# Patient Record
Sex: MALE | Race: WHITE | NOT HISPANIC OR LATINO | Employment: OTHER | URBAN - METROPOLITAN AREA
[De-identification: names, ages, dates, MRNs, and addresses within clinical notes are randomized per-mention and may not be internally consistent; named-entity substitution may affect disease eponyms.]

---

## 2017-04-19 ENCOUNTER — HOSPITAL ENCOUNTER (EMERGENCY)
Facility: HOSPITAL | Age: 37
Discharge: HOME | End: 2017-04-19
Attending: EMERGENCY MEDICINE | Admitting: EMERGENCY MEDICINE

## 2017-04-19 VITALS
RESPIRATION RATE: 18 BRPM | TEMPERATURE: 98.3 F | WEIGHT: 219.36 LBS | DIASTOLIC BLOOD PRESSURE: 76 MMHG | HEART RATE: 76 BPM | SYSTOLIC BLOOD PRESSURE: 136 MMHG | OXYGEN SATURATION: 94 %

## 2017-04-19 DIAGNOSIS — R45.851 SUICIDAL IDEATION: Primary | ICD-10-CM

## 2017-04-19 DIAGNOSIS — R45.1 AGITATION: ICD-10-CM

## 2017-04-19 LAB — ETHANOL EXG-MCNC: 0 MG/DL

## 2017-04-19 PROCEDURE — 99284 EMERGENCY DEPT VISIT MOD MDM: CPT

## 2017-04-19 PROCEDURE — 82075 ASSAY OF BREATH ETHANOL: CPT | Performed by: EMERGENCY MEDICINE

## 2017-07-18 ENCOUNTER — ALLSCRIPTS OFFICE VISIT (OUTPATIENT)
Dept: OTHER | Facility: OTHER | Age: 37
End: 2017-07-18

## 2017-07-18 DIAGNOSIS — T50.901A POISONING BY UNSPECIFIED DRUGS, MEDICAMENTS AND BIOLOGICAL SUBSTANCES, ACCIDENTAL (UNINTENTIONAL), INITIAL ENCOUNTER: ICD-10-CM

## 2017-07-18 DIAGNOSIS — B15.9 HEPATITIS A WITHOUT HEPATIC COMA: ICD-10-CM

## 2017-07-18 DIAGNOSIS — B19.20 VIRAL HEPATITIS C WITHOUT HEPATIC COMA: ICD-10-CM

## 2017-07-18 DIAGNOSIS — B17.10 ACUTE HEPATITIS C WITHOUT HEPATIC COMA: ICD-10-CM

## 2017-07-21 ENCOUNTER — APPOINTMENT (OUTPATIENT)
Dept: LAB | Facility: CLINIC | Age: 37
End: 2017-07-21
Payer: MEDICARE

## 2017-07-21 ENCOUNTER — TRANSCRIBE ORDERS (OUTPATIENT)
Dept: LAB | Facility: CLINIC | Age: 37
End: 2017-07-21

## 2017-07-21 DIAGNOSIS — B15.9 HEPATITIS A WITHOUT HEPATIC COMA: ICD-10-CM

## 2017-07-21 LAB
ALBUMIN SERPL BCP-MCNC: 3.7 G/DL (ref 3.5–5)
ALP SERPL-CCNC: 77 U/L (ref 46–116)
ALT SERPL W P-5'-P-CCNC: 96 U/L (ref 12–78)
AST SERPL W P-5'-P-CCNC: 39 U/L (ref 5–45)
BILIRUB DIRECT SERPL-MCNC: 0.24 MG/DL (ref 0–0.2)
BILIRUB SERPL-MCNC: 0.8 MG/DL (ref 0.2–1)
INR PPP: 0.93 (ref 0.86–1.16)
IRON SATN MFR SERPL: 24 %
IRON SERPL-MCNC: 90 UG/DL (ref 65–175)
PROT SERPL-MCNC: 7.6 G/DL (ref 6.4–8.2)
PROTHROMBIN TIME: 12.7 SECONDS (ref 12.1–14.4)
TIBC SERPL-MCNC: 372 UG/DL (ref 250–450)

## 2017-07-21 PROCEDURE — 80076 HEPATIC FUNCTION PANEL: CPT

## 2017-07-21 PROCEDURE — 87902 NFCT AGT GNTYP ALYS HEP C: CPT

## 2017-07-21 PROCEDURE — 83540 ASSAY OF IRON: CPT

## 2017-07-21 PROCEDURE — 87340 HEPATITIS B SURFACE AG IA: CPT

## 2017-07-21 PROCEDURE — 87522 HEPATITIS C REVRS TRNSCRPJ: CPT

## 2017-07-21 PROCEDURE — 85610 PROTHROMBIN TIME: CPT

## 2017-07-21 PROCEDURE — 86704 HEP B CORE ANTIBODY TOTAL: CPT

## 2017-07-21 PROCEDURE — 36415 COLL VENOUS BLD VENIPUNCTURE: CPT

## 2017-07-21 PROCEDURE — 83550 IRON BINDING TEST: CPT

## 2017-07-21 PROCEDURE — 86803 HEPATITIS C AB TEST: CPT

## 2017-07-21 PROCEDURE — 86705 HEP B CORE ANTIBODY IGM: CPT

## 2017-07-22 LAB
HBV CORE AB SER QL: ABNORMAL
HBV CORE IGM SER QL: ABNORMAL
HBV SURFACE AG SER QL: ABNORMAL
HCV AB SER QL: ABNORMAL

## 2017-07-25 LAB
HCV GENTYP SERPL NAA+PROBE: NORMAL
HCV PLEASE NOTE: NORMAL
HCV RNA SERPL NAA+PROBE-ACNC: NORMAL IU/ML
HCV RNA SERPL NAA+PROBE-LOG IU: 5.75 LOG10 IU/ML
TEST INFORMATION: NORMAL

## 2017-07-26 ENCOUNTER — GENERIC CONVERSION - ENCOUNTER (OUTPATIENT)
Dept: OTHER | Facility: OTHER | Age: 37
End: 2017-07-26

## 2017-08-24 ENCOUNTER — APPOINTMENT (OUTPATIENT)
Dept: LAB | Age: 37
End: 2017-08-24
Payer: MEDICARE

## 2017-08-24 ENCOUNTER — TRANSCRIBE ORDERS (OUTPATIENT)
Dept: ADMINISTRATIVE | Age: 37
End: 2017-08-24

## 2017-08-24 DIAGNOSIS — B17.10 ACUTE HEPATITIS C WITHOUT HEPATIC COMA: ICD-10-CM

## 2017-08-24 DIAGNOSIS — B19.20 VIRAL HEPATITIS C WITHOUT HEPATIC COMA: ICD-10-CM

## 2017-08-24 LAB
BASOPHILS # BLD AUTO: 0.03 THOUSANDS/ΜL (ref 0–0.1)
BASOPHILS NFR BLD AUTO: 0 % (ref 0–1)
EOSINOPHIL # BLD AUTO: 0.39 THOUSAND/ΜL (ref 0–0.61)
EOSINOPHIL NFR BLD AUTO: 4 % (ref 0–6)
ERYTHROCYTE [DISTWIDTH] IN BLOOD BY AUTOMATED COUNT: 12.6 % (ref 11.6–15.1)
ETHANOL SERPL-MCNC: <3 MG/DL (ref 0–3)
HCT VFR BLD AUTO: 41.6 % (ref 36.5–49.3)
HGB BLD-MCNC: 14.3 G/DL (ref 12–17)
LYMPHOCYTES # BLD AUTO: 2.88 THOUSANDS/ΜL (ref 0.6–4.47)
LYMPHOCYTES NFR BLD AUTO: 33 % (ref 14–44)
MCH RBC QN AUTO: 31.3 PG (ref 26.8–34.3)
MCHC RBC AUTO-ENTMCNC: 34.4 G/DL (ref 31.4–37.4)
MCV RBC AUTO: 91 FL (ref 82–98)
MONOCYTES # BLD AUTO: 0.8 THOUSAND/ΜL (ref 0.17–1.22)
MONOCYTES NFR BLD AUTO: 9 % (ref 4–12)
NEUTROPHILS # BLD AUTO: 4.72 THOUSANDS/ΜL (ref 1.85–7.62)
NEUTS SEG NFR BLD AUTO: 54 % (ref 43–75)
NRBC BLD AUTO-RTO: 0 /100 WBCS
PLATELET # BLD AUTO: 346 THOUSANDS/UL (ref 149–390)
PMV BLD AUTO: 9.3 FL (ref 8.9–12.7)
RBC # BLD AUTO: 4.57 MILLION/UL (ref 3.88–5.62)
WBC # BLD AUTO: 8.84 THOUSAND/UL (ref 4.31–10.16)

## 2017-08-24 PROCEDURE — 85025 COMPLETE CBC W/AUTO DIFF WBC: CPT

## 2017-08-24 PROCEDURE — 80307 DRUG TEST PRSMV CHEM ANLYZR: CPT

## 2017-08-24 PROCEDURE — 83010 ASSAY OF HAPTOGLOBIN QUANT: CPT

## 2017-08-24 PROCEDURE — 83883 ASSAY NEPHELOMETRY NOT SPEC: CPT

## 2017-08-24 PROCEDURE — 80320 DRUG SCREEN QUANTALCOHOLS: CPT

## 2017-08-24 PROCEDURE — 82247 BILIRUBIN TOTAL: CPT

## 2017-08-24 PROCEDURE — 82977 ASSAY OF GGT: CPT

## 2017-08-24 PROCEDURE — 84460 ALANINE AMINO (ALT) (SGPT): CPT

## 2017-08-24 PROCEDURE — 87389 HIV-1 AG W/HIV-1&-2 AB AG IA: CPT

## 2017-08-24 PROCEDURE — 82172 ASSAY OF APOLIPOPROTEIN: CPT

## 2017-08-24 PROCEDURE — 36415 COLL VENOUS BLD VENIPUNCTURE: CPT

## 2017-08-25 LAB
AMPHETAMINES UR QL SCN: NEGATIVE NG/ML
BARBITURATES UR QL SCN: NEGATIVE NG/ML
BENZODIAZ UR QL SCN: NEGATIVE NG/ML
BZE UR QL SCN: NEGATIVE NG/ML
CANNABINOIDS UR QL SCN: NEGATIVE NG/ML
METHADONE UR QL SCN: NEGATIVE NG/ML
OPIATES UR QL: NEGATIVE NG/ML
PCP UR QL: NEGATIVE NG/ML
PROPOXYPH UR QL: NEGATIVE NG/ML

## 2017-08-26 LAB
A2 MACROGLOB SERPL-MCNC: 157 MG/DL (ref 110–276)
ALT SERPL W P-5'-P-CCNC: 96 IU/L (ref 0–55)
APO A-I SERPL-MCNC: 163 MG/DL (ref 101–178)
BILIRUB SERPL-MCNC: 0.2 MG/DL (ref 0–1.2)
COMMENT: ABNORMAL
FIBROSIS SCORING:: ABNORMAL
FIBROSIS STAGE SERPL QL: ABNORMAL
GGT SERPL-CCNC: 176 IU/L (ref 0–65)
HAPTOGLOB SERPL-MCNC: 96 MG/DL (ref 34–200)
INTERPRETATIONS: ABNORMAL
LIVER FIBR SCORE SERPL CALC.FIBROSURE: 0.12 (ref 0–0.21)
NECROINFLAMM ACTIVITY SCORING:: ABNORMAL
NECROINFLAMMATORY ACT GRADE SERPL QL: ABNORMAL
NECROINFLAMMATORY ACT SCORE SERPL: 0.48 (ref 0–0.17)
SERVICE CMNT-IMP: ABNORMAL

## 2017-08-27 LAB — HIV 1+2 AB+HIV1 P24 AG SERPL QL IA: NORMAL

## 2017-08-31 ENCOUNTER — GENERIC CONVERSION - ENCOUNTER (OUTPATIENT)
Dept: OTHER | Facility: OTHER | Age: 37
End: 2017-08-31

## 2017-08-31 DIAGNOSIS — R74.8 ABNORMAL LEVELS OF OTHER SERUM ENZYMES: ICD-10-CM

## 2017-08-31 DIAGNOSIS — B19.20 VIRAL HEPATITIS C WITHOUT HEPATIC COMA: ICD-10-CM

## 2017-09-21 ENCOUNTER — GENERIC CONVERSION - ENCOUNTER (OUTPATIENT)
Dept: OTHER | Facility: OTHER | Age: 37
End: 2017-09-21

## 2017-10-18 DIAGNOSIS — B19.20 VIRAL HEPATITIS C WITHOUT HEPATIC COMA: ICD-10-CM

## 2017-10-18 DIAGNOSIS — B17.10 ACUTE HEPATITIS C WITHOUT HEPATIC COMA: ICD-10-CM

## 2017-10-27 ENCOUNTER — APPOINTMENT (OUTPATIENT)
Dept: LAB | Facility: CLINIC | Age: 37
End: 2017-10-27
Payer: MEDICARE

## 2017-10-27 DIAGNOSIS — B17.10 ACUTE HEPATITIS C WITHOUT HEPATIC COMA: ICD-10-CM

## 2017-10-27 LAB
ALBUMIN SERPL BCP-MCNC: 3.7 G/DL (ref 3.5–5)
ALP SERPL-CCNC: 61 U/L (ref 46–116)
ALT SERPL W P-5'-P-CCNC: 28 U/L (ref 12–78)
ANION GAP SERPL CALCULATED.3IONS-SCNC: 5 MMOL/L (ref 4–13)
AST SERPL W P-5'-P-CCNC: 19 U/L (ref 5–45)
BILIRUB SERPL-MCNC: 0.28 MG/DL (ref 0.2–1)
BUN SERPL-MCNC: 8 MG/DL (ref 5–25)
CALCIUM SERPL-MCNC: 8.8 MG/DL (ref 8.3–10.1)
CHLORIDE SERPL-SCNC: 106 MMOL/L (ref 100–108)
CO2 SERPL-SCNC: 28 MMOL/L (ref 21–32)
CREAT SERPL-MCNC: 1 MG/DL (ref 0.6–1.3)
ERYTHROCYTE [DISTWIDTH] IN BLOOD BY AUTOMATED COUNT: 12.4 % (ref 11.6–15.1)
GFR SERPL CREATININE-BSD FRML MDRD: 95 ML/MIN/1.73SQ M
GLUCOSE SERPL-MCNC: 88 MG/DL (ref 65–140)
HCT VFR BLD AUTO: 43.2 % (ref 36.5–49.3)
HGB BLD-MCNC: 15.4 G/DL (ref 12–17)
MCH RBC QN AUTO: 31.8 PG (ref 26.8–34.3)
MCHC RBC AUTO-ENTMCNC: 35.6 G/DL (ref 31.4–37.4)
MCV RBC AUTO: 89 FL (ref 82–98)
PLATELET # BLD AUTO: 337 THOUSANDS/UL (ref 149–390)
PMV BLD AUTO: 8.9 FL (ref 8.9–12.7)
POTASSIUM SERPL-SCNC: 3.9 MMOL/L (ref 3.5–5.3)
PROT SERPL-MCNC: 7.1 G/DL (ref 6.4–8.2)
RBC # BLD AUTO: 4.85 MILLION/UL (ref 3.88–5.62)
SODIUM SERPL-SCNC: 139 MMOL/L (ref 136–145)
WBC # BLD AUTO: 7.71 THOUSAND/UL (ref 4.31–10.16)

## 2017-10-27 PROCEDURE — 80053 COMPREHEN METABOLIC PANEL: CPT

## 2017-10-27 PROCEDURE — 87522 HEPATITIS C REVRS TRNSCRPJ: CPT

## 2017-10-27 PROCEDURE — 36415 COLL VENOUS BLD VENIPUNCTURE: CPT

## 2017-10-27 PROCEDURE — 85027 COMPLETE CBC AUTOMATED: CPT

## 2017-10-29 ENCOUNTER — GENERIC CONVERSION - ENCOUNTER (OUTPATIENT)
Dept: OTHER | Facility: OTHER | Age: 37
End: 2017-10-29

## 2017-10-31 ENCOUNTER — GENERIC CONVERSION - ENCOUNTER (OUTPATIENT)
Dept: OTHER | Facility: OTHER | Age: 37
End: 2017-10-31

## 2017-10-31 LAB
HCV RNA SERPL NAA+PROBE-ACNC: 40 IU/ML
HCV RNA SERPL NAA+PROBE-LOG IU: 1.6 LOG10 IU/ML
TEST INFORMATION: NORMAL

## 2017-11-02 ENCOUNTER — GENERIC CONVERSION - ENCOUNTER (OUTPATIENT)
Dept: OTHER | Facility: OTHER | Age: 37
End: 2017-11-02

## 2017-11-27 DIAGNOSIS — B19.20 VIRAL HEPATITIS C WITHOUT HEPATIC COMA: ICD-10-CM

## 2017-12-13 ENCOUNTER — GENERIC CONVERSION - ENCOUNTER (OUTPATIENT)
Dept: OTHER | Facility: OTHER | Age: 37
End: 2017-12-13

## 2017-12-13 ENCOUNTER — TRANSCRIBE ORDERS (OUTPATIENT)
Dept: LAB | Facility: CLINIC | Age: 37
End: 2017-12-13

## 2017-12-13 ENCOUNTER — APPOINTMENT (OUTPATIENT)
Dept: LAB | Facility: CLINIC | Age: 37
End: 2017-12-13
Payer: MEDICARE

## 2017-12-13 DIAGNOSIS — B19.20 VIRAL HEPATITIS C WITHOUT HEPATIC COMA: ICD-10-CM

## 2017-12-13 LAB
ALBUMIN SERPL BCP-MCNC: 3.9 G/DL (ref 3.5–5)
ALP SERPL-CCNC: 66 U/L (ref 46–116)
ALT SERPL W P-5'-P-CCNC: 35 U/L (ref 12–78)
ANION GAP SERPL CALCULATED.3IONS-SCNC: 8 MMOL/L (ref 4–13)
AST SERPL W P-5'-P-CCNC: 18 U/L (ref 5–45)
BASOPHILS # BLD AUTO: 0.03 THOUSANDS/ΜL (ref 0–0.1)
BASOPHILS NFR BLD AUTO: 0 % (ref 0–1)
BILIRUB SERPL-MCNC: 0.44 MG/DL (ref 0.2–1)
BUN SERPL-MCNC: 10 MG/DL (ref 5–25)
CALCIUM SERPL-MCNC: 9.2 MG/DL (ref 8.3–10.1)
CHLORIDE SERPL-SCNC: 105 MMOL/L (ref 100–108)
CO2 SERPL-SCNC: 26 MMOL/L (ref 21–32)
CREAT SERPL-MCNC: 0.9 MG/DL (ref 0.6–1.3)
EOSINOPHIL # BLD AUTO: 0.3 THOUSAND/ΜL (ref 0–0.61)
EOSINOPHIL NFR BLD AUTO: 3 % (ref 0–6)
ERYTHROCYTE [DISTWIDTH] IN BLOOD BY AUTOMATED COUNT: 12.8 % (ref 11.6–15.1)
GFR SERPL CREATININE-BSD FRML MDRD: 108 ML/MIN/1.73SQ M
GLUCOSE SERPL-MCNC: 127 MG/DL (ref 65–140)
HCT VFR BLD AUTO: 44.3 % (ref 36.5–49.3)
HGB BLD-MCNC: 15.4 G/DL (ref 12–17)
LYMPHOCYTES # BLD AUTO: 3.89 THOUSANDS/ΜL (ref 0.6–4.47)
LYMPHOCYTES NFR BLD AUTO: 34 % (ref 14–44)
MCH RBC QN AUTO: 31 PG (ref 26.8–34.3)
MCHC RBC AUTO-ENTMCNC: 34.8 G/DL (ref 31.4–37.4)
MCV RBC AUTO: 89 FL (ref 82–98)
MONOCYTES # BLD AUTO: 0.95 THOUSAND/ΜL (ref 0.17–1.22)
MONOCYTES NFR BLD AUTO: 8 % (ref 4–12)
NEUTROPHILS # BLD AUTO: 6.35 THOUSANDS/ΜL (ref 1.85–7.62)
NEUTS SEG NFR BLD AUTO: 55 % (ref 43–75)
NRBC BLD AUTO-RTO: 0 /100 WBCS
PLATELET # BLD AUTO: 359 THOUSANDS/UL (ref 149–390)
PMV BLD AUTO: 9.1 FL (ref 8.9–12.7)
POTASSIUM SERPL-SCNC: 3.6 MMOL/L (ref 3.5–5.3)
PROT SERPL-MCNC: 7.7 G/DL (ref 6.4–8.2)
RBC # BLD AUTO: 4.96 MILLION/UL (ref 3.88–5.62)
SODIUM SERPL-SCNC: 139 MMOL/L (ref 136–145)
WBC # BLD AUTO: 11.55 THOUSAND/UL (ref 4.31–10.16)

## 2017-12-13 PROCEDURE — 80053 COMPREHEN METABOLIC PANEL: CPT

## 2017-12-13 PROCEDURE — 87522 HEPATITIS C REVRS TRNSCRPJ: CPT

## 2017-12-13 PROCEDURE — 85025 COMPLETE CBC W/AUTO DIFF WBC: CPT

## 2017-12-13 PROCEDURE — 36415 COLL VENOUS BLD VENIPUNCTURE: CPT

## 2017-12-16 LAB
HCV RNA SERPL NAA+PROBE-ACNC: NORMAL IU/ML
TEST INFORMATION: NORMAL

## 2017-12-19 ENCOUNTER — GENERIC CONVERSION - ENCOUNTER (OUTPATIENT)
Dept: OTHER | Facility: OTHER | Age: 37
End: 2017-12-19

## 2018-01-09 ENCOUNTER — ALLSCRIPTS OFFICE VISIT (OUTPATIENT)
Dept: OTHER | Facility: OTHER | Age: 38
End: 2018-01-09

## 2018-01-09 DIAGNOSIS — B18.2 CHRONIC VIRAL HEPATITIS C (HCC): ICD-10-CM

## 2018-01-09 NOTE — RESULT NOTES
Discussion/Summary    Leif Alvarado to review the labs  Patient aware of results, he states he has not missed a dose of harvoni but has been taking it a different times of the day  He said he will make an effort to take the same time every day  He is not on a PPI or acid reducer either  Verified Results  (1) HEP C RNA PCR, QUANTITATIVE 27Oct2017 10:59AM Rahul ALEXIS Order Number: [id number]     Test Name Result Flag Reference   HCV PCR QUANTITATIVE 40 IU/mL     HCV QUANTITATIVE LOG 1 602 log10 IU/mL     Performed at:  [facility] [address]  [id number]  : Phil Gao MD, Phone:  [phone]   TEST INFORMATION Comment     The quantitative range of this assay is 15 IU/mL to 100 million IU/mL         Signatures   Electronically signed by : Kenneth Dooley, ; Nov 1 2017  3:01PM EST                       (Author)

## 2018-01-10 NOTE — PROGRESS NOTES
Assessment   1  Hepatitis C, chronic (070 54) (B18 2)    Plan   Hepatitis C, chronic    · (1) HEPATIC FUNCTION PANEL; Status:Active; Requested TDT:[id number]; Perform:[facility]; JUSTIN:42ZHS8136;DVEEMRC;UQF:XCLGPCOOV C, chronic; Ordered By:Yaquelin Dodson;   · (Q) HEPATITIS C VIRAL RNA, QN REAL TIME PCR W/REFLS; Status:Active; Requested    [id number]; Perform:[facility]; SWETA:56QOQ0608;AIKAXMP;NXO:TURBZFRUZ C, chronic; Ordered By:Yaquelin Dodson; Discussion/Summary   Discussion Summary:       Chronic hepatitis C status post 12 weeks treatment with Harvoni  He has been doing well without any side effects  History of depression     Discussed with patient and his wife in detail about virologic response at the end of the treatment but we need to do the PCR in about 3 months to confirm sustained virologic response  Check hepatitis C PCR and liver enzymes in 3 months        Counseling Documentation With Imm: The patient was counseled regarding instructions for management,-- risk factor reductions,-- patient and family education,-- risks and benefits of treatment options,-- importance of compliance with treatment  Chief Complaint   Chief Complaint Free Text Note Form:  for hepatitis C      History of Present Illness   HPI:  Jin Llanos came in for follow-up along with his mother  He completed Helen Dec last month and his PCR came back as negative  Patient has regular bowel movements and denies any blood or mucus in the stool  Appetite is good and denies any recent weight loss  Denies any abdominal pain, nausea, or vomiting  Has no heartburn or acid reflux  Denies any difficulty swallowing  History Reviewed: The history was obtained today from the patient and I agree with the documented history  Review of Systems   Complete-Male GI Adult:      Constitutional: No fever or chills, feels well, no tiredness, no recent weight gain or weight loss        Eyes: No complaints of eye pain, no red eyes, no discharge from eyes, no itchy eyes  ENT: no complaints of earache, no hearing loss, no nosebleeds, no nasal discharge, no sore throat, no hoarseness  Cardiovascular: No complaints of slow heart rate, no fast heart rate, no chest pain, no palpitations, no leg claudication, no lower extremity  Respiratory: No complaints of shortness of breath, no wheezing, no cough, no SOB on exertion, no orthopnea or PND  Gastrointestinal: as noted in HPI  Genitourinary: No complaints of dysuria, no incontinence, no hesitancy, no nocturia, no genital lesion, no testicular pain  Musculoskeletal: No complaints of arthralgia, no myalgias, no joint swelling or stiffness, no limb pain or swelling  Integumentary: No complaints of skin rash or skin lesions, no itching, no skin wound, no dry skin  Neurological: No compliants of headache, no confusion, no convulsions, no numbness or tingling, no dizziness or fainting, no limb weakness, no difficulty walking  Psychiatric: Is not suicidal, no sleep disturbances, no anxiety or depression, no change in personality, no emotional problems  Endocrine: No complaints of proptosis, no hot flashes, no muscle weakness, no erectile dysfunction, no deepening of the voice, no feelings of weakness  Hematologic/Lymphatic: No complaints of swollen glands, no swollen glands in the neck, does not bleed easily, no easy bruising  Active Problems   1  Acid reflux disease (530 81) (K21 9)   2  Acute hepatitis C virus infection without hepatic coma (070 51) (B17 10)   3  Depression (311) (F32 9)   4  Drug-induced hepatitis (573 3,E947 9) (K71 6,T50 905A)   5  Elevated ALT measurement (790 4) (R74 0)   6  Elevated liver enzymes (790 5) (R74 8)   7  Hepatitis C virus infection without hepatic coma, unspecified chronicity (070 70) (B19 20)   8   Hepatitis, infectious (070 1) (B15 9)    Past Medical History   Active Problems And Past Medical History Reviewed: The active problems and past medical history were reviewed and updated today  Surgical History   1  Denied: History of Reported Prior Surgical / Procedural History  Surgical History Reviewed: The surgical history was reviewed and updated today  Family History   Mother    1  Denied: Family history of Colon cancer   2  Denied: Family history of Crohn disease   3  Denied: Family history of Liver disease  Father    4  Denied: Family history of Colon cancer   5  Denied: Family history of Crohn disease   6  Denied: Family history of Liver disease  Grandmother    7  Family history of Liver disease  Family History Reviewed: The family history was reviewed and updated today  Social History    · Current every day smoker (305 1) (F17 200)   · Intravenous drug user (305 90) (F19 90)   · Social alcohol use (Z78 9)  Social History Reviewed: The social history was reviewed and updated today  The social history was reviewed and is unchanged  Current Meds    1  Abilify 5 MG Oral Tablet; Therapy: 00Qqc4212 to Recorded   2  ChlorproMAZINE HCl - 200 MG Oral Tablet; Therapy: 76QAN2900 to Recorded   3  DiazePAM 10 MG Oral Tablet; Therapy: 79HXS7393 to Recorded   4  Harvoni  MG Oral Tablet; TAKE 1 TABLET ONCE; Therapy: 45VKR3303 to (Evaluate:27Jyp0432); Last Rx:21Sep2017 Ordered   5  LamoTRIgine 25 MG Oral Tablet; Therapy: 52Nea9898 to Recorded   6  Lithium Carbonate 300 MG Oral Capsule; Therapy: 87ZBX3574 to Recorded  Medication List Reviewed: The medication list was reviewed and updated today  Allergies   1  Sulfa Drugs    Vitals   Vital Signs    Recorded: 61KXA0859 08:20AM   Temperature 97 6 F   Heart Rate 76   Systolic 261   Diastolic 78   Weight 956 lb    BMI Calculated 30 71   BSA Calculated 2 32   O2 Saturation 98     Physical Exam        Constitutional      General appearance: No acute distress, well appearing and well nourished         Eyes Conjunctiva and lids: No swelling, erythema, or discharge  Pupils and irises: Equal, round and reactive to light  Ears, Nose, Mouth, and Throat      External inspection of ears and nose: Normal        Oropharynx: Normal with no erythema, edema, exudate or lesions  Pulmonary      Respiratory effort: No increased work of breathing or signs of respiratory distress  Auscultation of lungs: Clear to auscultation, equal breath sounds bilaterally, no wheezes, no rales, no rhonci  Cardiovascular      Palpation of heart: Normal PMI, no thrills  Auscultation of heart: Normal rate and rhythm, normal S1 and S2, without murmurs  Examination of extremities for edema and/or varicosities: Normal        Carotid pulses: Normal        Abdomen      Abdomen: Non-tender, no masses  Liver and spleen: No hepatomegaly or splenomegaly  Lymphatic      Palpation of lymph nodes in neck: No lymphadenopathy  Musculoskeletal      Gait and station: Normal        Digits and nails: Normal without clubbing or cyanosis  Inspection/palpation of joints, bones, and muscles: Normal        Skin      Skin and subcutaneous tissue: Normal without rashes or lesions         Psychiatric      Orientation to person, place and time: Normal        Mood and affect: Normal           Signatures    Electronically signed by : MECHELLE Stroud ; Jan 9 2018  8:50AM EST                       (Author)

## 2018-01-11 NOTE — RESULT NOTES
Discussion/Summary   Please inform patient his liver function looks good, he does appear to have chronic hepatitis C as suspected and his genotype is 1b (knowing this will help us decide what treatments will be good for him)  Please make sure patient had further tests performed (the ones we ordered included MELANIA, AMA, ASMA, Fibrosure testing, serum alcohol level, urine drug screen) and we can inform him of those results  Proceed with office visit in ~3 months  Verified Results  (1) HEPATIC FUNCTION PANEL 21Jul2017 01:02PM Stanford Guillaume     Test Name Result Flag Reference   ALBUMIN 3 7 g/dL  3 5-5 0   ALK PHOSPHATAS 77 U/L     ALT (SGPT) 96 U/L H 12-78   AST(SGOT) 39 U/L  5-45   BILI, DIRECT 0 24 mg/dL H 0 00-0 20   BILI, TOTAL 0 80 mg/dL  0 20-1 00   TOTAL PROTEIN 7 6 g/dL  6 4-8 2     (1) PT WITH INR 21Jul2017 01:02PM [facility] Order Number: [id number]     Test Name Result Flag Reference   INR 0 93  0 86-1 16   PT 12 7 seconds  12 1-14 4     (1) CHRONIC HEPATITIS PANEL 21Jul2017 01:02PM Stanford Guillaume     Test Name Result Flag Reference   HEPATITIS B SURFACE ANTIGEN Non-reactive  Non-reactive, NonReactive - Confirmed   HEPATITIS C ANTIBODY High Reactive A Non-reactive   HEPATITIS B CORE IGM ANTIBODY Non-reactive  Non-reactive   HEPATITIS B CORE TOTAL ANTIBODY Non-reactive  Non-reactive     (1) HEP C RNA PCR, QUANTITATIVE 21Jul2017 01:02PM [facility] Order Number: [id number]     Test Name Result Flag Reference   HCV PCR QUANTITATIVE 733399 IU/mL     HCV QUANTITATIVE LOG 5 753 log10 IU/mL     Performed at:  [address] [place] [address]  [id number]  : Yony Norris MD, Phone:  [phone]   TEST INFORMATION Comment     The quantitative range of this assay is 15 IU/mL to 100 million IU/mL       (1) HEPATITIS C GENOTYPING 21Jul2017 01:02PM [facility] Order Number: [id number]     Test Name Result Flag Reference   HEPATITIS C GENOTYPING 1b     HEP C GENOTYPE NOTE Comment     This test was developed and its performance characteristics determined  by [facility]   It has not been cleared or approved by the [place]  Food and  Drug Administration  The FDA has determined that such clearance or approval is not  necessary  This test is used for clinical purposes  It should not be  regarded as investigational or for research  Performed at:  [address]  [id number]  : Glory Campos MD, Phone:  [phone]     (1) IRON SATURATION %, TIBC 32ZDY6026 01:02PM Wendy Sender     Test Name Result Flag Reference   IRON SATURATION 24 %     TOTAL IRON BINDING CAPACITY 372 ug/dL  250-450   IRON 90 ug/dL     Patients treated with metal-binding drugs (ie  Deferoxamine) may have depressed iron values

## 2018-01-11 NOTE — RESULT NOTES
Discussion/Summary   Please inform patient his labs show no other abnormalities; there is some active inflammation related to the hepatitis C which is expected  Please send labs MELANIA, AMA and ASMA, which I've ordered to complete workup  He should follow up in the office in 2 months; please make sure he has appointment  Thank you     Verified Results  (1) DRUG ABUSE SCREEN, URINE ROUTINE 86Lto8333 10:31AM [facility] Order Number: [id number]     Test Name Result Flag Reference   AMPHETAMINE SCREEN URINE Negative ng/mL  Rhkllq=9045   Amphetamine test includes Amphetamine and Methamphetamine  BARBITURATE SCREEN URINE Negative ng/mL  Jxkasx=804   BENZODIAZEPINE SCREEN, URINE Negative ng/mL  Wmpfgz=600   CANNABINOID SCREEN URINE Negative ng/mL  Cutoff=50   COCAINE(METAB  )SCREEN, URINE Negative ng/mL  Ntoinx=330   METHADONE SCREEN, URINE Negative ng/mL  Xfzbdo=333   OPIATE SCREEN URINE Negative ng/mL  Fxywzp=407   Opiate test includes Codeine and Morphine only     PHENCYCLIDINE (PCP), QUAL, UR Negative ng/mL  Cutoff=25   PROPOXYPHENE, SCREEN Negative ng/mL  Izzrbx=911   Performed at:  [address] [place] [address]  [id number]  : Eli Willis MD, Phone:  [phone]     (1) ALCOHOL,MEDICAL (SERUM) 71OXC8399 10:31AM [facility] Order Number: [id number]     Test Name Result Flag Reference   ALCOHOL,MEDICAL (SERUM) <3 mg/dL  0-3     (1) HCV FIBROSURE 30QWL9505 10:31AM [facility] Order Number: [id number]     Test Name Result Flag Reference   Fibrosis Score 0 12  0 00 - 0 21   Fibrosis Stage Comment     F0 - No fibrosis   Necroinflammat Activity Score 0 48 H 0 00 - 0 17   Necroinflammat Activity Grade A1-A2     Alpha 2-Macroglobulins, Qn 157 mg/dL  110 - 276   ALT (SGPT) P5P 96 IU/L H 0 - 55   Interpretations: Comment     Quantitative results of 6 biochemical tests are analyzed using  a computational algorithm to provide a quantitative surrogate  marker (0 0-1 0) for liver fibrosis (METAVIR F0-F4) and for  necroinflammatory activity (METAVIR A0-A3)  Fibrosis Scoring: Comment     <0 21 = Stage F0 - No fibrosis  0 21 - 0 27 = Stage F0 - F1  0 27 - 0 31 = Stage F1 - Portal fibrosis  0 31 - 0 48 = Stage F1 - F2  0 48 - 0 58 = Stage F2 - Bridging fibrosis with few septa  0 58 - 0 72 = Stage F3 - Bridging fibrosis with many septa  0 72 - 0 74 = Stage F3 - F4        >0 74 = Stage F4 - Cirrhosis   Necroinflamm Activity Scoring: Comment     <0 17 = Grade A0 - No Activity  0 17 - 0 29 = Grade A0 - A1  0 29 - 0 36 = Grade A1 - Minimal activity  0 36 - 0 52 = Grade A1 - A2  0 52 - 0 60 = Grade A2 - Moderate activity  0 60 - 0 62 = Grade A2 - A3        >0 62 = Grade A3 - Severe activity   Limitations: Comment     The negative predictive value of a Fibrotest score <0 31 (absence of  clinically significant fibrosis) was 85% when compared to liver biopsy  in 1,270 HCV infected patients with a 38% prevalence of significant  liver fibrosis (F2, 3 or 4)  The positive predictive value of a Fibro-  test score >0 48 (F2, 3, 4) was 61% in that same patient cohort  HCV  FibroSURE is not recommended in patients with Andrena Dry Disease, acute  hemolysis (e g  HCV ribavirin therapy mediated hemolysis) acute hepa-  titis of the liver, extra-hepatic cholestasis, transplant patients,  and/or renal insufficiency patients  Any of these clinical situations  may lead to inaccurate quantitative predictions of fibrosis and  necroinflammatory activity in the liver  Comment: Comment     This test was developed and its performance characteristics determined  by [facility]   It has not been cleared or approved by the Food and Drug  Administration  The FDA has determined that such clearance or  approval is not necessary  For questions regarding this report please contact customer service  at [phone]     Bilirubin, Total 0 2 mg/dL  0 0 - 1 2    IU/L H 0 - 65 Performed at:  [address]  [id number]  : Jose David Awan MD, Phone:  [phone]   HAPTOGLOBIN 96 mg/dL  34 - 200   APOA 163 mg/dL  101 - 178     (1) HIV AG/AB Ximena GUIDRY 10ZHK4816 10:31AM Silvio Pitts Order Number: [id number]     Test Name Result Flag Reference   HIV 1/2 AND P24 Non-Reactive  Non-Reactive   This test detects HIV 1, HIV2 and p24 Antigen  (1) CBC/PLT/DIFF 24Aug2017 10:31AM Stanford Guillaume   TW Order Number: [id number]     Test Name Result Flag Reference   WBC COUNT 8 84 Thousand/uL  4 31-10 16   RBC COUNT 4 57 Million/uL  3 88-5 62   HEMOGLOBIN 14 3 g/dL  12 0-17 0   HEMATOCRIT 41 6 %  36 5-49 3   MCV 91 fL  82-98   MCH 31 3 pg  26 8-34 3   MCHC 34 4 g/dL  31 4-37 4   RDW 12 6 %  11 6-15 1   MPV 9 3 fL  8 9-12 7   PLATELET COUNT 583 Thousands/uL  149-390   nRBC AUTOMATED 0 /100 WBCs     NEUTROPHILS RELATIVE PERCENT 54 %  43-75   LYMPHOCYTES RELATIVE PERCENT 33 %  14-44   MONOCYTES RELATIVE PERCENT 9 %  4-12   EOSINOPHILS RELATIVE PERCENT 4 %  0-6   BASOPHILS RELATIVE PERCENT 0 %  0-1   NEUTROPHILS ABSOLUTE COUNT 4 72 Thousands/? ??L  1 85-7 62   LYMPHOCYTES ABSOLUTE COUNT 2 88 Thousands/? ??L  0 60-4 47   MONOCYTES ABSOLUTE COUNT 0 80 Thousand/? ??L  0 17-1 22   EOSINOPHILS ABSOLUTE COUNT 0 39 Thousand/? ??L  0 00-0 61   BASOPHILS ABSOLUTE COUNT 0 03 Thousands/? ??L  0 00-0 10       Plan  Elevated liver enzymes, Hepatitis C virus infection without hepatic coma, unspecified  chronicity    · (1) MELANIA SCREEN W/REFLEX TO TITER/PATTERN; Status:Active; Requested  for:83Gvs0598;    · (1) MITOCHONDRIAL ANTIBODY; Status:Active; Requested for:01Pon0171;    · (1) SMOOTH MUSCLE ANTIBODY; Status:Active;  Requested for:72Jpj5378;

## 2018-01-13 VITALS
HEART RATE: 83 BPM | OXYGEN SATURATION: 97 % | SYSTOLIC BLOOD PRESSURE: 122 MMHG | TEMPERATURE: 95 F | WEIGHT: 163 LBS | DIASTOLIC BLOOD PRESSURE: 74 MMHG

## 2018-01-13 NOTE — MISCELLANEOUS
Message  I submitted prior authorization for hepatitis c medication, harvoni x 12 weeks, today to [facility]    Patient approved 9/25/2017 approval faxed to [facility], awaiting start date  1  Called patient 10/3/2017 awaiting start 2  date  Called [facility], medication was sent to patient home 9/27/17, called patient today 10/16/2017 awaiting start 3  date  2  Spoke with patient, he started 10/1/2017  4wk 10/30/17, 8wk 11/25/2017, 12wk12/31/2017, 12 week svr due 3/25/18  he currently denies symptoms  4  Hep C RNA QN "40" on 10/27/2017  Scripts for 8 wk bw given to patient at his office visit  11/2/2017 5  I called patient to see how he was doing and to check on when he was getting labs drawn  I reached , left message  6  HCV not detected 12/13/17 7  Patient had office visit 1/9/18  Scripts given for 12 week svr due March 2018  8        1 Amended By: Toya Orlando; Sep 26 2017 11:08 AM EST   2 Amended By: Toya Orlando; Oct 03 2017 11:31 AM EST   3 Amended By: Toya Orlando; Oct 16 2017 4:01 PM EST   4 Amended By: Toya Orlando; Oct 18 2017 12:37 PM EST   5 Amended By: Car Blank; Nov 06 2017 3:18 PM EST   6 Amended By: Car Blank; Dec 06 2017 4:20 PM EST   7 Amended By: Car Blank; Dec 26 2017 3:58 PM EST   8 Amended By: Car Blank; Jan 09 2018 9:15 AM EST    Active Problems   1  Acid reflux disease (530 81) (K21 9)  2  Acute hepatitis C virus infection without hepatic coma (070 51) (B17 10)  3  Depression (311) (F32 9)  4  Drug-induced hepatitis (573 3,E947 9) (T50 901A,K71 6)  5  Elevated ALT measurement (790 4) (R74 0)  6  Elevated liver enzymes (790 5) (R74 8)  7  Hepatitis C virus infection without hepatic coma, unspecified chronicity (070 70) (B19 20)  8  Hepatitis, infectious (070 1) (B15 9)    Current Meds  1  Abilify 5 MG Oral Tablet (ARIPiprazole); Therapy: 01Lsf0411 to Recorded  2  ChlorproMAZINE HCl - 200 MG Oral Tablet;    Therapy: 91NAW4734 to Recorded  3  DiazePAM 10 MG Oral Tablet; Therapy: 59HWQ6468 to Recorded  4  LamoTRIgine 25 MG Oral Tablet; Therapy: 52Avy6684 to Recorded  5  Lithium Carbonate 300 MG Oral Capsule; Therapy: 82IQF9123 to Recorded    Allergies   1   Sulfa Drugs    Plan  Acute hepatitis C virus infection without hepatic coma    · Start: Harvoni  MG Oral Tablet; TAKE 1 TABLET ONCE    Signatures   Electronically signed by : Asmita Harrington RN; Oct 18 2017 12:37PM EST                       (Author)    Electronically signed by : Arzella Sicard, ; Jan 9 2018  9:15AM EST                       (Author)

## 2018-01-15 NOTE — RESULT NOTES
Discussion/Summary   Gia Noe to review labs      Hepatitis C patient and treatment  Liver enzymes are normal     Verified Results  (1) CBC/ PLT (NO DIFF) 27Oct2017 10:59AM Moses Wise Order Number: [id number]     Test Name Result Flag Reference   HEMATOCRIT 43 2 %  36 5-49 3   HEMOGLOBIN 15 4 g/dL  12 0-17 0   MCHC 35 6 g/dL  31 4-37 4   MCH 31 8 pg  26 8-34 3   MCV 89 fL  82-98   PLATELET COUNT 883 Thousands/uL  149-390   RBC COUNT 4 85 Million/uL  3 88-5 62   RDW 12 4 %  11 6-15 1   WBC COUNT 7 71 Thousand/uL  4 31-10 16   MPV 8 9 fL  8 9-12 7     (1) COMPREHENSIVE METABOLIC PANEL 10PPF0661 82:72OU Pam ALEXIS Order Number: [id number]     Test Name Result Flag Reference   GLUCOSE,RANDM 88 mg/dL     If the patient is fasting, the ADA then defines impaired fasting glucose as > 100 mg/dL and diabetes as > or equal to 123 mg/dL  Specimen collection should occur prior to Sulfasalazine administration due to the potential for falsely depressed results  Specimen collection should occur prior to Sulfapyridine administration due to the potential for falsely elevated results  SODIUM 139 mmol/L  136-145   POTASSIUM 3 9 mmol/L  3 5-5 3   CHLORIDE 106 mmol/L  100-108   CARBON DIOXIDE 28 mmol/L  21-32   ANION GAP (CALC) 5 mmol/L  4-13   BLOOD UREA NITROGEN 8 mg/dL  5-25   CREATININE 1 00 mg/dL  0 60-1 30   Standardized to IDMS reference method   CALCIUM 8 8 mg/dL  8 3-10 1   BILI, TOTAL 0 28 mg/dL  0 20-1 00   ALK PHOSPHATAS 61 U/L     ALT (SGPT) 28 U/L  12-78   Specimen collection should occur prior to Sulfasalazine and/or Sulfapyridine administration due to the potential for falsely depressed results  AST(SGOT) 19 U/L  5-45   Specimen collection should occur prior to Sulfasalazine administration due to the potential for falsely depressed results     ALBUMIN 3 7 g/dL  3 5-5 0   TOTAL PROTEIN 7 1 g/dL  6 4-8 2   eGFR 95 ml/min/1 73sq m     National Kidney Disease Education Program recommendations are as follows:  GFR calculation is accurate only with a steady state creatinine  Chronic Kidney disease less than 60 ml/min/1 73 sq  meters  Kidney failure less than 15 ml/min/1 73 sq  meters

## 2018-01-15 NOTE — MISCELLANEOUS
Dear Leo Traore is your 4 blood work for Hepatitis C  Please complete these at your earliest convenience  Please call our office once you  have had your blood test is completed so that I may look for your results  Thank you, and please do not hesitate to contact me with any questions at [phone] (Monday-Friday 8AM-4:30PM)    Be Well,        Electronically signed by:Clinton Alvarado RN  Oct 18 2017 12:42PM EST Author

## 2018-01-22 VITALS
HEART RATE: 83 BPM | OXYGEN SATURATION: 98 % | SYSTOLIC BLOOD PRESSURE: 144 MMHG | TEMPERATURE: 97.3 F | DIASTOLIC BLOOD PRESSURE: 82 MMHG | WEIGHT: 223 LBS | BODY MASS INDEX: 29.02 KG/M2

## 2018-01-23 VITALS
BODY MASS INDEX: 30.71 KG/M2 | HEART RATE: 76 BPM | DIASTOLIC BLOOD PRESSURE: 78 MMHG | WEIGHT: 236 LBS | OXYGEN SATURATION: 98 % | TEMPERATURE: 97.6 F | SYSTOLIC BLOOD PRESSURE: 124 MMHG

## 2018-01-23 NOTE — RESULT NOTES
Discussion/Summary   Please advise patient that his Hepatitis C PCR test did not detect any virus - this means his treatment appears to be working  Please follow up in the office with Dr Rodney Varela as planned (I see he has appointment in January) and continue the Michael Prince  Thank you     Verified Results  (1) HEP C RNA PCR, QUANTITATIVE 19Ahd6501 09:47AM Evaristo Baronin Order Number: [id number]     Test Name Result Flag Reference   HCV PCR QUANTITATIVE      HCV Not Detected IU/mL   TEST INFORMATION Comment     The quantitative range of this assay is 15 IU/mL to 100 million IU/mL      Performed at:  [facility] [address]  [id number]  : Marsha Martinez MD, Phone:  [phone]

## 2018-01-23 NOTE — RESULT NOTES
Discussion/Summary   Please advise patient, liver function tests and cell counts appear normal, he can continue taking the medication for hepatitis C and follow up with Dr Juan Diego Conklin as planned  Thank you     Verified Results  (1) COMPREHENSIVE METABOLIC PANEL [id number] 51:70HL [place] Median Order Number: [id number]     Test Name Result Flag Reference   GLUCOSE,RANDM 127 mg/dL     If the patient is fasting, the ADA then defines impaired fasting glucose as > 100 mg/dL and diabetes as > or equal to 123 mg/dL  Specimen collection should occur prior to Sulfasalazine administration due to the potential for falsely depressed results  Specimen collection should occur prior to Sulfapyridine administration due to the potential for falsely elevated results  SODIUM 139 mmol/L  136-145   POTASSIUM 3 6 mmol/L  3 5-5 3   CHLORIDE 105 mmol/L  100-108   CARBON DIOXIDE 26 mmol/L  21-32   ANION GAP (CALC) 8 mmol/L  4-13   BLOOD UREA NITROGEN 10 mg/dL  5-25   CREATININE 0 90 mg/dL  0 60-1 30   Standardized to IDMS reference method   CALCIUM 9 2 mg/dL  8 3-10 1   BILI, TOTAL 0 44 mg/dL  0 20-1 00   ALK PHOSPHATAS 66 U/L     ALT (SGPT) 35 U/L  12-78   Specimen collection should occur prior to Sulfasalazine and/or Sulfapyridine administration due to the potential for falsely depressed results  AST(SGOT) 18 U/L  5-45   Specimen collection should occur prior to Sulfasalazine administration due to the potential for falsely depressed results  ALBUMIN 3 9 g/dL  3 5-5 0   TOTAL PROTEIN 7 7 g/dL  6 4-8 2   eGFR 108 ml/min/1 73sq [facility] Disease Education Program recommendations are as follows:  GFR calculation is accurate only with a steady state creatinine  Chronic Kidney disease less than 60 ml/min/1 73 sq  meters  Kidney failure less than 15 ml/min/1 73 sq  meters       (1) CBC/PLT/DIFF [id number] 09:47AM [place] Median Order Number: [id number]     Test Name Result Flag Reference   WBC COUNT 11 55 Thousand/uL H 4 31-10 16   RBC COUNT 4 96 Million/uL  3 88-5 62   HEMOGLOBIN 15 4 g/dL  12 0-17 0   HEMATOCRIT 44 3 %  36 5-49 3   MCV 89 fL  82-98   MCH 31 0 pg  26 8-34 3   MCHC 34 8 g/dL  31 4-37 4   RDW 12 8 %  11 6-15 1   MPV 9 1 fL  8 9-12 7   PLATELET COUNT 636 Thousands/uL  149-390   nRBC AUTOMATED 0 /100 WBCs     NEUTROPHILS RELATIVE PERCENT 55 %  43-75   LYMPHOCYTES RELATIVE PERCENT 34 %  14-44   MONOCYTES RELATIVE PERCENT 8 %  4-12   EOSINOPHILS RELATIVE PERCENT 3 %  0-6   BASOPHILS RELATIVE PERCENT 0 %  0-1   NEUTROPHILS ABSOLUTE COUNT 6 35 Thousands/? ??L  1 85-7 62   LYMPHOCYTES ABSOLUTE COUNT 3 89 Thousands/? ??L  0 60-4 47   MONOCYTES ABSOLUTE COUNT 0 95 Thousand/? ??L  0 17-1 22   EOSINOPHILS ABSOLUTE COUNT 0 30 Thousand/? ??L  0 00-0 61   BASOPHILS ABSOLUTE COUNT 0 03 Thousands/? ??L  0 00-0 10

## 2018-06-13 ENCOUNTER — TELEPHONE (OUTPATIENT)
Dept: GASTROENTEROLOGY | Facility: AMBULARY SURGERY CENTER | Age: 38
End: 2018-06-13

## 2018-06-13 DIAGNOSIS — B18.2 CHRONIC HEPATITIS C WITHOUT HEPATIC COMA (HCC): Primary | ICD-10-CM

## 2018-06-13 RX ORDER — LEDIPASVIR AND SOFOSBUVIR 90; 400 MG/1; MG/1
1 TABLET, FILM COATED ORAL DAILY
Status: ON HOLD | COMMUNITY
End: 2018-09-25 | Stop reason: ALTCHOICE

## 2018-06-13 NOTE — TELEPHONE ENCOUNTER
I called patient, reached vm, left message that I am mailing script for 12 week blood work  SVR to confirm successful clearing of hepatitis C virus post treatment with harvoni

## 2018-06-19 ENCOUNTER — APPOINTMENT (OUTPATIENT)
Dept: LAB | Facility: CLINIC | Age: 38
End: 2018-06-19
Payer: MEDICARE

## 2018-06-19 PROCEDURE — 87522 HEPATITIS C REVRS TRNSCRPJ: CPT

## 2018-06-19 PROCEDURE — 36415 COLL VENOUS BLD VENIPUNCTURE: CPT

## 2018-06-21 LAB
HCV RNA SERPL NAA+PROBE-ACNC: NORMAL IU/ML
TEST INFORMATION: NORMAL

## 2018-06-29 ENCOUNTER — TELEPHONE (OUTPATIENT)
Dept: GASTROENTEROLOGY | Facility: AMBULARY SURGERY CENTER | Age: 38
End: 2018-06-29

## 2018-06-29 NOTE — TELEPHONE ENCOUNTER
cancelled patients apt of 07/05/20 as per [facility] instructions    Left message for patient to call in to reshedule

## 2018-08-02 ENCOUNTER — OFFICE VISIT (OUTPATIENT)
Dept: GASTROENTEROLOGY | Facility: AMBULARY SURGERY CENTER | Age: 38
End: 2018-08-02
Payer: MEDICARE

## 2018-08-02 VITALS
SYSTOLIC BLOOD PRESSURE: 140 MMHG | WEIGHT: 223.2 LBS | TEMPERATURE: 96.1 F | HEART RATE: 98 BPM | DIASTOLIC BLOOD PRESSURE: 94 MMHG | BODY MASS INDEX: 28.64 KG/M2 | HEIGHT: 74 IN | RESPIRATION RATE: 18 BRPM

## 2018-08-02 DIAGNOSIS — Z86.19 HISTORY OF HEPATITIS C: Primary | ICD-10-CM

## 2018-08-02 PROCEDURE — 99213 OFFICE O/P EST LOW 20 MIN: CPT | Performed by: PHYSICIAN ASSISTANT

## 2018-08-02 RX ORDER — LAMOTRIGINE 25 MG/1
TABLET ORAL
Status: ON HOLD | COMMUNITY
Start: 2014-09-08 | End: 2018-09-25 | Stop reason: ALTCHOICE

## 2018-08-02 RX ORDER — DIAZEPAM 10 MG/1
10 TABLET ORAL 2 TIMES DAILY
Refills: 0 | COMMUNITY
Start: 2018-07-18

## 2018-08-02 RX ORDER — CHLORPROMAZINE HYDROCHLORIDE 200 MG/1
TABLET, FILM COATED ORAL
Status: ON HOLD | COMMUNITY
Start: 2014-09-24 | End: 2018-09-25 | Stop reason: ALTCHOICE

## 2018-08-02 RX ORDER — DIAZEPAM 10 MG/1
TABLET ORAL
Status: ON HOLD | COMMUNITY
Start: 2015-01-12 | End: 2018-09-25 | Stop reason: ALTCHOICE

## 2018-08-02 RX ORDER — ARIPIPRAZOLE 5 MG/1
5 TABLET ORAL
Status: ON HOLD | COMMUNITY
Start: 2014-09-08 | End: 2019-04-09 | Stop reason: ALTCHOICE

## 2018-08-02 RX ORDER — DEXTROAMPHETAMINE SACCHARATE, AMPHETAMINE ASPARTATE, DEXTROAMPHETAMINE SULFATE AND AMPHETAMINE SULFATE 5; 5; 5; 5 MG/1; MG/1; MG/1; MG/1
TABLET ORAL
Refills: 0 | COMMUNITY
Start: 2018-07-19 | End: 2019-02-15 | Stop reason: HOSPADM

## 2018-08-02 RX ORDER — BUPROPION HYDROCHLORIDE 300 MG/1
300 TABLET ORAL EVERY MORNING
Refills: 0 | COMMUNITY
Start: 2018-07-18

## 2018-08-02 RX ORDER — PRAZOSIN HYDROCHLORIDE 1 MG/1
1 CAPSULE ORAL 2 TIMES DAILY PRN
Refills: 0 | COMMUNITY
Start: 2018-07-18

## 2018-08-02 RX ORDER — QUETIAPINE FUMARATE 200 MG/1
200 TABLET, FILM COATED ORAL
Refills: 0 | COMMUNITY
Start: 2018-07-18

## 2018-08-02 NOTE — LETTER
August 2, 2018     Hammad Buenrostro, [address] [facility]    Patient: Otf Ramirez   YOB: 1980   Date of Visit: 8/2/2018       Dear Dr Ermias Crocker: Thank you for referring Otf Ramirez to me for evaluation  Below are my notes for this consultation  If you have questions, please do not hesitate to call me  I look forward to following your patient along with you  Sincerely,        Rogers Houston PA-C        CC: No Recipients  Rogers Houston PA-C  8/2/2018  3:37 PM  Sign at close encounter  [facility] - Outpatient Follow-up Note  Otf Ramirez 43 y o  male MRN: [id number]  Encounter: [id number]    ASSESSMENT AND PLAN:      History of hepatitis C treated effectively with harvoni with sustained virologic response  -Advised that reinfection with the virus is possible and to continue to abstain from IV drug use  -follow-up PRN  ______________________________________________________________________    SUBJECTIVE:  Otf Ramirez is a 44 y/o male with a history of hepatitis C who presents for follow-up  He was treated with harvoni x 12 weeks and repeat SVR 12 weeks post treatment is undetectable  He has a history of IV drug use however has not used them in the last year  He denies complaints at this time    REVIEW OF SYSTEMS IS OTHERWISE NEGATIVE  Historical Information   Past Medical History:   Diagnosis Date    Bipolar 1 disorder ([facility] )     Delusion ([facility] )      No past surgical history on file  Social History   History   Alcohol Use    Yes     Comment: occ     History   Drug Use    Types: Marijuana     Comment: medical     History   Smoking Status    Current Every Day Smoker    Packs/day: 1 00   Smokeless Tobacco    Never Used     No family history on file      Meds/Allergies       Current Outpatient Prescriptions:     amphetamine-dextroamphetamine (ADDERALL) 20 mg tablet    ARIPiprazole (ABILIFY) 5 mg tablet    buPROPion (WELLBUTRIN XL) 300 mg 24 hr tablet    chlorproMAZINE (THORAZINE) 200 mg tablet    diazepam (VALIUM) 10 mg tablet    diazepam (VALIUM) 10 mg tablet    lamoTRIgine (LaMICtal) 25 mg tablet    Ledipasvir-Sofosbuvir (HARVONI) tablet    prazosin (MINIPRESS) 1 mg capsule    QUEtiapine (SEROquel) 200 mg tablet    Allergies   Allergen Reactions    Sulfa Antibiotics Hives     Objective     Blood pressure 140/94, pulse 98, temperature (!) 96 1 °F (35 6 °C), temperature source Tympanic, resp  rate 18, height 6' 2" (1 88 m), weight 101 kg (223 lb 3 2 oz)  Body mass index is 28 66 kg/m²  PHYSICAL EXAM:      General Appearance:   Alert, cooperative, no distress   HEENT:   Normocephalic, atraumatic, anicteric      Neck:  Supple, symmetrical, trachea midline   Lungs:   Clear to auscultation bilaterally   Heart[de-identified]   Regular rate and rhythm; no murmur, rub, or gallop  Abdomen:   Soft, non-tender, non-distended; normal bowel sounds   Genitalia:   Deferred    Rectal:   Deferred    Extremities:  No cyanosis, clubbing or edema    Pulses:  2+ and symmetric    Skin:  No jaundice, rashes, or lesions    Lymph nodes:  No palpable cervical lymphadenopathy        Lab Results:   No visits with results within 1 Day(s) from this visit  Latest known visit with results is:   Orders Only on 06/13/2018   Component Date Value    HCV PCR Quantitative 06/19/2018 HCV Not Detected     Test Information 06/19/2018 Comment          Radiology Results:   No results found

## 2018-08-02 NOTE — PROGRESS NOTES
[facility] - Outpatient Follow-up Note  Shiva Vickers 43 y o  male MRN: [id number]  Encounter: [id number]    ASSESSMENT AND PLAN:      History of hepatitis C treated effectively with harvoni with sustained virologic response  -Advised that reinfection with the virus is possible and to continue to abstain from IV drug use  -follow-up PRN  ______________________________________________________________________    SUBJECTIVE:  Shiva Vickesr is a 44 y/o male with a history of hepatitis C who presents for follow-up  He was treated with harvoni x 12 weeks and repeat SVR 12 weeks post treatment is undetectable  He has a history of IV drug use however has not used them in the last year  He denies complaints at this time    REVIEW OF SYSTEMS IS OTHERWISE NEGATIVE  Historical Information   Past Medical History:   Diagnosis Date    Bipolar 1 disorder ([facility] )     Delusion ([facility] )      No past surgical history on file  Social History   History   Alcohol Use    Yes     Comment: occ     History   Drug Use    Types: Marijuana     Comment: medical     History   Smoking Status    Current Every Day Smoker    Packs/day: 1 00   Smokeless Tobacco    Never Used     No family history on file  Meds/Allergies       Current Outpatient Prescriptions:     amphetamine-dextroamphetamine (ADDERALL) 20 mg tablet    ARIPiprazole (ABILIFY) 5 mg tablet    buPROPion (WELLBUTRIN XL) 300 mg 24 hr tablet    chlorproMAZINE (THORAZINE) 200 mg tablet    diazepam (VALIUM) 10 mg tablet    diazepam (VALIUM) 10 mg tablet    lamoTRIgine (LaMICtal) 25 mg tablet    Ledipasvir-Sofosbuvir (HARVONI) tablet    prazosin (MINIPRESS) 1 mg capsule    QUEtiapine (SEROquel) 200 mg tablet    Allergies   Allergen Reactions    Sulfa Antibiotics Hives     Objective     Blood pressure 140/94, pulse 98, temperature (!) 96 1 °F (35 6 °C), temperature source Tympanic, resp   rate 18, height 6' 2" (1 88 m), weight 101 kg (223 lb 3 2 oz)  Body mass index is 28 66 kg/m²  PHYSICAL EXAM:      General Appearance:   Alert, cooperative, no distress   HEENT:   Normocephalic, atraumatic, anicteric      Neck:  Supple, symmetrical, trachea midline   Lungs:   Clear to auscultation bilaterally   Heart[de-identified]   Regular rate and rhythm; no murmur, rub, or gallop  Abdomen:   Soft, non-tender, non-distended; normal bowel sounds   Genitalia:   Deferred    Rectal:   Deferred    Extremities:  No cyanosis, clubbing or edema    Pulses:  2+ and symmetric    Skin:  No jaundice, rashes, or lesions    Lymph nodes:  No palpable cervical lymphadenopathy        Lab Results:   No visits with results within 1 Day(s) from this visit  Latest known visit with results is:   Orders Only on 06/13/2018   Component Date Value    HCV PCR Quantitative 06/19/2018 HCV Not Detected     Test Information 06/19/2018 Comment          Radiology Results:   No results found

## 2018-08-03 PROBLEM — Z30.09 VASECTOMY EVALUATION: Status: ACTIVE | Noted: 2018-08-03

## 2018-08-03 NOTE — PATIENT INSTRUCTIONS
Vasectomy   WHAT YOU NEED TO KNOW:   A vasectomy is a procedure to make you sterile  It is a permanent form of birth control  The vas deferens (sperm tubes) are cut so that the semen does not contain sperm  HOW TO PREPARE:   Before your procedure:   · Write down the correct date, time, and location of your procedure  · Arrange a ride home  Ask a family member or friend to drive you home after your surgery or procedure  Do not drive yourself home  · Ask your caregiver if you need to stop using aspirin or any other prescribed or over-the-counter medicine before your procedure or surgery  · Bring your medicine bottles or a list of your medicines when you see your caregiver  Tell your caregiver if you are allergic to any medicine  Tell your caregiver if you use any herbs, food supplements, or over-the-counter medicine  The night before your procedure:  Ask caregivers about directions for eating and drinking  The day of your procedure:   · Ask your caregiver before taking any medicine on the day of your procedure  These medicines include insulin, diabetic pills, high blood pressure pills, or heart pills  Bring a list of all the medicines you take, or your pill bottles, with you to the hospital     · You or a close family member will be asked to sign a legal document called a consent form  It gives caregivers permission to do the procedure or surgery  It also explains the problems that may happen, and your choices  Make sure all your questions are answered before you sign this form  · Caregivers may insert an intravenous tube (IV) into your vein  A vein in the arm is usually chosen  Through the IV tube, you may be given liquids and medicine  · An anesthesiologist will talk to you before your surgery  You may need medicine to keep you asleep or numb an area of your body during surgery   Tell caregivers if you or anyone in your family has had a problem with anesthesia in the past     · Bring an athletic supporter to wear after your procedure  WHAT WILL HAPPEN:   What will happen: An incision will be made on one side of your scrotum or down the middle  One of your sperm tubes will be pulled through the incision  Your surgeon will cut the sperm tube and remove a small portion of it  He may then close one or both ends with stitches or a heat treatment  He also may sew a piece of body tissue between the cut ends of your tubes  Your surgeon will then do the same procedure to your other sperm tube  Your incisions may be closed with stitches, tissue glue, or left open to heal  Germ-fighting medicine may be put on your scrotum, and the area will be covered with a bandage  After your procedure: You will be taken to a room to rest until you are fully awake  Healthcare providers will monitor you closely for any problems  Healthcare providers may apply ice and an athletic supporter to decrease swelling and bruising  Do not get out of bed until your healthcare provider says it is okay  When your healthcare provider sees that you are okay, you will be able to go home or be taken to your hospital room  CONTACT YOUR HEALTHCARE PROVIDER IF:   · You cannot make it to your procedure  · You have a fever  · You get a cold or the flu  · You have questions or concerns about your procedure  RISKS:   You may bleed more than expected  Your scrotum may be bruised or inflamed  You may get a wound, urinary tract, or epididymal infection  The epididymis is a long, curled tube on the back of your scrotum  You may feel pain when you have an erection  Granulomas may form if sperm leaks out of your cut sperm tube  Granulomas are a lump that forms under your skin  You may not become sterile if one or both of your cut sperm tubes grow back together  CARE AGREEMENT:   You have the right to help plan your care  Learn about your health condition and how it may be treated   Discuss treatment options with your caregivers to decide what care you want to receive  You always have the right to refuse treatment  © 2017 [employer] Information is for End User's use only and may not be sold, redistributed or otherwise used for commercial purposes  All illustrations and images included in CareNotes® are the copyrighted property of A [employer] M [employer]  or [employer]  The above information is an  only  It is not intended as medical advice for individual conditions or treatments  Talk to your doctor, nurse or pharmacist before following any medical regimen to see if it is safe and effective for you

## 2018-08-03 NOTE — PROGRESS NOTES
Problem List Items Addressed This Visit     Vasectomy evaluation - Primary     The patient presents requesting elective sterilization vasectomy  We discussed that vasectomy is an operation performed in the office in order to provide elective sterilization  Risks include infection, bleeding, pain, damage to surrounding structures, risk of sperm granuloma, risk of recannulization and failure of the procedure, and risk that both Vasa deferentia cannot be accessed via a procedure under local anesthesia and a completion vasectomy may become necessary in the future in the operating room under general anesthesia  This procedure should be considered a permanent option  Although there are subspecialists who perform vasectomy reversals, these operations are not 100% successful and are often not covered by insurance meaning they can come with a large out-of-pocket cost  The patient understands this  We reviewed the procedure in depth  Risk and benefits of the procedure were discussed and reviewed  Informed consent was obtained in the office today  The patient was prescribed alprazolam to take one hour prior to the procedure to assist with his comfort  He understands that he will require transportation to and from the office that day if he is to use the alprazolam      He also understands he will require 2 semen analyses at 6 and 8 weeks post procedure to ensure full sterilization  In the interim, he will require contraception during intercourse to avoid an undesired pregnancy  Usually, patients are out of work for 2-3 days  We recommend tight fitting scrotal support following the procedure along with ice packs applied to the scrotum 15 minutes on and 15 minutes off for the first 24 hours  We discussed that we sometimes do send the patient home with short course of narcotic pain medication but that most men require only Tylenol and ibuprofen       After this discussion, the patient agrees to proceed and informed consent was signed and is on the chart  We will schedule him in the near future           Other Visit Diagnoses     Encounter for vasectomy        Relevant Orders    Case request operating room: VASECTOMY (Completed)                Assessment and plan:       Please see problem oriented charting for the assessment plan of today's urological complaints    Natty Narvaez MD      Chief Complaint     Chief Complaint   Patient presents with   Romero Domingo consult for vasectomy         History of Present Illness     Jaci Doctor is a 43 y o  gentleman with a history of hepatitis C as well as depression anxiety and PTSD who is desiring a vasectomy  He has a 3-1/2year-old boy desires no further children  He does have family members who have had vasectomy may have recommended this procedure to him  His only previous surgical history is wisdom teeth surgery and apart from getting dry socket he had no issues with bleeding or bruising after that surgery  On review of systems today he denies dysuria, incontinence, hesitancy of urination, gross hematuria, urgency, nocturia, he feels empty after voiding and stream quality is good  The following portions of the patient's history were reviewed and updated as appropriate: allergies, current medications, past family history, past medical history, past social history, past surgical history and problem list     Detailed Urologic History     - please refer to HPI    Review of Systems     Review of Systems   Constitutional: Negative  HENT: Negative  Eyes: Negative  Respiratory: Negative  Cardiovascular: Negative  Gastrointestinal: Negative  Endocrine: Negative  Genitourinary: Negative  Musculoskeletal: Negative  Skin: Negative  Allergic/Immunologic: Negative  Neurological: Negative  Hematological: Negative  Psychiatric/Behavioral: Negative                Allergies     Allergies   Allergen Reactions    Sulfa Antibiotics Hives       Physical Exam Physical Exam   Constitutional: He is oriented to person, place, and time  He appears well-developed and well-nourished  No distress  HENT:   Head: Normocephalic and atraumatic  Right Ear: External ear normal    Left Ear: External ear normal    Nose: Nose normal    Eyes: EOM are normal  Pupils are equal, round, and reactive to light  Right eye exhibits no discharge  Left eye exhibits no discharge  No scleral icterus  Neck: Normal range of motion  Neck supple  Cardiovascular: Regular rhythm and intact distal pulses  Pulmonary/Chest: Effort normal  No stridor  No respiratory distress  He has no wheezes  Abdominal: Soft  He exhibits no distension and no mass  There is no tenderness  There is no rebound and no guarding  No hernia  Hernia confirmed negative in the right inguinal area and confirmed negative in the left inguinal area  Genitourinary: Right testis shows no mass, no swelling and no tenderness  Right testis is descended  Cremasteric reflex is not absent on the right side  Left testis shows no mass, no swelling and no tenderness  Left testis is descended  Cremasteric reflex is not absent on the left side  Circumcised  No hypospadias, penile erythema or penile tenderness  No discharge found  Genitourinary Comments: The patient's Vasa deferentia are quite medial and quite deep, he would be a difficult procedure to do in the office and as such is better served in the ambulatory surgery center   Musculoskeletal: Normal range of motion  He exhibits no edema, tenderness or deformity  Lymphadenopathy: No inguinal adenopathy noted on the right or left side  Neurological: He is alert and oriented to person, place, and time  No cranial nerve deficit or sensory deficit  He exhibits normal muscle tone  Coordination normal    Skin: Skin is warm and dry  No rash noted  He is not diaphoretic  No erythema  No pallor  Psychiatric: He has a normal mood and affect   His behavior is normal  Judgment and thought content normal    Nursing note and vitals reviewed  Vital Signs  Vitals:    08/07/18 0845   BP: 118/78   BP Location: Left arm   Patient Position: Sitting   Cuff Size: Adult   Pulse: 70   Weight: 102 kg (225 lb 12 8 oz)   Height: 6' 3" (1 905 m)         Current Medications       Current Outpatient Prescriptions:     amphetamine-dextroamphetamine (ADDERALL) 20 mg tablet, TAKE 1 TABLET BY MOUTH AT 8AM, NOON, AND 4PM, Disp: , Rfl: 0    ARIPiprazole (ABILIFY) 5 mg tablet, Take by mouth, Disp: , Rfl:     buPROPion (WELLBUTRIN XL) 300 mg 24 hr tablet, Take 300 mg by mouth every morning, Disp: , Rfl: 0    chlorproMAZINE (THORAZINE) 200 mg tablet, Take by mouth, Disp: , Rfl:     diazepam (VALIUM) 10 mg tablet, Take 10 mg by mouth 2 (two) times a day, Disp: , Rfl: 0    diazepam (VALIUM) 10 mg tablet, Take by mouth, Disp: , Rfl:     lamoTRIgine (LaMICtal) 25 mg tablet, Take by mouth, Disp: , Rfl:     prazosin (MINIPRESS) 1 mg capsule, Take 1 mg by mouth 2 (two) times a day as needed, Disp: , Rfl: 0    QUEtiapine (SEROquel) 200 mg tablet, Take 200 mg by mouth daily at bedtime, Disp: , Rfl: 0    Ledipasvir-Sofosbuvir (HARVONI) tablet, Take 1 tablet by mouth daily 12 week duration, Disp: , Rfl:       Active Problems     Patient Active Problem List   Diagnosis    History of hepatitis C    Vasectomy evaluation         Past Medical History     Past Medical History:   Diagnosis Date    [facility] )     [facility]          Surgical History     Past Surgical History:   Procedure Laterality Date    WISDOM TOOTH EXTRACTION           Family History     History reviewed  No pertinent family history        Social History     Social History     History   Smoking Status    Current Every Day Smoker    Packs/day: 1 00   Smokeless Tobacco    Never Used         Pertinent Lab Values     Lab Results   Component Value Date    CREATININE 0 90 12/13/2017       No results found for: PSA          Pertinent Imaging      There is no pertinent urological imaging for my review

## 2018-08-07 ENCOUNTER — OFFICE VISIT (OUTPATIENT)
Dept: UROLOGY | Facility: CLINIC | Age: 38
End: 2018-08-07
Payer: MEDICARE

## 2018-08-07 ENCOUNTER — TELEPHONE (OUTPATIENT)
Dept: UROLOGY | Facility: CLINIC | Age: 38
End: 2018-08-07

## 2018-08-07 VITALS
BODY MASS INDEX: 28.07 KG/M2 | DIASTOLIC BLOOD PRESSURE: 78 MMHG | SYSTOLIC BLOOD PRESSURE: 118 MMHG | HEART RATE: 70 BPM | HEIGHT: 75 IN | WEIGHT: 225.8 LBS

## 2018-08-07 DIAGNOSIS — Z30.2 ENCOUNTER FOR VASECTOMY: ICD-10-CM

## 2018-08-07 DIAGNOSIS — Z30.09 VASECTOMY EVALUATION: Primary | ICD-10-CM

## 2018-08-07 PROCEDURE — 99203 OFFICE O/P NEW LOW 30 MIN: CPT | Performed by: UROLOGY

## 2018-08-07 NOTE — ASSESSMENT & PLAN NOTE
The patient presents requesting elective sterilization vasectomy  We discussed that vasectomy is an operation performed in the office in order to provide elective sterilization  Risks include infection, bleeding, pain, damage to surrounding structures, risk of sperm granuloma, risk of recannulization and failure of the procedure, and risk that both Vasa deferentia cannot be accessed via a procedure under local anesthesia and a completion vasectomy may become necessary in the future in the operating room under general anesthesia  This procedure should be considered a permanent option  Although there are subspecialists who perform vasectomy reversals, these operations are not 100% successful and are often not covered by insurance meaning they can come with a large out-of-pocket cost  The patient understands this  We reviewed the procedure in depth  Risk and benefits of the procedure were discussed and reviewed  Informed consent was obtained in the office today  The patient was prescribed alprazolam to take one hour prior to the procedure to assist with his comfort  He understands that he will require transportation to and from the office that day if he is to use the alprazolam      He also understands he will require 2 semen analyses at 6 and 8 weeks post procedure to ensure full sterilization  In the interim, he will require contraception during intercourse to avoid an undesired pregnancy  Usually, patients are out of work for 2-3 days  We recommend tight fitting scrotal support following the procedure along with ice packs applied to the scrotum 15 minutes on and 15 minutes off for the first 24 hours  We discussed that we sometimes do send the patient home with short course of narcotic pain medication but that most men require only Tylenol and ibuprofen  After this discussion, the patient agrees to proceed and informed consent was signed and is on the chart   We will schedule him in the near future

## 2018-08-07 NOTE — LETTER
August 7, 2018     Referral [address] [facility] [address]    Patient: Alan Matthews   YOB: 1980   Date of Visit: 8/7/2018       Dear Dr Prakash Innocent:    Thank you for referring Alan Matthews to me for evaluation  Below are my notes for this consultation  If you have questions, please do not hesitate to call me  I look forward to following your patient along with you  Sincerely,        Ab Clemens MD        CC: DO Ab Gonzales MD  8/7/2018  9:06 AM  Sign at close encounter       Problem List Items Addressed This Visit     Vasectomy evaluation - Primary     The patient presents requesting elective sterilization vasectomy  We discussed that vasectomy is an operation performed in the office in order to provide elective sterilization  Risks include infection, bleeding, pain, damage to surrounding structures, risk of sperm granuloma, risk of recannulization and failure of the procedure, and risk that both Vasa deferentia cannot be accessed via a procedure under local anesthesia and a completion vasectomy may become necessary in the future in the operating room under general anesthesia  This procedure should be considered a permanent option  Although there are subspecialists who perform vasectomy reversals, these operations are not 100% successful and are often not covered by insurance meaning they can come with a large out-of-pocket cost  The patient understands this  We reviewed the procedure in depth  Risk and benefits of the procedure were discussed and reviewed  Informed consent was obtained in the office today  The patient was prescribed alprazolam to take one hour prior to the procedure to assist with his comfort  He understands that he will require transportation to and from the office that day if he is to use the alprazolam      He also understands he will require 2 semen analyses at 6 and 8 weeks post procedure to ensure full sterilization   In the interim, he will require contraception during intercourse to avoid an undesired pregnancy  Usually, patients are out of work for 2-3 days  We recommend tight fitting scrotal support following the procedure along with ice packs applied to the scrotum 15 minutes on and 15 minutes off for the first 24 hours  We discussed that we sometimes do send the patient home with short course of narcotic pain medication but that most men require only Tylenol and ibuprofen  After this discussion, the patient agrees to proceed and informed consent was signed and is on the chart  We will schedule him in the near future           Other Visit Diagnoses     Encounter for vasectomy        Relevant Orders    Case request operating room: VASECTOMY (Completed)                Assessment and plan:       Please see problem oriented charting for the assessment plan of today's urological complaints    Lui Hinson MD      Chief Complaint     Chief Complaint   Patient presents with    consult for vasectomy         History of Present Illness     Samantha Beatty is a 43 y o  gentleman with a history of hepatitis [place] as well as depression anxiety and PTSD who is desiring a vasectomy  He has a 3-1/2year-old boy desires no further children  He does have family members who have had vasectomy may have recommended this procedure to him  His only previous surgical history is wisdom teeth surgery and apart from getting dry socket he had no issues with bleeding or bruising after that surgery  On review of systems today he denies dysuria, incontinence, hesitancy of urination, gross hematuria, urgency, nocturia, he feels empty after voiding and stream quality is good      The following portions of the patient's history were reviewed and updated as appropriate: allergies, current medications, past family history, past medical history, past social history, past surgical history and problem list     Detailed Urologic History     - please refer to HPI    Review of Systems     Review of Systems   Constitutional: Negative  HENT: Negative  Eyes: Negative  Respiratory: Negative  Cardiovascular: Negative  Gastrointestinal: Negative  Endocrine: Negative  Genitourinary: Negative  Musculoskeletal: Negative  Skin: Negative  Allergic/Immunologic: Negative  Neurological: Negative  Hematological: Negative  Psychiatric/Behavioral: Negative  Allergies     Allergies   Allergen Reactions    Sulfa Antibiotics Hives       Physical Exam     Physical Exam   Constitutional: He is oriented to person, place, and time  He appears well-developed and well-nourished  No distress  HENT:   Head: Normocephalic and atraumatic  Right Ear: External ear normal    Left Ear: External ear normal    Nose: Nose normal    Eyes: EOM are normal  Pupils are equal, round, and reactive to light  Right eye exhibits no discharge  Left eye exhibits no discharge  No scleral icterus  Neck: Normal range of motion  Neck supple  Cardiovascular: Regular rhythm and intact distal pulses  Pulmonary/Chest: Effort normal  No stridor  No respiratory distress  He has no wheezes  Abdominal: Soft  He exhibits no distension and no mass  There is no tenderness  There is no rebound and no guarding  No hernia  Hernia confirmed negative in the right inguinal area and confirmed negative in the left inguinal area  Genitourinary: Right testis shows no mass, no swelling and no tenderness  Right testis is descended  Cremasteric reflex is not absent on the right side  Left testis shows no mass, no swelling and no tenderness  Left testis is descended  Cremasteric reflex is not absent on the left side  Circumcised  No hypospadias, penile erythema or penile tenderness  No discharge found  Genitourinary Comments:  The patient's Vasa deferentia are quite medial and quite deep, he would be a difficult procedure to do in the office and as such is better served in the ambulatory surgery center   Musculoskeletal: Normal range of motion  He exhibits no edema, tenderness or deformity  Lymphadenopathy: No inguinal adenopathy noted on the right or left side  Neurological: He is alert and oriented to person, place, and time  No cranial nerve deficit or sensory deficit  He exhibits normal muscle tone  Coordination normal    Skin: Skin is warm and dry  No rash noted  He is not diaphoretic  No erythema  No pallor  Psychiatric: He has a normal mood and affect  His behavior is normal  Judgment and thought content normal    Nursing note and vitals reviewed            Vital Signs  Vitals:    08/07/18 0845   BP: 118/78   BP Location: Left arm   Patient Position: Sitting   Cuff Size: Adult   Pulse: 70   Weight: 102 kg (225 lb 12 8 oz)   Height: 6' 3" (1 905 m)         Current Medications       Current Outpatient Prescriptions:     amphetamine-dextroamphetamine (ADDERALL) 20 mg tablet, TAKE 1 TABLET BY MOUTH AT 8AM, NOON, AND 4PM, Disp: , Rfl: 0    ARIPiprazole (ABILIFY) 5 mg tablet, Take by mouth, Disp: , Rfl:     buPROPion (WELLBUTRIN XL) 300 mg 24 hr tablet, Take 300 mg by mouth every morning, Disp: , Rfl: 0    chlorproMAZINE (THORAZINE) 200 mg tablet, Take by mouth, Disp: , Rfl:     diazepam (VALIUM) 10 mg tablet, Take 10 mg by mouth 2 (two) times a day, Disp: , Rfl: 0    diazepam (VALIUM) 10 mg tablet, Take by mouth, Disp: , Rfl:     lamoTRIgine (LaMICtal) 25 mg tablet, Take by mouth, Disp: , Rfl:     prazosin (MINIPRESS) 1 mg capsule, Take 1 mg by mouth 2 (two) times a day as needed, Disp: , Rfl: 0    QUEtiapine (SEROquel) 200 mg tablet, Take 200 mg by mouth daily at bedtime, Disp: , Rfl: 0    Ledipasvir-Sofosbuvir (HARVONI) tablet, Take 1 tablet by mouth daily 12 week duration, Disp: , Rfl:       Active Problems     Patient Active Problem List   Diagnosis    History of hepatitis C    Vasectomy evaluation         Past Medical History     Past Medical History: Diagnosis Date    Bipolar 1 disorder ([facility] [address] )     Delusion [facility])          Surgical History     Past Surgical History:   Procedure Laterality Date    WISDOM TOOTH EXTRACTION           Family History     History reviewed  No pertinent family history        Social History     Social History     History   Smoking Status    Current Every Day Smoker    Packs/day: 1 00   Smokeless Tobacco    Never Used         Pertinent Lab Values     Lab Results   Component Value Date    CREATININE 0 90 12/13/2017       No results found for: PSA          Pertinent Imaging      There is no pertinent urological imaging for my review

## 2018-08-09 PROBLEM — Z30.2 ENCOUNTER FOR VASECTOMY: Status: ACTIVE | Noted: 2018-08-09

## 2018-09-04 NOTE — PRE-PROCEDURE INSTRUCTIONS
Pre-Surgery Instructions:   Medication Instructions    amphetamine-dextroamphetamine (ADDERALL) 20 mg tablet Instructed patient per Anesthesia Guidelines   ARIPiprazole (ABILIFY) 5 mg tablet Instructed patient per Anesthesia Guidelines   buPROPion (WELLBUTRIN XL) 300 mg 24 hr tablet Instructed patient per Anesthesia Guidelines   diazepam (VALIUM) 10 mg tablet Instructed patient per Anesthesia Guidelines   prazosin (MINIPRESS) 1 mg capsule Instructed patient per Anesthesia Guidelines   QUEtiapine (SEROquel) 200 mg tablet Instructed patient per Anesthesia Guidelines      Pre op and showering instructions reviewed- Patient has hibiclens

## 2018-09-11 ENCOUNTER — ANESTHESIA EVENT (OUTPATIENT)
Dept: PERIOP | Facility: AMBULARY SURGERY CENTER | Age: 38
End: 2018-09-11
Payer: MEDICARE

## 2018-09-25 ENCOUNTER — ANESTHESIA (OUTPATIENT)
Dept: PERIOP | Facility: AMBULARY SURGERY CENTER | Age: 38
End: 2018-09-25
Payer: MEDICARE

## 2018-09-25 ENCOUNTER — TELEPHONE (OUTPATIENT)
Dept: UROLOGY | Facility: CLINIC | Age: 38
End: 2018-09-25

## 2018-09-25 ENCOUNTER — HOSPITAL ENCOUNTER (OUTPATIENT)
Facility: AMBULARY SURGERY CENTER | Age: 38
Setting detail: OUTPATIENT SURGERY
Discharge: HOME | End: 2018-09-25
Attending: UROLOGY | Admitting: UROLOGY
Payer: MEDICARE

## 2018-09-25 VITALS
WEIGHT: 220 LBS | SYSTOLIC BLOOD PRESSURE: 124 MMHG | TEMPERATURE: 97 F | OXYGEN SATURATION: 95 % | DIASTOLIC BLOOD PRESSURE: 70 MMHG | HEIGHT: 75 IN | BODY MASS INDEX: 27.35 KG/M2 | HEART RATE: 80 BPM | RESPIRATION RATE: 16 BRPM

## 2018-09-25 DIAGNOSIS — Z30.2 ENCOUNTER FOR VASECTOMY: Primary | ICD-10-CM

## 2018-09-25 PROCEDURE — 88302 TISSUE EXAM BY PATHOLOGIST: CPT | Performed by: PATHOLOGY

## 2018-09-25 PROCEDURE — 55250 REMOVAL OF SPERM DUCT(S): CPT | Performed by: UROLOGY

## 2018-09-25 RX ORDER — OXYCODONE HYDROCHLORIDE AND ACETAMINOPHEN 5; 325 MG/1; MG/1
1 TABLET ORAL EVERY 6 HOURS PRN
Qty: 8 TABLET | Refills: 0 | Status: SHIPPED | OUTPATIENT
Start: 2018-09-25 | End: 2018-09-30

## 2018-09-25 RX ORDER — FENTANYL CITRATE 50 UG/ML
INJECTION, SOLUTION INTRAMUSCULAR; INTRAVENOUS AS NEEDED
Status: DISCONTINUED | OUTPATIENT
Start: 2018-09-25 | End: 2018-09-25 | Stop reason: SURG

## 2018-09-25 RX ORDER — FENTANYL CITRATE/PF 50 MCG/ML
25 SYRINGE (ML) INJECTION
Status: DISCONTINUED | OUTPATIENT
Start: 2018-09-25 | End: 2018-09-25 | Stop reason: HOSPADM

## 2018-09-25 RX ORDER — LIDOCAINE HYDROCHLORIDE 10 MG/ML
INJECTION, SOLUTION INFILTRATION; PERINEURAL AS NEEDED
Status: DISCONTINUED | OUTPATIENT
Start: 2018-09-25 | End: 2018-09-25 | Stop reason: SURG

## 2018-09-25 RX ORDER — SODIUM CHLORIDE 9 MG/ML
125 INJECTION, SOLUTION INTRAVENOUS CONTINUOUS
Status: DISCONTINUED | OUTPATIENT
Start: 2018-09-25 | End: 2018-09-25 | Stop reason: HOSPADM

## 2018-09-25 RX ORDER — OXYCODONE HYDROCHLORIDE AND ACETAMINOPHEN 5; 325 MG/1; MG/1
2 TABLET ORAL EVERY 6 HOURS PRN
Status: CANCELLED | OUTPATIENT
Start: 2018-09-25

## 2018-09-25 RX ORDER — SODIUM CHLORIDE, SODIUM LACTATE, POTASSIUM CHLORIDE, CALCIUM CHLORIDE 600; 310; 30; 20 MG/100ML; MG/100ML; MG/100ML; MG/100ML
INJECTION, SOLUTION INTRAVENOUS CONTINUOUS PRN
Status: DISCONTINUED | OUTPATIENT
Start: 2018-09-25 | End: 2018-09-25 | Stop reason: SURG

## 2018-09-25 RX ORDER — BUPIVACAINE HYDROCHLORIDE 5 MG/ML
INJECTION, SOLUTION PERINEURAL AS NEEDED
Status: DISCONTINUED | OUTPATIENT
Start: 2018-09-25 | End: 2018-09-25 | Stop reason: HOSPADM

## 2018-09-25 RX ORDER — MIDAZOLAM HYDROCHLORIDE 1 MG/ML
INJECTION INTRAMUSCULAR; INTRAVENOUS AS NEEDED
Status: DISCONTINUED | OUTPATIENT
Start: 2018-09-25 | End: 2018-09-25 | Stop reason: SURG

## 2018-09-25 RX ORDER — DIPHENHYDRAMINE HYDROCHLORIDE 50 MG/ML
25 INJECTION INTRAMUSCULAR; INTRAVENOUS EVERY 6 HOURS PRN
Status: CANCELLED | OUTPATIENT
Start: 2018-09-25

## 2018-09-25 RX ORDER — PROPOFOL 10 MG/ML
INJECTION, EMULSION INTRAVENOUS AS NEEDED
Status: DISCONTINUED | OUTPATIENT
Start: 2018-09-25 | End: 2018-09-25 | Stop reason: SURG

## 2018-09-25 RX ORDER — DOCUSATE SODIUM 100 MG/1
100 CAPSULE, LIQUID FILLED ORAL 3 TIMES DAILY
Qty: 90 CAPSULE | Refills: 0 | Status: SHIPPED | OUTPATIENT
Start: 2018-09-25 | End: 2019-02-15 | Stop reason: HOSPADM

## 2018-09-25 RX ORDER — ONDANSETRON 2 MG/ML
INJECTION INTRAMUSCULAR; INTRAVENOUS AS NEEDED
Status: DISCONTINUED | OUTPATIENT
Start: 2018-09-25 | End: 2018-09-25 | Stop reason: SURG

## 2018-09-25 RX ORDER — ACETAMINOPHEN 325 MG/1
650 TABLET ORAL EVERY 6 HOURS PRN
Status: CANCELLED | OUTPATIENT
Start: 2018-09-25

## 2018-09-25 RX ORDER — ONDANSETRON 2 MG/ML
4 INJECTION INTRAMUSCULAR; INTRAVENOUS ONCE AS NEEDED
Status: DISCONTINUED | OUTPATIENT
Start: 2018-09-25 | End: 2018-09-25 | Stop reason: HOSPADM

## 2018-09-25 RX ORDER — MAGNESIUM HYDROXIDE 1200 MG/15ML
LIQUID ORAL AS NEEDED
Status: DISCONTINUED | OUTPATIENT
Start: 2018-09-25 | End: 2018-09-25 | Stop reason: HOSPADM

## 2018-09-25 RX ORDER — ONDANSETRON 2 MG/ML
4 INJECTION INTRAMUSCULAR; INTRAVENOUS EVERY 6 HOURS PRN
Status: CANCELLED | OUTPATIENT
Start: 2018-09-25

## 2018-09-25 RX ADMIN — DEXAMETHASONE SODIUM PHOSPHATE 4 MG: 10 INJECTION INTRAMUSCULAR; INTRAVENOUS at 14:11

## 2018-09-25 RX ADMIN — SODIUM CHLORIDE, SODIUM LACTATE, POTASSIUM CHLORIDE, AND CALCIUM CHLORIDE: .6; .31; .03; .02 INJECTION, SOLUTION INTRAVENOUS at 13:46

## 2018-09-25 RX ADMIN — LIDOCAINE HYDROCHLORIDE 50 MG: 10 INJECTION, SOLUTION INFILTRATION; PERINEURAL at 13:50

## 2018-09-25 RX ADMIN — FENTANYL CITRATE 50 MCG: 50 INJECTION, SOLUTION INTRAMUSCULAR; INTRAVENOUS at 14:02

## 2018-09-25 RX ADMIN — CEFAZOLIN SODIUM 2000 MG: 2 SOLUTION INTRAVENOUS at 13:46

## 2018-09-25 RX ADMIN — MIDAZOLAM HYDROCHLORIDE 2 MG: 1 INJECTION, SOLUTION INTRAMUSCULAR; INTRAVENOUS at 13:46

## 2018-09-25 RX ADMIN — ONDANSETRON 4 MG: 2 INJECTION INTRAMUSCULAR; INTRAVENOUS at 14:11

## 2018-09-25 RX ADMIN — FENTANYL CITRATE 50 MCG: 50 INJECTION, SOLUTION INTRAMUSCULAR; INTRAVENOUS at 13:50

## 2018-09-25 RX ADMIN — PROPOFOL 200 MG: 10 INJECTION, EMULSION INTRAVENOUS at 13:50

## 2018-09-25 NOTE — ANESTHESIA PREPROCEDURE EVALUATION
Review of Systems/Medical History  Patient summary reviewed  Chart reviewed  No history of anesthetic complications     Cardiovascular  Negative cardio ROS Exercise tolerance (METS): >4,     Pulmonary  Smoker cigarette smoker and e-cigarette user  ,   Comment: Marijuana use     GI/Hepatic    Hepatitis (hx treated hep C) ,        Negative  ROS        Endo/Other  Negative endo/other ROS      GYN       Hematology  Negative hematology ROS      Musculoskeletal  Negative musculoskeletal ROS        Neurology  Negative neurology ROS      Psychology   Psychiatric history (PTSD), Anxiety, Depression , bipolar disorder and being treated for depression,     Comment: ADHD       Physical Exam    Airway  Comment: muhammad  Mallampati score: III  TM Distance: >3 FB  Neck ROM: full     Dental   No notable dental hx     Cardiovascular  Comment: Negative ROS,     Pulmonary      Other Findings      No recent labs    Anesthesia Plan  ASA Score- 2     Anesthesia Type- general with ASA Monitors  Additional Monitors:   Airway Plan: LMA  Plan Factors-  Patient did not smoke on day of surgery  Induction- intravenous  Postoperative Plan-     Informed Consent- Anesthetic plan and risks discussed with patient and spouse  I personally reviewed this patient with the CRNA  Discussed and agreed on the Anesthesia Plan with the CRNA  Soco Montenegro

## 2018-09-25 NOTE — TELEPHONE ENCOUNTER
If possible, could you please help to schedule appointment in 2 weeks at [facility] for post op? I do not see any availability in this time with either AP's/MD  Thank you!

## 2018-09-25 NOTE — H&P
H&P Exam - Urology   Lesly Colmenares 43 y o  male MRN: [id number]  Unit/Bed#: OR [place] Encounter: [id number]    Assessment/Plan     Assessment:  71-year-old gentleman desiring vasectomy, where the risks and benefits alternatives of this procedure, ready for surgery today  Plan:  Proceed to the operating room for bilateral vasectomy, patient ready for surgery    History of Present Illness   HPI:  Lesly Colmenares is a 43 y o  male who presents with undesired continued fertility, he desires bilateral vasectomy  He has been appropriately prepared for surgery today he does not require marking and his informed consent was verified  Review of Systems   Constitutional: Negative  HENT: Negative  Eyes: Negative  Respiratory: Negative  Cardiovascular: Negative  Gastrointestinal: Negative  Endocrine: Negative  Genitourinary: Negative  Musculoskeletal: Negative  Skin: Negative  Allergic/Immunologic: Negative  Neurological: Negative  Hematological: Negative  Psychiatric/Behavioral: Negative  Historical Information   Past Medical History:   Diagnosis Date    ADHD     Anxiety     [facility]     Delusion ([facility] )     Infectious viral hepatitis     Hep C- No Symptoms( Took a course of Harvoni)    Psoriasis     PTSD (post-traumatic stress disorder)     nursing home     Past Surgical History:   Procedure Laterality Date    WISDOM TOOTH EXTRACTION       Social History   History   Alcohol Use    Yes     Comment: socially     History   Drug Use    Types: Marijuana     Comment: medical- vaping 4x/day     History   Smoking Status    Current Every Day Smoker    Packs/day: 1 00   Smokeless Tobacco    Never Used     Family History: History reviewed  No pertinent family history  Meds/Allergies   PTA meds:   Prior to Admission Medications   Prescriptions Last Dose Informant Patient Reported? Taking?    ARIPiprazole (ABILIFY) 5 mg tablet 9/24/2018 at Unknown time Self Yes Yes Sig: Take 5 mg by mouth daily in the early morning     QUEtiapine (SEROquel) 200 mg tablet 9/24/2018 at Unknown time Self Yes Yes   Sig: Take 200 mg by mouth daily at bedtime   amphetamine-dextroamphetamine (ADDERALL) 20 mg tablet 9/24/2018 at Unknown time Self Yes Yes   Sig: TAKE 1 TABLET BY MOUTH AT 8AM, NOON, AND 4PM   buPROPion (WELLBUTRIN XL) 300 mg 24 hr tablet 9/24/2018 at Unknown time Self Yes Yes   Sig: Take 300 mg by mouth every morning   diazepam (VALIUM) 10 mg tablet 9/24/2018 at Unknown time Self Yes Yes   Sig: Take 10 mg by mouth 2 (two) times a day   prazosin (MINIPRESS) 1 mg capsule 9/24/2018 at Unknown time Self Yes Yes   Sig: Take 1 mg by mouth as needed Anxiety       Facility-Administered Medications: None     Allergies   Allergen Reactions    Sulfa Antibiotics Hives       Objective   Vitals: Blood pressure 136/72, pulse 90, temperature 98 2 °F (36 8 °C), resp  rate 18, height 6' 3" (1 905 m), weight 99 8 kg (220 lb), SpO2 96 %  No intake/output data recorded  Invasive Devices     Peripheral Intravenous Line            Peripheral IV 09/25/18 Right Hand less than 1 day                Physical Exam   Constitutional: He is oriented to person, place, and time  He appears well-developed and well-nourished  No distress  HENT:   Head: Normocephalic and atraumatic  Right Ear: External ear normal    Left Ear: External ear normal    Eyes: EOM are normal  Pupils are equal, round, and reactive to light  Left eye exhibits no discharge  No scleral icterus  Neck: Normal range of motion  Neck supple  No tracheal deviation present  No thyromegaly present  Cardiovascular: Regular rhythm and intact distal pulses  Pulmonary/Chest: Effort normal  No respiratory distress  He has no wheezes  Abdominal: Soft  He exhibits no distension and no mass  There is no tenderness  There is no rebound and no guarding     Genitourinary:   Genitourinary Comments: Deferred for OR   Musculoskeletal: Normal range of motion  He exhibits no edema, tenderness or deformity  Neurological: He is alert and oriented to person, place, and time  No cranial nerve deficit  He exhibits normal muscle tone  Coordination normal    Skin: Skin is warm and dry  No rash noted  He is not diaphoretic  No erythema  No pallor  Psychiatric: He has a normal mood and affect  His behavior is normal  Judgment and thought content normal    Nursing note and vitals reviewed  Lab Results: I have personally reviewed pertinent reports  Imaging: I have personally reviewed pertinent reports  EKG, Pathology, and Other Studies: I have personally reviewed pertinent reports  VTE Prophylaxis: Sequential compression device [place])     Code Status: No Order  Advance Directive and Living Will:      Power of :    POLST:      Counseling / Coordination of Care  Total floor / unit time spent today 15 minutes  Greater than 50% of total time was spent with the patient and / or family counseling and / or coordination of care  A description of the counseling / coordination of care:   Review of the pre, leslie, and postoperative care for this procedure

## 2018-09-25 NOTE — OP NOTE
OPERATIVE REPORT  PATIENT NAME: Kiana Turcios    :  1980  MRN: [id number]  Pt Location: [facility] OR ROOM 04    SURGERY DATE: 2018    Surgeon(s) and Role:     * Violetta Candelario MD - Primary    Preop Diagnosis:  Encounter for vasectomy [Z30 2]    Post-Op Diagnosis Codes:     * Encounter for vasectomy [Z30 2]    Procedure(s) (LRB):  VASECTOMY (Bilateral)    Specimen(s):  ID Type Source Tests Collected by Time Destination   1 :  Tissue Vas Deferens, Right TISSUE EXAM Violetta Candelario MD 2018 140    2 :  Tissue Vas Deferens, Left TISSUE EXAM Violetta Candelario MD 2018 1406        Estimated Blood Loss:   Minimal    Drains:  None       Anesthesia Type:   General    Operative Indications:  Encounter for vasectomy [Z30 2]  Undesired continued fertility    Operative Findings:  Bilateral vasectomy was performed, no issues, essentially no blood loss  Complications:   None    Procedure and Technique:  Procedures      Risks and benefits of vasectomy were previously discussed  Informed consent was obtained at his prior office visit  The patient had elected to undergo this procedure under anesthesia and he was brought to the operating room at the ambulatory surgery center  The patient was placed in the supine position  His genitalia were prepped and draped in the usual sterile fashion after being induced under general anesthesia via a laryngeal mask airway  A full time-out confirmed the proper patient position and procedure  The right vas deferens was then grasped and presented to the area of interest on the right hemiscrotum  A #15 blade was used to make a longitudinal incision in the sheath of the vas deferens  The vas itself was then dissected free from the surrounding tissue  Clamps were placed proximally and distally and a 1 cm segment of the vas deferens was excised  Silk ties were applied and the exposed ends were fulgurated as was the lumen of the vas  Hemostasis was excellent   The vas was returned to its natural anatomic position after ensuring meticulous hemostasis  A single stitch of 3-0 chromic was placed through the skin and dartos to reapproximate the defect in a horizontal mattress fashion  The left vas deferens was grasped and presented to the area of interest on the left hemiscrotum  Next, 2% lidocaine was used to anesthetize the skin, subcutaneous tissue, and left vas deferens  The left vas deferens was grasped and presented into the surgical field with a vas clamp  A #15 blade was used to make a longitudinal incision in the sheath of the vas deferens  The vas itself was then dissected free from the surrounding tissue  Clamps were placed proximally and distally and a 1 cm segment of the vas deferens was excised  Silk ties were applied and the exposed ends were fulgurated as was the lumen of the vas  Hemostasis was excellent  The vas was returned to its natural anatomic position after ensuring meticulous hemostasis  A single stitch of 3-0 chromic was placed through the skin and dartos to reapproximate the defect in a horizontal mattress fashion  I was present for the entire procedure and A qualified resident physician was not available    Patient Disposition:  PACU      Plan:  He did very well with the procedure today  Postprocedural instructions were provided  He will follow-up in 2-3 weeks for postoperative assessment  At that time we will coordinate his postprocedural semen analyses at 6 weeks, and again it 8 weeks after the procedure  He was instructed to call my office 2 weeks after his second specimen is submitted to review the results by phone  He was instructed to continue his current methods of contraception until that time      SIGNATURE: Leida Villegas MD  DATE: September 25, 2018  TIME: 2:19 PM

## 2018-09-25 NOTE — DISCHARGE INSTRUCTIONS
Vasectomy   WHAT YOU NEED TO KNOW:     Robert Good,    Today you had a bilateral vasectomy in which we removed a small segment of your right vas deferens as well as the left vas deferens  You lost essentially no blood in there were no complications during today's case  It will be very normal for you in the coming days to have discomfort in your scrotum which I expect to start in approximately 4 hours (when your local anesthesia wears off)  You may also see that your black and blue down below and this is no cause for concern  You will see someone in our office in 2 weeks to see how you are healing into order to confirmatory semen analyses  To review, we should see either no sperm or just a couple of sperm that are not moving on these confirmatory semen analyses  When we see that we give you the blessing to have unprotected intercourse with the knowledge that you will not have any further children  If you have questions or concerns in the postoperative time frame, please do not hesitate to contact my office  Take care and be well,  Dr Alisha Call vasectomy is a procedure to make you sterile  It is a permanent form of birth control  The vas deferens (sperm tubes) are cut so that the semen does not contain sperm  DISCHARGE INSTRUCTIONS:   Medicines: You may need any of the following:  · NSAIDs  help decrease swelling, pain, and fever  This medicine can be bought with or without a doctor's order  This medicine can cause stomach bleeding or kidney problems in certain people  If you take blood thinner medicine, always ask your healthcare provider if NSAIDs are safe for you  Always read the medicine label and follow the directions on it before using this medicine  · Take your medicine as directed  Contact your healthcare provider if you think your medicine is not helping or if you have side effects  Tell him if you are allergic to any medicine  Keep a list of the medicines, vitamins, and herbs you take  Include the amounts, and when and why you take them  Bring the list or the pill bottles to follow-up visits  Carry your medicine list with you in case of an emergency  Decrease pain and swelling:   · Lie on your back  as much as possible the day of your procedure  Place a cushion such as a washcloth or small towel under your scrotum to elevate it  · Apply ice  on your scrotum for 15 to 20 minutes every hour for 2 days  Use an ice pack, or put crushed ice in a plastic bag  Cover it with a towel  Ice helps prevent tissue damage and decreases swelling and pain  · Wear an athletic supporter for at least 2 days  This will decrease pain and swelling, and protect your wound  Wound care:  Care for your wound as directed  Carefully wash the wound with soap and water  Dry the area and put on new, clean bandages as directed  Change your bandages when they get wet or dirty  Activity:  You may walk and drive normally the day after your procedure  Do not play sports, do yard work, or lift anything heavy until your healthcare provider says it is okay  You may need to wait up to a week before you have sex  Follow up with your healthcare provider or surgeon in 12 weeks: You will need to return to have your semen tested for sperm  Write down your questions so you remember to ask them during your visits  Contact your healthcare provider or surgeon if:   · You have a fever  · Your wound is red, swollen, or draining pus  · You feel pain or burning when you urinate  · You have worsening pain in your scrotum, even after you take medicine  · You have questions or concerns about your condition or care  Seek care immediately or call 911 if:   · Blood soaks through your bandage  · Your stitches come apart  · You see blood in your urine or semen  © 2017 [employer] Information is for End User's use only and may not be sold, redistributed or otherwise used for commercial purposes   All illustrations and images included in CareNotes® are the copyrighted property of A [employer] Inc  or Willie Acosta  The above information is an  only  It is not intended as medical advice for individual conditions or treatments  Talk to your doctor, nurse or pharmacist before following any medical regimen to see if it is safe and effective for you

## 2018-10-19 ENCOUNTER — OFFICE VISIT (OUTPATIENT)
Dept: UROLOGY | Facility: CLINIC | Age: 38
End: 2018-10-19

## 2018-10-19 VITALS
DIASTOLIC BLOOD PRESSURE: 82 MMHG | BODY MASS INDEX: 31.7 KG/M2 | WEIGHT: 247 LBS | HEIGHT: 74 IN | HEART RATE: 80 BPM | SYSTOLIC BLOOD PRESSURE: 118 MMHG

## 2018-10-19 DIAGNOSIS — Z30.09 VASECTOMY EVALUATION: Primary | ICD-10-CM

## 2018-10-19 PROBLEM — Z30.2 ENCOUNTER FOR VASECTOMY: Status: RESOLVED | Noted: 2018-08-09 | Resolved: 2018-10-19

## 2018-10-19 PROCEDURE — 99024 POSTOP FOLLOW-UP VISIT: CPT | Performed by: PHYSICIAN ASSISTANT

## 2018-10-19 NOTE — PROGRESS NOTES
10/19/2018  Efraín Arizmendi  1980  [id number]      Assessment/Plan  S/p vasectomy 9/25/18    Discussion  Efraín Arizmendi is a 43 y o  male being managed by Dr Cris Steinberg  The patient is doing well with no complaints  He was provided verbal and written instructions regarding semen analysis testing  He was instructed to use contraception until sterility confirmed with 2 semen analysis  He will call to review results  He will follow up on an as needed basis  All questions were answered  Results reviewed, the partial section of vas deferens is likely due to the fixation of the specimen   Clinically, during the vasectomy cross-section was seen and the prostatic side and testicular side of the remaining vas deferens were cauterized shot and tied off with silk sutures  The patient will require postoperative semen analyses as per our protocol and only pending continued sperm on semen analyses will be concerned about this pathologic finding    History of Present Illness  43 y o  male s/p vasectomy 9/25/18, presents today for follow up  He denies any scrotal pain or swelling  He is doing well with no issues after surgery      Vitals  Vitals:    10/19/18 1103   BP: 118/82   BP Location: Left arm   Patient Position: Sitting   Cuff Size: Standard   Pulse: 80   Weight: 112 kg (247 lb)   Height: 6' 2" (1 88 m)       Current Medications  Current Outpatient Prescriptions   Medication Sig Dispense Refill    amphetamine-dextroamphetamine (ADDERALL) 20 mg tablet TAKE 1 TABLET BY MOUTH AT 8AM, NOON, AND 4PM  0    ARIPiprazole (ABILIFY) 5 mg tablet Take 5 mg by mouth daily in the early morning        buPROPion (WELLBUTRIN XL) 300 mg 24 hr tablet Take 300 mg by mouth every morning  0    diazepam (VALIUM) 10 mg tablet Take 10 mg by mouth 2 (two) times a day  0    docusate sodium (COLACE) 100 mg capsule Take 1 capsule (100 mg total) by mouth 3 (three) times a day for 30 days 90 capsule 0    prazosin (MINIPRESS) 1 mg capsule Take 1 mg by mouth as needed Anxiety   0    QUEtiapine (SEROquel) 200 mg tablet Take 200 mg by mouth daily at bedtime  0     No current facility-administered medications for this visit  Final Diagnosis   A  Right vas deferens (vasectomy):     - Complete cross-section of benign vas deferens       - No malignancy identified      B  Left vas deferens (vasectomy):     - Tangential section of benign vas deferens  - Tubular structure noted grossly  - No malignancy identified     Electronically signed by Batsheva Pop MD on 9/28/2018 at 10:27 AM   Preliminary result electronically signed by Batsheva Pop MD on 9/27/2018 at  9:29 AM         Physical Exam  Gu: scrotum midline/bilateral incisions c/d/i

## 2018-11-15 ENCOUNTER — TRANSCRIBE ORDERS (OUTPATIENT)
Dept: LAB | Facility: HOSPITAL | Age: 38
End: 2018-11-15

## 2018-11-20 ENCOUNTER — APPOINTMENT (OUTPATIENT)
Dept: LAB | Facility: HOSPITAL | Age: 38
End: 2018-11-20
Payer: MEDICARE

## 2018-11-20 DIAGNOSIS — Z30.09 VASECTOMY EVALUATION: ICD-10-CM

## 2019-02-01 ENCOUNTER — HOSPITAL ENCOUNTER (INPATIENT)
Facility: HOSPITAL | Age: 39
LOS: 14 days | Discharge: HOME HEALTH | DRG: 871 | End: 2019-02-15
Attending: EMERGENCY MEDICINE | Admitting: SURGERY
Payer: MEDICARE

## 2019-02-01 ENCOUNTER — APPOINTMENT (EMERGENCY)
Dept: RADIOLOGY | Facility: HOSPITAL | Age: 39
DRG: 871 | End: 2019-02-01
Payer: MEDICARE

## 2019-02-01 ENCOUNTER — APPOINTMENT (INPATIENT)
Dept: RADIOLOGY | Facility: HOSPITAL | Age: 39
DRG: 871 | End: 2019-02-01
Payer: MEDICARE

## 2019-02-01 ENCOUNTER — ANESTHESIA (INPATIENT)
Dept: RADIOLOGY | Facility: HOSPITAL | Age: 39
DRG: 871 | End: 2019-02-01
Payer: MEDICARE

## 2019-02-01 ENCOUNTER — ANESTHESIA EVENT (INPATIENT)
Dept: RADIOLOGY | Facility: HOSPITAL | Age: 39
DRG: 871 | End: 2019-02-01
Payer: MEDICARE

## 2019-02-01 DIAGNOSIS — R33.9 URINARY RETENTION: ICD-10-CM

## 2019-02-01 DIAGNOSIS — K75.0 PYOGENIC HEPATIC ABSCESS: Primary | ICD-10-CM

## 2019-02-01 DIAGNOSIS — F32.0 CURRENT MILD EPISODE OF MAJOR DEPRESSIVE DISORDER WITHOUT PRIOR EPISODE (HCC): ICD-10-CM

## 2019-02-01 DIAGNOSIS — K72.00 ACUTE HEPATIC FAILURE: ICD-10-CM

## 2019-02-01 DIAGNOSIS — A41.9 SEPSIS (HCC): ICD-10-CM

## 2019-02-01 DIAGNOSIS — R50.9 FEVER: ICD-10-CM

## 2019-02-01 LAB
ABO GROUP BLD: NORMAL
ALBUMIN SERPL BCP-MCNC: 2 G/DL (ref 3.5–5)
ALBUMIN SERPL BCP-MCNC: 2.2 G/DL (ref 3.5–5)
ALP SERPL-CCNC: 131 U/L (ref 46–116)
ALP SERPL-CCNC: 149 U/L (ref 46–116)
ALT SERPL W P-5'-P-CCNC: 279 U/L (ref 12–78)
ALT SERPL W P-5'-P-CCNC: 375 U/L (ref 12–78)
ANION GAP SERPL CALCULATED.3IONS-SCNC: 11 MMOL/L (ref 4–13)
ANION GAP SERPL CALCULATED.3IONS-SCNC: 11 MMOL/L (ref 4–13)
ANISOCYTOSIS BLD QL SMEAR: PRESENT
ANISOCYTOSIS BLD QL SMEAR: PRESENT
APTT PPP: 32 SECONDS (ref 26–38)
APTT PPP: 35 SECONDS (ref 26–38)
ARTERIAL PATENCY WRIST A: YES
AST SERPL W P-5'-P-CCNC: 513 U/L (ref 5–45)
AST SERPL W P-5'-P-CCNC: 706 U/L (ref 5–45)
ATRIAL RATE: 82 BPM
BASE EXCESS BLDA CALC-SCNC: -4 MMOL/L (ref -2–3)
BASE EXCESS BLDA CALC-SCNC: -7.6 MMOL/L
BASOPHILS # BLD MANUAL: 0 THOUSAND/UL (ref 0–0.1)
BASOPHILS # BLD MANUAL: 0.29 THOUSAND/UL (ref 0–0.1)
BASOPHILS NFR MAR MANUAL: 0 % (ref 0–1)
BASOPHILS NFR MAR MANUAL: 1 % (ref 0–1)
BILIRUB DIRECT SERPL-MCNC: 8.31 MG/DL (ref 0–0.2)
BILIRUB SERPL-MCNC: 8.92 MG/DL (ref 0.2–1)
BILIRUB SERPL-MCNC: 9.45 MG/DL (ref 0.2–1)
BILIRUB SERPL-MCNC: 9.53 MG/DL (ref 0.2–1)
BILIRUB UR QL STRIP: ABNORMAL
BLD GP AB SCN SERPL QL: NEGATIVE
BUN SERPL-MCNC: 36 MG/DL (ref 5–25)
BUN SERPL-MCNC: 48 MG/DL (ref 5–25)
BURR CELLS BLD QL SMEAR: PRESENT
CA-I BLD-SCNC: 0.96 MMOL/L (ref 1.12–1.32)
CALCIUM SERPL-MCNC: 7.1 MG/DL (ref 8.3–10.1)
CALCIUM SERPL-MCNC: 7.8 MG/DL (ref 8.3–10.1)
CHLORIDE SERPL-SCNC: 101 MMOL/L (ref 100–108)
CHLORIDE SERPL-SCNC: 92 MMOL/L (ref 100–108)
CLARITY UR: ABNORMAL
CO2 SERPL-SCNC: 20 MMOL/L (ref 21–32)
CO2 SERPL-SCNC: 21 MMOL/L (ref 21–32)
COLOR UR: ABNORMAL
CREAT SERPL-MCNC: 1.52 MG/DL (ref 0.6–1.3)
CREAT SERPL-MCNC: 1.74 MG/DL (ref 0.6–1.3)
EOSINOPHIL # BLD MANUAL: 0 THOUSAND/UL (ref 0–0.4)
EOSINOPHIL # BLD MANUAL: 0.23 THOUSAND/UL (ref 0–0.4)
EOSINOPHIL NFR BLD MANUAL: 0 % (ref 0–6)
EOSINOPHIL NFR BLD MANUAL: 1 % (ref 0–6)
ERYTHROCYTE [DISTWIDTH] IN BLOOD BY AUTOMATED COUNT: 14.5 % (ref 11.6–15.1)
ERYTHROCYTE [DISTWIDTH] IN BLOOD BY AUTOMATED COUNT: 15 % (ref 11.6–15.1)
GFR SERPL CREATININE-BSD FRML MDRD: 48 ML/MIN/1.73SQ M
GFR SERPL CREATININE-BSD FRML MDRD: 56 ML/MIN/1.73SQ M
GLUCOSE SERPL-MCNC: 85 MG/DL (ref 65–140)
GLUCOSE SERPL-MCNC: 94 MG/DL (ref 65–140)
GLUCOSE SERPL-MCNC: 99 MG/DL (ref 65–140)
GLUCOSE UR STRIP-MCNC: NEGATIVE MG/DL
HCO3 BLDA-SCNC: 17.8 MMOL/L (ref 22–28)
HCO3 BLDA-SCNC: 22.4 MMOL/L (ref 24–30)
HCT VFR BLD AUTO: 30.7 % (ref 36.5–49.3)
HCT VFR BLD AUTO: 31.6 % (ref 36.5–49.3)
HCT VFR BLD CALC: 36 % (ref 36.5–49.3)
HGB BLD-MCNC: 10.3 G/DL (ref 12–17)
HGB BLD-MCNC: 11.2 G/DL (ref 12–17)
HGB BLDA-MCNC: 12.2 G/DL (ref 12–17)
HGB UR QL STRIP.AUTO: NEGATIVE
HIV 1+2 AB+HIV1 P24 AG SERPL QL IA: NORMAL
HIV1 P24 AG SER QL: NORMAL
HOROWITZ INDEX BLDA+IHG-RTO: 100 MM[HG]
INR PPP: 1.4 (ref 0.86–1.17)
INR PPP: 1.4 (ref 0.86–1.17)
KETONES UR STRIP-MCNC: NEGATIVE MG/DL
LACTATE SERPL-SCNC: 1.9 MMOL/L (ref 0.5–2)
LACTATE SERPL-SCNC: 2.2 MMOL/L (ref 0.5–2)
LACTATE SERPL-SCNC: 2.6 MMOL/L (ref 0.5–2)
LEUKOCYTE ESTERASE UR QL STRIP: NEGATIVE
LIPASE SERPL-CCNC: 165 U/L (ref 73–393)
LYMPHOCYTES # BLD AUTO: 0.93 THOUSAND/UL (ref 0.6–4.47)
LYMPHOCYTES # BLD AUTO: 2.06 THOUSAND/UL (ref 0.6–4.47)
LYMPHOCYTES # BLD AUTO: 4 % (ref 14–44)
LYMPHOCYTES # BLD AUTO: 7 % (ref 14–44)
MACROCYTES BLD QL AUTO: PRESENT
MAGNESIUM SERPL-MCNC: 2.7 MG/DL (ref 1.6–2.6)
MCH RBC QN AUTO: 29.3 PG (ref 26.8–34.3)
MCH RBC QN AUTO: 29.7 PG (ref 26.8–34.3)
MCHC RBC AUTO-ENTMCNC: 33.6 G/DL (ref 31.4–37.4)
MCHC RBC AUTO-ENTMCNC: 35.4 G/DL (ref 31.4–37.4)
MCV RBC AUTO: 84 FL (ref 82–98)
MCV RBC AUTO: 88 FL (ref 82–98)
METAMYELOCYTES NFR BLD MANUAL: 1 % (ref 0–1)
MONOCYTES # BLD AUTO: 0.23 THOUSAND/UL (ref 0–1.22)
MONOCYTES # BLD AUTO: 0.88 THOUSAND/UL (ref 0–1.22)
MONOCYTES NFR BLD: 1 % (ref 4–12)
MONOCYTES NFR BLD: 3 % (ref 4–12)
NEUTROPHILS # BLD MANUAL: 21.79 THOUSAND/UL (ref 1.85–7.62)
NEUTROPHILS # BLD MANUAL: 25.85 THOUSAND/UL (ref 1.85–7.62)
NEUTS BAND NFR BLD MANUAL: 2 % (ref 0–8)
NEUTS BAND NFR BLD MANUAL: 4 % (ref 0–8)
NEUTS SEG NFR BLD AUTO: 86 % (ref 43–75)
NEUTS SEG NFR BLD AUTO: 90 % (ref 43–75)
NITRITE UR QL STRIP: NEGATIVE
NRBC BLD AUTO-RTO: 0 /100 WBCS
NRBC BLD AUTO-RTO: 0 /100 WBCS
O2 CT BLDA-SCNC: 15.3 ML/DL (ref 16–23)
OXYHGB MFR BLDA: 94.2 % (ref 94–97)
P AXIS: 31 DEGREES
PCO2 BLD: 24 MMOL/L (ref 21–32)
PCO2 BLD: 47.7 MM HG (ref 42–50)
PCO2 BLDA: 35.7 MM HG (ref 36–44)
PEEP RESPIRATORY: 5 CM[H2O]
PH BLD: 7.28 [PH] (ref 7.3–7.4)
PH BLDA: 7.32 [PH] (ref 7.35–7.45)
PH UR STRIP.AUTO: 5.5 [PH] (ref 4.5–8)
PHOSPHATE SERPL-MCNC: 3.6 MG/DL (ref 2.7–4.5)
PLATELET # BLD AUTO: 341 THOUSANDS/UL (ref 149–390)
PLATELET # BLD AUTO: 384 THOUSANDS/UL (ref 149–390)
PLATELET BLD QL SMEAR: ADEQUATE
PLATELET BLD QL SMEAR: ADEQUATE
PMV BLD AUTO: 10.6 FL (ref 8.9–12.7)
PMV BLD AUTO: 11.8 FL (ref 8.9–12.7)
PO2 BLD: 71 MM HG (ref 35–45)
PO2 BLDA: 83.8 MM HG (ref 75–129)
POIKILOCYTOSIS BLD QL SMEAR: PRESENT
POIKILOCYTOSIS BLD QL SMEAR: PRESENT
POLYCHROMASIA BLD QL SMEAR: PRESENT
POTASSIUM BLD-SCNC: 3.6 MMOL/L (ref 3.5–5.3)
POTASSIUM SERPL-SCNC: 3.2 MMOL/L (ref 3.5–5.3)
POTASSIUM SERPL-SCNC: 3.9 MMOL/L (ref 3.5–5.3)
PR INTERVAL: 164 MS
PROCALCITONIN SERPL-MCNC: 38.27 NG/ML
PROT SERPL-MCNC: 6.3 G/DL (ref 6.4–8.2)
PROT SERPL-MCNC: 7.4 G/DL (ref 6.4–8.2)
PROT UR STRIP-MCNC: NEGATIVE MG/DL
PROTHROMBIN TIME: 17.3 SECONDS (ref 11.8–14.2)
PROTHROMBIN TIME: 17.3 SECONDS (ref 11.8–14.2)
QRS AXIS: 36 DEGREES
QRSD INTERVAL: 80 MS
QT INTERVAL: 378 MS
QTC INTERVAL: 422 MS
RBC # BLD AUTO: 3.51 MILLION/UL (ref 3.88–5.62)
RBC # BLD AUTO: 3.77 MILLION/UL (ref 3.88–5.62)
RBC MORPH BLD: PRESENT
RBC MORPH BLD: PRESENT
RH BLD: POSITIVE
SAO2 % BLD FROM PO2: 92 % (ref 95–98)
SODIUM BLD-SCNC: 130 MMOL/L (ref 136–145)
SODIUM SERPL-SCNC: 124 MMOL/L (ref 136–145)
SODIUM SERPL-SCNC: 132 MMOL/L (ref 136–145)
SP GR UR STRIP.AUTO: 1.02 (ref 1–1.03)
SPECIMEN EXPIRATION DATE: NORMAL
SPECIMEN SOURCE: ABNORMAL
SPECIMEN SOURCE: ABNORMAL
T WAVE AXIS: 38 DEGREES
TOXIC GRANULES BLD QL SMEAR: PRESENT
UROBILINOGEN UR QL STRIP.AUTO: 1 E.U./DL
VENT AC: 16
VENT- AC: AC
VENTRICULAR RATE: 75 BPM
VT SETTING VENT: 500 ML
WBC # BLD AUTO: 23.18 THOUSAND/UL (ref 4.31–10.16)
WBC # BLD AUTO: 29.37 THOUSAND/UL (ref 4.31–10.16)
WBC TOXIC VACUOLES BLD QL SMEAR: PRESENT

## 2019-02-01 PROCEDURE — 86900 BLOOD TYPING SEROLOGIC ABO: CPT | Performed by: RADIOLOGY

## 2019-02-01 PROCEDURE — 83605 ASSAY OF LACTIC ACID: CPT | Performed by: STUDENT IN AN ORGANIZED HEALTH CARE EDUCATION/TRAINING PROGRAM

## 2019-02-01 PROCEDURE — 82330 ASSAY OF CALCIUM: CPT

## 2019-02-01 PROCEDURE — 84132 ASSAY OF SERUM POTASSIUM: CPT

## 2019-02-01 PROCEDURE — 49405 IMAGE CATH FLUID COLXN VISC: CPT

## 2019-02-01 PROCEDURE — 96361 HYDRATE IV INFUSION ADD-ON: CPT

## 2019-02-01 PROCEDURE — 80053 COMPREHEN METABOLIC PANEL: CPT | Performed by: STUDENT IN AN ORGANIZED HEALTH CARE EDUCATION/TRAINING PROGRAM

## 2019-02-01 PROCEDURE — C1729 CATH, DRAINAGE: HCPCS

## 2019-02-01 PROCEDURE — 87040 BLOOD CULTURE FOR BACTERIA: CPT | Performed by: EMERGENCY MEDICINE

## 2019-02-01 PROCEDURE — 86901 BLOOD TYPING SEROLOGIC RH(D): CPT | Performed by: RADIOLOGY

## 2019-02-01 PROCEDURE — 49406 IMAGE CATH FLUID PERI/RETRO: CPT | Performed by: RADIOLOGY

## 2019-02-01 PROCEDURE — 81003 URINALYSIS AUTO W/O SCOPE: CPT | Performed by: EMERGENCY MEDICINE

## 2019-02-01 PROCEDURE — 94760 N-INVAS EAR/PLS OXIMETRY 1: CPT

## 2019-02-01 PROCEDURE — 36600 WITHDRAWAL OF ARTERIAL BLOOD: CPT

## 2019-02-01 PROCEDURE — 0F913ZX DRAINAGE OF RIGHT LOBE LIVER, PERCUTANEOUS APPROACH, DIAGNOSTIC: ICD-10-PCS | Performed by: RADIOLOGY

## 2019-02-01 PROCEDURE — 85610 PROTHROMBIN TIME: CPT | Performed by: STUDENT IN AN ORGANIZED HEALTH CARE EDUCATION/TRAINING PROGRAM

## 2019-02-01 PROCEDURE — 85610 PROTHROMBIN TIME: CPT | Performed by: EMERGENCY MEDICINE

## 2019-02-01 PROCEDURE — 82805 BLOOD GASES W/O2 SATURATION: CPT | Performed by: STUDENT IN AN ORGANIZED HEALTH CARE EDUCATION/TRAINING PROGRAM

## 2019-02-01 PROCEDURE — 87205 SMEAR GRAM STAIN: CPT | Performed by: SURGERY

## 2019-02-01 PROCEDURE — 80053 COMPREHEN METABOLIC PANEL: CPT | Performed by: EMERGENCY MEDICINE

## 2019-02-01 PROCEDURE — 85730 THROMBOPLASTIN TIME PARTIAL: CPT | Performed by: EMERGENCY MEDICINE

## 2019-02-01 PROCEDURE — 82803 BLOOD GASES ANY COMBINATION: CPT

## 2019-02-01 PROCEDURE — 85014 HEMATOCRIT: CPT

## 2019-02-01 PROCEDURE — 87806 HIV AG W/HIV1&2 ANTB W/OPTIC: CPT | Performed by: NURSE ANESTHETIST, CERTIFIED REGISTERED

## 2019-02-01 PROCEDURE — 74177 CT ABD & PELVIS W/CONTRAST: CPT

## 2019-02-01 PROCEDURE — 85027 COMPLETE CBC AUTOMATED: CPT | Performed by: EMERGENCY MEDICINE

## 2019-02-01 PROCEDURE — 84100 ASSAY OF PHOSPHORUS: CPT | Performed by: STUDENT IN AN ORGANIZED HEALTH CARE EDUCATION/TRAINING PROGRAM

## 2019-02-01 PROCEDURE — 85730 THROMBOPLASTIN TIME PARTIAL: CPT | Performed by: STUDENT IN AN ORGANIZED HEALTH CARE EDUCATION/TRAINING PROGRAM

## 2019-02-01 PROCEDURE — 94002 VENT MGMT INPAT INIT DAY: CPT

## 2019-02-01 PROCEDURE — 96365 THER/PROPH/DIAG IV INF INIT: CPT

## 2019-02-01 PROCEDURE — 0F9130Z DRAINAGE OF RIGHT LOBE LIVER WITH DRAINAGE DEVICE, PERCUTANEOUS APPROACH: ICD-10-PCS | Performed by: RADIOLOGY

## 2019-02-01 PROCEDURE — 93010 ELECTROCARDIOGRAM REPORT: CPT | Performed by: INTERNAL MEDICINE

## 2019-02-01 PROCEDURE — 87077 CULTURE AEROBIC IDENTIFY: CPT | Performed by: SURGERY

## 2019-02-01 PROCEDURE — 99285 EMERGENCY DEPT VISIT HI MDM: CPT

## 2019-02-01 PROCEDURE — 83735 ASSAY OF MAGNESIUM: CPT | Performed by: STUDENT IN AN ORGANIZED HEALTH CARE EDUCATION/TRAINING PROGRAM

## 2019-02-01 PROCEDURE — 87070 CULTURE OTHR SPECIMN AEROBIC: CPT | Performed by: SURGERY

## 2019-02-01 PROCEDURE — 84145 PROCALCITONIN (PCT): CPT | Performed by: EMERGENCY MEDICINE

## 2019-02-01 PROCEDURE — 36415 COLL VENOUS BLD VENIPUNCTURE: CPT

## 2019-02-01 PROCEDURE — 86803 HEPATITIS C AB TEST: CPT | Performed by: NURSE ANESTHETIST, CERTIFIED REGISTERED

## 2019-02-01 PROCEDURE — 93005 ELECTROCARDIOGRAM TRACING: CPT

## 2019-02-01 PROCEDURE — 85027 COMPLETE CBC AUTOMATED: CPT | Performed by: STUDENT IN AN ORGANIZED HEALTH CARE EDUCATION/TRAINING PROGRAM

## 2019-02-01 PROCEDURE — 99233 SBSQ HOSP IP/OBS HIGH 50: CPT | Performed by: SURGERY

## 2019-02-01 PROCEDURE — 82247 BILIRUBIN TOTAL: CPT | Performed by: EMERGENCY MEDICINE

## 2019-02-01 PROCEDURE — 87077 CULTURE AEROBIC IDENTIFY: CPT | Performed by: EMERGENCY MEDICINE

## 2019-02-01 PROCEDURE — 87340 HEPATITIS B SURFACE AG IA: CPT | Performed by: NURSE ANESTHETIST, CERTIFIED REGISTERED

## 2019-02-01 PROCEDURE — 85007 BL SMEAR W/DIFF WBC COUNT: CPT | Performed by: EMERGENCY MEDICINE

## 2019-02-01 PROCEDURE — 71046 X-RAY EXAM CHEST 2 VIEWS: CPT

## 2019-02-01 PROCEDURE — 84295 ASSAY OF SERUM SODIUM: CPT

## 2019-02-01 PROCEDURE — 83605 ASSAY OF LACTIC ACID: CPT | Performed by: EMERGENCY MEDICINE

## 2019-02-01 PROCEDURE — 85007 BL SMEAR W/DIFF WBC COUNT: CPT | Performed by: STUDENT IN AN ORGANIZED HEALTH CARE EDUCATION/TRAINING PROGRAM

## 2019-02-01 PROCEDURE — 86850 RBC ANTIBODY SCREEN: CPT | Performed by: RADIOLOGY

## 2019-02-01 PROCEDURE — 82947 ASSAY GLUCOSE BLOOD QUANT: CPT

## 2019-02-01 PROCEDURE — 83690 ASSAY OF LIPASE: CPT | Performed by: EMERGENCY MEDICINE

## 2019-02-01 PROCEDURE — C1769 GUIDE WIRE: HCPCS

## 2019-02-01 PROCEDURE — 82248 BILIRUBIN DIRECT: CPT | Performed by: EMERGENCY MEDICINE

## 2019-02-01 RX ORDER — SODIUM CHLORIDE 9 MG/ML
125 INJECTION, SOLUTION INTRAVENOUS CONTINUOUS
Status: DISCONTINUED | OUTPATIENT
Start: 2019-02-01 | End: 2019-02-02

## 2019-02-01 RX ORDER — ROCURONIUM BROMIDE 10 MG/ML
INJECTION, SOLUTION INTRAVENOUS AS NEEDED
Status: DISCONTINUED | OUTPATIENT
Start: 2019-02-01 | End: 2019-02-01 | Stop reason: SURG

## 2019-02-01 RX ORDER — PROPOFOL 10 MG/ML
5-50 INJECTION, EMULSION INTRAVENOUS
Status: DISCONTINUED | OUTPATIENT
Start: 2019-02-01 | End: 2019-02-02

## 2019-02-01 RX ORDER — METOCLOPRAMIDE HYDROCHLORIDE 5 MG/ML
10 INJECTION INTRAMUSCULAR; INTRAVENOUS ONCE AS NEEDED
Status: DISCONTINUED | OUTPATIENT
Start: 2019-02-01 | End: 2019-02-02 | Stop reason: HOSPADM

## 2019-02-01 RX ORDER — DIAZEPAM 5 MG/1
10 TABLET ORAL ONCE
Status: COMPLETED | OUTPATIENT
Start: 2019-02-01 | End: 2019-02-01

## 2019-02-01 RX ORDER — HEPARIN SODIUM 5000 [USP'U]/ML
5000 INJECTION, SOLUTION INTRAVENOUS; SUBCUTANEOUS EVERY 8 HOURS SCHEDULED
Status: DISCONTINUED | OUTPATIENT
Start: 2019-02-02 | End: 2019-02-15 | Stop reason: HOSPADM

## 2019-02-01 RX ORDER — CHLORHEXIDINE GLUCONATE 0.12 MG/ML
15 RINSE ORAL EVERY 12 HOURS SCHEDULED
Status: DISCONTINUED | OUTPATIENT
Start: 2019-02-01 | End: 2019-02-15 | Stop reason: HOSPADM

## 2019-02-01 RX ORDER — METOCLOPRAMIDE HYDROCHLORIDE 5 MG/ML
INJECTION INTRAMUSCULAR; INTRAVENOUS AS NEEDED
Status: DISCONTINUED | OUTPATIENT
Start: 2019-02-01 | End: 2019-02-01 | Stop reason: SURG

## 2019-02-01 RX ORDER — SODIUM CHLORIDE 9 MG/ML
INJECTION, SOLUTION INTRAVENOUS CONTINUOUS PRN
Status: DISCONTINUED | OUTPATIENT
Start: 2019-02-01 | End: 2019-02-01 | Stop reason: SURG

## 2019-02-01 RX ORDER — ACETAMINOPHEN 325 MG/1
650 TABLET ORAL ONCE
Status: COMPLETED | OUTPATIENT
Start: 2019-02-01 | End: 2019-02-01

## 2019-02-01 RX ORDER — ALBUMIN, HUMAN INJ 5% 5 %
SOLUTION INTRAVENOUS CONTINUOUS PRN
Status: DISCONTINUED | OUTPATIENT
Start: 2019-02-01 | End: 2019-02-01 | Stop reason: SURG

## 2019-02-01 RX ORDER — SUCCINYLCHOLINE CHLORIDE 20 MG/ML
INJECTION INTRAMUSCULAR; INTRAVENOUS AS NEEDED
Status: DISCONTINUED | OUTPATIENT
Start: 2019-02-01 | End: 2019-02-01 | Stop reason: SURG

## 2019-02-01 RX ORDER — FENTANYL CITRATE 50 UG/ML
INJECTION, SOLUTION INTRAMUSCULAR; INTRAVENOUS AS NEEDED
Status: DISCONTINUED | OUTPATIENT
Start: 2019-02-01 | End: 2019-02-01 | Stop reason: SURG

## 2019-02-01 RX ORDER — ACETAMINOPHEN 325 MG/1
650 TABLET ORAL EVERY 6 HOURS PRN
Status: DISCONTINUED | OUTPATIENT
Start: 2019-02-01 | End: 2019-02-01

## 2019-02-01 RX ORDER — NEOSTIGMINE METHYLSULFATE 1 MG/ML
INJECTION INTRAVENOUS AS NEEDED
Status: DISCONTINUED | OUTPATIENT
Start: 2019-02-01 | End: 2019-02-01 | Stop reason: SURG

## 2019-02-01 RX ORDER — ACETAMINOPHEN 160 MG/5ML
650 SUSPENSION, ORAL (FINAL DOSE FORM) ORAL EVERY 4 HOURS PRN
Status: DISCONTINUED | OUTPATIENT
Start: 2019-02-01 | End: 2019-02-02

## 2019-02-01 RX ORDER — SODIUM CHLORIDE, SODIUM LACTATE, POTASSIUM CHLORIDE, CALCIUM CHLORIDE 600; 310; 30; 20 MG/100ML; MG/100ML; MG/100ML; MG/100ML
INJECTION, SOLUTION INTRAVENOUS CONTINUOUS PRN
Status: DISCONTINUED | OUTPATIENT
Start: 2019-02-01 | End: 2019-02-01 | Stop reason: SURG

## 2019-02-01 RX ORDER — FENTANYL CITRATE 50 UG/ML
25 INJECTION, SOLUTION INTRAMUSCULAR; INTRAVENOUS
Status: DISCONTINUED | OUTPATIENT
Start: 2019-02-01 | End: 2019-02-02

## 2019-02-01 RX ORDER — SODIUM CHLORIDE, SODIUM GLUCONATE, SODIUM ACETATE, POTASSIUM CHLORIDE, MAGNESIUM CHLORIDE, SODIUM PHOSPHATE, DIBASIC, AND POTASSIUM PHOSPHATE .53; .5; .37; .037; .03; .012; .00082 G/100ML; G/100ML; G/100ML; G/100ML; G/100ML; G/100ML; G/100ML
1000 INJECTION, SOLUTION INTRAVENOUS ONCE
Status: COMPLETED | OUTPATIENT
Start: 2019-02-01 | End: 2019-02-02

## 2019-02-01 RX ORDER — HYDROMORPHONE HCL/PF 1 MG/ML
0.5 SYRINGE (ML) INJECTION
Status: DISCONTINUED | OUTPATIENT
Start: 2019-02-01 | End: 2019-02-02

## 2019-02-01 RX ORDER — LIDOCAINE HYDROCHLORIDE 10 MG/ML
INJECTION, SOLUTION INFILTRATION; PERINEURAL AS NEEDED
Status: DISCONTINUED | OUTPATIENT
Start: 2019-02-01 | End: 2019-02-01 | Stop reason: SURG

## 2019-02-01 RX ORDER — PROPOFOL 10 MG/ML
INJECTION, EMULSION INTRAVENOUS AS NEEDED
Status: DISCONTINUED | OUTPATIENT
Start: 2019-02-01 | End: 2019-02-01 | Stop reason: SURG

## 2019-02-01 RX ORDER — PROPOFOL 10 MG/ML
INJECTION, EMULSION INTRAVENOUS CONTINUOUS PRN
Status: DISCONTINUED | OUTPATIENT
Start: 2019-02-01 | End: 2019-02-01 | Stop reason: SURG

## 2019-02-01 RX ORDER — GLYCOPYRROLATE 0.2 MG/ML
INJECTION INTRAMUSCULAR; INTRAVENOUS AS NEEDED
Status: DISCONTINUED | OUTPATIENT
Start: 2019-02-01 | End: 2019-02-01 | Stop reason: SURG

## 2019-02-01 RX ORDER — ONDANSETRON 2 MG/ML
4 INJECTION INTRAMUSCULAR; INTRAVENOUS ONCE AS NEEDED
Status: DISCONTINUED | OUTPATIENT
Start: 2019-02-01 | End: 2019-02-02 | Stop reason: HOSPADM

## 2019-02-01 RX ORDER — ONDANSETRON 2 MG/ML
INJECTION INTRAMUSCULAR; INTRAVENOUS AS NEEDED
Status: DISCONTINUED | OUTPATIENT
Start: 2019-02-01 | End: 2019-02-01 | Stop reason: SURG

## 2019-02-01 RX ADMIN — ROCURONIUM BROMIDE 48 MG: 10 INJECTION INTRAVENOUS at 18:19

## 2019-02-01 RX ADMIN — IODIXANOL 100 ML: 320 INJECTION, SOLUTION INTRAVASCULAR at 14:40

## 2019-02-01 RX ADMIN — CHLORHEXIDINE GLUCONATE 0.12% ORAL RINSE 15 ML: 1.2 LIQUID ORAL at 23:50

## 2019-02-01 RX ADMIN — SODIUM CHLORIDE: 0.9 INJECTION, SOLUTION INTRAVENOUS at 20:38

## 2019-02-01 RX ADMIN — FENTANYL CITRATE 50 MCG/HR: 50 INJECTION, SOLUTION INTRAMUSCULAR; INTRAVENOUS at 22:26

## 2019-02-01 RX ADMIN — FENTANYL CITRATE 100 MCG: 50 INJECTION, SOLUTION INTRAMUSCULAR; INTRAVENOUS at 21:10

## 2019-02-01 RX ADMIN — FENTANYL CITRATE 50 MCG: 50 INJECTION, SOLUTION INTRAMUSCULAR; INTRAVENOUS at 18:30

## 2019-02-01 RX ADMIN — SODIUM CHLORIDE: 0.9 INJECTION, SOLUTION INTRAVENOUS at 17:50

## 2019-02-01 RX ADMIN — PROPOFOL 50 MCG/KG/MIN: 10 INJECTION, EMULSION INTRAVENOUS at 21:24

## 2019-02-01 RX ADMIN — SODIUM CHLORIDE, SODIUM LACTATE, POTASSIUM CHLORIDE, AND CALCIUM CHLORIDE: .6; .31; .03; .02 INJECTION, SOLUTION INTRAVENOUS at 18:15

## 2019-02-01 RX ADMIN — METOCLOPRAMIDE 10 MG: 5 INJECTION, SOLUTION INTRAMUSCULAR; INTRAVENOUS at 18:50

## 2019-02-01 RX ADMIN — PIPERACILLIN SODIUM,TAZOBACTAM SODIUM 4.5 G: 4; .5 INJECTION, POWDER, FOR SOLUTION INTRAVENOUS at 15:11

## 2019-02-01 RX ADMIN — SODIUM CHLORIDE: 0.9 INJECTION, SOLUTION INTRAVENOUS at 20:24

## 2019-02-01 RX ADMIN — DIAZEPAM 10 MG: 5 TABLET ORAL at 15:04

## 2019-02-01 RX ADMIN — ACETAMINOPHEN 650 MG: 160 SUSPENSION ORAL at 23:50

## 2019-02-01 RX ADMIN — LIDOCAINE HYDROCHLORIDE 50 MG: 10 INJECTION, SOLUTION INFILTRATION; PERINEURAL at 18:10

## 2019-02-01 RX ADMIN — PROPOFOL 50 MCG/KG/MIN: 10 INJECTION, EMULSION INTRAVENOUS at 23:15

## 2019-02-01 RX ADMIN — FENTANYL CITRATE 100 MCG: 50 INJECTION, SOLUTION INTRAMUSCULAR; INTRAVENOUS at 21:21

## 2019-02-01 RX ADMIN — PROPOFOL 200 MG: 10 INJECTION, EMULSION INTRAVENOUS at 18:10

## 2019-02-01 RX ADMIN — GLYCOPYRROLATE 0.4 MG: 0.2 INJECTION, SOLUTION INTRAMUSCULAR; INTRAVENOUS at 20:36

## 2019-02-01 RX ADMIN — ALBUMIN (HUMAN): 12.5 SOLUTION INTRAVENOUS at 18:13

## 2019-02-01 RX ADMIN — SODIUM CHLORIDE 125 ML/HR: 0.9 INJECTION, SOLUTION INTRAVENOUS at 22:48

## 2019-02-01 RX ADMIN — FENTANYL CITRATE 50 MCG: 50 INJECTION, SOLUTION INTRAMUSCULAR; INTRAVENOUS at 18:10

## 2019-02-01 RX ADMIN — ROCURONIUM BROMIDE 10 MG: 10 INJECTION INTRAVENOUS at 19:35

## 2019-02-01 RX ADMIN — FENTANYL CITRATE 50 MCG: 50 INJECTION, SOLUTION INTRAMUSCULAR; INTRAVENOUS at 20:37

## 2019-02-01 RX ADMIN — ONDANSETRON 4 MG: 2 INJECTION INTRAMUSCULAR; INTRAVENOUS at 18:50

## 2019-02-01 RX ADMIN — NEOSTIGMINE METHYLSULFATE 2 MG: 1 INJECTION INTRAVENOUS at 20:36

## 2019-02-01 RX ADMIN — METRONIDAZOLE 500 MG: 500 INJECTION, SOLUTION INTRAVENOUS at 23:21

## 2019-02-01 RX ADMIN — SUCCINYLCHOLINE CHLORIDE 100 MG: 20 INJECTION, SOLUTION INTRAMUSCULAR; INTRAVENOUS at 18:10

## 2019-02-01 RX ADMIN — SODIUM CHLORIDE, SODIUM GLUCONATE, SODIUM ACETATE, POTASSIUM CHLORIDE, MAGNESIUM CHLORIDE, SODIUM PHOSPHATE, DIBASIC, AND POTASSIUM PHOSPHATE 1000 ML: .53; .5; .37; .037; .03; .012; .00082 INJECTION, SOLUTION INTRAVENOUS at 23:51

## 2019-02-01 RX ADMIN — GLYCOPYRROLATE 0.2 MG: 0.2 INJECTION, SOLUTION INTRAMUSCULAR; INTRAVENOUS at 21:05

## 2019-02-01 RX ADMIN — NEOSTIGMINE METHYLSULFATE 1 MG: 1 INJECTION INTRAVENOUS at 21:05

## 2019-02-01 RX ADMIN — ROCURONIUM BROMIDE 2 MG: 10 INJECTION INTRAVENOUS at 18:10

## 2019-02-01 RX ADMIN — ALBUMIN (HUMAN): 12.5 SOLUTION INTRAVENOUS at 18:38

## 2019-02-01 RX ADMIN — SODIUM CHLORIDE 1000 ML: 0.9 INJECTION, SOLUTION INTRAVENOUS at 13:49

## 2019-02-01 RX ADMIN — ACETAMINOPHEN 650 MG: 325 TABLET, FILM COATED ORAL at 13:48

## 2019-02-01 RX ADMIN — CEFEPIME HYDROCHLORIDE 2000 MG: 1 INJECTION, POWDER, FOR SOLUTION INTRAMUSCULAR; INTRAVENOUS at 22:50

## 2019-02-01 RX ADMIN — FENTANYL CITRATE 50 MCG: 50 INJECTION, SOLUTION INTRAMUSCULAR; INTRAVENOUS at 20:38

## 2019-02-01 NOTE — H&P
H&P Exam - General Surgery   Analy Jeronimo 35 y o  male MRN: [id number]  Unit/Bed#: ED 10 Encounter: [id number]    Assessment/Plan     Assessment:  36 yo M with sepsis and multiple liver abscesses/collections  Also with elevated LFTs and bilirubin, unknown etiology or source, risk factor of previous IVDU but patient affirms he hasn't done drugs in 2 years  Plan:  1  Sepsis   - Admit to ICU   - 2 L bolus in ED   - Blood cultures sent   - Empiric broad spectrum antibiotics- Cefepime/Vanco/Flagyl   - May need echo if blood cx positive- unknown source of multi-abscess liver   - ID consult  2  Liver abscesses   - IR consult for drainage  Will be difficult given the multiple locations of these abscesses    - Broad spectrum antibiotics  3  Hyponatremia   - Likely 2/2 sepsis, will check serial BMP  4  Hyperbilirubinemia   - Likely 2/2 multiple liver abscess, no significant biliary findings on Ct   - Trend T bili, check D bili   - Will check AFP given hx of Hep C, liver masses        History of Present Illness     HPI:  Analy Jeronimo is a 35 y o  male with history of treated Hep C (Harvoni, 2017- secondary to IVDU)  The patient denies any IV drug use since his treatment  He presents with abdominal pain, mostly RUQ, that has been present and worsening over the past week  He denies history of any similar pain  This pain is intermittent but quite severe at the moment, does not radiate  Associated with nausea, vomiting, diarrhea  He does not think he has had fevers at home, however temp in the ED is 103  1  A few days ago someone pointed out to him that he appeared more yellow than usual  T bili today is 9 5  WBC 29 37  CTAP was performed and shows multiple (~9) multiloculated liver masses suggestive of abscesses  There is no obvious source, as he has some mild GB thickening likely reactive to the surrounding inflammation      Review of Systems   14 point ROS was conducted and is negative unless noted in HPI    Historical Information   Past Medical History:   Diagnosis Date    ADHD     Anxiety     Bipolar 1 disorder ([facility] [address] )     Delusion (HCC)     Hepatitis C     Infectious viral hepatitis     Hep C- No Symptoms( Took a course of Harvoni)    Psoriasis     PTSD (post-traumatic stress disorder)     FDC     Past Surgical History:   Procedure Laterality Date    OK REMOVAL OF SPERM DUCT(S) Bilateral 9/25/2018    Procedure: VASECTOMY;  Surgeon: Joe Tompkins MD;  Location: [facility];  Service: Urology    WISDOM TOOTH EXTRACTION       Social History   History   Alcohol Use    Yes     Comment: socially     History   Drug Use    Types: Marijuana     Comment: medical- vaping 4x/day     History   Smoking Status    Current Every Day Smoker    Packs/day: 1 00   Smokeless Tobacco    Never Used     Family History: History reviewed  No pertinent family history  Meds/Allergies   all medications and allergies reviewed  Allergies   Allergen Reactions    Sulfa Antibiotics Hives       Objective   First Vitals:   Blood Pressure: 111/72 (02/01/19 1224)  Pulse: 76 (02/01/19 1224)  Temperature: (!) 103 1 °F (39 5 °C) (02/01/19 1224)  Temp Source: Rectal (02/01/19 1224)  Respirations: (!) 24 (02/01/19 1224)  SpO2: 96 % (02/01/19 1224)    Current Vitals:   Blood Pressure: 94/52 (02/01/19 1617)  Pulse: 77 (02/01/19 1617)  Temperature: 99 4 °F (37 4 °C) (02/01/19 1509)  Temp Source: Tympanic (02/01/19 1509)  Respirations: 22 (02/01/19 1617)  SpO2: 94 % (02/01/19 1617)      Intake/Output Summary (Last 24 hours) at 02/01/19 1618  Last data filed at 02/01/19 1511   Gross per 24 hour   Intake                0 ml   Output              600 ml   Net             -600 ml       Invasive Devices     Peripheral Intravenous Line            Peripheral IV 02/01/19 Right Antecubital less than 1 day                Physical Exam   Constitutional: He is oriented to person, place, and time  He has a sickly appearance  No distress     HENT:   Head: Normocephalic and atraumatic  Eyes:   Scratches and erythema to right orbit   Neck: Neck supple  Pulmonary/Chest: Breath sounds normal  No respiratory distress  He has no wheezes  Abdominal: Soft  He exhibits no distension  There is tenderness  There is guarding  There is no rebound  Tender in RUQ   Musculoskeletal: He exhibits no edema, tenderness or deformity  Neurological: He is alert and oriented to person, place, and time  Skin: Skin is warm and dry  He is not diaphoretic  Jaundiced   Psychiatric: He has a normal mood and affect  Lab Results:   CBC:   Lab Results   Component Value Date    WBC 29 37 (H) 02/01/2019    HGB 11 2 (L) 02/01/2019    HCT 31 6 (L) 02/01/2019    MCV 84 02/01/2019     02/01/2019    MCH 29 7 02/01/2019    MCHC 35 4 02/01/2019    RDW 14 5 02/01/2019    MPV 11 8 02/01/2019    NRBC 0 02/01/2019   , CMP:   Lab Results   Component Value Date    SODIUM 124 (L) 02/01/2019    K 3 2 (L) 02/01/2019    CL 92 (L) 02/01/2019    CO2 21 02/01/2019    BUN 48 (H) 02/01/2019    CREATININE 1 74 (H) 02/01/2019    CALCIUM 7 8 (L) 02/01/2019     (H) 02/01/2019     (H) 02/01/2019    ALKPHOS 149 (H) 02/01/2019    EGFR 48 02/01/2019   , Coagulation:   Lab Results   Component Value Date    INR 1 40 (H) 02/01/2019     Imaging: I have personally reviewed pertinent reports  CT abdomen pelvis with contrast   Final Result by Antonio Kaba MD (02/01 9394)         1  In the provided clinical setting of fever and severe leukocytosis, the innumerable hepatic multilocular/multiseptated liver masses are most likely pyogenic abscesses versus less likely anemic abscesses  Other causes of cystic liver masses are less    likely in this clinical setting  The patient's reported history of hepatitis C is noted  After patient's acute issues have resolved and the above liver lesions have been treated, surveillance liver imaging could be performed        2   Nondistended gallbladder with mild wall thickening and mucosal hyperemia is likely reactive  I personally discussed this study with Taylor Jewell on 2/1/2019 at 3:21PM                Workstation performed: [id number]         XR chest 2 views   Final Result by Sanjay Alvarez DO (02/01 1450)      Elevation of the right hemidiaphragm with bibasilar subsegmental atelectasis and suspected trace right effusion  Workstation performed: [id number]             EKG, Pathology, and Other Studies: I have personally reviewed pertinent reports  Code Status: No Order  Advance Directive and Living Will:      Power of :    POLST:      Counseling / Coordination of Care  Total floor / unit time spent today 45 minutes  Greater than 50% of total time was spent with the patient and / or family counseling and / or coordination of care  A description of the counseling / coordination of care: Telly Riggs

## 2019-02-01 NOTE — ED PROVIDER NOTES
History  Chief Complaint   Patient presents with    Abdominal Pain     Pt presents with RUQ pain and generalized red rash on abdomen  Pt states since January 24th he has felt sick c/o chills, diarrhea, new onset jaundice, and tachypnea  Hx: Heptitis C      HPI  50-year-old male with past medical history of IV drug use (has not used in last two years) and hepatitis-C virus s/p treatment with harvoni in 2017 presents the ED with flu-like symptoms x1 week and progressively worsening right upper quadrant pain x3 days  Patient reports extreme fatigue, headache, body aches, multiple episodes of diarrhea, fever for about 1 week  He denies cough or vomiting  Denies any sick contacts  States he has had these symptoms in the past, but never lasting more than 1 or 2 days  A few days ago patient's family members also brought to his attention that he was jaundiced  Patient also reports that he has been feeling off balance for about a week and has fallen several times when walking  Right upper quadrant pain is constant and nonradiating  No history of abdominal surgeries  Patient notes feeling short of breath, dizzy, and like his abdomen is distended  Prior to Admission Medications   Prescriptions Last Dose Informant Patient Reported? Taking?    ARIPiprazole (ABILIFY) 5 mg tablet 1/31/2019 at Unknown time Self Yes Yes   Sig: Take 5 mg by mouth daily in the early morning     QUEtiapine (SEROquel) 200 mg tablet 1/31/2019 at Unknown time Self Yes Yes   Sig: Take 200 mg by mouth daily at bedtime   amphetamine-dextroamphetamine (ADDERALL) 20 mg tablet 1/31/2019 at Unknown time Self Yes Yes   Sig: TAKE 1 TABLET BY MOUTH AT 8AM, NOON, AND 4PM   buPROPion (WELLBUTRIN XL) 300 mg 24 hr tablet 1/31/2019 at Unknown time Self Yes Yes   Sig: Take 300 mg by mouth every morning   diazepam (VALIUM) 10 mg tablet 1/31/2019 at Unknown time Self Yes Yes   Sig: Take 10 mg by mouth 2 (two) times a day   docusate sodium (COLACE) 100 mg capsule Self No No   Sig: Take 1 capsule (100 mg total) by mouth 3 (three) times a day for 30 days   prazosin (MINIPRESS) 1 mg capsule 1/31/2019 at Unknown time Self Yes Yes   Sig: Take 1 mg by mouth as needed Anxiety       Facility-Administered Medications: None       Past Medical History:   Diagnosis Date    ADHD     Anxiety     Bipolar 1 disorder ([facility] )     Delusion ([facility] )     Hepatitis C     Infectious viral hepatitis     Hep C- No Symptoms( Took a course of Harvoni)    Psoriasis     PTSD (post-traumatic stress disorder)     [employer]       Past Surgical History:   Procedure Laterality Date    [place] REMOVAL OF SPERM DUCT(S) Bilateral 9/25/2018    Procedure: VASECTOMY;  Surgeon: Kadeem Min MD;  Location: [facility];  Service: Urology    WISDOM TOOTH EXTRACTION         History reviewed  No pertinent family history  I have reviewed and agree with the history as documented  Social History   Substance Use Topics    Smoking status: Current Every Day Smoker     Packs/day: 1 00    Smokeless tobacco: Never Used    Alcohol use Yes      Comment: socially        Review of Systems   Constitutional: Positive for appetite change, chills, fatigue and fever  HENT: Negative for congestion, rhinorrhea and sore throat  Respiratory: Positive for shortness of breath  Negative for cough  Cardiovascular: Negative for chest pain and palpitations  Gastrointestinal: Positive for diarrhea  Negative for abdominal pain, nausea and vomiting  Genitourinary: Negative for dysuria and hematuria  Musculoskeletal: Positive for arthralgias and myalgias  Negative for neck pain and neck stiffness  Skin: Negative for rash  Neurological: Positive for dizziness, light-headedness and headaches  Negative for syncope, weakness and numbness  Psychiatric/Behavioral: The patient is nervous/anxious  All other systems reviewed and are negative        Physical Exam  ED Triage Vitals   Temperature Pulse Respirations Blood Pressure SpO2   02/01/19 1224 02/01/19 1224 02/01/19 1224 02/01/19 1224 02/01/19 1224   (!) 103 1 °F (39 5 °C) 76 (!) 24 111/72 96 %      Temp Source Heart Rate Source Patient Position - Orthostatic VS BP Location FiO2 (%)   02/01/19 1224 02/01/19 1303 02/01/19 1345 02/01/19 1303 --   Rectal Monitor Lying Right arm       Pain Score       02/01/19 1224       9           Orthostatic Vital Signs  Vitals:    02/01/19 1400 02/01/19 1509 02/01/19 1617 02/01/19 1714   BP: 120/65 101/54 94/52 126/65   Pulse: 78 76 77 82   Patient Position - Orthostatic VS:  Lying Lying Lying       Physical Exam   Constitutional: He is oriented to person, place, and time  He appears well-developed and well-nourished  No distress  HENT:   Head: Normocephalic and atraumatic  Right Ear: External ear normal    Left Ear: External ear normal    Mouth/Throat: Oropharynx is clear and moist  Mucous membranes are dry  Eyes: Pupils are equal, round, and reactive to light  Conjunctivae are normal  Scleral icterus is present  Neck: Normal range of motion  Neck supple  No neck rigidity  Normal range of motion present  Cardiovascular: Normal rate, regular rhythm, normal heart sounds and intact distal pulses  Exam reveals no gallop and no friction rub  No murmur heard  Pulmonary/Chest: Effort normal and breath sounds normal  No respiratory distress  He has no wheezes  He has no rhonchi  He has no rales  Abdominal: Soft  Bowel sounds are normal  He exhibits distension  He exhibits no fluid wave and no mass  There is generalized tenderness and tenderness in the right upper quadrant  Musculoskeletal: Normal range of motion  Lymphadenopathy:     He has no cervical adenopathy  Neurological: He is alert and oriented to person, place, and time  He has normal strength  No cranial nerve deficit or sensory deficit  Coordination (mild dysmetria on finger to nose bilaterally) abnormal    Skin: Skin is warm and dry  He is not diaphoretic   There is erythema (erythema and excoriations, likely from scratching on right side of abdomen  Blanching, not petechial)  jaundice   Nursing note and vitals reviewed  ED Medications  Medications   sodium chloride 0 9 % bolus 1,000 mL (not administered)   acetaminophen (TYLENOL) tablet 650 mg (650 mg Oral Given 2/1/19 1348)   sodium chloride 0 9 % bolus 1,000 mL (1,000 mL Intravenous New Bag 2/1/19 1349)   piperacillin-tazobactam (ZOSYN) 4 5 g in sodium chloride 0 9 % 100 mL IVPB (4 5 g Intravenous New Bag 2/1/19 1511)   diazepam (VALIUM) tablet 10 mg (10 mg Oral Given 2/1/19 1504)   iodixanol (VISIPAQUE) 320 MG/ML injection 100 mL (100 mL Intravenous Given 2/1/19 1440)       Diagnostic Studies  Results Reviewed     Procedure Component Value Units Date/Time    UA w Reflex to Microscopic w Reflex to Culture [[id number]]  (Abnormal) Collected:  02/01/19 1508    Lab Status:  Final result Specimen:  Urine from Urine, Clean Catch Updated:  02/01/19 1613     Color, UA Orange     Clarity, UA Cloudy     Specific Gravity, UA 1 016     pH, UA 5 5     Leukocytes, UA Negative     Nitrite, UA Negative     Protein, UA Negative mg/dl      Glucose, UA Negative mg/dl      Ketones, UA Negative mg/dl      Urobilinogen, UA 1 0 E U /dl      Bilirubin, UA Interference- unable to analyze (A)     Blood, UA Negative    Bilirubin, direct [[id number]]     Lab Status:  No result Specimen:  Blood     Lactic Acid x2 [[id number]]  (Normal) Collected:  02/01/19 1503    Lab Status:  Final result Specimen:  Blood from Arm, Right Updated:  02/01/19 1545     LACTIC ACID 1 9 mmol/L     Narrative:         Result may be elevated if tourniquet was used during collection      Ova and parasite examination [[id number]]     Lab Status:  No result Specimen:  Stool from Rectum     Procalcitonin [[id number]]  (Abnormal) Collected:  02/01/19 1345    Lab Status:  Final result Specimen:  Blood Updated:  02/01/19 1443     Procalcitonin 38 27 (H) ng/ml     Bilirubin, direct [[id number]] Collected:  02/01/19 1234    Lab Status: In process Specimen:  Blood from Arm, Right Updated:  02/01/19 1420    Lipase [[id number]] Collected:  02/01/19 1234    Lab Status: In process Specimen:  Blood from Arm, Right Updated:  02/01/19 1420    Bilirubin, total [[id number]] Collected:  02/01/19 1234    Lab Status: In process Specimen:  Blood from Arm, Right Updated:  02/01/19 1420    Lactic Acid x2 [[id number]]  (Abnormal) Collected:  02/01/19 1235    Lab Status:  Final result Specimen:  Blood from Arm, Right Updated:  02/01/19 1353     LACTIC ACID 2 2 (HH) mmol/L     Narrative:         Result may be elevated if tourniquet was used during collection  POCT urinalysis dipstick [[id number]]     Lab Status:  No result     Comprehensive metabolic panel [[id number]]  (Abnormal) Collected:  02/01/19 1234    Lab Status:  Final result Specimen:  Blood from Arm, Right Updated:  02/01/19 1333     Sodium 124 (L) mmol/L      Potassium 3 2 (L) mmol/L      Chloride 92 (L) mmol/L      CO2 21 mmol/L      ANION GAP 11 mmol/L      BUN 48 (H) mg/dL      Creatinine 1 74 (H) mg/dL      Glucose 99 mg/dL      Calcium 7 8 (L) mg/dL       (H) U/L       (H) U/L      Alkaline Phosphatase 149 (H) U/L      Total Protein 7 4 g/dL      Albumin 2 2 (L) g/dL      Total Bilirubin 9 53 (H) mg/dL      eGFR 48 ml/min/1 73sq m     Narrative:         National Kidney Disease Education Program recommendations are as follows:  GFR calculation is accurate only with a steady state creatinine  Chronic Kidney disease less than 60 ml/min/1 73 sq  meters  Kidney failure less than 15 ml/min/1 73 sq  meters      CBC and differential [[id number]]  (Abnormal) Collected:  02/01/19 1235    Lab Status:  Final result Specimen:  Blood from Arm, Right Updated:  02/01/19 1327     WBC 29 37 (H) Thousand/uL      RBC 3 77 (L) Million/uL      Hemoglobin 11 2 (L) g/dL      Hematocrit 31 6 (L) %      MCV 84 fL      MCH 29 7 pg      MCHC 35 4 g/dL      RDW 14 5 %      MPV 11 8 fL      Platelets 673 Thousands/uL      nRBC 0 /100 WBCs     Narrative: This is an appended report  These results have been appended to a previously verified report  APTT [[id number]]  (Normal) Collected:  02/01/19 1234    Lab Status:  Final result Specimen:  Blood from Arm, Right Updated:  02/01/19 1312     PTT 35 seconds     Protime-INR [[id number]]  (Abnormal) Collected:  02/01/19 1234    Lab Status:  Final result Specimen:  Blood from Arm, Right Updated:  02/01/19 1312     Protime 17 3 (H) seconds      INR 1 40 (H)    Blood culture #1 [[id number]] Collected:  02/01/19 1301    Lab Status: In process Specimen:  Blood from Hand, Left Updated:  02/01/19 1308    Blood culture #2 [[id number]] Collected:  02/01/19 1235    Lab Status: In process Specimen:  Blood from Arm, Right Updated:  02/01/19 1241                 CT abdomen pelvis with contrast   Final Result by Julianne Springer MD (02/01 4628)         1  In the provided clinical setting of fever and severe leukocytosis, the innumerable hepatic multilocular/multiseptated liver masses are most likely pyogenic abscesses versus less likely anemic abscesses  Other causes of cystic liver masses are less    likely in this clinical setting  The patient's reported history of hepatitis C is noted  After patient's acute issues have resolved and the above liver lesions have been treated, surveillance liver imaging could be performed  2   Nondistended gallbladder with mild wall thickening and mucosal hyperemia is likely reactive  I personally discussed this study with Hyun Gale on 2/1/2019 at 3:21PM                Workstation performed: [id number]         XR chest 2 views   Final Result by Odalys Negrete DO (02/01 6950)      Elevation of the right hemidiaphragm with bibasilar subsegmental atelectasis and suspected trace right effusion              Workstation performed: [id number]         IR image guided drainage w tube placement    (Results Pending)         Procedures  ECG 12 Lead Documentation  Date/Time: 2/1/2019 3:33 PM  Performed by: Claudia Turk  Authorized by: Claudia Turk     ECG reviewed by me, the ED Provider: yes    Patient location:  ED  Previous ECG:     Previous ECG:  Compared to current    Similarity:  No change  Interpretation:     Interpretation: normal    Rate:     ECG rate:  75    ECG rate assessment: normal    Rhythm:     Rhythm: sinus rhythm    Ectopy:     Ectopy: none    QRS:     QRS axis:  Normal    QRS intervals:  Normal  Conduction:     Conduction: normal    ST segments:     ST segments:  Normal  T waves:     T waves: normal            Phone Consults  ED Phone Contact    ED Course  ED Course as of Feb 01 1744 Fri Feb 01, 2019   1354 WBC: (!) 29 37   1354 Creatinine: (!) 1 74   1354 AST: (!) 706   1354 ALT: (!) 375   1416 LACTIC ACID: (!!) 2 2   1524 Discussed with radiology  Multiple hepatic masses/abscesses  CT abdomen pelvis with contrast   1554 Discussed with Dr Sp Bey, will admit to red surgery  1724 Surgery will admit to SICU                          Initial Sepsis Screening     Row Name 02/01/19 1303                Is the patient's history suggestive of a new or worsening infection? (!)  Yes (Proceed)  -HK        Suspected source of infection    gallbladder vs  hepatitis  -HK        Are two or more of the following signs & symptoms of infection both present and new to the patient? (!)  Yes (Proceed)  -HK        Indicate SIRS criteria Hyperthemia > 38 3C (100 9F); Tachypnea > 20 resp per min  -HK        If the answer is yes to both questions, suspicion of sepsis is present          If severe sepsis is present AND tissue hypoperfusion perists in the hour after fluid resuscitation or lactate > 4, the patient meets criteria for SEPTIC SHOCK          Are any of the following organ dysfunction criteria present within 6 hours of suspected infection and SIRS criteria that are NOT considered to be chronic conditions?         Organ dysfunction          Date of presentation of severe sepsis          Time of presentation of severe sepsis          Tissue hypoperfusion persists in the hour after crystalloid fluid administration, evidenced, by either:          Was hypotension present within one hour of the conclusion of crystalloid fluid administration?         Date of presentation of septic shock          Time of presentation of septic shock            User Key  (r) = Recorded By, (t) = Taken By, (c) = Cosigned By    Initials Name Provider Type    Diya Serna MD Resident                  MDM  Number of Diagnoses or Management Options  Diagnosis management comments: 58-year-old male with past medical history of IV drug use (has not used in last two years) and hepatitis-C virus s/p treatment with harvoni in 2017 presents the ED with flu-like symptoms and fever x1 week and progressively worsening right upper quadrant pain x3 days  This with pt's jaundice is also concerning for acute hepatic failure  Bedside ultrasound does not show gallstones, but gallbladder neck is not well visualized  Will get CT abdomen with contrast to evaluate for cholangitis or other source of infection  Will get CBC, CMP, fractionated bilirubin, EKG, CXR, UA, Lactate, Procalcitonin to evaluate for sepsis  Will give fluids as pt looks dry  Pt will likely need IV antibiotics and to be admitted to surgery         Disposition  Final diagnoses:   Pyogenic hepatic abscess   Fever   Sepsis ([facility] )   Acute hepatic failure     Time reflects when diagnosis was documented in both MDM as applicable and the Disposition within this note     Time User Action Codes Description Comment    2/1/2019  4:00 PM Cleophus Fanti Add [K75 0] Pyogenic hepatic abscess     2/1/2019  4:00 PM Cleophus Fanti Add [R50 9] Fever     2/1/2019  4:00 PM Parris Fuelling [A41 9] Sepsis ([facility] )     2/1/2019  4:01 PM Cleophus Fanti Add [K72 00] Acute hepatic failure ED Disposition     ED Disposition Condition Date/Time Comment    Admit  Fri Feb 1, 2019  4:02 PM Case was discussed with Dr Dave Ordaz and the patient's admission status was agreed to be Admission Status: inpatient status to the service of Dr Dave Ordaz   Follow-up Information    None         Patient's Medications   Discharge Prescriptions    No medications on file     No discharge procedures on file  ED Provider  Attending physically available and evaluated Cathy Bro I managed the patient along with the ED Attending      Electronically Signed by         Dustin Baez MD  02/01/19 8089

## 2019-02-01 NOTE — PROGRESS NOTES
Critical Care Accept Note   Mandy Alonzo 35 y o  male MRN: [id number]  Unit/Bed#: [facility] Encounter: [id number]    Physician Requesting Consult: Lamont Jackman MD    Reason for Consultation / Chief Complaint: hepatic abscesses    History of Present Illness:  Mandy Alonzo is a 35 y o  male who presents with approximately 1 week history of RUQ pain, nausea/vomiting, and malaise  Past medical history is significant for IVDU and Hep C, treated with Balbir Calle in 2017  He follows with  [facility]  He denies any drug use since treatment  Upon presentation to the ED he was febrile as high as 103  CT of the abdomen and pelvis demonstrated multiple liver abscesses  He went directly to IR for drainage; 5 drains were placed and cultures were sent  He was unable to be weaned from the vent following IR therefore was brought to the ICU intubated and sedated  History obtained from the patient  Past Medical History:  Past Medical History:   Diagnosis Date    ADHD     Anxiety     Bipolar 1 disorder ([facility] [address] )     Delusion ([facility] )     Hepatitis C     Infectious viral hepatitis     Hep C- No Symptoms( Took a course of Harvoni)    Psoriasis     PTSD (post-traumatic stress disorder)     assisted       Past Surgical History:  Past Surgical History:   Procedure Laterality Date    VT REMOVAL OF SPERM DUCT(S) Bilateral 9/25/2018    Procedure: VASECTOMY;  Surgeon: Ta Jackman MD;  Location: [facility]  MAIN [facility];  Service: Urology    WISDOM TOOTH EXTRACTION         Past Family History:  History reviewed  No pertinent family history  Social History:  History   Smoking Status    Current Every Day Smoker    Packs/day: 1 00   Smokeless Tobacco    Never Used     History   Alcohol Use    Yes     Comment: socially     History   Drug Use    Types: Marijuana     Comment: medical- vaping 4x/day     Marital Status: Single    Home Medications:   Prior to Admission medications    Medication Sig Start Date End Date Taking?  Authorizing Provider amphetamine-dextroamphetamine (ADDERALL) 20 mg tablet TAKE 1 TABLET BY MOUTH AT 8AM, NOON, AND 4PM 7/19/18  Yes Historical Provider, MD   ARIPiprazole (ABILIFY) 5 mg tablet Take 5 mg by mouth daily in the early morning   9/8/14  Yes Historical Provider, MD   buPROPion (WELLBUTRIN XL) 300 mg 24 hr tablet Take 300 mg by mouth every morning 7/18/18  Yes Historical Provider, MD   diazepam (VALIUM) 10 mg tablet Take 10 mg by mouth 2 (two) times a day 7/18/18  Yes Historical Provider, MD   prazosin (MINIPRESS) 1 mg capsule Take 1 mg by mouth as needed Anxiety  7/18/18  Yes Historical Provider, MD   QUEtiapine (SEROquel) 200 mg tablet Take 200 mg by mouth daily at bedtime 7/18/18  Yes Historical Provider, MD   docusate sodium (COLACE) 100 mg capsule Take 1 capsule (100 mg total) by mouth 3 (three) times a day for 30 days 9/25/18 10/25/18  Kristin Rivera MD       Inpatient Medications:  Scheduled Meds:  Current Facility-Administered Medications:  sodium chloride 1,000 mL Intravenous Once Sofy Frias MD     Facility-Administered Medications Ordered in Other Encounters:  albumin human   Continuous PRN Niharika Mcgarry CRNA Last Rate: 0 mL/hr at 02/01/19 1825   fentanyl citrate (PF)  Intravenous PRN Niharika Malgorzata, CRNA    lactated ringers   Continuous PRN Niharika Malgorzata, CRNA    lidocaine   PRN Niharika Malgorzata, CRNA    propofol  Intravenous PRN Niharika Malgorzata, CRNA    rocuronium   PRN Niharika Malgorzata, CRNA    sodium chloride   Continuous PRN Niharika Malgorzata, CRNA    succinylcholine  Intravenous PRN Niharika Malgorzata, CRNA      Continuous Infusions:   PRN Meds:       Allergies: Allergies   Allergen Reactions    Sulfa Antibiotics Hives       ROS:   Review of Systems   Constitutional: Positive for fatigue  Negative for chills and fever  HENT: Negative  Eyes: Negative  Respiratory: Negative  Negative for cough and shortness of breath  Cardiovascular: Negative    Negative for chest pain and palpitations  Gastrointestinal: Positive for abdominal pain, diarrhea, nausea and vomiting  Negative for blood in stool and constipation  Endocrine: Negative  Genitourinary: Negative  Negative for dysuria  Musculoskeletal: Negative  Skin: Negative  Allergic/Immunologic: Negative  Neurological: Negative  Hematological: Negative  Psychiatric/Behavioral: Negative  Vitals:  Vitals:    19 1400 19 1509 19 1617 19 1714   BP: 120/65 101/54 94/52 126/65   BP Location:  Left arm Left arm Left arm   Pulse: 78 76 77 82   Resp: (!) 24 22 22 22   Temp:  99 4 °F (37 4 °C)     TempSrc:  Tympanic     SpO2: 94% 94% 94% 93%     Temperature:   Temp (24hrs), Av 3 °F (38 5 °C), Min:99 4 °F (37 4 °C), Max:103 1 °F (39 5 °C)    Current Temperature: 99 4 °F (37 4 °C)    Weights: There is no height or weight on file to calculate BMI  Hemodynamic Monitoring:  N/A     Non-Invasive/Invasive Ventilation Settings:  Respiratory    Lab Data (Last 4 hours)    None         O2/Vent Data (Last 4 hours)    None              No results found for: PHART, NTJ9JNI, PO2ART, DXR6INY, A9IWHPVQ, BEART, SOURCE  SpO2: SpO2: 93 %     Physical Exam:  Physical Exam   Constitutional: He is oriented to person, place, and time  He appears well-developed and well-nourished  No distress  Visibly jaundiced   HENT:   Head: Normocephalic and atraumatic  Eyes: Pupils are equal, round, and reactive to light  EOM are normal  Scleral icterus is present  Neck: No JVD present  Cardiovascular: Normal rate, regular rhythm and normal heart sounds  Pulmonary/Chest: Effort normal  No stridor  No respiratory distress  On mechanical ventilation AC 14/500/50/5  Decreased breath sounds bilateral bases   Abdominal: Soft  He exhibits no distension  There is tenderness (tender in RUQ)  There is no rebound and no guarding  IR drains in place   Musculoskeletal: He exhibits no edema     Neurological: He is alert and oriented to person, place, and time  No cranial nerve deficit  Skin: Skin is warm and dry  He is not diaphoretic  Psychiatric: He has a normal mood and affect  Labs:    Results from last 7 days  Lab Units 02/01/19  1235   WBC Thousand/uL 29 37*   HEMOGLOBIN g/dL 11 2*   HEMATOCRIT % 31 6*   PLATELETS Thousands/uL 341   BANDS PCT % 2   MONO PCT % 3*       Results from last 7 days  Lab Units 02/01/19  1234   SODIUM mmol/L 124*   POTASSIUM mmol/L 3 2*   CHLORIDE mmol/L 92*   CO2 mmol/L 21   ANION GAP mmol/L 11   BUN mg/dL 48*   CREATININE mg/dL 1 74*   CALCIUM mg/dL 7 8*   ALT U/L 375*   AST U/L 706*   ALK PHOS U/L 149*   ALBUMIN g/dL 2 2*   TOTAL BILIRUBIN mg/dL 9 45*  9 53*            Results from last 7 days  Lab Units 02/01/19  1234   INR  1 40*   PTT seconds 35           Results from last 7 days  Lab Units 02/01/19  1503 02/01/19  1235   LACTIC ACID mmol/L 1 9 2 2*     ABG:No results found for: PHART, KYV7DCJ, PO2ART, YHF7DGJ, V2UNNEFK, BEART, SOURCE  VBG:      Results from last 7 days  Lab Units 02/01/19  1345   PROCALCITONIN ng/ml 38 27*       Imaging:   Xr Chest 2 Views    Result Date: 2/1/2019  Impression: Elevation of the right hemidiaphragm with bibasilar subsegmental atelectasis and suspected trace right effusion  Workstation performed: [id number]     Ct Abdomen Pelvis With Contrast    Result Date: 2/1/2019  Impression: 1  In the provided clinical setting of fever and severe leukocytosis, the innumerable hepatic multilocular/multiseptated liver masses are most likely pyogenic abscesses versus less likely anemic abscesses  Other causes of cystic liver masses are less likely in this clinical setting  The patient's reported history of hepatitis C is noted  After patient's acute issues have resolved and the above liver lesions have been treated, surveillance liver imaging could be performed   2   Nondistended gallbladder with mild wall thickening and mucosal hyperemia is likely reactive  I personally discussed this study with Tori Earl on 2/1/2019 at 3:21PM  Workstation performed: [id number]    I have personally reviewed pertinent reports  Micro: blood cultures pending        ______________________________________________________________________    Assessment and Plan:     Neuro:   - monitor for any signs of withdrawal  - prn analgesia    CV:   - currently hemodynamically stable, will trend cuff pressures     Lung:   - unable to wean from vent post procedure, patient does have a history of smoking  - respiratory protocol, vent wean as tolerated    GI:   - history of Hepatitis C s/p Harvoni treatment, will plan on outpatient follow up with GI  - trend LFT's  - will insert OGT while intubated    F/E/N:   - keep NPO/sips with meds for now  - s/p 2L IVF bolus in ED, will continue resuscitative IVF  - trend hyponatremia with q6 BMP, likely secondary to sepsis    :   - HENRIQUE of 1 7 in setting of sepsis, will trend Cr  - Lopez for accurate I/O in setting of critical illness, need to assess resuscitation    ID:   - hepatic abscesses s/p IR drain placement x5  - infectious disease consult for their recommendations, but in meantime will continue cefepime/vanc/flagyl and trend WBC/procalcitonin  - f/u blood cultures and IR cultures  - plan to obtain echo tomorrow to evaluate for source    Heme:   - f/u AFP  - trend H/H    Endo:   - blood sugar checks q6 while NPO    Msk/Skin:   - frequent turns/OOB as able    Disposition:   - ICU level of care    Counseling / Coordination of Care  Total Critical Care time spent 30 minutes excluding procedures, teaching and family updates  ______________________________________________________________________    VTE Pharmacologic Prophylaxis: Heparin  VTE Mechanical Prophylaxis: sequential compression device    Invasive lines and devices:   Invasive Devices     Peripheral Intravenous Line            Peripheral IV 02/01/19 Right Antecubital less than 1 day                Code Status: No Order  POA:    POLST:      Given critical illness, patient length of stay will require greater than two midnights  Portions of the record may have been created with voice recognition software  Occasional wrong word or "sound a like" substitutions may have occurred due to the inherent limitations of voice recognition software  Read the chart carefully and recognize, using context, where substitutions have occurred        Torey Roach MD    Consults

## 2019-02-01 NOTE — ED ATTENDING ATTESTATION
Villa Fuentes MD, saw and evaluated the patient  I have discussed the patient with the resident/non-physician practitioner and agree with the resident's/non-physician practitioner's findings, Plan of Care, and MDM as documented in the resident's/non-physician practitioner's note, except where noted  All available labs and Radiology studies were reviewed  At this point I agree with the current assessment done in the Emergency Department  I have conducted an independent evaluation of this patient including a focused history and a physical exam     58-year-old male, history of hepatitis-C, reports that he was treated for and cured of hepatitis-C, is presenting to the emergency department for evaluation of right upper quadrant abdominal pain and fever  The patient states that he has been sick this past week with diarrhea and intermittent vomiting  The patient reports that he is having right upper quadrant abdominal pain, has developed fever, and when specifically questioned about the yellow pigmentation of his skin, says that he thinks that he might of been jaundiced over the past couple of days  No history of foreign travel  No chest pain, cough, shortness of breath  No urinary symptoms  Ten systems reviewed and negative except as noted in the history of present illness  On examination patient is moderately ill in appearance  Patient is tachypneic  Head is normocephalic and atraumatic  Eyelids and lashes are normal   Conjuctiva are icteric  Mucous membranes are dry  Lungs are clear to auscultation bilaterally  Heart is regular rate and rhythm with no murmurs rubs or gallops  Abdomen is distended, soft, tender to palpation in the right upper quadrant  Positive Campo sign  Extremities are unremarkable  GCS is 15, motor is 5/5 bilateral upper lower extremities      Assessment and plan:  58-year-old male, history of hepatitis-C, presenting with right upper quadrant abdominal pain and vomiting, diarrhea, and jaundice  Differential diagnosis includes acute viral hepatitis including hepatitis-A, EBV, CMV  Other differential considerations include acute cholecystitis versus cholangitis  Bedside ultrasound was utilized and demonstrates a gallbladder which is of normal size without any notable gallstones  There is some gallbladder wall thickening noted  Labs Reviewed   PROTIME-INR - Abnormal        Result Value Ref Range Status    Protime 17 3 (*) 11 8 - 14 2 seconds Final    INR 1 40 (*) 0 86 - 1 17 Final   CBC AND DIFFERENTIAL - Abnormal     WBC 29 37 (*) 4 31 - 10 16 Thousand/uL Final    RBC 3 77 (*) 3 88 - 5 62 Million/uL Final    Hemoglobin 11 2 (*) 12 0 - 17 0 g/dL Final    Hematocrit 31 6 (*) 36 5 - 49 3 % Final    MCV 84  82 - 98 fL Final    MCH 29 7  26 8 - 34 3 pg Final    MCHC 35 4  31 4 - 37 4 g/dL Final    RDW 14 5  11 6 - 15 1 % Final    MPV 11 8  8 9 - 12 7 fL Final    Platelets 386  247 - 390 Thousands/uL Final    nRBC 0  /100 WBCs Final    Comment: This is an appended report  These results have been appended to a previously preliminary verified report  Narrative: This is an appended report  These results have been appended to a previously verified report  COMPREHENSIVE METABOLIC PANEL - Abnormal     Sodium 124 (*) 136 - 145 mmol/L Final    Potassium 3 2 (*) 3 5 - 5 3 mmol/L Final    Chloride 92 (*) 100 - 108 mmol/L Final    CO2 21  21 - 32 mmol/L Final    ANION GAP 11  4 - 13 mmol/L Final    BUN 48 (*) 5 - 25 mg/dL Final    Creatinine 1 74 (*) 0 60 - 1 30 mg/dL Final    Comment: Standardized to IDMS reference method    Glucose 99  65 - 140 mg/dL Final    Comment:   If the patient is fasting, the ADA then defines impaired fasting glucose as > 100 mg/dL and diabetes as > or equal to 123 mg/dL  Specimen collection should occur prior to Sulfasalazine administration due to the potential for falsely depressed results   Specimen collection should occur prior to Sulfapyridine administration due to the potential for falsely elevated results  Calcium 7 8 (*) 8 3 - 10 1 mg/dL Final     (*) 5 - 45 U/L Final    Comment:   Specimen collection should occur prior to Sulfasalazine administration due to the potential for falsely depressed results   (*) 12 - 78 U/L Final    Comment:   Specimen collection should occur prior to Sulfasalazine and/or Sulfapyridine administration due to the potential for falsely depressed results  Alkaline Phosphatase 149 (*) 46 - 116 U/L Final    Total Protein 7 4  6 4 - 8 2 g/dL Final    Albumin 2 2 (*) 3 5 - 5 0 g/dL Final    Total Bilirubin 9 53 (*) 0 20 - 1 00 mg/dL Final    eGFR 48  ml/min/1 73sq m Final    Narrative:     National Kidney Disease Education Program recommendations are as follows:  GFR calculation is accurate only with a steady state creatinine  Chronic Kidney disease less than 60 ml/min/1 73 sq  meters  Kidney failure less than 15 ml/min/1 73 sq  meters  LACTIC ACID, PLASMA - Abnormal     LACTIC ACID 2 2 (*) 0 5 - 2 0 mmol/L Final    Narrative:     Result may be elevated if tourniquet was used during collection  PROCALCITONIN TEST - Abnormal     Procalcitonin 38 27 (*) <=0 25 ng/ml Final    Comment: Suspected Lower Respiratory Tract Infection (LRTI):  - LESS than or EQUAL to 0 25 ng/mL:   low likelihood for bacterial LRTI; antibiotics DISCOURAGED   - GREATER than 0 25 ng/mL:   increased likelihood for bacterial LRTI; antibiotics ENCOURAGED  Suspected Sepsis:  - Strongly consider initiating antibiotics in ALL UNSTABLE patients  - LESS than or EQUAL to 0 5 ng/mL:   low likelihood for bacterial sepsis; antibiotics DISCOURAGED   - GREATER than 0 5 ng/mL:   increased likelihood for bacterial sepsis; antibiotics ENCOURAGED   - GREATER than 2 ng/mL:   high risk for severe sepsis / septic shock; antibiotics strongly ENCOURAGED      Decisions on antibiotic use should not be based solely on Procalcitonin (PCT) levels  If PCT is low but uncertainty exists with stopping antibiotics, repeat PCT in 6-24 hours to confirm the low level  If antibiotics are administered (regardless if initial PCT was high or low), repeat PCT every 1-2 days to consider early antibiotic cessation (when GREATER than 80% decrease from the peak OR when PCT drops below designated cutoffs, whichever comes first), so long as the infection is NOT one that typically requires prolonged treatment durations (e g , bone/joint infections, endocarditis, Staph  aureus bacteremia)      Situations of FALSE-POSITIVE Procalcitonin values:  1) Newborns < 67 hours old  2) Massive stress from severe trauma / burns, major surgery, acute pancreatitis, cardiogenic / hemorrhagic shock, sickle cell crisis, or other organ perfusion abnormalities  3) Malaria and some Candidal infections  4) Treatment with agents that stimulate cytokines (e g , OKT3, anti-lymphocyte globulins, alemtuzumab, IL-2, granulocyte transfusion [NOT GCSFs])  5) Chronic renal disease causes elevated baseline levels (consider GREATER than 0 75 ng/mL as an abnormal cut-off); initiating HD/CRRT may cause transient decreases  6) Paraneoplastic syndromes from medullary thyroid or SCLC, some forms of vasculitis, and acute [id number]-gguh disease    Situations of FALSE-NEGATIVE Procalcitonin values:  1) Too early in clinical course for PCT to have reached its peak (may repeat in 6-24 hours to confirm low level)  2) Localized infection WITHOUT systemic (SIRS / sepsis) response (e g , an abscess, osteomyelitis, cystitis)  3) Mycobacteria (e g , Tuberculosis, MAC)  4) Cystic fibrosis exacerbations     MANUAL DIFFERENTIAL(PHLEBS DO NOT ORDER) - Abnormal     Segmented % 86 (*) 43 - 75 % Final    Bands % 2  0 - 8 % Final    Lymphocytes % 7 (*) 14 - 44 % Final    Monocytes % 3 (*) 4 - 12 % Final    Eosinophils, % 0  0 - 6 % Final    Basophils % 1  0 - 1 % Final    Metamyelocytes% 1  0 - 1 % Final    Absolute Neutrophils 25 85 (*) 1 85 - 7 62 Thousand/uL Final    Lymphocytes Absolute 2 06  0 60 - 4 47 Thousand/uL Final    Monocytes Absolute 0 88  0 00 - 1 22 Thousand/uL Final    Eosinophils Absolute 0 00  0 00 - 0 40 Thousand/uL Final    Basophils Absolute 0 29 (*) 0 00 - 0 10 Thousand/uL Final    Total Counted     Final    Toxic Granulation Present   Final    Toxic Vacuolated Neutrophils Present   Final    RBC Morphology Present   Final    Anisocytosis Present   Final    Kathy Cells Present   Final    Poikilocytes Present   Final    Platelet Estimate Adequate  Adequate Final   APTT - Normal    PTT 35  26 - 38 seconds Final    Comment: Therapeutic Heparin Range =  60-90 seconds   LACTIC ACID, PLASMA - Normal    LACTIC ACID 1 9  0 5 - 2 0 mmol/L Final    Narrative:     Result may be elevated if tourniquet was used during collection  BLOOD CULTURE   BLOOD CULTURE   OVA AND PARASITE EXAMINATION   UA W REFLEX TO MICROSCOPIC WITH REFLEX TO CULTURE   LIPASE   BILIRUBIN, DIRECT   BILIRUBIN, TOTAL   POCT URINALYSIS DIPSTICK       CT abdomen pelvis with contrast   Final Result         1  In the provided clinical setting of fever and severe leukocytosis, the innumerable hepatic multilocular/multiseptated liver masses are most likely pyogenic abscesses versus less likely anemic abscesses  Other causes of cystic liver masses are less    likely in this clinical setting  The patient's reported history of hepatitis C is noted  After patient's acute issues have resolved and the above liver lesions have been treated, surveillance liver imaging could be performed  2   Nondistended gallbladder with mild wall thickening and mucosal hyperemia is likely reactive               I personally discussed this study with Jenn Luciano on 2/1/2019 at 3:21PM                Workstation performed: [id number]         XR chest 2 views   Final Result      Elevation of the right hemidiaphragm with bibasilar subsegmental atelectasis and suspected trace right effusion  Workstation performed: [id number]           Patient was empirically treated for cholangitis pending CT  CT consistent with possible abscess  Plan surgical consult  Total critical care time in initial assessment of sepsis, ordering labs, reassessing patient, bedside ultrasound, following up CT, consultation to surgery equals 34 minutes

## 2019-02-01 NOTE — ED NOTES
Pt reports no food intake today, had ~1/2cup of ice water here today with medication admin       Jamila López, JAIME  02/01/19 7280

## 2019-02-01 NOTE — ANESTHESIA PREPROCEDURE EVALUATION
Review of Systems/Medical History  Patient summary reviewed  Chart reviewed  No history of anesthetic complications     Cardiovascular  Negative cardio ROS Exercise tolerance (METS): >4,     Pulmonary  Smoker cigarette smoker and e-cigarette user  ,   Comment: Marijuana use     GI/Hepatic    Hepatitis (hx treated hep C) C,   Comment: Septic from multiple liver abscesses     Negative  ROS Kidney disease ARF,        Endo/Other  Negative endo/other ROS      GYN       Hematology  Negative hematology ROS      Musculoskeletal  Negative musculoskeletal ROS        Neurology  Negative neurology ROS      Psychology   Psychiatric history (PTSD), Anxiety, Depression , bipolar disorder and being treated for depression,     Comment: ADHD         Physical Exam    Airway    Mallampati score: II  TM Distance: >3 FB  Neck ROM: full     Dental       Cardiovascular  Comment: Negative ROS,     Pulmonary      Other Findings        Anesthesia Plan  ASA Score- 3 Emergent    Anesthesia Type- general with ASA Monitors  Additional Monitors:   Airway Plan: ETT  Comment: Prone  Plan Factors-    Induction- intravenous  Postoperative Plan-     Informed Consent- Anesthetic plan and risks discussed with patient  I personally reviewed this patient with the CRNA  Discussed and agreed on the Anesthesia Plan with the CRNA  Mely Navarrete

## 2019-02-02 ENCOUNTER — APPOINTMENT (INPATIENT)
Dept: NON INVASIVE DIAGNOSTICS | Facility: HOSPITAL | Age: 39
DRG: 871 | End: 2019-02-02
Payer: MEDICARE

## 2019-02-02 PROBLEM — F19.10 IV DRUG ABUSE (HCC): Status: ACTIVE | Noted: 2019-02-02

## 2019-02-02 PROBLEM — F32.A DEPRESSION: Status: ACTIVE | Noted: 2019-02-02

## 2019-02-02 PROBLEM — F41.9 ANXIETY: Status: ACTIVE | Noted: 2019-02-02

## 2019-02-02 PROBLEM — E87.1 HYPONATREMIA: Status: ACTIVE | Noted: 2019-02-02

## 2019-02-02 LAB
AFP-TM SERPL-MCNC: 0.9 NG/ML (ref 0.5–8)
ALBUMIN SERPL BCP-MCNC: 1.8 G/DL (ref 3.5–5)
ALP SERPL-CCNC: 129 U/L (ref 46–116)
ALT SERPL W P-5'-P-CCNC: 316 U/L (ref 12–78)
ANION GAP SERPL CALCULATED.3IONS-SCNC: 8 MMOL/L (ref 4–13)
ARTERIAL PATENCY WRIST A: YES
AST SERPL W P-5'-P-CCNC: 596 U/L (ref 5–45)
BASE EXCESS BLDA CALC-SCNC: -3.9 MMOL/L
BASOPHILS # BLD MANUAL: 0.27 THOUSAND/UL (ref 0–0.1)
BASOPHILS NFR MAR MANUAL: 1 % (ref 0–1)
BILIRUB SERPL-MCNC: 8.24 MG/DL (ref 0.2–1)
BUN SERPL-MCNC: 30 MG/DL (ref 5–25)
CALCIUM SERPL-MCNC: 6.7 MG/DL (ref 8.3–10.1)
CHLORIDE SERPL-SCNC: 104 MMOL/L (ref 100–108)
CO2 SERPL-SCNC: 22 MMOL/L (ref 21–32)
CREAT SERPL-MCNC: 1.49 MG/DL (ref 0.6–1.3)
EOSINOPHIL # BLD MANUAL: 0 THOUSAND/UL (ref 0–0.4)
EOSINOPHIL NFR BLD MANUAL: 0 % (ref 0–6)
ERYTHROCYTE [DISTWIDTH] IN BLOOD BY AUTOMATED COUNT: 15.2 % (ref 11.6–15.1)
GFR SERPL CREATININE-BSD FRML MDRD: 58 ML/MIN/1.73SQ M
GIANT PLATELETS BLD QL SMEAR: PRESENT
GLUCOSE SERPL-MCNC: 108 MG/DL (ref 65–140)
GLUCOSE SERPL-MCNC: 112 MG/DL (ref 65–140)
GLUCOSE SERPL-MCNC: 143 MG/DL (ref 65–140)
HBV SURFACE AG SER QL: NORMAL
HCO3 BLDA-SCNC: 19.4 MMOL/L (ref 22–28)
HCT VFR BLD AUTO: 28.9 % (ref 36.5–49.3)
HCV AB SER QL: ABNORMAL
HGB BLD-MCNC: 10.1 G/DL (ref 12–17)
HOROWITZ INDEX BLDA+IHG-RTO: 60 MM[HG]
INR PPP: 1.53 (ref 0.86–1.17)
LACTATE SERPL-SCNC: 1.7 MMOL/L (ref 0.5–2)
LYMPHOCYTES # BLD AUTO: 1.35 THOUSAND/UL (ref 0.6–4.47)
LYMPHOCYTES # BLD AUTO: 5 % (ref 14–44)
MAGNESIUM SERPL-MCNC: 2.7 MG/DL (ref 1.6–2.6)
MCH RBC QN AUTO: 30.6 PG (ref 26.8–34.3)
MCHC RBC AUTO-ENTMCNC: 34.9 G/DL (ref 31.4–37.4)
MCV RBC AUTO: 88 FL (ref 82–98)
MONOCYTES # BLD AUTO: 1.08 THOUSAND/UL (ref 0–1.22)
MONOCYTES NFR BLD: 4 % (ref 4–12)
MYELOCYTES NFR BLD MANUAL: 1 % (ref 0–1)
NEUTROPHILS # BLD MANUAL: 23.94 THOUSAND/UL (ref 1.85–7.62)
NEUTS BAND NFR BLD MANUAL: 2 % (ref 0–8)
NEUTS SEG NFR BLD AUTO: 87 % (ref 43–75)
NRBC BLD AUTO-RTO: 0 /100 WBCS
O2 CT BLDA-SCNC: 14.6 ML/DL (ref 16–23)
OXYHGB MFR BLDA: 99.1 % (ref 94–97)
PCO2 BLDA: 29.2 MM HG (ref 36–44)
PEEP RESPIRATORY: 5 CM[H2O]
PH BLDA: 7.44 [PH] (ref 7.35–7.45)
PHOSPHATE SERPL-MCNC: 2.9 MG/DL (ref 2.7–4.5)
PLATELET # BLD AUTO: 350 THOUSANDS/UL (ref 149–390)
PLATELET BLD QL SMEAR: ADEQUATE
PMV BLD AUTO: 10.6 FL (ref 8.9–12.7)
PO2 BLDA: 187.5 MM HG (ref 75–129)
POIKILOCYTOSIS BLD QL SMEAR: PRESENT
POLYCHROMASIA BLD QL SMEAR: PRESENT
POTASSIUM SERPL-SCNC: 3.9 MMOL/L (ref 3.5–5.3)
PROCALCITONIN SERPL-MCNC: 33.89 NG/ML
PROT SERPL-MCNC: 5.9 G/DL (ref 6.4–8.2)
PROTHROMBIN TIME: 18.5 SECONDS (ref 11.8–14.2)
RBC # BLD AUTO: 3.3 MILLION/UL (ref 3.88–5.62)
RBC MORPH BLD: PRESENT
SODIUM SERPL-SCNC: 134 MMOL/L (ref 136–145)
SPECIMEN SOURCE: ABNORMAL
TOTAL CELLS COUNTED SPEC: 100
VENT AC: 14
VENT- AC: AC
VT SETTING VENT: 500 ML
WBC # BLD AUTO: 26.9 THOUSAND/UL (ref 4.31–10.16)

## 2019-02-02 PROCEDURE — 94760 N-INVAS EAR/PLS OXIMETRY 1: CPT

## 2019-02-02 PROCEDURE — 82105 ALPHA-FETOPROTEIN SERUM: CPT | Performed by: SURGERY

## 2019-02-02 PROCEDURE — 84100 ASSAY OF PHOSPHORUS: CPT | Performed by: SURGERY

## 2019-02-02 PROCEDURE — 92610 EVALUATE SWALLOWING FUNCTION: CPT

## 2019-02-02 PROCEDURE — 99291 CRITICAL CARE FIRST HOUR: CPT | Performed by: SURGERY

## 2019-02-02 PROCEDURE — 85610 PROTHROMBIN TIME: CPT | Performed by: SURGERY

## 2019-02-02 PROCEDURE — 94003 VENT MGMT INPAT SUBQ DAY: CPT

## 2019-02-02 PROCEDURE — 83605 ASSAY OF LACTIC ACID: CPT | Performed by: STUDENT IN AN ORGANIZED HEALTH CARE EDUCATION/TRAINING PROGRAM

## 2019-02-02 PROCEDURE — 85027 COMPLETE CBC AUTOMATED: CPT | Performed by: SURGERY

## 2019-02-02 PROCEDURE — 93306 TTE W/DOPPLER COMPLETE: CPT

## 2019-02-02 PROCEDURE — 99223 1ST HOSP IP/OBS HIGH 75: CPT | Performed by: INTERNAL MEDICINE

## 2019-02-02 PROCEDURE — 84145 PROCALCITONIN (PCT): CPT | Performed by: SURGERY

## 2019-02-02 PROCEDURE — 93306 TTE W/DOPPLER COMPLETE: CPT | Performed by: INTERNAL MEDICINE

## 2019-02-02 PROCEDURE — 36600 WITHDRAWAL OF ARTERIAL BLOOD: CPT

## 2019-02-02 PROCEDURE — 83735 ASSAY OF MAGNESIUM: CPT | Performed by: SURGERY

## 2019-02-02 PROCEDURE — 85007 BL SMEAR W/DIFF WBC COUNT: CPT | Performed by: SURGERY

## 2019-02-02 PROCEDURE — 82805 BLOOD GASES W/O2 SATURATION: CPT | Performed by: STUDENT IN AN ORGANIZED HEALTH CARE EDUCATION/TRAINING PROGRAM

## 2019-02-02 PROCEDURE — 82948 REAGENT STRIP/BLOOD GLUCOSE: CPT

## 2019-02-02 PROCEDURE — C9113 INJ PANTOPRAZOLE SODIUM, VIA: HCPCS | Performed by: STUDENT IN AN ORGANIZED HEALTH CARE EDUCATION/TRAINING PROGRAM

## 2019-02-02 PROCEDURE — 94660 CPAP INITIATION&MGMT: CPT

## 2019-02-02 PROCEDURE — 80053 COMPREHEN METABOLIC PANEL: CPT | Performed by: SURGERY

## 2019-02-02 RX ORDER — OXYCODONE HYDROCHLORIDE 10 MG/1
10 TABLET ORAL EVERY 4 HOURS PRN
Status: DISCONTINUED | OUTPATIENT
Start: 2019-02-02 | End: 2019-02-15 | Stop reason: HOSPADM

## 2019-02-02 RX ORDER — PANTOPRAZOLE SODIUM 40 MG/1
40 INJECTION, POWDER, FOR SOLUTION INTRAVENOUS
Status: DISCONTINUED | OUTPATIENT
Start: 2019-02-02 | End: 2019-02-03

## 2019-02-02 RX ORDER — DIAZEPAM 5 MG/1
10 TABLET ORAL 2 TIMES DAILY
Status: DISCONTINUED | OUTPATIENT
Start: 2019-02-02 | End: 2019-02-04

## 2019-02-02 RX ORDER — BUPROPION HYDROCHLORIDE 150 MG/1
300 TABLET ORAL EVERY MORNING
Status: DISCONTINUED | OUTPATIENT
Start: 2019-02-02 | End: 2019-02-15 | Stop reason: HOSPADM

## 2019-02-02 RX ORDER — ACETAMINOPHEN 160 MG/5ML
650 SUSPENSION, ORAL (FINAL DOSE FORM) ORAL EVERY 4 HOURS PRN
Status: DISCONTINUED | OUTPATIENT
Start: 2019-02-02 | End: 2019-02-02

## 2019-02-02 RX ORDER — QUETIAPINE FUMARATE 100 MG/1
200 TABLET, FILM COATED ORAL
Status: DISCONTINUED | OUTPATIENT
Start: 2019-02-02 | End: 2019-02-15 | Stop reason: HOSPADM

## 2019-02-02 RX ORDER — NICOTINE 21 MG/24HR
21 PATCH, TRANSDERMAL 24 HOURS TRANSDERMAL DAILY
Status: COMPLETED | OUTPATIENT
Start: 2019-02-02 | End: 2019-02-09

## 2019-02-02 RX ORDER — SENNOSIDES 8.8 MG/5ML
17.6 LIQUID ORAL
Status: DISCONTINUED | OUTPATIENT
Start: 2019-02-02 | End: 2019-02-03

## 2019-02-02 RX ORDER — LORAZEPAM 2 MG/ML
1 INJECTION INTRAMUSCULAR EVERY 4 HOURS PRN
Status: DISCONTINUED | OUTPATIENT
Start: 2019-02-02 | End: 2019-02-15 | Stop reason: HOSPADM

## 2019-02-02 RX ORDER — ACETAMINOPHEN 160 MG/5ML
650 SUSPENSION, ORAL (FINAL DOSE FORM) ORAL EVERY 8 HOURS PRN
Status: DISCONTINUED | OUTPATIENT
Start: 2019-02-02 | End: 2019-02-03

## 2019-02-02 RX ORDER — ARIPIPRAZOLE 5 MG/1
5 TABLET ORAL
Status: DISCONTINUED | OUTPATIENT
Start: 2019-02-02 | End: 2019-02-15 | Stop reason: HOSPADM

## 2019-02-02 RX ORDER — OXYCODONE HYDROCHLORIDE 5 MG/1
5 TABLET ORAL EVERY 4 HOURS PRN
Status: DISCONTINUED | OUTPATIENT
Start: 2019-02-02 | End: 2019-02-15 | Stop reason: HOSPADM

## 2019-02-02 RX ORDER — HYDROMORPHONE HCL/PF 1 MG/ML
1 SYRINGE (ML) INJECTION
Status: DISCONTINUED | OUTPATIENT
Start: 2019-02-02 | End: 2019-02-15 | Stop reason: HOSPADM

## 2019-02-02 RX ORDER — METHOCARBAMOL 500 MG/1
500 TABLET, FILM COATED ORAL EVERY 6 HOURS SCHEDULED
Status: DISCONTINUED | OUTPATIENT
Start: 2019-02-02 | End: 2019-02-02

## 2019-02-02 RX ORDER — OXYCODONE HYDROCHLORIDE 5 MG/1
5 TABLET ORAL EVERY 4 HOURS PRN
Status: DISCONTINUED | OUTPATIENT
Start: 2019-02-02 | End: 2019-02-02

## 2019-02-02 RX ORDER — METHOCARBAMOL 750 MG/1
750 TABLET, FILM COATED ORAL EVERY 6 HOURS SCHEDULED
Status: DISCONTINUED | OUTPATIENT
Start: 2019-02-02 | End: 2019-02-04

## 2019-02-02 RX ORDER — FENTANYL CITRATE 50 UG/ML
25 INJECTION, SOLUTION INTRAMUSCULAR; INTRAVENOUS EVERY 2 HOUR PRN
Status: DISCONTINUED | OUTPATIENT
Start: 2019-02-02 | End: 2019-02-02

## 2019-02-02 RX ADMIN — AMPICILLIN SODIUM AND SULBACTAM SODIUM 3 G: 10; 5 INJECTION, POWDER, FOR SOLUTION INTRAVENOUS at 20:49

## 2019-02-02 RX ADMIN — LORAZEPAM 1 MG: 2 INJECTION INTRAMUSCULAR; INTRAVENOUS at 09:33

## 2019-02-02 RX ADMIN — PROPOFOL 50 MCG/KG/MIN: 10 INJECTION, EMULSION INTRAVENOUS at 01:47

## 2019-02-02 RX ADMIN — METHOCARBAMOL 500 MG: 500 TABLET ORAL at 12:02

## 2019-02-02 RX ADMIN — ACETAMINOPHEN 650 MG: 160 SUSPENSION ORAL at 20:31

## 2019-02-02 RX ADMIN — PROPOFOL 50 MCG/KG/MIN: 10 INJECTION, EMULSION INTRAVENOUS at 07:51

## 2019-02-02 RX ADMIN — PROPOFOL 45 MCG/KG/MIN: 10 INJECTION, EMULSION INTRAVENOUS at 04:53

## 2019-02-02 RX ADMIN — PANTOPRAZOLE SODIUM 40 MG: 40 INJECTION, POWDER, FOR SOLUTION INTRAVENOUS at 08:49

## 2019-02-02 RX ADMIN — PNEUMOCOCCAL 13-VALENT CONJUGATE VACCINE 0.5 ML: 2.2; 2.2; 2.2; 2.2; 2.2; 4.4; 2.2; 2.2; 2.2; 2.2; 2.2; 2.2; 2.2 INJECTION, SUSPENSION INTRAMUSCULAR at 15:25

## 2019-02-02 RX ADMIN — HYDROMORPHONE HYDROCHLORIDE 1 MG: 1 INJECTION, SOLUTION INTRAMUSCULAR; INTRAVENOUS; SUBCUTANEOUS at 17:10

## 2019-02-02 RX ADMIN — FENTANYL CITRATE 25 MCG: 50 INJECTION, SOLUTION INTRAMUSCULAR; INTRAVENOUS at 09:34

## 2019-02-02 RX ADMIN — VANCOMYCIN HYDROCHLORIDE 1500 MG: 10 INJECTION, POWDER, LYOPHILIZED, FOR SOLUTION INTRAVENOUS at 00:26

## 2019-02-02 RX ADMIN — DIAZEPAM 10 MG: 5 TABLET ORAL at 17:09

## 2019-02-02 RX ADMIN — HEPARIN SODIUM 5000 UNITS: 5000 INJECTION INTRAVENOUS; SUBCUTANEOUS at 05:25

## 2019-02-02 RX ADMIN — CHLORHEXIDINE GLUCONATE 0.12% ORAL RINSE 15 ML: 1.2 LIQUID ORAL at 20:30

## 2019-02-02 RX ADMIN — METRONIDAZOLE 500 MG: 500 INJECTION, SOLUTION INTRAVENOUS at 06:08

## 2019-02-02 RX ADMIN — HEPARIN SODIUM 5000 UNITS: 5000 INJECTION INTRAVENOUS; SUBCUTANEOUS at 21:46

## 2019-02-02 RX ADMIN — SODIUM CHLORIDE 125 ML/HR: 0.9 INJECTION, SOLUTION INTRAVENOUS at 12:18

## 2019-02-02 RX ADMIN — QUETIAPINE FUMARATE 200 MG: 100 TABLET ORAL at 21:46

## 2019-02-02 RX ADMIN — OXYCODONE HYDROCHLORIDE 5 MG: 5 TABLET ORAL at 11:01

## 2019-02-02 RX ADMIN — ACETAMINOPHEN 650 MG: 160 SUSPENSION ORAL at 12:02

## 2019-02-02 RX ADMIN — HEPARIN SODIUM 5000 UNITS: 5000 INJECTION INTRAVENOUS; SUBCUTANEOUS at 13:52

## 2019-02-02 RX ADMIN — BUPROPION HYDROCHLORIDE 300 MG: 150 TABLET, FILM COATED, EXTENDED RELEASE ORAL at 09:11

## 2019-02-02 RX ADMIN — VANCOMYCIN HYDROCHLORIDE 1750 MG: 10 INJECTION, POWDER, LYOPHILIZED, FOR SOLUTION INTRAVENOUS at 10:08

## 2019-02-02 RX ADMIN — NICOTINE 21 MG: 21 PATCH, EXTENDED RELEASE TRANSDERMAL at 18:02

## 2019-02-02 RX ADMIN — OXYCODONE HYDROCHLORIDE 10 MG: 10 TABLET ORAL at 13:40

## 2019-02-02 RX ADMIN — METHOCARBAMOL 750 MG: 750 TABLET, FILM COATED ORAL at 17:10

## 2019-02-02 RX ADMIN — SENNOSIDES A AND B 17.6 MG: 415.36 LIQUID ORAL at 21:46

## 2019-02-02 RX ADMIN — AMPICILLIN SODIUM AND SULBACTAM SODIUM 3 G: 10; 5 INJECTION, POWDER, FOR SOLUTION INTRAVENOUS at 15:06

## 2019-02-02 RX ADMIN — ARIPIPRAZOLE 5 MG: 5 TABLET ORAL at 09:10

## 2019-02-02 RX ADMIN — POTASSIUM PHOSPHATE, MONOBASIC AND POTASSIUM PHOSPHATE, DIBASIC 12 MMOL: 224; 236 INJECTION, SOLUTION INTRAVENOUS at 07:26

## 2019-02-02 RX ADMIN — INFLUENZA VIRUS VACCINE 0.5 ML: 15; 15; 15; 15 SUSPENSION INTRAMUSCULAR at 07:26

## 2019-02-02 RX ADMIN — FENTANYL CITRATE 25 MCG: 50 INJECTION, SOLUTION INTRAMUSCULAR; INTRAVENOUS at 12:09

## 2019-02-02 RX ADMIN — HYDROMORPHONE HYDROCHLORIDE 1 MG: 1 INJECTION, SOLUTION INTRAMUSCULAR; INTRAVENOUS; SUBCUTANEOUS at 21:39

## 2019-02-02 RX ADMIN — OXYCODONE HYDROCHLORIDE 10 MG: 10 TABLET ORAL at 20:30

## 2019-02-02 RX ADMIN — CEFEPIME HYDROCHLORIDE 2000 MG: 1 INJECTION, POWDER, FOR SOLUTION INTRAMUSCULAR; INTRAVENOUS at 10:00

## 2019-02-02 RX ADMIN — LORAZEPAM 1 MG: 2 INJECTION INTRAMUSCULAR; INTRAVENOUS at 13:40

## 2019-02-02 RX ADMIN — VANCOMYCIN HYDROCHLORIDE 1750 MG: 10 INJECTION, POWDER, LYOPHILIZED, FOR SOLUTION INTRAVENOUS at 21:40

## 2019-02-02 RX ADMIN — DIAZEPAM 10 MG: 5 TABLET ORAL at 08:49

## 2019-02-02 RX ADMIN — CHLORHEXIDINE GLUCONATE 0.12% ORAL RINSE 15 ML: 1.2 LIQUID ORAL at 08:00

## 2019-02-02 NOTE — UTILIZATION REVIEW
Initial Clinical Review    Admission: Date/Time/Statement: 2/1/19 @ 1723   Orders Placed This Encounter   Procedures    Inpatient Admission     Standing Status:   Standing     Number of Occurrences:   1     Order Specific Question:   Admitting Physician     Answer:   Gwenette Ormond     Order Specific Question:   Level of Care     Answer:   Critical Care [15]     Order Specific Question:   Estimated length of stay     Answer:   More than 2 Midnights     Order Specific Question:   Certification     Answer:   I certify that inpatient services are medically necessary for this patient for a duration of greater than two midnights  See H&P and MD Progress Notes for additional information about the patient's course of treatment  ED: Date/Time/Mode of Arrival:   ED Arrival Information     Expected Arrival Acuity Means of Arrival Escorted By Service Admission Type    - 2/1/2019 12:16 Emergent Ambulance [facility] Surgery-General Emergency    Arrival Complaint    weakness        Chief Complaint:   Chief Complaint   Patient presents with    Abdominal Pain     Pt presents with RUQ pain and generalized red rash on abdomen  Pt states since January 24th he has felt sick c/o chills, diarrhea, new onset jaundice, and tachypnea  Hx: Heptitis C  History of Illness:  35 y o  male with history of treated Hep C ([facility], 2017- secondary to IVDU)  The patient denies any IV drug use since his treatment  He presents with abdominal pain, mostly RUQ, that has been present and worsening over the past week  He denies history of any similar pain  This pain is intermittent but quite severe at the moment, does not radiate  Associated with nausea, vomiting, diarrhea  He does not think he has had fevers at home, however temp in the ED is 103  1  A few days ago someone pointed out to him that he appeared more yellow than usual  T bili today is 9 5  WBC 29 37   CTAP was performed and shows multiple (~9) multiloculated liver masses suggestive of abscesses   There is no obvious source, as he has some mild GB thickening likely reactive to the surrounding inflammation      ED Vital Signs:   ED Triage Vitals   Temperature Pulse Respirations Blood Pressure SpO2   02/01/19 1224 02/01/19 1224 02/01/19 1224 02/01/19 1224 02/01/19 1224   (!) 103 1 °F (39 5 °C) 76 (!) 24 111/72 96 %      Temp Source Heart Rate Source Patient Position - Orthostatic VS BP Location FiO2 (%)   02/01/19 1224 02/01/19 1303 02/01/19 1345 02/01/19 1303 02/02/19 0952   Rectal Monitor Lying Right arm 70      Pain Score       02/01/19 1224       9        Wt Readings from Last 1 Encounters:   02/01/19 112 kg (246 lb 14 6 oz)     Vital Signs   02/01 0701  02/02 0700    02/02 0701  02/02 1851    Most Recent    Temperature (°F) 99103 6 98 6101 8 100 (37 8)    Pulse 6496 64104 86    Respirations 039 1845 36    Blood Pressure 89/47131/71 97/53151/96 133/79    Shock Index 0 550 93 0 650 85 0 65    SpO2 (%) 93100 8898 96        Pertinent Labs/Diagnostic Test Results:    Ref Range & Units 02/01/19 2212   pH, Arterial 7 350 - 7 450 7 316     pCO2, Arterial 36 0 - 44 0 mm Hg 35 7     pO2, Arterial 75 0 - 129 0 mm Hg 83 8    HCO3, Arterial 22 0 - 28 0 mmol/L 17 8     Base Excess, Arterial mmol/L -7 6    O2 Content, Arterial 16 0 - 23 0 mL/dL 15 3     O2 HGB,Arterial  94 0 - 97 0 % 94 2    SOURCE  Radial, Left       Ref Range & Units 02/01/19 1234   Sodium 136 - 145 mmol/L 124     Potassium 3 5 - 5 3 mmol/L 3 2     Chloride 100 - 108 mmol/L 92     BUN 5 - 25 mg/dL 48     Creatinine 0 60 - 1 30 mg/dL 1 74     Calcium 8 3 - 10 1 mg/dL 7 8     AST 5 - 45 U/L 706     ALT 12 - 78 U/L 375     Alkaline Phosphatase 46 - 116 U/L 149     Total Protein 6 4 - 8 2 g/dL 7 4    Albumin 3 5 - 5 0 g/dL 2 2     Total Bilirubin 0 20 - 1 00 mg/dL 9 53        Ref Range & Units 02/01/19 1235   WBC 4 31 - 10 16 Thousand/uL 29 37     RBC 3 88 - 5 62 Million/uL 3 77     Hemoglobin 12 0 - 17 0 g/dL 11 2     Hematocrit 36 5 - 49 3 % 31 6       CXR -- Elevation of the right hemidiaphragm with bibasilar subsegmental atelectasis and suspected trace right effusion  CT A/P -- 1   In the provided clinical setting of fever and severe leukocytosis, the innumerable hepatic multilocular/multiseptated liver masses are most likely pyogenic abscesses versus less likely anemic abscesses   Other causes of cystic liver masses are less likely in this clinical setting  The patient's reported history of hepatitis C is noted  N[place] Bill patient's acute issues have resolved and the above liver lesions have been treated, surveillance liver imaging could be performed  2   Nondistended gallbladder with mild wall thickening and mucosal hyperemia is likely reactive  CT GUIDED PERC DRAINAGE CATH PLACEMENT -- Successful placement of a total of 5  all-purpose drainage catheters into the 5 largest liver abscess collections  A total of 300 cc of purulent material was aspirated and a specimen sent to the laboratory as described  The 5 drains were labeled as anterior superior, middle superior, posterior superior, central, and posterior inferior   The two 10-Palestinian drains were the central drain and the posterior-inferior drain   The 3 superior drains were 8 5-Palestinian  Plan: Tubes to DANNY bulb suction   Flush each tube with 20 mL normal saline solution   The patient will likely need more drains placed since there are several other abscesses   We will follow patient and see on follow-up CAT scan if 2 further drains are needed or if the existing drains require repositioning and or wire disruption of the loculations to improvedrainage      ED Treatment:   Medication Administration from 02/01/2019 1216 to 02/01/2019 2133       Date/Time Order Dose Route Action     02/01/2019 1348 acetaminophen (TYLENOL) tablet 650 mg 650 mg Oral Given     02/01/2019 1349 sodium chloride 0 9 % bolus 1,000 mL 1,000 mL Intravenous New Bag     02/01/2019 1511 piperacillin-tazobactam (ZOSYN) 4 5 g in sodium chloride 0 9 % 100 mL IVPB 4 5 g Intravenous New Bag     02/01/2019 1504 diazepam (VALIUM) tablet 10 mg 10 mg Oral Given     02/01/2019 1440 iodixanol (VISIPAQUE) 320 MG/ML injection 100 mL 100 mL Intravenous Given     Past Medical/Surgical History: Active Ambulatory Problems     Diagnosis Date Noted    History of hepatitis C 08/02/2018    Vasectomy evaluation 08/03/2018     Past Medical History:   Diagnosis Date    ADHD     Anxiety     Bipolar 1 disorder (HCC)     Delusion (HCC)     Hepatitis C     Infectious viral hepatitis     Psoriasis     PTSD (post-traumatic stress disorder)      Admitting Diagnosis: Pyogenic hepatic abscess [K75 0]  Weakness [R53 1]  Acute hepatic failure [K72 00]  Fever [R50 9]  Sepsis ([address] ) [A41 9]  Age/Sex: 35 y o  male     Assessment/Plan:   Assessment:  36 yo M with sepsis and multiple liver abscesses/collections  Also with elevated LFTs and bilirubin, unknown etiology or source, risk factor of previous IVDU but patient affirms he hasn't done drugs in 2 years      Plan:  1  Sepsis              - Admit to ICU              - 2 L bolus in ED              - Blood cultures sent              - Empiric broad spectrum antibiotics- Cefepime/Vanco/Flagyl              - May need echo if blood cx positive- unknown source of multi-abscess liver              - ID consult  2  Liver abscesses              - IR consult for drainage  Will be difficult given the multiple locations of these abscesses               - Broad spectrum antibiotics  3  Hyponatremia              - Likely 2/2 sepsis, will check serial BMP  4   Hyperbilirubinemia             - Likely 2/2 multiple liver abscess, no significant biliary findings on Ct             - Trend T bili, check D bili             - Will check AFP given hx of Hep C, liver masses       Admission Orders:  Scheduled Meds:   Current Facility-Administered Medications:  acetaminophen 650 mg Oral Q8H PRN ampicillin-sulbactam 3 g Intravenous Q6H   ARIPiprazole 5 mg Oral Early Morning   buPROPion 300 mg Oral QAM   chlorhexidine 15 mL Swish & Spit Q12H [facility]   diazepam 10 mg Oral BID   heparin (porcine) 5,000 Units Subcutaneous Q8H [facility]   HYDROmorphone 1 mg Intravenous Q3H PRN   LORazepam 1 mg Intravenous Q4H PRN   methocarbamol 750 mg Oral Q6H GAIL   nicotine 21 mg Transdermal Daily   oxyCODONE 10 mg Oral Q4H PRN   oxyCODONE 5 mg Oral Q4H PRN   pantoprazole 40 mg Intravenous Q24H GAIL   QUEtiapine 200 mg Oral HS   senna 17 6 mg Oral HS   sodium chloride 1,000 mL Intravenous Once   vancomycin 15 mg/kg Intravenous Q12H     ADMIT TO ICU  NEURO CHECKS Q4H  VENT WEANED --> O2 HFNC 55L/MIN, FIO2 60  RESPIRATORY PROTOCOL  PIV X3  ABD BULB DRAIN X5  ACEVES CATH  FINGERSTICK GLUCOSE Q6H  DAILY WTS  I/O  SCD'S  UP WITH ASSIST  SPEECH EVAL  CLEAR LIQUID DIET  CONSULT ID, ACUTE PAIN SERVICES

## 2019-02-02 NOTE — PROGRESS NOTES
Progress Note - Critical Care   Corazon Grady 35 y o  male MRN: [id number]  Unit/Bed#: ICU 06 Encounter: [id number]    Attending Physician: Dionna Swift MD      ______________________________________________________________________  Assessment and Plan:   Principal Problem:    Hepatic abscess  Resolved Problems:    * No resolved hospital problems  *        Neuro:   - GCS 11T, intermittently obeying commands off sedation  - propofol/fentanyl drips while intubated, titrating for RASS 0 to -1, prn fentanyl for pain   - holding oral psych meds while intubated, will restart when able    CV:   - maintaining MAPs >60-65 without hemodynamic support, will continue to trend cuff pressures  - plan to check echo today to r/o endocarditis    Pulm:   - FiO2 weaned to 40% from 100% overnight, plan for SBT today to assess for possible extubation  - albuterol nebs/respiratory protocol, patient with a history of heavy smoking    GI:   - history of Hep C s/p Harvoni in 2017, plan for outpt f/u with GI  - trend LFT's, are trending down  - OGT to suction, protonix for stress ulcer ppx    :   - HENRIQUE improving, continue to trend Cr  - continue IVF at 125  - Lopez for accurate I/O's    F/E/N:   - NPO while intubated, will plan to start TF through OGT if unable to extubate today  - NS @ 125  - replete lytes for Mg>2, phos>3, K>4  - trend hyponatremia    ID:   - hepatic abscesses s/p IR drain placement x5  - DANNY bulbs to suction  - f/u IR cultures and blood cultures  - continue cefepime/vanc/flagyl while awaiting cultures  - awaiting ID recommendations  - echo today for evaluation of valves    Heme:   - trend H/H, currently stable  - SQH for DVT ppx    Endo:   - q6 blood sugar checks     Msk/Skin:   - frequent turns/repositioning    Disposition:   - continue ICU level of care    Code Status: Level 1 - Full Code    Counseling / Coordination of Care  Total Critical Care time spent 30 minutes excluding procedures, teaching and family updates  ______________________________________________________________________    Chief Complaint: unable to assess, intubated/sedated    24 Hour Events: Patient went to IR yesterday for drain placement into largest of hepatic abscesses  Was unable to be extubated post-procedure, so was brought to ICU intubated/sedated  Review of Systems   Unable to perform ROS: Intubated     ______________________________________________________________________    Physical Exam:   NAD, comfortable  Intubated and sedated, GCS 10-11T, intermittently obeying commands  Normocephalic, atraumatic  Jaundiced  MMM  Mechanical ventilation AC 14/500/40/5, satting well  RRR  OGT in place to low continuous suction, minimal contents in canister  Abd soft, NT/ND, DANNY x5 in place to suction, serosanguineous/serous, minimal purulence  Lopez in place with yellow urine draining  No calf tenderness or peripheral edema  -rash/lesions      ______________________________________________________________________  Vitals:    19 0359 19 0400 19 0500 19 0600   BP:  93/51 125/69 (!) 94/48   Pulse:  70 90 70   Resp:  (!) 24 (!) 39 (!) 23   Temp:  100 °F (37 8 °C) 99 7 °F (37 6 °C) 99 3 °F (37 4 °C)   TempSrc:       SpO2: 98% 98% 93% 94%   Weight:       Height:           Temperature:   Temp (24hrs), Av 1 °F (38 9 °C), Min:99 3 °F (37 4 °C), Max:103 6 °F (39 8 °C)    Current Temperature: 99 3 °F (37 4 °C)  Weights:   IBW: 82 2 kg    Body mass index is 31 7 kg/m²    Weight (last 2 days)     Date/Time   Weight    19 2138  112 (246 92)            Hemodynamic Monitoring:  N/A     Non-Invasive/Invasive Ventilation Settings:  Respiratory    Lab Data (Last 4 hours)    None         O2/Vent Data (Last 4 hours)       0359           Vent Mode AC/VC       Resp Rate (BPM) (BPM) 14       Vt (mL) (mL) 500       FIO2 (%) (%) 40       PEEP (cmH2O) (cmH2O) 5       MV 13 8                 Lab Results   Component Value Date    PHA 7 839 02/02/2019    QHY4EFY 29 2 (LL) 02/02/2019    PO2ART 187 5 (H) 02/02/2019    ISF7WCD 19 4 (L) 02/02/2019    BEART -3 9 02/02/2019    SOURCE Radial, Right 02/02/2019     SpO2: SpO2: 94 %  Intake and Outputs:  I/O       01/31 0701 - 02/01 0700 02/01 0701 - 02/02 0700    I V  (mL/kg)  3653 4 (32 6)    NG/GT  100    IV Piggyback  1124 5    Total Intake(mL/kg)  4877 9 (43 6)    Urine (mL/kg/hr)  2825    Drains  282    Blood  200    Total Output   3307    Net   +1570 9              UOP: 1 8cc/kg/hr  Nutrition:        Diet Orders            Start     Ordered    02/01/19 2146  Diet NPO  Diet effective now     Question Answer Comment   Diet Type NPO    RD to adjust diet per protocol?  Yes        02/01/19 2145          Labs:     Results from last 7 days  Lab Units 02/02/19 0459 02/01/19 2234 02/01/19 2011 02/01/19  1235   WBC Thousand/uL 26 90* 23 18*  --  29 37*   HEMOGLOBIN g/dL 10 1* 10 3*  --  11 2*   I STAT HEMOGLOBIN g/dl  --   --  12 2  --    HEMATOCRIT % 28 9* 30 7*  --  31 6*   HEMATOCRIT, ISTAT %  --   --  36*  --    PLATELETS Thousands/uL 350 384  --  341   BANDS PCT %  --  4  --  2   MONO PCT %  --  1*  --  3*       Results from last 7 days  Lab Units 02/02/19 0459 02/01/19 2234 02/01/19 2011 02/01/19  1234   SODIUM mmol/L 134* 132*  --  124*   POTASSIUM mmol/L 3 9 3 9  --  3 2*   CHLORIDE mmol/L 104 101  --  92*   CO2 mmol/L 22 20*  --  21   CO2, I-STAT mmol/L  --   --  24  --    ANION GAP mmol/L 8 11  --  11   BUN mg/dL 30* 36*  --  48*   CREATININE mg/dL 1 49* 1 52*  --  1 74*   CALCIUM mg/dL 6 7* 7 1*  --  7 8*   ALT U/L 316* 279*  --  375*   AST U/L 596* 513*  --  706*   ALK PHOS U/L 129* 131*  --  149*   ALBUMIN g/dL 1 8* 2 0*  --  2 2*   TOTAL BILIRUBIN mg/dL 8 24* 8 92*  --  9 45*  9 53*       Results from last 7 days  Lab Units 02/02/19  0459 02/01/19  2234   MAGNESIUM mg/dL 2 7* 2 7*   PHOSPHORUS mg/dL 2 9 3 6        Results from last 7 days  Lab Units 02/02/19  0459 02/01/19  2238 02/01/19  1234 INR  1 53* 1 40* 1 40*   PTT seconds  --  32 35           Results from last 7 days  Lab Units 02/02/19  0133 02/01/19  2234 02/01/19  1503 02/01/19  1235   LACTIC ACID mmol/L 1 7 2 6* 1 9 2 2*     ABG:  Lab Results   Component Value Date    PHART 7 440 02/02/2019    ROL2DGD 29 2 (LL) 02/02/2019    PO2ART 187 5 (H) 02/02/2019    XUA8ZCT 19 4 (L) 02/02/2019    BEART -3 9 02/02/2019    SOURCE Radial, Right 02/02/2019     VBG:  Results from last 7 days  Lab Units 02/02/19  0129   ABG SOURCE  Radial, Right       Results from last 7 days  Lab Units 02/02/19  0459 02/01/19  1345   PROCALCITONIN ng/ml 33 89* 38 27*     No results found for: [facility]   Imaging:   Xr Chest 2 Views    Result Date: 2/1/2019  Impression: Elevation of the right hemidiaphragm with bibasilar subsegmental atelectasis and suspected trace right effusion  Workstation performed: [id number]     Ct Guided Perc Drainage Catheter Placeme    Result Date: 2/1/2019  Impression: Impression: Successful placement of a total of 5  all-purpose drainage catheters into the 5 largest liver abscess collections  A total of 300 cc of purulent material was aspirated and a specimen sent to the laboratory as described  The 5 drains were labeled as anterior superior, middle superior, posterior superior, central, and posterior inferior  The two 10-Irish drains were the central drain and the posterior-inferior drain  The 3 superior drains were 8 5-Irish  Plan: Tubes to DANNY bulb suction  Flush each tube with 20 mL normal saline solution  The patient will likely need more drains placed since there are several other abscesses  We will follow patient and see on follow-up CAT scan if 2 further drains are needed or if the existing drains require repositioning and or wire disruption of the loculations to [facility]  Workstation performed: [id number]     Ct Abdomen Pelvis With Contrast    Result Date: 2/1/2019  Impression: 1    In the provided clinical setting of fever and severe leukocytosis, the innumerable hepatic multilocular/multiseptated liver masses are most likely pyogenic abscesses versus less likely anemic abscesses  Other causes of cystic liver masses are less likely in this clinical setting  The patient's reported history of hepatitis C is noted  After patient's acute issues have resolved and the above liver lesions have been treated, surveillance liver imaging could be performed  2   Nondistended gallbladder with mild wall thickening and mucosal hyperemia is likely reactive  I personally discussed this study with Sheryle Ammarisa on 2/1/2019 at 3:21PM  Workstation performed: [id number]    I have personally reviewed pertinent reports  Micro: blood cx and IR cx pending      Allergies:    Allergies   Allergen Reactions    Sulfa Antibiotics Hives     Medications:   Scheduled Meds:  Current Facility-Administered Medications:  acetaminophen 650 mg Oral Q4H PRN Kajal Galicia MD    cefepime 2,000 mg Intravenous Q12H Vasquez Hernandez MD Last Rate: Stopped (02/01/19 2320)   chlorhexidine 15 mL Swish & Spit Q12H [facility] Vasquez Hernandez MD    fentaNYL 50 mcg/hr Intravenous Continuous Kajal Galicia MD Last Rate: 50 mcg/hr (02/01/19 2226)   fentanyl citrate (PF) 25 mcg Intravenous Q1H PRN Kajal Galicia MD    heparin (porcine) 5,000 Units Subcutaneous [facility] Vasquez Hernandez MD    HYDROmorphone 0 5 mg Intravenous Q5 Min PRN Onofre Cabrera MD    influenza vaccine 0 5 mL Intramuscular Prior to discharge Dionna Swift MD    metoclopramide 10 mg Intravenous Once PRN Onofre Cabrera MD    metroNIDAZOLE 500 mg Intravenous Q8H Vasquez Hernandez MD Last Rate: Stopped (02/02/19 0708)   ondansetron 4 mg Intravenous Once PRN Onofre Cabrera MD    pneumococcal 13-valent conjugate vaccine 0 5 mL Intramuscular Prior to discharge Dionna Swift MD    potassium phosphate 12 mmol Intravenous Once Kajal Galicia MD    propofol 5-50 mcg/kg/min Intravenous Titrated Kajal Galicia MD Last Rate: 50 mcg/kg/min (02/02/19 0501)   sodium chloride 1,000 mL Intravenous Once Vasquez Hernandez MD    sodium chloride 125 mL/hr Intravenous Continuous Vasquez Hernandez MD Last Rate: 125 mL/hr (02/01/19 2248)   vancomycin 12 5 mg/kg Intravenous Q12H Vasquez Hernandez MD Last Rate: Stopped (02/02/19 0200)     Continuous Infusions:  fentaNYL 50 mcg/hr Last Rate: 50 mcg/hr (02/01/19 2226)   propofol 5-50 mcg/kg/min Last Rate: 50 mcg/kg/min (02/02/19 0501)   sodium chloride 125 mL/hr Last Rate: 125 mL/hr (02/01/19 2248)     PRN Meds:    acetaminophen 650 mg Q4H PRN   fentanyl citrate (PF) 25 mcg Q1H PRN   HYDROmorphone 0 5 mg Q5 Min PRN   influenza vaccine 0 5 mL Prior to discharge   metoclopramide 10 mg Once PRN   ondansetron 4 mg Once PRN   pneumococcal 13-valent conjugate vaccine 0 5 mL Prior to discharge     VTE Pharmacologic Prophylaxis: Heparin  VTE Mechanical Prophylaxis: sequential compression device  Invasive lines and devices: Invasive Devices     Peripheral Intravenous Line            Peripheral IV 02/01/19 Right Antecubital 1 day    Peripheral IV 02/01/19 Left Wrist less than 1 day    Peripheral IV 02/01/19 Right Wrist less than 1 day          Drain            Closed/Suction Drain Right Abdomen Bulb 10 2 Fr  less than 1 day    Closed/Suction Drain Right; Inferior;Superior Abdomen Bulb 8 5 Fr  less than 1 day    Closed/Suction Drain Right;Posterior; Inferior Abdomen Bulb 10 2 Fr  less than 1 day    Closed/Suction Drain Right;Superior; Anterior Abdomen Bulb 8 5 Fr  less than 1 day    Closed/Suction Drain Right;Superior;Medial Abdomen Bulb 8 5 Fr  less than 1 day    Urethral Catheter Temperature probe 16 Fr  less than 1 day                     Portions of the record may have been created with voice recognition software  Occasional wrong word or "sound a like" substitutions may have occurred due to the inherent limitations of voice recognition software    Read the chart carefully and recognize, using context, where substitutions have occurred      Lakisha Mandujano MD

## 2019-02-02 NOTE — ADDENDUM NOTE
Addendum  created 02/02/19 7250 by Lorie Ramos MD    Anesthesia Attestations filed, Anesthesia Event edited

## 2019-02-02 NOTE — RESPIRATORY THERAPY NOTE
RT Protocol Note  Bishop Santoyo 35 y o  male MRN: [id number]  Unit/Bed#: ICU 06 Encounter: [id number]    Assessment    Active Problems:    * No active hospital problems  *      Home Pulmonary Medications:  none       Past Medical History:   Diagnosis Date    ADHD     Anxiety     Bipolar 1 disorder ([address] )     Delusion (HCC)     Hepatitis C     Infectious viral hepatitis     Hep C- No Symptoms( Took a course of Harvoni)    Psoriasis     PTSD (post-traumatic stress disorder)     snf     Social History     Social History    Marital status: Single     Spouse name: N/A    Number of children: N/A    Years of education: N/A     Social History Main Topics    Smoking status: Current Every Day Smoker     Packs/day: 1 00    Smokeless tobacco: Never Used    Alcohol use Yes      Comment: socially    Drug use: Yes     Types: Marijuana      Comment: medical- vaping 4x/day    Sexual activity: Not Asked     Other Topics Concern    None     Social History Narrative    None       Subjective    Subjective Data: Pt currently intubated, on vent  BS clear, SpO2 100%  No resp neb treatments needed at this time  Will continue vent management while pt intubated    Objective    Physical Exam:   Assessment Type: Assess only  General Appearance: Sedated  Respiratory Pattern: Assisted  Chest Assessment: Chest expansion symmetrical  Bilateral Breath Sounds: Clear  O2 Device: vent    Vitals:  Blood pressure 114/61, pulse 86, temperature (!) 101 5 °F (38 6 °C), resp  rate (!) 30, SpO2 100 %  Imaging and other studies: I have personally reviewed pertinent reports  O2 Device: vent     Plan: vent management while pt intubated             Resp Comments: pt arrived from IR/PACU, intubated  Placed on 840 vent, AC/VC mode/settings, currently tolerating  Pt assesed per resp protocol, presents with hepatic abscesses  No resp PMH, no home resp meds, + smoking hx

## 2019-02-02 NOTE — PROGRESS NOTES
Progress Note - General Surgery   Nohemy Marroquin 35 y o  male MRN: [id number]  Unit/Bed#: ICU 06 Encounter: [id number]    Assessment:  34 yo M w/ sepsis from multiple hepatic abscesses s/p IR drainage    Drains 1-5: 45, 43, 72, 20, 110  WBC 26 9  Tb 8 24    Plan:  Vent wean to extubation this AM  F/U ID consult, continue broad spectrum antibx for now  F/U drainage cultures  Flush drains w/ 20 cc NS every shift per IR request  PRN pain control  Follow CMP, bilirubin, WBC      Subjective/Objective   Chief Complaint:     Subjective: Remained intubated s/p IR drainage, failed vent wean  Tmax 103 6 overnight s/p drainage  Was purposeful but difficult to direct on sedation holiday    Objective:     Blood pressure (!) 94/48, pulse 70, temperature 99 3 °F (37 4 °C), resp  rate (!) 23, height 6' 2" (1 88 m), weight 112 kg (246 lb 14 6 oz), SpO2 94 %  ,Body mass index is 31 7 kg/m²  Intake/Output Summary (Last 24 hours) at 02/02/19 0659  Last data filed at 02/02/19 0600   Gross per 24 hour   Intake          5201 14 ml   Output             3507 ml   Net          1694 14 ml       Invasive Devices     Peripheral Intravenous Line            Peripheral IV 02/01/19 Right Antecubital 1 day    Peripheral IV 02/01/19 Left Wrist less than 1 day    Peripheral IV 02/01/19 Right Wrist less than 1 day          Drain            Closed/Suction Drain Right Abdomen Bulb 10 2 Fr  less than 1 day    Closed/Suction Drain Right; Inferior;Superior Abdomen Bulb 8 5 Fr  less than 1 day    Closed/Suction Drain Right;Posterior; Inferior Abdomen Bulb 10 2 Fr  less than 1 day    Closed/Suction Drain Right;Superior; Anterior Abdomen Bulb 8 5 Fr  less than 1 day    Closed/Suction Drain Right;Superior;Medial Abdomen Bulb 8 5 Fr  less than 1 day    Urethral Catheter Temperature probe 16 Fr  less than 1 day                Physical Exam:   Gen: Sedated, intubated, NAD  Jaundiced  Cardio: RRR  Lungs: CTAB  Abd: Soft, non distended, non tender   DANNY x 5, serous/serosanginous      Lab, Imaging and other studies:  CBC:   Lab Results   Component Value Date    WBC 26 90 (H) 02/02/2019    HGB 10 1 (L) 02/02/2019    HCT 28 9 (L) 02/02/2019    MCV 88 02/02/2019     02/02/2019    MCH 30 6 02/02/2019    MCHC 34 9 02/02/2019    RDW 15 2 (H) 02/02/2019    MPV 10 6 02/02/2019    NRBC 0 02/01/2019   , CMP:   Lab Results   Component Value Date    SODIUM 134 (L) 02/02/2019    K 3 9 02/02/2019     02/02/2019    CO2 22 02/02/2019    CO2 24 02/01/2019    BUN 30 (H) 02/02/2019    CREATININE 1 49 (H) 02/02/2019    GLUCOSE 94 02/01/2019    CALCIUM 6 7 (L) 02/02/2019     (H) 02/02/2019     (H) 02/02/2019    ALKPHOS 129 (H) 02/02/2019    EGFR 58 02/02/2019   , Coagulation:   Lab Results   Component Value Date    INR 1 53 (H) 02/02/2019     VTE Pharmacologic Prophylaxis: Heparin  VTE Mechanical Prophylaxis: sequential compression device

## 2019-02-02 NOTE — BRIEF OP NOTE (RAD/CATH)
CT liver abscess drainage tube placement - Procedure Note    PATIENT NAME: Helena Burch  : 1980  MRN: [id number]     Pre-op Diagnosis:   1  Pyogenic hepatic abscess    2  Fever    3  Sepsis ([facility] [address] )    4  Acute hepatic failure      Post-op Diagnosis:   1  Pyogenic hepatic abscess    2  Fever    3  Sepsis ([facility] [address] )    4  Acute hepatic failure        Surgeon:   Donavon Calle MD  Assistants:     No qualified resident was available, Resident is only observing    Estimated Blood Loss: minimal  Findings:  Successful placement of 5 liver abscess drains using CT guidance  Tubes to DANNY bulb suction  Flush each tube with 20cc q shift      Specimens: purulent material    Complications:  None immediate    Anesthesia: General    Donavon Calle MD     Date: 2019  Time: 10:29 PM

## 2019-02-02 NOTE — PROGRESS NOTES
Vancomycin Assessment    Corazon Grady is a 35 y o  male who is currently receiving vancomycin 1500mg IV q12H for & Hepatic Abscess   Relevant clinical data and objective history reviewed:  Creatinine   Date Value Ref Range Status   02/02/2019 1 49 (H) 0 60 - 1 30 mg/dL Final     Comment:     Standardized to IDMS reference method   02/01/2019 1 52 (H) 0 60 - 1 30 mg/dL Final     Comment:     Standardized to IDMS reference method   02/01/2019 1 74 (H) 0 60 - 1 30 mg/dL Final     Comment:     Standardized to IDMS reference method   03/21/2015 0 76 0 60 - 1 30 mg/dL Final     Comment:     Standardized to IDMS reference method     BP 97/53 (BP Location: Left arm)   Pulse 64   Temp 98 6 °F (37 °C) (Probe)   Resp 22   Ht 6' 2" (1 88 m)   Wt 112 kg (246 lb 14 6 oz)   SpO2 97%   BMI 31 70 kg/m²   I/O last 3 completed shifts: In: 5201 1 [I V :3976 7; NG/GT:100; IV Piggyback:1124 5]  Out: 4699 [Urine:3025; Drains:282; Blood:200]  Lab Results   Component Value Date/Time    BUN 30 (H) 02/02/2019 04:59 AM    BUN 14 03/21/2015 11:33 PM    WBC 26 90 (H) 02/02/2019 04:59 AM    WBC 9 33 03/31/2015 10:12 AM    HGB 10 1 (L) 02/02/2019 04:59 AM    HGB 14 4 03/31/2015 10:12 AM    HCT 28 9 (L) 02/02/2019 04:59 AM    HCT 43 4 03/31/2015 10:12 AM    MCV 88 02/02/2019 04:59 AM    MCV 87 03/31/2015 10:12 AM     02/02/2019 04:59 AM     03/31/2015 10:12 AM     Temp Readings from Last 3 Encounters:   02/02/19 98 6 °F (37 °C) (Probe)   09/25/18 (!) 97 °F (36 1 °C)   08/02/18 (!) 96 1 °F (35 6 °C) (Tympanic)     Vancomycin Days of Therapy: 1    Assessment/Plan  The patient is currently on vancomycin utilizing scheduled dosing based on adjusted body weight (due to obesity)  Baseline risks associated with therapy include: pre-existing renal impairment  Patient's baseline Scr is approx 0 9 and Scr has been improving 1 74 - 1 52 - 1 49 today  Will continue to monitor   Urine output is also decent at 1 3mL/kg/hour The patient is currently receiving 1500mg IV q12H and after clinical evaluation will be changed to 1750mg IV q12H  (corrected CrCL is 79mL/min) Pharmacy will also follow closely for s/sx of nephrotoxicity, infusion reactions and appropriateness of therapy  BMP and CBC will be ordered per protocol  Plan for trough as patient approaches steady state, prior to the 4th dose at approximately 21:30 on 2/3/18  Due to infection severity, will target a trough of 15-20 (appropriate for most indications)   Pharmacy will continue to follow the patients culture results and clinical progress daily      Darcy Lou, Pharmacist

## 2019-02-02 NOTE — PROGRESS NOTES
Extubated to nasal cannula  with dr Mao Kumar and team at bedside  Pt  With copious secretions with strong cough noted  Pt  tacypniac on nasal cannula and short guppy like breaths noted    md haider, placed on high flow nasal cannula to master sats > 92 % at this time

## 2019-02-02 NOTE — RESPIRATORY THERAPY NOTE
RT Ventilator Management Note  Horace Delgadillo 35 y o  male MRN: [id number]  Unit/Bed#: ICU 06 Encounter: [id number]      Daily Screen     No data found  Physical Exam:   Assessment Type: Assess only  General Appearance: Sedated  Respiratory Pattern: Assisted  Chest Assessment: Chest expansion symmetrical  Bilateral Breath Sounds: Clear  O2 Device: vent  Subjective Data: Pt currently intubated, on vent  BS clear, SpO2 100%  No resp neb treatments needed at this time  Will continue vent management while pt intubated      Resp Comments: pt remained on AC/VC mode/settings all evening/night  Vent changes made during the night based on ABG results

## 2019-02-02 NOTE — PROGRESS NOTES
Vancomycin Assessment    Jb Garcia is a 35 y o  male who is currently being restarted on vancomycin 1750 q12 for bacteremia    Relevant clinical data and objective history reviewed:  Creatinine   Date Value Ref Range Status   02/02/2019 1 49 (H) 0 60 - 1 30 mg/dL Final     Comment:     Standardized to IDMS reference method   02/01/2019 1 52 (H) 0 60 - 1 30 mg/dL Final     Comment:     Standardized to IDMS reference method   02/01/2019 1 74 (H) 0 60 - 1 30 mg/dL Final     Comment:     Standardized to IDMS reference method   03/21/2015 0 76 0 60 - 1 30 mg/dL Final     Comment:     Standardized to IDMS reference method     /51 (BP Location: Left arm)   Pulse 96   Temp 100 4 °F (38 °C) (Probe)   Resp (!) 36   Ht 6' 2" (1 88 m)   Wt 112 kg (246 lb 14 6 oz)   SpO2 96%   BMI 31 70 kg/m²   I/O last 3 completed shifts: In: 5201 1 [I V :3976 7; NG/GT:100; IV Piggyback:1124 5]  Out: 3380 [Urine:3025; Drains:282; Blood:200]  Lab Results   Component Value Date/Time    BUN 30 (H) 02/02/2019 04:59 AM    BUN 14 03/21/2015 11:33 PM    WBC 26 90 (H) 02/02/2019 04:59 AM    WBC 9 33 03/31/2015 10:12 AM    HGB 10 1 (L) 02/02/2019 04:59 AM    HGB 14 4 03/31/2015 10:12 AM    HCT 28 9 (L) 02/02/2019 04:59 AM    HCT 43 4 03/31/2015 10:12 AM    MCV 88 02/02/2019 04:59 AM    MCV 87 03/31/2015 10:12 AM     02/02/2019 04:59 AM     03/31/2015 10:12 AM     Temp Readings from Last 3 Encounters:   02/02/19 100 4 °F (38 °C) (Probe)   09/25/18 (!) 97 °F (36 1 °C)   08/02/18 (!) 96 1 °F (35 6 °C) (Tympanic)     Vancomycin Days of Therapy: 1    Assessment/Plan  The patient is currently on vancomycin utilizing scheduled dosing  The patient was receiving 1750 q12 for a hepatic abscess and will be restarted on the same dose for bacteremia with a goal of 15-20 (appropriate for most indications)  Pharmacy will continue to follow closely for s/sx of nephrotoxicity, infusion reactions and appropriateness of therapy    BMP and CBC will be ordered per protocol  Plan for trough as patient approaches steady state, prior to the 4th  dose at approximately 2/3 2130  Pharmacy will continue to follow the patients culture results and clinical progress daily      Dian Oviedo Pharmacist

## 2019-02-02 NOTE — ANESTHESIA POSTPROCEDURE EVALUATION
Post-Op Assessment Note      CV Status:  Stable    Mental Status:  Unresponsive (sedated on ppf)    Hydration Status:  Euvolemic and stable    PONV Controlled:  Controlled    Airway Patency:  Patent  Airway: intubated    Post Op Vitals Reviewed: Yes          Staff: CRNA       Comments: report to icu staff          /61 (02/01/19 2138)    Temp (!) 101 5 °F (38 6 °C) (02/01/19 2138)    Pulse 86 (02/01/19 2138)   Resp (!) 30 (02/01/19 2138)    SpO2 99 % (02/01/19 2138)

## 2019-02-02 NOTE — CONSULTS
Addendum:  Both bc's are now positive for GPC in chains  Will restart vancomycin in the event patient has ampicillin-resistant Enterococcus  D/W surgery service earlier  Consultation - Infectious Disease   Jayne López 35 y o  male MRN: [id number]  Unit/Bed#: ICU 06 Encounter: [id number]      IMPRESSION & RECOMMENDATIONS:   Impression/Recommendations: This is a 35 y o  male, with history of chronic HCV infection IVDU, came to the ER with diarrhea and RUQ abdominal pain  He is found to have sepsis and multiple liver abscesses  He is status post drainage of liver abscesses  1  Sepsis, POA, presented with fever and leukocytosis  Source of sepsis is most likely liver abscesses  Overall, patient is stable  Temperature still up and down  WBC stable  Admission blood cultures are negative so far  Antibiotic plan as in below  Monitor temperature/WB C  Monitor hemodynamics  Follow-up on pending blood cultures  2  Multiple liver abscesses  Patient has history of diarrhea  Liver abscess is may be all secondary to translocation from gut  Abdomen/pelvis CT without other obvious intra-abdominal pathologies  Consider hematogenous source from active IVDU with bacteremia  Thus far, blood cultures are negative  It may be worthwhile to locate colonoscopy to look for colonic lesions  Patient is at low risk for resistant pathogens, due to lack of antibiotic use and hospitalization  Likely pathogens are enteric GNR and anaerobes  As long as blood cultures are negative, I would not pursue endocarditis workup  Change antibiotic regimen to IV Unasyn  Follow-up on abscess cultures  Monitor temperature/WB C  Monitor abdominal pain  3  Intermittent hypotension  This may be all secondary to sepsis above  SBP is much better now  Patient is currently not on any pressors  Antibiotic plan as in above  Monitor SBP  4  HENRIQUE, POA  This may be secondary sepsis  Dehydration may contribute    Creatinine slowly improving with IV fluid hydration  Antibiotic at full dose for now  Monitor creatinine  5  Elevated LFTs  This is most likely secondary to hepatic abscesses  Will monitor LFTs with treatment and drainage of abscesses  Monitor LFTs  6  History of IVDU  I have some concern regarding possible active IVDU  For these reasons, patient is not a candidate for home IV antibiotic  Prior HIV screen negative  Hospitalization records reviewed in detail  Discussed with patient in detail regarding the above plan  Thank you for this consultation  We will follow along with you  HISTORY OF PRESENT ILLNESS:  Reason for Consult:  Liver abscesses  HPI: Faisal Willard is a 35 y o  male, with history of chronic HCV infection and IVDU, came to the ER yesterday with 1 week history of RUQ abdominal pain with diarrhea  On presentation, he was noted to have high fever with leukocytosis  Abdomen/pelvis CT show multiple liver collections  Patient was admitted to ICU for sepsis  He was started on vancomycin/cefepime/Flagyl  Yesterday, he had drainage of l 5 iver abscess in IR, with finding of purulence  Fall these reasons, we are asked to evaluate the patient  At present, patient states that his RUQ abdominal pain is about the same  He denies any chills  Diarrhea is better  No ill contact  Patient has history of IVDU  He states that he has not used drugs for more than 5 years  He is status post Harvoni treatment for his chronic HCV infection in 2017  REVIEW OF SYSTEMS:  A complete 12 point system-based review was done  Except for what is noted in HPI above, ROS of systems is otherwise negative      PAST MEDICAL HISTORY:  Past Medical History:   Diagnosis Date    ADHD     Anxiety     Bipolar 1 disorder ([facility] [address] )     Delusion ([facility] )     Hepatitis C     Infectious viral hepatitis     Hep C- No Symptoms( Took a course of Harvoni)    Psoriasis     PTSD (post-traumatic stress disorder)     [facility] Past Surgical History:   Procedure Laterality Date    CT GUIDED PERC DRAINAGE CATHETER PLACEMENT  2019    MD REMOVAL OF SPERM DUCT(S) Bilateral 2018    Procedure: Celsa Diaz;  Surgeon: Jazmin Fagan MD;  Location: [facility];  Service: Urology    WISDOM TOOTH EXTRACTION       Problem list reviewed  FAMILY HISTORY:  Non-contributory    SOCIAL HISTORY:  History   Alcohol Use    Yes     Comment: socially     History   Drug Use    Types: Marijuana, Methaqualone     Comment: medical- vaping 4x/day     History   Smoking Status    Current Every Day Smoker    Packs/day: 1 00   Smokeless Tobacco    Never Used       ALLERGIES:  Allergies   Allergen Reactions    Sulfa Antibiotics Hives       MEDICATIONS:  All current active medications have been reviewed  Patient is currently on vancomycin/cefepime/Flagyl  PHYSICAL EXAM:  Vitals:  Temp:  [98 6 °F (37 °C)-103 6 °F (39 8 °C)] 101 8 °F (38 8 °C)  HR:  [] 104  Resp:  [0-45] 40  BP: ()/(45-96) 151/96  SpO2:  [88 %-100 %] 97 %  Temp (24hrs), Av 5 °F (38 6 °C), Min:98 6 °F (37 °C), Max:103 6 °F (39 8 °C)  Current: Temperature: (!) 101 8 °F (38 8 °C)     Physical Exam:  General:  Well-nourished, well-developed, in no acute distress  Awake, alert and oriented x 3  Eyes:  Conjunctive clear with no hemorrhages or effusions  Sclera icteric  Oropharynx:  No ulcers, no lesions, pharynx benign, no tonsillitis  Neck:  Supple, no lymphadenopathy, no mass, nontender  Lungs:  Expansion symmetric, no rales, no wheezing, no accessory muscle use  Cardiac:  Tachycardic with regular rhythm, normal S1, normal S2, no murmurs  Abdomen:  Soft, nondistended, moderate RUQ tenderness, no HSM  Drain serial purulent  Extremities:  No edema, no erythema, nontender   No ulcers  Skin:  No rashes, no ulcers  Neurological:  Moves all four extremities spontaneously, sensation grossly intact    LABS, IMAGING, & OTHER STUDIES:  Lab Results:  I have personally reviewed pertinent labs  Results from last 7 days  Lab Units 02/02/19  0459 02/01/19 2234 02/01/19 2011 02/01/19  1234   POTASSIUM mmol/L 3 9 3 9  --  3 2*   CHLORIDE mmol/L 104 101  --  92*   CO2 mmol/L 22 20*  --  21   CO2, I-STAT mmol/L  --   --  24  --    BUN mg/dL 30* 36*  --  48*   CREATININE mg/dL 1 49* 1 52*  --  1 74*   EGFR ml/min/1 73sq m 58 56  --  48   GLUCOSE, ISTAT mg/dl  --   --  94  --    CALCIUM mg/dL 6 7* 7 1*  --  7 8*   AST U/L 596* 513*  --  706*   ALT U/L 316* 279*  --  375*   ALK PHOS U/L 129* 131*  --  149*       Results from last 7 days  Lab Units 02/02/19  0459 02/01/19 2234 02/01/19 2011 02/01/19  1235   WBC Thousand/uL 26 90* 23 18*  --  29 37*   HEMOGLOBIN g/dL 10 1* 10 3*  --  11 2*   I STAT HEMOGLOBIN g/dl  --   --  12 2  --    PLATELETS Thousands/uL 350 384  --  341       Results from last 7 days  Lab Units 02/01/19 1952   GRAM STAIN RESULT  3+ Polys  4+ Gram positive cocci in pairs and chains   BODY FLUID CULTURE, STERILE  Culture results to follow  Imaging Studies:   I have personally reviewed pertinent imaging study reports and images in PACS  CXR reviewed personally  Elevated right hemidiaphragm with atelectasis and small effusion  Abdomen/pelvis CT reviewed personally  Multiple hepatic collections  EKG, Pathology, and Other Studies:   I have personally reviewed pertinent reports

## 2019-02-02 NOTE — SPEECH THERAPY NOTE
Bedside Swallow Evaluation:    Summary:  Dysphagia consult received as patient was extubated this am  Patient passed RN dysphagia screen  Patient has adequate oral stage of swallowing with a possible mild pharyngeal dysphagia characterized by coughing with all trials  However, patient is also coughing outside of evaluation  RN reports patient has been coughing and moaning all morning since being extubated  Recommendations:  Diet: Clear liquids   Meds: As tolerated best   Supervision: Close supervision  Remind patient to take small sips  Positioning:Upright  Strategies: Pt to take PO/Meds only when fully alert and upright  Oral care: As needed     Aspiration precautions    Therapy Prognosis: Good  Prognosis considerations: Respiratory compromise  Frequency: 3-5x week    Patient's goal: "Can I have Gatorade?"     Consider consult w/:  None at this time      Reason for consult:  R/o aspiration  Determine safest and least restrictive diet  Change in mental status  respiratory compromise    Precautions:  None    Current diet:  NPO  Premorbid diet[de-identified]  Regular with thin liquids   Previous VBS:  None on record   O2 requirement:  HFNC  Voice/Speech:  WFL, but some SOB  Social:  Lives with spouse  Follows commands:  WFL                    Cognitive Status:  WFL  Oral [facility] exam:  Full dentition  Full symmetry    Items administered:  Puree solids and thin liquids  Liquids were taken by straw  Oral stage: WFL  Lip closure: WFL  Mastication: N/A  Bolus formation: WFL  Bolus control: WFL  Transfer: WFL  Oral residue: No  Pocketing: No    Pharyngeal stage: Mild  Swallow promptness: WFL  Laryngeal rise: WFL  Wet voice: No  Throat clear: No  Cough: Yes, but also observed not with PO trials  Secondary swallows: No  Audible swallows: No    Esophageal stage:  No s/s reported    Results d/w:  Pt, nursing, physician    Goal(s):  Pt will tolerate least restrictive diet w/out s/s aspiration or oral/pharyngeal difficulties

## 2019-02-02 NOTE — INTERVAL H&P NOTE
Update: (This section must be completed if the H&P was completed greater than 24 hrs to procedure or admission)    H&P reviewed  After examining the patient, I find no changed to the H&P since it had been written  Patient re-evaluated   Accept as history and physical     Lakisha Metcalf MD/February 1, 2019/10:29 PM

## 2019-02-03 ENCOUNTER — APPOINTMENT (INPATIENT)
Dept: RADIOLOGY | Facility: HOSPITAL | Age: 39
DRG: 871 | End: 2019-02-03
Payer: MEDICARE

## 2019-02-03 PROBLEM — D64.9 ANEMIA: Status: ACTIVE | Noted: 2019-02-03

## 2019-02-03 PROBLEM — D72.829 LEUKOCYTOSIS: Status: ACTIVE | Noted: 2019-02-03

## 2019-02-03 PROBLEM — R74.01 TRANSAMINITIS: Status: ACTIVE | Noted: 2019-02-03

## 2019-02-03 PROBLEM — Z72.0 TOBACCO ABUSE: Status: ACTIVE | Noted: 2019-02-03

## 2019-02-03 PROBLEM — R78.81 GRAM-POSITIVE BACTEREMIA: Status: ACTIVE | Noted: 2019-02-03

## 2019-02-03 LAB
ALBUMIN SERPL BCP-MCNC: 1.7 G/DL (ref 3.5–5)
ALP SERPL-CCNC: 137 U/L (ref 46–116)
ALT SERPL W P-5'-P-CCNC: 254 U/L (ref 12–78)
ANION GAP SERPL CALCULATED.3IONS-SCNC: 7 MMOL/L (ref 4–13)
ANISOCYTOSIS BLD QL SMEAR: PRESENT
ARTERIAL PATENCY WRIST A: YES
AST SERPL W P-5'-P-CCNC: 272 U/L (ref 5–45)
BASE EXCESS BLDA CALC-SCNC: -3.3 MMOL/L
BASOPHILS # BLD MANUAL: 0.21 THOUSAND/UL (ref 0–0.1)
BASOPHILS NFR MAR MANUAL: 1 % (ref 0–1)
BILIRUB SERPL-MCNC: 7.2 MG/DL (ref 0.2–1)
BUN SERPL-MCNC: 13 MG/DL (ref 5–25)
CALCIUM SERPL-MCNC: 7.2 MG/DL (ref 8.3–10.1)
CHLORIDE SERPL-SCNC: 104 MMOL/L (ref 100–108)
CO2 SERPL-SCNC: 23 MMOL/L (ref 21–32)
CREAT SERPL-MCNC: 0.92 MG/DL (ref 0.6–1.3)
EOSINOPHIL # BLD MANUAL: 0.21 THOUSAND/UL (ref 0–0.4)
EOSINOPHIL NFR BLD MANUAL: 1 % (ref 0–6)
ERYTHROCYTE [DISTWIDTH] IN BLOOD BY AUTOMATED COUNT: 15.6 % (ref 11.6–15.1)
GFR SERPL CREATININE-BSD FRML MDRD: 104 ML/MIN/1.73SQ M
GIANT PLATELETS BLD QL SMEAR: PRESENT
GLUCOSE SERPL-MCNC: 110 MG/DL (ref 65–140)
GLUCOSE SERPL-MCNC: 113 MG/DL (ref 65–140)
HCO3 BLDA-SCNC: 20.8 MMOL/L (ref 22–28)
HCT VFR BLD AUTO: 28 % (ref 36.5–49.3)
HFNC FLOW LPM: 55
HGB BLD-MCNC: 9.6 G/DL (ref 12–17)
LYMPHOCYTES # BLD AUTO: 1.04 THOUSAND/UL (ref 0.6–4.47)
LYMPHOCYTES # BLD AUTO: 5 % (ref 14–44)
MAGNESIUM SERPL-MCNC: 2.3 MG/DL (ref 1.6–2.6)
MCH RBC QN AUTO: 30.1 PG (ref 26.8–34.3)
MCHC RBC AUTO-ENTMCNC: 34.3 G/DL (ref 31.4–37.4)
MCV RBC AUTO: 88 FL (ref 82–98)
MONOCYTES # BLD AUTO: 0.83 THOUSAND/UL (ref 0–1.22)
MONOCYTES NFR BLD: 4 % (ref 4–12)
NEUTROPHILS # BLD MANUAL: 18.57 THOUSAND/UL (ref 1.85–7.62)
NEUTS SEG NFR BLD AUTO: 89 % (ref 43–75)
NON VENT HFNC FIO2: 65
NON VENT TYPE HFNC: ABNORMAL
NRBC BLD AUTO-RTO: 0 /100 WBCS
O2 CT BLDA-SCNC: 15.1 ML/DL (ref 16–23)
OXYHGB MFR BLDA: 95.9 % (ref 94–97)
PCO2 BLDA: 34.2 MM HG (ref 36–44)
PH BLDA: 7.4 [PH] (ref 7.35–7.45)
PHOSPHATE SERPL-MCNC: 1.3 MG/DL (ref 2.7–4.5)
PLATELET # BLD AUTO: 407 THOUSANDS/UL (ref 149–390)
PLATELET BLD QL SMEAR: ABNORMAL
PMV BLD AUTO: 10.2 FL (ref 8.9–12.7)
PO2 BLDA: 83 MM HG (ref 75–129)
POIKILOCYTOSIS BLD QL SMEAR: PRESENT
POTASSIUM SERPL-SCNC: 3.6 MMOL/L (ref 3.5–5.3)
PROT SERPL-MCNC: 5.9 G/DL (ref 6.4–8.2)
RBC # BLD AUTO: 3.19 MILLION/UL (ref 3.88–5.62)
RBC MORPH BLD: PRESENT
SODIUM SERPL-SCNC: 134 MMOL/L (ref 136–145)
SPECIMEN SOURCE: ABNORMAL
TARGETS BLD QL SMEAR: PRESENT
TOXIC GRANULES BLD QL SMEAR: PRESENT
WBC # BLD AUTO: 20.86 THOUSAND/UL (ref 4.31–10.16)

## 2019-02-03 PROCEDURE — 80053 COMPREHEN METABOLIC PANEL: CPT | Performed by: STUDENT IN AN ORGANIZED HEALTH CARE EDUCATION/TRAINING PROGRAM

## 2019-02-03 PROCEDURE — 36600 WITHDRAWAL OF ARTERIAL BLOOD: CPT

## 2019-02-03 PROCEDURE — 82805 BLOOD GASES W/O2 SATURATION: CPT | Performed by: EMERGENCY MEDICINE

## 2019-02-03 PROCEDURE — 94660 CPAP INITIATION&MGMT: CPT

## 2019-02-03 PROCEDURE — 83735 ASSAY OF MAGNESIUM: CPT | Performed by: STUDENT IN AN ORGANIZED HEALTH CARE EDUCATION/TRAINING PROGRAM

## 2019-02-03 PROCEDURE — 71045 X-RAY EXAM CHEST 1 VIEW: CPT

## 2019-02-03 PROCEDURE — 82948 REAGENT STRIP/BLOOD GLUCOSE: CPT

## 2019-02-03 PROCEDURE — 99232 SBSQ HOSP IP/OBS MODERATE 35: CPT | Performed by: SURGERY

## 2019-02-03 PROCEDURE — 94760 N-INVAS EAR/PLS OXIMETRY 1: CPT

## 2019-02-03 PROCEDURE — 94669 MECHANICAL CHEST WALL OSCILL: CPT

## 2019-02-03 PROCEDURE — 84100 ASSAY OF PHOSPHORUS: CPT | Performed by: STUDENT IN AN ORGANIZED HEALTH CARE EDUCATION/TRAINING PROGRAM

## 2019-02-03 PROCEDURE — 99233 SBSQ HOSP IP/OBS HIGH 50: CPT | Performed by: INTERNAL MEDICINE

## 2019-02-03 PROCEDURE — 94640 AIRWAY INHALATION TREATMENT: CPT

## 2019-02-03 PROCEDURE — 85027 COMPLETE CBC AUTOMATED: CPT | Performed by: STUDENT IN AN ORGANIZED HEALTH CARE EDUCATION/TRAINING PROGRAM

## 2019-02-03 PROCEDURE — 99233 SBSQ HOSP IP/OBS HIGH 50: CPT | Performed by: SURGERY

## 2019-02-03 PROCEDURE — 85007 BL SMEAR W/DIFF WBC COUNT: CPT | Performed by: STUDENT IN AN ORGANIZED HEALTH CARE EDUCATION/TRAINING PROGRAM

## 2019-02-03 RX ORDER — SODIUM CHLORIDE, SODIUM GLUCONATE, SODIUM ACETATE, POTASSIUM CHLORIDE, MAGNESIUM CHLORIDE, SODIUM PHOSPHATE, DIBASIC, AND POTASSIUM PHOSPHATE .53; .5; .37; .037; .03; .012; .00082 G/100ML; G/100ML; G/100ML; G/100ML; G/100ML; G/100ML; G/100ML
75 INJECTION, SOLUTION INTRAVENOUS CONTINUOUS
Status: DISCONTINUED | OUTPATIENT
Start: 2019-02-03 | End: 2019-02-03

## 2019-02-03 RX ORDER — SODIUM CHLORIDE FOR INHALATION 0.9 %
3 VIAL, NEBULIZER (ML) INHALATION
Status: DISCONTINUED | OUTPATIENT
Start: 2019-02-03 | End: 2019-02-06

## 2019-02-03 RX ORDER — FAMOTIDINE 20 MG/1
20 TABLET, FILM COATED ORAL EVERY 12 HOURS SCHEDULED
Status: DISCONTINUED | OUTPATIENT
Start: 2019-02-03 | End: 2019-02-15 | Stop reason: HOSPADM

## 2019-02-03 RX ORDER — PRAZOSIN HYDROCHLORIDE 1 MG/1
1 CAPSULE ORAL DAILY PRN
Status: DISCONTINUED | OUTPATIENT
Start: 2019-02-03 | End: 2019-02-15 | Stop reason: HOSPADM

## 2019-02-03 RX ORDER — METRONIDAZOLE 500 MG/1
500 TABLET ORAL EVERY 8 HOURS SCHEDULED
Status: DISCONTINUED | OUTPATIENT
Start: 2019-02-03 | End: 2019-02-15 | Stop reason: HOSPADM

## 2019-02-03 RX ORDER — FAMOTIDINE 40 MG/5ML
20 POWDER, FOR SUSPENSION ORAL 2 TIMES DAILY
Status: DISCONTINUED | OUTPATIENT
Start: 2019-02-03 | End: 2019-02-03

## 2019-02-03 RX ORDER — ACETAMINOPHEN 325 MG/1
650 TABLET ORAL EVERY 8 HOURS SCHEDULED
Status: DISCONTINUED | OUTPATIENT
Start: 2019-02-03 | End: 2019-02-04

## 2019-02-03 RX ORDER — AMOXICILLIN 250 MG
1 CAPSULE ORAL
Status: DISCONTINUED | OUTPATIENT
Start: 2019-02-03 | End: 2019-02-04

## 2019-02-03 RX ORDER — DEXTROSE, SODIUM CHLORIDE, AND POTASSIUM CHLORIDE 5; .45; .15 G/100ML; G/100ML; G/100ML
75 INJECTION INTRAVENOUS CONTINUOUS
Status: DISCONTINUED | OUTPATIENT
Start: 2019-02-03 | End: 2019-02-05

## 2019-02-03 RX ORDER — LEVALBUTEROL 1.25 MG/.5ML
1.25 SOLUTION, CONCENTRATE RESPIRATORY (INHALATION) ONCE
Status: COMPLETED | OUTPATIENT
Start: 2019-02-03 | End: 2019-02-03

## 2019-02-03 RX ORDER — ACETAMINOPHEN 160 MG/5ML
650 SUSPENSION, ORAL (FINAL DOSE FORM) ORAL EVERY 8 HOURS SCHEDULED
Status: DISCONTINUED | OUTPATIENT
Start: 2019-02-03 | End: 2019-02-03

## 2019-02-03 RX ORDER — LEVALBUTEROL 1.25 MG/.5ML
1.25 SOLUTION, CONCENTRATE RESPIRATORY (INHALATION)
Status: DISCONTINUED | OUTPATIENT
Start: 2019-02-03 | End: 2019-02-06

## 2019-02-03 RX ADMIN — ACETAMINOPHEN 650 MG: 325 TABLET, FILM COATED ORAL at 21:33

## 2019-02-03 RX ADMIN — ISODIUM CHLORIDE 3 ML: 0.03 SOLUTION RESPIRATORY (INHALATION) at 19:16

## 2019-02-03 RX ADMIN — OXYCODONE HYDROCHLORIDE 10 MG: 10 TABLET ORAL at 00:49

## 2019-02-03 RX ADMIN — HEPARIN SODIUM 5000 UNITS: 5000 INJECTION INTRAVENOUS; SUBCUTANEOUS at 05:31

## 2019-02-03 RX ADMIN — CEFTRIAXONE SODIUM 2000 MG: 10 INJECTION, POWDER, FOR SOLUTION INTRAVENOUS at 21:34

## 2019-02-03 RX ADMIN — HEPARIN SODIUM 5000 UNITS: 5000 INJECTION INTRAVENOUS; SUBCUTANEOUS at 17:55

## 2019-02-03 RX ADMIN — METHOCARBAMOL 750 MG: 750 TABLET, FILM COATED ORAL at 18:05

## 2019-02-03 RX ADMIN — QUETIAPINE FUMARATE 200 MG: 100 TABLET ORAL at 21:35

## 2019-02-03 RX ADMIN — SENNOSIDES AND DOCUSATE SODIUM 1 TABLET: 8.6; 5 TABLET ORAL at 21:35

## 2019-02-03 RX ADMIN — DIAZEPAM 10 MG: 5 TABLET ORAL at 18:05

## 2019-02-03 RX ADMIN — METHOCARBAMOL 750 MG: 750 TABLET, FILM COATED ORAL at 00:48

## 2019-02-03 RX ADMIN — BUPROPION HYDROCHLORIDE 300 MG: 150 TABLET, FILM COATED, EXTENDED RELEASE ORAL at 09:31

## 2019-02-03 RX ADMIN — METHOCARBAMOL 750 MG: 750 TABLET, FILM COATED ORAL at 05:31

## 2019-02-03 RX ADMIN — LEVALBUTEROL 1.25 MG: 1.25 SOLUTION, CONCENTRATE RESPIRATORY (INHALATION) at 13:50

## 2019-02-03 RX ADMIN — NICOTINE 21 MG: 21 PATCH, EXTENDED RELEASE TRANSDERMAL at 18:08

## 2019-02-03 RX ADMIN — DEXTROSE, SODIUM CHLORIDE, AND POTASSIUM CHLORIDE 75 ML/HR: 5; .45; .15 INJECTION INTRAVENOUS at 09:46

## 2019-02-03 RX ADMIN — OXYCODONE HYDROCHLORIDE 10 MG: 10 TABLET ORAL at 05:32

## 2019-02-03 RX ADMIN — VANCOMYCIN HYDROCHLORIDE 1500 MG: 10 INJECTION, POWDER, LYOPHILIZED, FOR SOLUTION INTRAVENOUS at 09:38

## 2019-02-03 RX ADMIN — LEVALBUTEROL 1.25 MG: 1.25 SOLUTION, CONCENTRATE RESPIRATORY (INHALATION) at 19:16

## 2019-02-03 RX ADMIN — FAMOTIDINE 20 MG: 20 TABLET, FILM COATED ORAL at 20:05

## 2019-02-03 RX ADMIN — CHLORHEXIDINE GLUCONATE 0.12% ORAL RINSE 15 ML: 1.2 LIQUID ORAL at 08:47

## 2019-02-03 RX ADMIN — METRONIDAZOLE 500 MG: 500 TABLET ORAL at 21:35

## 2019-02-03 RX ADMIN — FAMOTIDINE 20 MG: 40 POWDER, FOR SUSPENSION ORAL at 09:33

## 2019-02-03 RX ADMIN — LEVALBUTEROL 1.25 MG: 1.25 SOLUTION, CONCENTRATE RESPIRATORY (INHALATION) at 08:19

## 2019-02-03 RX ADMIN — ACETAMINOPHEN 650 MG: 325 TABLET, FILM COATED ORAL at 13:25

## 2019-02-03 RX ADMIN — DEXTROSE, SODIUM CHLORIDE, AND POTASSIUM CHLORIDE 75 ML/HR: 5; .45; .15 INJECTION INTRAVENOUS at 22:01

## 2019-02-03 RX ADMIN — POTASSIUM PHOSPHATE, MONOBASIC AND POTASSIUM PHOSPHATE, DIBASIC 30 MMOL: 224; 236 INJECTION, SOLUTION INTRAVENOUS at 08:18

## 2019-02-03 RX ADMIN — HEPARIN SODIUM 5000 UNITS: 5000 INJECTION INTRAVENOUS; SUBCUTANEOUS at 21:34

## 2019-02-03 RX ADMIN — METHOCARBAMOL 750 MG: 750 TABLET, FILM COATED ORAL at 13:25

## 2019-02-03 RX ADMIN — AMPICILLIN SODIUM AND SULBACTAM SODIUM 3 G: 10; 5 INJECTION, POWDER, FOR SOLUTION INTRAVENOUS at 02:30

## 2019-02-03 RX ADMIN — ARIPIPRAZOLE 5 MG: 5 TABLET ORAL at 05:31

## 2019-02-03 RX ADMIN — ACETAMINOPHEN 650 MG: 160 SUSPENSION ORAL at 06:32

## 2019-02-03 RX ADMIN — CHLORHEXIDINE GLUCONATE 0.12% ORAL RINSE 15 ML: 1.2 LIQUID ORAL at 20:05

## 2019-02-03 RX ADMIN — AMPICILLIN SODIUM AND SULBACTAM SODIUM 3 G: 10; 5 INJECTION, POWDER, FOR SOLUTION INTRAVENOUS at 08:25

## 2019-02-03 RX ADMIN — ISODIUM CHLORIDE 3 ML: 0.03 SOLUTION RESPIRATORY (INHALATION) at 13:50

## 2019-02-03 RX ADMIN — METRONIDAZOLE 500 MG: 500 TABLET ORAL at 13:25

## 2019-02-03 RX ADMIN — ISODIUM CHLORIDE 3 ML: 0.03 SOLUTION RESPIRATORY (INHALATION) at 08:19

## 2019-02-03 NOTE — PROGRESS NOTES
Progress Note - Infectious Disease   Corazon Grady 35 y o  male MRN: [id number]  Unit/Bed#: ICU 06 Encounter: [id number]      Impression/Recommendations:  1  Sepsis, POA, presented with fever and leukocytosis  Source of sepsis is most likely liver abscesses  Overall, patient is stable  Temperature still up and down  WBC stable  Admission blood cultures are now positive  Antibiotic plan as in below  Monitor temperature/WB C  Monitor hemodynamics      2  Alpha Strep bacteremia  Source is most likely liver abscesses  However, we need to consider the possibility of endocarditis with septic emboli to liver  Change Unasyn to high-dose IV ceftriaxone  No further need for vancomycin  I will discontinue  Repeat blood cultures in a paz Westrie Nissen Check 2D echo  3  Multiple liver abscesses  Patient has history of diarrhea  Liver abscess is may be all secondary to translocation from gut  Abdomen/pelvis CT without other obvious intra-abdominal pathologies  It may be worthwhile to look at colonoscopy to look for colonic lesions  Patient remains with fever  He may need repeat CT to look for undrained abscess if fever persists  Antibiotic plan as in above  Add Flagyl for anaerobic coverage  Monitor temperature/WBC  Monitor abdominal pain      3  Intermittent hypotension  This may be all secondary to sepsis above  SBP is much better now  Patient is currently not on any pressors  Antibiotic plan as in above  Monitor SBP      4  HENRIQUE, POA  This may be secondary sepsis  Dehydration may contribute  Creatinine improving, near normalized  Antibiotic at full dose for now  Monitor creatinine      5  Elevated LFTs  This is most likely secondary to hepatic abscesses  Will monitor LFTs with treatment and drainage of abscesses  LFTs are improving  Monitor LFTs      6  History of IVDU  I have some concern regarding possible active IVDU  For these reasons, patient is not a candidate for home IV antibiotic    Prior HIV screen negative      Discussed with patient in detail regarding the above plan  Discussed with surgery service earlier  Antibiotics:  Unasyn/vancomycin  Post drainage # 2     Subjective:  Patient remains in ICU  He is on high-flow O2  He is more sleepy today, arousable  Stable RUQ abdominal pain  Temperature stays up  No diarrhea  Objective:  Vitals:  Temp:  [99 °F (37 2 °C)-102 6 °F (39 2 °C)] 99 °F (37 2 °C)  HR:  [] 110  Resp:  [21-59] 24  BP: ()/(51-96) 114/62  SpO2:  [94 %-98 %] 96 %  Temp (24hrs), Av 9 °F (38 3 °C), Min:99 °F (37 2 °C), Max:102 6 °F (39 2 °C)  Current: Temperature: 99 °F (37 2 °C)    Physical Exam:     General: Awake, alert, cooperative, no distress  Neck:  Supple  No mass  No lymphadenopathy  Lungs: Expansion symmetric, I basilar rales, no wheezing, respirations unlabored  Heart:  Tachycardic with regular rhythm, S1 and S2 normal, no murmur  Abdomen: Soft, distended, stable RUQ tenderness  Drains serial purulent  Extremities: Trace edema  No erythema/warmth  No ulcer  Nontender to palpation  Skin:  No rash  Neuro: Moves all extremities  Invasive Devices     Peripheral Intravenous Line            Peripheral IV 19 Left Wrist 1 day    Peripheral IV 19 Right Wrist 1 day    Peripheral IV 19 Left Antecubital less than 1 day          Drain            Closed/Suction Drain Right Abdomen Bulb 10 2 Fr  1 day    Closed/Suction Drain Right; Inferior;Superior Abdomen Bulb 8 5 Fr  1 day    Closed/Suction Drain Right;Posterior; Inferior Abdomen Bulb 10 2 Fr  1 day    Closed/Suction Drain Right;Superior; Anterior Abdomen Bulb 8 5 Fr  1 day    Closed/Suction Drain Right;Superior;Medial Abdomen Bulb 8 5 Fr  1 day                Labs studies:   I have personally reviewed pertinent labs      Results from last 7 days  Lab Units 19  0524 19  0459 19  2234 19   POTASSIUM mmol/L 3 6 3 9 3 9  --    CHLORIDE mmol/L 104 104 101  --    CO2 mmol/L 23 22 20*  --    CO2, I-STAT mmol/L  --   --   --  24   BUN mg/dL 13 30* 36*  --    CREATININE mg/dL 0 92 1 49* 1 52*  --    EGFR ml/min/1 73sq m 104 58 56  --    GLUCOSE, ISTAT mg/dl  --   --   --  94   CALCIUM mg/dL 7 2* 6 7* 7 1*  --    AST U/L 272* 596* 513*  --    ALT U/L 254* 316* 279*  --    ALK PHOS U/L 137* 129* 131*  --        Results from last 7 days  Lab Units 02/03/19  0524 02/02/19  0459 02/01/19  2234   WBC Thousand/uL 20 86* 26 90* 23 18*   HEMOGLOBIN g/dL 9 6* 10 1* 10 3*   PLATELETS Thousands/uL 407* 350 384       Results from last 7 days  Lab Units 02/01/19  1952 02/01/19  1301 02/01/19  1235   BLOOD CULTURE   --   --  Alpha Hemolytic Strep (Organism type)*   GRAM STAIN RESULT  3+ Polys  4+ Gram positive cocci in pairs and chains Gram positive cocci in chains Gram positive cocci in chains   BODY FLUID CULTURE, STERILE  3+ Growth of Alpha Hemolytic Streptococcus NOT Enterococcus*  --   --        Imaging Studies:   I have personally reviewed pertinent imaging study reports and images in PACS  EKG, Pathology, and Other Studies:   I have personally reviewed pertinent reports

## 2019-02-03 NOTE — CONSULTS
The patient's vancomycin therapy was discontinued  Pharmacy will sign off now  Thank you for this consult

## 2019-02-03 NOTE — PROGRESS NOTES
Progress Note - Critical Care   Guru Lewis 35 y o  male MRN: [id number]  Unit/Bed#: ICU 06 Encounter: [id number]    Attending Physician: Salome Jacobson MD      ______________________________________________________________________  Assessment and Plan:   Principal Problem:    Hepatic abscess  Active Problems:    History of hepatitis C    Hyponatremia    Depression    Anxiety    IV drug abuse ([facility] [address] )  Resolved Problems:    * No resolved hospital problems  *        Neuro:   - Anxiety: continue home meds of Abilify, Wellbutrin, Valium, Seroquel  PRN minipress restarted today also  - Analgesia: continue 5/10 oxycodone prn and scheduled robaxin  Will consult acute pain service for their recommendations  CV:   - currently hemodynamically stable, continue to trend cuff pressures    Pulm:   - Acute hypoxic respiratory failure: extubated yesterday; however, unable to wean from HFNC  Will continue HFNC titrating for sats>88% given history of smoking  Respiratory protocol and airway protocol ordered  ABG relatively unremarkable  CXR demonstrates increased R pleural effusion as well as opacity on RLL/RML  Possibility of aspiration pneumonitis given location  Aggressive pulmonary toilet/IS, encourage coughing  GI:   - Hepatitis C: s/p Harvoni treatment x12 months [id number]  Hep C Ab highly reactive  Could consider obtaining viral PCR to evaluate for virologic response   - Transaminitis: continue to trend LFT's, all improving    :   - HENRIQUE: resolved, creatinine this morning 0 94 which appears to be his approximate baseline  Will discontinue mckeon catheter  F/E/N:   - Fluids: currently off all IVF, will restart at low rate given patient's distension/likely inability to tolerate much PO   - Electrolytes: administer 30 KPhos, repleting lytes for Mg>2, phos>3, K>4  Continue to trend hyponatremia   - Nutrition: continue clears as tolerated, counseled patient on going slow given considerable distension      ID:   - Hepatic abscesses: s/p IR drain placement x5  Will continue DANNY bulbs to suction  Cultures primarily growing GPC in pairs and chains  Patient switched to Unasyn and Vanc yesterday by infectious disease  TTE negative for any valvular lesions but given cultures would consider obtaining TAMICA to evaluate for endocarditis; translocation of intra-abdominal pathogens seems unlikely given lack of enteric bacteria  Will discuss with infectious disease  Will continue to trend WBC  Heme:   - Thrombocytosis: likely reactive in nature, will trend  - Anemia - trend H/H, currently stable  - DVT ppx: SQH, SCDs    Endo:   - QID blood sugar checks while in ICU     Msk/Skin:   - frequent turns/repositioning    Disposition:   - continue ICU level of care    Code Status: Level 1 - Full Code    Counseling / Coordination of Care  Total Critical Care time spent 30 minutes excluding procedures, teaching and family updates  ______________________________________________________________________    Chief Complaint: "I'm very sleepy"    24 Hour Events: Extubated yesterday, but unable to wean from HFNC  Review of Systems   Constitutional: Positive for fever  Negative for chills  HENT: Negative  Eyes: Negative  Respiratory: Positive for shortness of breath  Negative for cough and wheezing  Cardiovascular: Negative  Negative for chest pain and palpitations  Gastrointestinal: Positive for abdominal distention and abdominal pain  Negative for blood in stool, constipation, diarrhea, nausea and vomiting  Endocrine: Negative  Genitourinary: Negative  Musculoskeletal: Negative  Skin: Negative  Allergic/Immunologic: Negative  Neurological: Negative  Hematological: Negative      Psychiatric/Behavioral: Negative       ______________________________________________________________________    Physical Exam:  NAD, alert and oriented to person, place, time, and situation  Normocephalic, atraumatic  MMM, EOMI, PERRLA  Tachypnea, shallow breathing, coarse BS bilaterally, decreased at R lung base  Mildly tachycardic in 100's, sinus rhythm on monitors  Abd soft, tender in RUQ, distended, DANNY drains in place x5 with serosang/serous output  No calf tenderness or peripheral edema  Lopez in place draining clear urine  Motor/sensation intact in distal extremities  CN grossly intact  -rash/lesions        ______________________________________________________________________  Vitals:    19 0337 19 0400 19 0500 19 0600   BP:  131/60 120/64 127/79   Pulse:  102 104 (!) 110   Resp:  (!) 36 (!) 32 (!) 38   Temp:  (!) 102 2 °F (39 °C) (!) 102 6 °F (39 2 °C) (!) 102 6 °F (39 2 °C)   TempSrc:       SpO2: 96% 97% 96% 94%   Weight:       Height:           Temperature:   Temp (24hrs), Av 6 °F (38 1 °C), Min:98 6 °F (37 °C), Max:102 6 °F (39 2 °C)    Current Temperature: (!) 102 6 °F (39 2 °C)  Weights:   IBW: 82 2 kg    Body mass index is 31 7 kg/m²    Weight (last 2 days)     Date/Time   Weight    19 2138  112 (246 92)            Hemodynamic Monitoring:  N/A     Non-Invasive/Invasive Ventilation Settings:  Respiratory    Lab Data (Last 4 hours)       0634            pH, Arterial       7 402           pCO2, Arterial       (!)34 2           pO2, Arterial       83 0           HCO3, Arterial       (!)20 8           Base Excess, Arterial       -3 3                O2/Vent Data           Most Recent        Non-Invasive Ventilation Mode   HFNC                Lab Results   Component Value Date    PHART 7 402 2019    BMI3ZLO 34 2 (L) 2019    PO2ART 83 0 2019    AUY9VZZ 20 8 (L) 2019    BEART -3 3 2019    SOURCE Radial, Right 2019     SpO2: SpO2: 94 %  Intake and Outputs:  I/O        07 -  0700  0700    I V  (mL/kg)  3653 4 (32 6)    NG/GT  100    IV Piggyback  1124 5    Total Intake(mL/kg)  4877 9 (43 6)    Urine (mL/kg/hr)  2825    Drains  282    Blood  200    Total Output   3307    Net   +1570 9              UOP: 1 8cc/kg/hr  Nutrition:        Diet Orders            Start     Ordered    02/02/19 1149  Diet Clear Liquid  Diet effective now     Question Answer Comment   Diet Type Clear Liquid    RD to adjust diet per protocol?  Yes        02/02/19 1148          Labs:     Results from last 7 days  Lab Units 02/03/19 0524 02/02/19 0459 02/01/19 2234 02/01/19 2011 02/01/19  1235   WBC Thousand/uL 20 86* 26 90* 23 18*  --  29 37*   HEMOGLOBIN g/dL 9 6* 10 1* 10 3*  --  11 2*   I STAT HEMOGLOBIN g/dl  --   --   --  12 2  --    HEMATOCRIT % 28 0* 28 9* 30 7*  --  31 6*   HEMATOCRIT, ISTAT %  --   --   --  36*  --    PLATELETS Thousands/uL 407* 350 384  --  341   BANDS PCT %  --  2 4  --  2   MONO PCT %  --  4 1*  --  3*       Results from last 7 days  Lab Units 02/03/19 0524 02/02/19 0459 02/01/19 2234 02/01/19 2011 02/01/19  1234   SODIUM mmol/L 134* 134* 132*  --  124*   POTASSIUM mmol/L 3 6 3 9 3 9  --  3 2*   CHLORIDE mmol/L 104 104 101  --  92*   CO2 mmol/L 23 22 20*  --  21   CO2, I-STAT mmol/L  --   --   --  24  --    ANION GAP mmol/L 7 8 11  --  11   BUN mg/dL 13 30* 36*  --  48*   CREATININE mg/dL 0 92 1 49* 1 52*  --  1 74*   CALCIUM mg/dL 7 2* 6 7* 7 1*  --  7 8*   ALT U/L 254* 316* 279*  --  375*   AST U/L 272* 596* 513*  --  706*   ALK PHOS U/L 137* 129* 131*  --  149*   ALBUMIN g/dL 1 7* 1 8* 2 0*  --  2 2*   TOTAL BILIRUBIN mg/dL 7 20* 8 24* 8 92*  --  9 45*  9 53*       Results from last 7 days  Lab Units 02/03/19 0524 02/02/19 0459 02/01/19 2234   MAGNESIUM mg/dL 2 3 2 7* 2 7*   PHOSPHORUS mg/dL 1 3* 2 9 3 6        Results from last 7 days  Lab Units 02/02/19  0459 02/01/19  2238 02/01/19  1234   INR  1 53* 1 40* 1 40*   PTT seconds  --  32 35           Results from last 7 days  Lab Units 02/02/19  0133 02/01/19  2234 02/01/19  1503 02/01/19  1235   LACTIC ACID mmol/L 1 7 2 6* 1 9 2 2*     ABG:  Lab Results   Component Value Date    PHART 7 402 02/03/2019 [id number] 34 2 (L) 02/03/2019    PO2ART 83 0 02/03/2019    [id number] 20 8 (L) 02/03/2019    BEART -3 3 02/03/2019    SOURCE Radial, Right 02/03/2019     VBG:    Results from last 7 days  Lab Units 02/03/19  0634   ABG SOURCE  Radial, Right       Results from last 7 days  Lab Units 02/02/19  0459 02/01/19  1345   PROCALCITONIN ng/ml 33 89* 38 27*     No results found for: [facility]   Imaging:   Xr Chest 2 Views    Result Date: 2/1/2019  Impression: Elevation of the right hemidiaphragm with bibasilar subsegmental atelectasis and suspected trace right effusion  Workstation performed: [id number]     Ct Guided Perc Drainage Catheter Placeme    Result Date: 2/1/2019  Impression: Impression: Successful placement of a total of 5  all-purpose drainage catheters into the 5 largest liver abscess collections  A total of 300 cc of purulent material was aspirated and a specimen sent to the laboratory as described  The 5 drains were labeled as anterior superior, middle superior, posterior superior, central, and posterior inferior  The two 10-Slovak drains were the central drain and the posterior-inferior drain  The 3 superior drains were 8 5-Slovak  Plan: Tubes to DANNY bulb suction  Flush each tube with 20 mL normal saline solution  The patient will likely need more drains placed since there are several other abscesses  We will follow patient and see on follow-up CAT scan if 2 further drains are needed or if the existing drains require repositioning and or wire disruption of the loculations to [facility]  Workstation performed: [id number]     Ct Abdomen Pelvis With Contrast    Result Date: 2/1/2019  Impression: 1  In the provided clinical setting of fever and severe leukocytosis, the innumerable hepatic multilocular/multiseptated liver masses are most likely pyogenic abscesses versus less likely anemic abscesses  Other causes of cystic liver masses are less likely in this clinical setting   The patient's reported history of hepatitis C is noted  After patient's acute issues have resolved and the above liver lesions have been treated, surveillance liver imaging could be performed  2   Nondistended gallbladder with mild wall thickening and mucosal hyperemia is likely reactive  I personally discussed this study with Dinesh Banks on 2/1/2019 at 3:21PM  Workstation performed: [id number]    I have personally reviewed pertinent reports  Micro: blood cx and IR cx pending    Results from last 7 days  Lab Units 02/01/19  1952 02/01/19  1301 02/01/19  1235   GRAM STAIN RESULT  3+ Polys  4+ Gram positive cocci in pairs and chains Gram positive cocci in chains Gram positive cocci in chains   BODY FLUID CULTURE, STERILE  Culture results to follow  --   --      Allergies:    Allergies   Allergen Reactions    Sulfa Antibiotics Hives     Medications:   Scheduled Meds:    Current Facility-Administered Medications:  acetaminophen 650 mg Oral Q8H PRN Malgorzata Frees, CRNP    ampicillin-sulbactam 3 g Intravenous Q6H Johan Fraire MD Last Rate: Stopped (02/03/19 0300)   ARIPiprazole 5 mg Oral Early Morning Beatrice Salinas MD    buPROPion 300 mg Oral QAM Beatrice Salinas MD    chlorhexidine 15 mL Swish & Spit Q12H [facility] Beatrice Salinas MD    diazepam 10 mg Oral BID Beatrice Salinas MD    heparin (porcine) 5,000 Units Subcutaneous [facility] Beatrice Salinas MD    HYDROmorphone 1 mg Intravenous Q3H PRN Malgorzata Frees, CRNP    LORazepam 1 mg Intravenous Q4H PRN Malgorzata Frees, CRNP    methocarbamol 750 mg Oral Q6H [facility] ESTRELLITA Scott    nicotine 21 mg Transdermal Daily ESTRELLITA Scott    oxyCODONE 10 mg Oral Q4H PRN Malgorzata Frees, CRNP    oxyCODONE 5 mg Oral Q4H PRN ESTRELLITA Scott    pantoprazole 40 mg Intravenous Q24H Sabrina Freedman MD    potassium phosphate 30 mmol Intravenous Once Margi Rey MD    prazosin 1 mg Oral Daily PRN Margi Rey MD    QUEtiapine 200 mg Oral HS Beatrice Salinas MD    senna-docusate sodium 1 tablet Oral HS Kajal Galicia MD    sodium chloride 1,000 mL Intravenous Once Vasquez Hernandez MD    vancomycin 15 mg/kg Intravenous Q12H Kassandra Vera MD Last Rate: Stopped (02/02/19 2340)     Continuous Infusions:   PRN Meds:    acetaminophen 650 mg Q8H PRN   HYDROmorphone 1 mg Q3H PRN   LORazepam 1 mg Q4H PRN   oxyCODONE 10 mg Q4H PRN   oxyCODONE 5 mg Q4H PRN   prazosin 1 mg Daily PRN     VTE Pharmacologic Prophylaxis: Heparin  VTE Mechanical Prophylaxis: sequential compression device  Invasive lines and devices: Invasive Devices     Peripheral Intravenous Line            Peripheral IV 02/01/19 Right Antecubital 2 days    Peripheral IV 02/01/19 Left Wrist 1 day    Peripheral IV 02/01/19 Right Wrist 1 day          Drain            Closed/Suction Drain Right Abdomen Bulb 10 2 Fr  1 day    Closed/Suction Drain Right; Inferior;Superior Abdomen Bulb 8 5 Fr  1 day    Closed/Suction Drain Right;Posterior; Inferior Abdomen Bulb 10 2 Fr  1 day    Closed/Suction Drain Right;Superior; Anterior Abdomen Bulb 8 5 Fr  1 day    Closed/Suction Drain Right;Superior;Medial Abdomen Bulb 8 5 Fr  1 day    Urethral Catheter Temperature probe 16 Fr  1 day                     Portions of the record may have been created with voice recognition software  Occasional wrong word or "sound a like" substitutions may have occurred due to the inherent limitations of voice recognition software  Read the chart carefully and recognize, using context, where substitutions have occurred      Kajal Galicia MD

## 2019-02-03 NOTE — PROGRESS NOTES
Progress Note - General Surgery   Omi Varma 35 y o  male MRN: [id number]  Unit/Bed#: ICU 06 Encounter: [id number]    Assessment:  36 yo M w/ sepsis from multiple hepatic abscesses s/p IR drainage  Extubated to HFNC, stable    ABG 7 4/34/83/20/-3 3/96  JPs: 75, 90, 95, 75, 145  Echo- EF 60%, no lesions seen  T max 102 6    Plan:  Would NOT recommend re-intubating at this time- not in distress on HFNC and ABG unalarming  Needs aggressive pulm toilet  Recommend backing down to sips of clears- patient took in 2 L (does not seem to self-restrain) and is distended  Continue flushing drains per IR recs  Antibiotics per ID: Unasyn  F/U culture speciation - currently GPCs pairs/chains  OOB / pulm toilet      Subjective/Objective   Chief Complaint:     Subjective: Patient states he still has RUQ pain but it's improved  On HFNC  Some concern from nursing that he should be re-intubated as he de-sats off of HFNC    Took in 2 L PO, denies N/V however is distended    Objective:     Blood pressure 127/79, pulse (!) 110, temperature (!) 102 6 °F (39 2 °C), resp  rate (!) 38, height 6' 2" (1 88 m), weight 112 kg (246 lb 14 6 oz), SpO2 94 %  ,Body mass index is 31 7 kg/m²  Intake/Output Summary (Last 24 hours) at 02/03/19 0704  Last data filed at 02/03/19 0601   Gross per 24 hour   Intake          5752 42 ml   Output             4155 ml   Net          1597 42 ml       Invasive Devices     Peripheral Intravenous Line            Peripheral IV 02/01/19 Right Antecubital 2 days    Peripheral IV 02/01/19 Left Wrist 1 day    Peripheral IV 02/01/19 Right Wrist 1 day          Drain            Closed/Suction Drain Right Abdomen Bulb 10 2 Fr  1 day    Closed/Suction Drain Right; Inferior;Superior Abdomen Bulb 8 5 Fr  1 day    Closed/Suction Drain Right;Posterior; Inferior Abdomen Bulb 10 2 Fr  1 day    Closed/Suction Drain Right;Superior; Anterior Abdomen Bulb 8 5 Fr  1 day    Closed/Suction Drain Right;Superior;Medial Abdomen Bulb 8 5 Fr  1 day    Urethral Catheter Temperature probe 16 Fr  1 day                Physical Exam:   Gen: A&O, NAD  Cardio: RRR  Lungs: Decreased b/l  Abd: Soft, distended & tympanitic   DANNY x 5, ranging from serous to serosang    Lab, Imaging and other studies:  CBC:   Lab Results   Component Value Date    WBC 20 86 (H) 02/03/2019    HGB 9 6 (L) 02/03/2019    HCT 28 0 (L) 02/03/2019    MCV 88 02/03/2019     (H) 02/03/2019    MCH 30 1 02/03/2019    MCHC 34 3 02/03/2019    RDW 15 6 (H) 02/03/2019    MPV 10 2 02/03/2019    NRBC 0 02/03/2019   , CMP:   Lab Results   Component Value Date    SODIUM 134 (L) 02/03/2019    K 3 6 02/03/2019     02/03/2019    CO2 23 02/03/2019    BUN 13 02/03/2019    CREATININE 0 92 02/03/2019    CALCIUM 7 2 (L) 02/03/2019     (H) 02/03/2019     (H) 02/03/2019    ALKPHOS 137 (H) 02/03/2019    EGFR 104 02/03/2019   , Coagulation: No results found for: PT, INR, APTT  VTE Pharmacologic Prophylaxis: Heparin  VTE Mechanical Prophylaxis: sequential compression device

## 2019-02-03 NOTE — PROGRESS NOTES
Vancomycin Assessment    Jonelle Dowling is a 35 y o  male who is currently receiving vancomycin 1750mg IV q12H for bacteremia, MRSA suspected; Hepatic Abscess   Relevant clinical data and objective history reviewed:  Creatinine   Date Value Ref Range Status   02/03/2019 0 92 0 60 - 1 30 mg/dL Final     Comment:     Standardized to IDMS reference method   02/02/2019 1 49 (H) 0 60 - 1 30 mg/dL Final     Comment:     Standardized to IDMS reference method   02/01/2019 1 52 (H) 0 60 - 1 30 mg/dL Final     Comment:     Standardized to IDMS reference method   03/21/2015 0 76 0 60 - 1 30 mg/dL Final     Comment:     Standardized to IDMS reference method     BP 97/62 (BP Location: Left arm)   Pulse (!) 106   Temp (!) 101 5 °F (38 6 °C)   Resp (!) 29   Ht 6' 2" (1 88 m)   Wt 112 kg (246 lb 14 6 oz)   SpO2 98%   BMI 31 70 kg/m²   I/O last 3 completed shifts: In: 07509 6 [P O :2020; I V :5809 1; NG/GT:400; IV Piggyback:2374 5]  Out: 7062 [Urine:6100; Drains:762; Blood:200]  Lab Results   Component Value Date/Time    BUN 13 02/03/2019 05:24 AM    BUN 14 03/21/2015 11:33 PM    WBC 20 86 (H) 02/03/2019 05:24 AM    WBC 9 33 03/31/2015 10:12 AM    HGB 9 6 (L) 02/03/2019 05:24 AM    HGB 14 4 03/31/2015 10:12 AM    HCT 28 0 (L) 02/03/2019 05:24 AM    HCT 43 4 03/31/2015 10:12 AM    MCV 88 02/03/2019 05:24 AM    MCV 87 03/31/2015 10:12 AM     (H) 02/03/2019 05:24 AM     03/31/2015 10:12 AM     Temp Readings from Last 3 Encounters:   02/03/19 (!) 101 5 °F (38 6 °C)   09/25/18 (!) 97 °F (36 1 °C)   08/02/18 (!) 96 1 °F (35 6 °C) (Tympanic)     Vancomycin Days of Therapy: 2    Assessment/Plan  The patient is currently on vancomycin utilizing scheduled dosing  Baseline risks associated with therapy include: none - patient's renal function is back at baseline   The patient is receiving 1750mg IV q12H, and has received one dose  Patient's renal function has returned to baseline   Due to severity of infection, increased dose to 1500mg IV [id number] ((15mg/kg (adjusted body weight) dose for current renal function based on goal trough of 15 - 20))  Pharmacy will continue to follow closely for s/sx of nephrotoxicity, infusion reactions and appropriateness of therapy  BMP and CBC will be ordered per protocol  Plan for trough as patient approaches steady state, prior to the 4th  dose at approximately 9:00am on 2/4/19 before 9:30am dose  Pharmacy will continue to follow the patients culture results and clinical progress daily      Hilary Van, Pharmacist

## 2019-02-03 NOTE — SOCIAL WORK
CM attempted to assess pt, pt sleepy and not arousable  CM called pts Mother Moisés Toledo and left message for callback

## 2019-02-04 ENCOUNTER — APPOINTMENT (INPATIENT)
Dept: RADIOLOGY | Facility: HOSPITAL | Age: 39
DRG: 871 | End: 2019-02-04
Payer: MEDICARE

## 2019-02-04 PROBLEM — E83.39 HYPOPHOSPHATEMIA: Status: ACTIVE | Noted: 2019-02-04

## 2019-02-04 PROBLEM — R33.9 URINARY RETENTION: Status: ACTIVE | Noted: 2019-02-04

## 2019-02-04 LAB
ALBUMIN SERPL BCP-MCNC: 1.5 G/DL (ref 3.5–5)
ALP SERPL-CCNC: 122 U/L (ref 46–116)
ALT SERPL W P-5'-P-CCNC: 190 U/L (ref 12–78)
ANION GAP SERPL CALCULATED.3IONS-SCNC: 6 MMOL/L (ref 4–13)
ANISOCYTOSIS BLD QL SMEAR: PRESENT
ARTERIAL PATENCY WRIST A: YES
AST SERPL W P-5'-P-CCNC: 137 U/L (ref 5–45)
BACTERIA SPEC BFLD CULT: ABNORMAL
BASE EXCESS BLDA CALC-SCNC: 1.5 MMOL/L
BASOPHILS # BLD MANUAL: 0 THOUSAND/UL (ref 0–0.1)
BASOPHILS NFR MAR MANUAL: 0 % (ref 0–1)
BILIRUB SERPL-MCNC: 7.76 MG/DL (ref 0.2–1)
BUN SERPL-MCNC: 9 MG/DL (ref 5–25)
CALCIUM SERPL-MCNC: 7.3 MG/DL (ref 8.3–10.1)
CHLORIDE SERPL-SCNC: 105 MMOL/L (ref 100–108)
CO2 SERPL-SCNC: 24 MMOL/L (ref 21–32)
CREAT SERPL-MCNC: 0.83 MG/DL (ref 0.6–1.3)
EOSINOPHIL # BLD MANUAL: 0.21 THOUSAND/UL (ref 0–0.4)
EOSINOPHIL NFR BLD MANUAL: 1 % (ref 0–6)
ERYTHROCYTE [DISTWIDTH] IN BLOOD BY AUTOMATED COUNT: 15.8 % (ref 11.6–15.1)
GFR SERPL CREATININE-BSD FRML MDRD: 110 ML/MIN/1.73SQ M
GLUCOSE SERPL-MCNC: 101 MG/DL (ref 65–140)
GLUCOSE SERPL-MCNC: 102 MG/DL (ref 65–140)
GLUCOSE SERPL-MCNC: 104 MG/DL (ref 65–140)
GLUCOSE SERPL-MCNC: 106 MG/DL (ref 65–140)
GLUCOSE SERPL-MCNC: 109 MG/DL (ref 65–140)
GLUCOSE SERPL-MCNC: 113 MG/DL (ref 65–140)
GLUCOSE SERPL-MCNC: 122 MG/DL (ref 65–140)
GLUCOSE SERPL-MCNC: 129 MG/DL (ref 65–140)
GLUCOSE SERPL-MCNC: 131 MG/DL (ref 65–140)
GRAM STN SPEC: ABNORMAL
GRAM STN SPEC: ABNORMAL
HCO3 BLDA-SCNC: 24.8 MMOL/L (ref 22–28)
HCT VFR BLD AUTO: 27.3 % (ref 36.5–49.3)
HFNC FLOW LPM: 50
HGB BLD-MCNC: 9.1 G/DL (ref 12–17)
HYPERCHROMIA BLD QL SMEAR: PRESENT
LYMPHOCYTES # BLD AUTO: 0.43 THOUSAND/UL (ref 0.6–4.47)
LYMPHOCYTES # BLD AUTO: 2 % (ref 14–44)
MAGNESIUM SERPL-MCNC: 2 MG/DL (ref 1.6–2.6)
MCH RBC QN AUTO: 29.5 PG (ref 26.8–34.3)
MCHC RBC AUTO-ENTMCNC: 33.3 G/DL (ref 31.4–37.4)
MCV RBC AUTO: 89 FL (ref 82–98)
MONOCYTES # BLD AUTO: 0 THOUSAND/UL (ref 0–1.22)
MONOCYTES NFR BLD: 0 % (ref 4–12)
MYELOCYTES NFR BLD MANUAL: 2 % (ref 0–1)
NEUTROPHILS # BLD MANUAL: 20.19 THOUSAND/UL (ref 1.85–7.62)
NEUTS SEG NFR BLD AUTO: 95 % (ref 43–75)
NON VENT HFNC FIO2: 50
NON VENT TYPE HFNC: ABNORMAL
NRBC BLD AUTO-RTO: 0 /100 WBCS
O2 CT BLDA-SCNC: 13 ML/DL (ref 16–23)
OXYHGB MFR BLDA: 94.9 % (ref 94–97)
PCO2 BLDA: 33.7 MM HG (ref 36–44)
PH BLDA: 7.48 [PH] (ref 7.35–7.45)
PHOSPHATE SERPL-MCNC: 1.4 MG/DL (ref 2.7–4.5)
PLATELET # BLD AUTO: 428 THOUSANDS/UL (ref 149–390)
PLATELET BLD QL SMEAR: ABNORMAL
PMV BLD AUTO: 9.9 FL (ref 8.9–12.7)
PO2 BLDA: 73.9 MM HG (ref 75–129)
POTASSIUM SERPL-SCNC: 4 MMOL/L (ref 3.5–5.3)
PROT SERPL-MCNC: 5.9 G/DL (ref 6.4–8.2)
RBC # BLD AUTO: 3.08 MILLION/UL (ref 3.88–5.62)
RBC MORPH BLD: PRESENT
SODIUM SERPL-SCNC: 135 MMOL/L (ref 136–145)
SPECIMEN SOURCE: ABNORMAL
TARGETS BLD QL SMEAR: PRESENT
TOXIC GRANULES BLD QL SMEAR: PRESENT
WBC # BLD AUTO: 21.25 THOUSAND/UL (ref 4.31–10.16)

## 2019-02-04 PROCEDURE — 83735 ASSAY OF MAGNESIUM: CPT | Performed by: STUDENT IN AN ORGANIZED HEALTH CARE EDUCATION/TRAINING PROGRAM

## 2019-02-04 PROCEDURE — 99232 SBSQ HOSP IP/OBS MODERATE 35: CPT | Performed by: SURGERY

## 2019-02-04 PROCEDURE — 94760 N-INVAS EAR/PLS OXIMETRY 1: CPT

## 2019-02-04 PROCEDURE — 85027 COMPLETE CBC AUTOMATED: CPT | Performed by: STUDENT IN AN ORGANIZED HEALTH CARE EDUCATION/TRAINING PROGRAM

## 2019-02-04 PROCEDURE — 94640 AIRWAY INHALATION TREATMENT: CPT

## 2019-02-04 PROCEDURE — 94760 N-INVAS EAR/PLS OXIMETRY 1: CPT | Performed by: SOCIAL WORKER

## 2019-02-04 PROCEDURE — 94669 MECHANICAL CHEST WALL OSCILL: CPT | Performed by: SOCIAL WORKER

## 2019-02-04 PROCEDURE — 87040 BLOOD CULTURE FOR BACTERIA: CPT | Performed by: INTERNAL MEDICINE

## 2019-02-04 PROCEDURE — 80053 COMPREHEN METABOLIC PANEL: CPT | Performed by: STUDENT IN AN ORGANIZED HEALTH CARE EDUCATION/TRAINING PROGRAM

## 2019-02-04 PROCEDURE — 99222 1ST HOSP IP/OBS MODERATE 55: CPT | Performed by: NURSE PRACTITIONER

## 2019-02-04 PROCEDURE — 94660 CPAP INITIATION&MGMT: CPT | Performed by: SOCIAL WORKER

## 2019-02-04 PROCEDURE — G8988 SELF CARE GOAL STATUS: HCPCS

## 2019-02-04 PROCEDURE — G8979 MOBILITY GOAL STATUS: HCPCS

## 2019-02-04 PROCEDURE — 82805 BLOOD GASES W/O2 SATURATION: CPT | Performed by: EMERGENCY MEDICINE

## 2019-02-04 PROCEDURE — 99233 SBSQ HOSP IP/OBS HIGH 50: CPT | Performed by: INTERNAL MEDICINE

## 2019-02-04 PROCEDURE — 85007 BL SMEAR W/DIFF WBC COUNT: CPT | Performed by: STUDENT IN AN ORGANIZED HEALTH CARE EDUCATION/TRAINING PROGRAM

## 2019-02-04 PROCEDURE — 94640 AIRWAY INHALATION TREATMENT: CPT | Performed by: SOCIAL WORKER

## 2019-02-04 PROCEDURE — 36600 WITHDRAWAL OF ARTERIAL BLOOD: CPT | Performed by: SOCIAL WORKER

## 2019-02-04 PROCEDURE — G8987 SELF CARE CURRENT STATUS: HCPCS

## 2019-02-04 PROCEDURE — G8978 MOBILITY CURRENT STATUS: HCPCS

## 2019-02-04 PROCEDURE — 84100 ASSAY OF PHOSPHORUS: CPT | Performed by: STUDENT IN AN ORGANIZED HEALTH CARE EDUCATION/TRAINING PROGRAM

## 2019-02-04 PROCEDURE — 97167 OT EVAL HIGH COMPLEX 60 MIN: CPT

## 2019-02-04 PROCEDURE — 97163 PT EVAL HIGH COMPLEX 45 MIN: CPT

## 2019-02-04 PROCEDURE — 82948 REAGENT STRIP/BLOOD GLUCOSE: CPT

## 2019-02-04 PROCEDURE — 99233 SBSQ HOSP IP/OBS HIGH 50: CPT | Performed by: EMERGENCY MEDICINE

## 2019-02-04 PROCEDURE — 94669 MECHANICAL CHEST WALL OSCILL: CPT

## 2019-02-04 PROCEDURE — 71045 X-RAY EXAM CHEST 1 VIEW: CPT

## 2019-02-04 RX ORDER — CEFAZOLIN SODIUM 2 G/50ML
2000 SOLUTION INTRAVENOUS EVERY 8 HOURS
Status: DISCONTINUED | OUTPATIENT
Start: 2019-02-04 | End: 2019-02-05

## 2019-02-04 RX ORDER — FUROSEMIDE 10 MG/ML
40 INJECTION INTRAMUSCULAR; INTRAVENOUS ONCE
Status: COMPLETED | OUTPATIENT
Start: 2019-02-04 | End: 2019-02-04

## 2019-02-04 RX ORDER — DIAZEPAM 5 MG/1
10 TABLET ORAL 2 TIMES DAILY
Status: DISCONTINUED | OUTPATIENT
Start: 2019-02-04 | End: 2019-02-15 | Stop reason: HOSPADM

## 2019-02-04 RX ORDER — POLYETHYLENE GLYCOL 3350 17 G/17G
17 POWDER, FOR SOLUTION ORAL DAILY
Status: DISCONTINUED | OUTPATIENT
Start: 2019-02-05 | End: 2019-02-08

## 2019-02-04 RX ORDER — ACETAMINOPHEN 325 MG/1
650 TABLET ORAL ONCE
Status: COMPLETED | OUTPATIENT
Start: 2019-02-04 | End: 2019-02-04

## 2019-02-04 RX ORDER — DOCUSATE SODIUM 100 MG/1
100 CAPSULE, LIQUID FILLED ORAL 2 TIMES DAILY
Status: DISCONTINUED | OUTPATIENT
Start: 2019-02-04 | End: 2019-02-15 | Stop reason: HOSPADM

## 2019-02-04 RX ORDER — POLYETHYLENE GLYCOL 3350 17 G/17G
17 POWDER, FOR SOLUTION ORAL DAILY PRN
Status: DISCONTINUED | OUTPATIENT
Start: 2019-02-04 | End: 2019-02-15 | Stop reason: HOSPADM

## 2019-02-04 RX ORDER — BISACODYL 10 MG
10 SUPPOSITORY, RECTAL RECTAL DAILY
Status: DISCONTINUED | OUTPATIENT
Start: 2019-02-04 | End: 2019-02-15 | Stop reason: HOSPADM

## 2019-02-04 RX ORDER — SENNOSIDES 8.6 MG
1 TABLET ORAL
Status: DISCONTINUED | OUTPATIENT
Start: 2019-02-04 | End: 2019-02-15 | Stop reason: HOSPADM

## 2019-02-04 RX ADMIN — DIBASIC SODIUM PHOSPHATE, MONOBASIC POTASSIUM PHOSPHATE AND MONOBASIC SODIUM PHOSPHATE 2 TABLET: 852; 155; 130 TABLET ORAL at 18:44

## 2019-02-04 RX ADMIN — METHOCARBAMOL 750 MG: 750 TABLET, FILM COATED ORAL at 05:32

## 2019-02-04 RX ADMIN — HEPARIN SODIUM 5000 UNITS: 5000 INJECTION INTRAVENOUS; SUBCUTANEOUS at 21:30

## 2019-02-04 RX ADMIN — HEPARIN SODIUM 5000 UNITS: 5000 INJECTION INTRAVENOUS; SUBCUTANEOUS at 13:43

## 2019-02-04 RX ADMIN — ISODIUM CHLORIDE 3 ML: 0.03 SOLUTION RESPIRATORY (INHALATION) at 14:35

## 2019-02-04 RX ADMIN — LEVALBUTEROL 1.25 MG: 1.25 SOLUTION, CONCENTRATE RESPIRATORY (INHALATION) at 19:51

## 2019-02-04 RX ADMIN — STANDARDIZED SENNA CONCENTRATE 8.6 MG: 8.6 TABLET ORAL at 21:33

## 2019-02-04 RX ADMIN — FAMOTIDINE 20 MG: 20 TABLET, FILM COATED ORAL at 09:03

## 2019-02-04 RX ADMIN — VANCOMYCIN HYDROCHLORIDE 1500 MG: 10 INJECTION, POWDER, LYOPHILIZED, FOR SOLUTION INTRAVENOUS at 20:03

## 2019-02-04 RX ADMIN — NICOTINE 21 MG: 21 PATCH, EXTENDED RELEASE TRANSDERMAL at 18:38

## 2019-02-04 RX ADMIN — CEFAZOLIN SODIUM 2000 MG: 2 SOLUTION INTRAVENOUS at 21:30

## 2019-02-04 RX ADMIN — METRONIDAZOLE 500 MG: 500 TABLET ORAL at 05:28

## 2019-02-04 RX ADMIN — HEPARIN SODIUM 5000 UNITS: 5000 INJECTION INTRAVENOUS; SUBCUTANEOUS at 05:28

## 2019-02-04 RX ADMIN — ISODIUM CHLORIDE 3 ML: 0.03 SOLUTION RESPIRATORY (INHALATION) at 19:51

## 2019-02-04 RX ADMIN — DEXTROSE, SODIUM CHLORIDE, AND POTASSIUM CHLORIDE 75 ML/HR: 5; .45; .15 INJECTION INTRAVENOUS at 11:04

## 2019-02-04 RX ADMIN — QUETIAPINE FUMARATE 200 MG: 100 TABLET ORAL at 21:31

## 2019-02-04 RX ADMIN — FAMOTIDINE 20 MG: 20 TABLET, FILM COATED ORAL at 20:03

## 2019-02-04 RX ADMIN — ACETAMINOPHEN 650 MG: 325 TABLET, FILM COATED ORAL at 05:27

## 2019-02-04 RX ADMIN — DOCUSATE SODIUM 100 MG: 100 CAPSULE, LIQUID FILLED ORAL at 12:56

## 2019-02-04 RX ADMIN — CHLORHEXIDINE GLUCONATE 0.12% ORAL RINSE 15 ML: 1.2 LIQUID ORAL at 20:03

## 2019-02-04 RX ADMIN — METHOCARBAMOL 750 MG: 750 TABLET, FILM COATED ORAL at 00:59

## 2019-02-04 RX ADMIN — OXYCODONE HYDROCHLORIDE 10 MG: 10 TABLET ORAL at 05:29

## 2019-02-04 RX ADMIN — METRONIDAZOLE 500 MG: 500 TABLET ORAL at 21:31

## 2019-02-04 RX ADMIN — DIBASIC SODIUM PHOSPHATE, MONOBASIC POTASSIUM PHOSPHATE AND MONOBASIC SODIUM PHOSPHATE 2 TABLET: 852; 155; 130 TABLET ORAL at 09:03

## 2019-02-04 RX ADMIN — ACETAMINOPHEN 650 MG: 325 TABLET, FILM COATED ORAL at 12:56

## 2019-02-04 RX ADMIN — ISODIUM CHLORIDE 3 ML: 0.03 SOLUTION RESPIRATORY (INHALATION) at 07:56

## 2019-02-04 RX ADMIN — LEVALBUTEROL 1.25 MG: 1.25 SOLUTION, CONCENTRATE RESPIRATORY (INHALATION) at 07:56

## 2019-02-04 RX ADMIN — LEVALBUTEROL 1.25 MG: 1.25 SOLUTION, CONCENTRATE RESPIRATORY (INHALATION) at 00:28

## 2019-02-04 RX ADMIN — VANCOMYCIN HYDROCHLORIDE 1500 MG: 10 INJECTION, POWDER, LYOPHILIZED, FOR SOLUTION INTRAVENOUS at 12:45

## 2019-02-04 RX ADMIN — CHLORHEXIDINE GLUCONATE 0.12% ORAL RINSE 15 ML: 1.2 LIQUID ORAL at 09:08

## 2019-02-04 RX ADMIN — ARIPIPRAZOLE 5 MG: 5 TABLET ORAL at 06:10

## 2019-02-04 RX ADMIN — OXYCODONE HYDROCHLORIDE 10 MG: 10 TABLET ORAL at 21:28

## 2019-02-04 RX ADMIN — METRONIDAZOLE 500 MG: 500 TABLET ORAL at 13:53

## 2019-02-04 RX ADMIN — DOCUSATE SODIUM 100 MG: 100 CAPSULE, LIQUID FILLED ORAL at 18:38

## 2019-02-04 RX ADMIN — BUPROPION HYDROCHLORIDE 300 MG: 150 TABLET, FILM COATED, EXTENDED RELEASE ORAL at 11:05

## 2019-02-04 RX ADMIN — LEVALBUTEROL 1.25 MG: 1.25 SOLUTION, CONCENTRATE RESPIRATORY (INHALATION) at 14:35

## 2019-02-04 RX ADMIN — Medication 10 MG: at 11:55

## 2019-02-04 RX ADMIN — FUROSEMIDE 40 MG: 10 INJECTION, SOLUTION INTRAMUSCULAR; INTRAVENOUS at 11:39

## 2019-02-04 RX ADMIN — CEFAZOLIN SODIUM 2000 MG: 2 SOLUTION INTRAVENOUS at 13:53

## 2019-02-04 RX ADMIN — ISODIUM CHLORIDE 3 ML: 0.03 SOLUTION RESPIRATORY (INHALATION) at 00:28

## 2019-02-04 RX ADMIN — POLYETHYLENE GLYCOL 3350 17 G: 17 POWDER, FOR SOLUTION ORAL at 09:02

## 2019-02-04 NOTE — RESPIRATORY THERAPY NOTE
resp care      02/04/19 0913   Respiratory Assessment   Resp Comments Pt changed to nc      Additional Assessments   SpO2 95 %

## 2019-02-04 NOTE — PROGRESS NOTES
Progress Note - Critical Care   Christie Mendez 35 y o  male MRN: [id number]  Unit/Bed#: ICU 06 Encounter: [id number]    Assessment: 34 yo M w/ sepsis from multiple hepatic abscesses s/p IR drainage on 2/1    Principal Problem:    Hepatic abscess  Active Problems:    History of hepatitis C    Hyponatremia    Depression    Anxiety    IV drug abuse ([facility] [address] )  Resolved Problems:    * No resolved hospital problems  *    Neuro:   Anxiety: continue home meds of Abilify, Wellbutrin, Valium, Seroquel  PRN minipress restarted today also  Analgesia: continue 5/10 oxycodone prn and scheduled robaxin  Pain mgmt consult pending     CV: No acute issues  Currently hemodynamically stable, continue to trend cuff pressures     Pulm:   1  Acute hypoxic respiratory failure:   -HFNC, 55% LPM, 52% FiO2  Will continue HFNC titrating for sats>88% given history of smoking   -Trial wean  -Respiratory protocol and airway protocol ordered   -ABG relatively unremarkable    -2/3 CXR demonstrates increased R pleural effusion as well as opacity on RLL/RML, Possibility of aspiration pneumonitis given location  -2/4 CXR appears mildly improved  -Aggressive pulmonary toilet/IS, encourage coughing      GI:   1  Hepatitis C: s/p Harvoni treatment x12 months [id number]  Hep C Ab highly reactive  Could consider obtaining viral PCR to evaluate for virologic response  2  Transaminitis: continue to trend LFT's, all improving  Bowel regimen: senokot, no BM, will add miralax     :   1  HENRIQUE:   -resolved, creatinine this morning 0 83  2  Urinary retention   - No UO x2, bladder scanner 600-750cc     F/E/N:   Fluids: D5 1/2 NS with 20 KCl 75cc/hr, clear liquid   Electrolytes: administer 30 KPhos, repleting lytes for Mg>2, phos>3, K>4  Continue to trend hyponatremia  Nutrition: continue clears as tolerated, counseled patient on going slow given considerable distension      ID:   1   Hepatic abscesses: s/p IR drain placement x5   -Continue DANNY bulbs to suction  -Culture grew alpha hemolytic strep  -elevated LFTs, improving   -if fever persists, consider repeat CTAP or colonoscopy  2  Alpha strep bacteremia   --WBC 20 86-> 21 25  -2/1 blood cultures grew GPC in pairs and chains, one set grew Strep intermedius  -high dose ceftriaxone and flagyl as per ID and vanco DC  -Blood culture set x2 redrawn today, follow up   -TTE negative for any valvular lesions but given cultures would consider obtaining TAMICA to evaluate for endocarditis  -ID following      Heme:   1  Thrombocytosis: likely reactive in nature, will trend  2  Anemia - trend H/H, currently stable 9 6->9 1  DVT ppx: SQH, SCDs     Endo: No acute issues  QID blood sugar checks while in ICU                Msk/Skin:   Frequent turns/repositioning  OOB  PT/OT     Disposition:   Continue ICU level of care    Chief Complaint: "Tired"    HPI/24hr events:   Remains tachpynic on HFNC, drain 4# appeared more purulent today  Not urinating     Jonelle Dowling is a 35 y o  male who presents with approximately 1 week history of RUQ pain, nausea/vomiting, and malaise  Past medical history is significant for IVDU and Hep C, treated with Babak Grammes in 2017  He follows with Dr Jay Sebastian of GI  He denies any drug use since treatment  Upon presentation to the ED he was febrile as high as 103  CT of the abdomen and pelvis demonstrated multiple liver abscesses  He went directly to IR for drainage; 5 drains were placed and cultures were sent  He was unable to be weaned from the vent following IR therefore was brought to the ICU intubated and sedated    Physical Exam:   Physical Exam   Constitutional: He is oriented to person, place, and time  He appears distressed  HENT:   Head: Normocephalic and atraumatic  Eyes: Pupils are equal, round, and reactive to light  Scleral icterus is present  Neck: Normal range of motion  Neck supple  Cardiovascular: Regular rhythm, normal heart sounds and intact distal pulses  No murmur heard    Mildly tachy   Pulmonary/Chest:   HFNC  Decreased breath sounds and crackles at bases   Abdominal: Soft  Bowel sounds are normal  He exhibits distension  There is no tenderness  There is no guarding  RUQ DANNY draines c/d/i   Musculoskeletal: He exhibits no edema or tenderness  Neurological: He is alert and oriented to person, place, and time  Skin: Skin is warm and dry  He is not diaphoretic  Mildly jaundice   Psychiatric: He has a normal mood and affect  His behavior is normal  Judgment and thought content normal    Nursing note and vitals reviewed  Vitals:    19 0845 19 0900 19 0913 19 1000   BP: 126/72 134/69  115/66   BP Location: Right arm Right arm  Right arm   Pulse: 104 104  98   Resp: (!) 30 (!) 28  (!) 28   Temp: 100 °F (37 8 °C)      TempSrc: Oral      SpO2: 97% 99% 95% 96%   Weight:       Height:                 Temperature:   Temp (24hrs), Av 2 °F (37 3 °C), Min:97 5 °F (36 4 °C), Max:100 2 °F (37 9 °C)    Current: Temperature: 100 °F (37 8 °C)    Weights:   IBW: 82 2 kg    Body mass index is 31 7 kg/m²  Weight (last 2 days)     None          Hemodynamic Monitoring:  BP cuff     Non-Invasive/Invasive Ventilation Settings:  Respiratory    Lab Data (Last 4 hours)    None         O2/Vent Data (Last 4 hours)       0757          Non-Invasive Ventilation Mode HFNC                 No results found for: PHART, VTD2FJI, PO2ART, VHL3MQA, Y7WBGMOY, BEART, SOURCE      Intake and Outputs:  I/O       701 -  0700  07 -  07 07 -  0700    P  O  2020 1920     I V  (mL/kg) 1832 4 (16 4) 1517 5 (13 5)     NG/ 300     IV Piggyback 1600 680     Total Intake(mL/kg) 5752 4 (51 4) 4417 5 (39 4)     Urine (mL/kg/hr) 3675 (1 4) 1945 (0 7)     Drains 480 310     Total Output 4155 2255      Net +1597 4 +2162 5                 UOP: 81cc/hour     Nutrition:        Diet Orders            Start     Ordered    19 1149  Diet Clear Liquid  Diet effective now     Question Answer Comment   Diet Type Clear Liquid    RD to adjust diet per protocol? Yes        02/02/19 1148            Labs:     Results from last 7 days  Lab Units 02/04/19  0500 02/03/19  0524 02/02/19  0459   WBC Thousand/uL 21 25* 20 86* 26 90*   HEMOGLOBIN g/dL 9 1* 9 6* 10 1*   HEMATOCRIT % 27 3* 28 0* 28 9*   PLATELETS Thousands/uL 428* 407* 350   MONO PCT % 0* 4 4      Results from last 7 days  Lab Units 02/04/19  0500 02/03/19  0524 02/02/19  0459  02/01/19 2011   SODIUM mmol/L 135* 134* 134*  < >  --    POTASSIUM mmol/L 4 0 3 6 3 9  < >  --    CHLORIDE mmol/L 105 104 104  < >  --    CO2 mmol/L 24 23 22  < >  --    CO2, I-STAT mmol/L  --   --   --   --  24   BUN mg/dL 9 13 30*  < >  --    CREATININE mg/dL 0 83 0 92 1 49*  < >  --    CALCIUM mg/dL 7 3* 7 2* 6 7*  < >  --    ALK PHOS U/L 122* 137* 129*  < >  --    ALT U/L 190* 254* 316*  < >  --    AST U/L 137* 272* 596*  < >  --    GLUCOSE, ISTAT mg/dl  --   --   --   --  94   < > = values in this interval not displayed  Results from last 7 days  Lab Units 02/04/19  0500 02/03/19  0524 02/02/19  0459   MAGNESIUM mg/dL 2 0 2 3 2 7*       Results from last 7 days  Lab Units 02/04/19  0500 02/03/19  0524 02/02/19  0459   PHOSPHORUS mg/dL 1 4* 1 3* 2 9        Results from last 7 days  Lab Units 02/02/19  0459 02/01/19 2238 02/01/19  1234   INR  1 53* 1 40* 1 40*   PTT seconds  --  32 35       Results from last 7 days  Lab Units 02/02/19  0133   LACTIC ACID mmol/L 1 7     No results found for: TROPONINI    Imaging:  I have personally reviewed pertinent films in PACS    Micro:  Lab Results   Component Value Date    BLOODCX Streptococcus intermedius (A) 02/01/2019       Allergies:    Allergies   Allergen Reactions    Sulfa Antibiotics Hives       Medications:   Scheduled Meds:    Current Facility-Administered Medications:  acetaminophen 650 mg Oral Q8H Antonia Gonzales MD    ARIPiprazole 5 mg Oral Early Morning Quentin Yeager MD buPROPion 300 mg Oral QAM Kaila Short MD    cefTRIAXone 2,000 mg Intravenous Q24H Lisa Angel MD Last Rate: Stopped (02/03/19 2210)   chlorhexidine 15 mL Swish & Spit Q12H Brianna Hermosillo MD    dextrose 5 % and sodium chloride 0 45 % with KCl 20 mEq/L 75 mL/hr Intravenous Continuous Cony Machuca MD Last Rate: 75 mL/hr (02/03/19 2201)   famotidine 20 mg Oral Q12H Navdeep Tijerina MD    heparin (porcine) 5,000 Units Subcutaneous [facility] Kaila Short MD    HYDROmorphone 1 mg Intravenous Q3H PRN Malgorzata Hutchinson, ESTRELLITA    levalbuterol 1 25 mg Nebulization Q6H Susan Thomas MD    LORazepam 1 mg Intravenous Q4H PRN Malgorzata Jasper, AMANDANP    methocarbamol 750 mg Oral Q6H [facility] ESTRELLITA Scott    metroNIDAZOLE 500 mg Oral [facility] Lisa Angel MD    nicotine 21 mg Transdermal Daily Malgorzata Jasper, CRNP    oxyCODONE 10 mg Oral Q4H PRN Malgorzata Jasper, CRNP    oxyCODONE 5 mg Oral Q4H PRN ESTRELLITA Scott    polyethylene glycol 17 g Oral Daily PRN Zara Diop MD    prazosin 1 mg Oral Daily PRN Cony Machuca MD    QUEtiapine 200 mg Oral HS Kaila Short MD    senna-docusate sodium 1 tablet Oral HS Cony Machuca MD    sodium chloride 3 mL Nebulization Q6H Susan Thomas MD      Continuous Infusions:    dextrose 5 % and sodium chloride 0 45 % with KCl 20 mEq/L 75 mL/hr Last Rate: 75 mL/hr (02/03/19 2201)     PRN Meds:    HYDROmorphone 1 mg Q3H PRN   LORazepam 1 mg Q4H PRN   oxyCODONE 10 mg Q4H PRN   oxyCODONE 5 mg Q4H PRN   polyethylene glycol 17 g Daily PRN   prazosin 1 mg Daily PRN       VTE Pharmacologic Prophylaxis: Heparin  VTE Mechanical Prophylaxis: sequential compression device    Invasive lines and devices: Invasive Devices     Peripheral Intravenous Line            Peripheral IV 02/01/19 Left Wrist 2 days    Peripheral IV 02/03/19 Left Antecubital 1 day          Drain            Closed/Suction Drain Right Abdomen Bulb 10 2 Fr  2 days    Closed/Suction Drain Right; Inferior;Superior Abdomen Bulb 8 5 Fr  2 days    Closed/Suction Drain Right;Posterior; Inferior Abdomen Bulb 10 2 Fr  2 days    Closed/Suction Drain Right;Superior; Anterior Abdomen Bulb 8 5 Fr  2 days    Closed/Suction Drain Right;Superior;Medial Abdomen Bulb 8 5 Fr  2 days                   Counseling / Coordination of Care  Total Critical Care time spent 35 minutes excluding procedures, teaching and family updates  Code Status: Level 1 - Full Code     Portions of the record may have been created with voice recognition software  Occasional wrong word or "sound a like" substitutions may have occurred due to the inherent limitations of voice recognition software  Read the chart carefully and recognize, using context, where substitutions have occurred       Boris Hargrove MD

## 2019-02-04 NOTE — MEDICAL STUDENT
Progress Note - Critical Care   Waqas Stratton 35 y o  male MRN: [id number]  Unit/Bed#: ICU 06 Encounter: [id number]    Assessment / Plan:     Neuro:  - Pain: currently denies pain    - Continue Robaxin 750 mg PO q6h   - PRNs: oxycodone 5/10 mg q4h (mod/severe pain)    - Oxycodone 10 mg x 2 in last 24 hours (consistent use x 2 days)   - Follow-up APS recs  - Anxiety   - Continue home meds: Abilify 5 mg, Wellbutrin 300 mg, Diazepam 10 mg BID, Seroquel 200 mg QHS, Ativan 2 mg q4h PRN, Minipress 1 mg PRN (0 PRNs in last 24 hours)    CV:   - No acute issues, continue to monitor hemodynamic status     Lung:   - Acute hypoxic respiratory failure   - Extubation on 2/2 -> unable to wean from HFNC   - CXR (2/1): elevation of R hemidiaphragm with bibasilar subsegmental atelectasis, suspected trace R effusion   - CXR (2/3): diffuse opacities? - CXR (2/4): unchanged?   - Continue HFNC 60/55, maintain O2 sat 88+%   - Continue pulmonary toilet / IS     GI:  - Elevated LFTs: trending down since initial presentation, continue to monitor   - AST: 706 -> 137   - ALT:  375 -> 190   - Alk Phos: 149 -> 122   - T   Bili: 9 53 -> 7 76 (7 20 on 2/3)     FEN:   - Fluids: D5 1/2 NS (with KCl 20 mEQ/L) @ 75 mL/hr  - Electrolytes: currently stable (besides phos 1 4), continue to monitor and replete with goal K > 4, Mg > 2, Phos > 3   - Administer potassium phosphate 30 mmol today (1 dose yesterday)  - Nutrition: clear liquids     :   - HENRIQUE   - Resolved (Cr 1 74 -> 0 83)   - Continue to monitor renal function  - Lopez d/c'd on 2/3   - Required straight cath (x2) yesterday -> plan to replace Lopez if straight cath x3     ID:   - Hepatic abscesses: s/p abscess drain placement (x5) on 2/1   - WBC: 29 37 -> 23 18 -> 26 90 -> 20 86 -> 21 25   - Blood cx (2/1): streptococcus intermedius, GPC in chains / clusters   - Body fluid cx / stain (2/1): alpha-hemolytic streptococcus     - Source most likely liver abscesses, still need to consider possibility of endocarditis with septic emboli to liver    - Repeat blood cx today per ID   - ECHO - TTE (2/2): no valvular abnormalities seen    - Consider TAMICA due to clinical suspicion   - ID recommendations:    - D/C vancomycin    - Switch Unasyn to high-dose IV ceftriaxone 2 g (alpha-strep bacteremia)    - Add metronidazole 500 mg q8h (anaerobic coverage)     Heme:   - Anemia (normocytic)   - Hgb with slight downtrend (at 9 1 today)   - Possibly hemodilution (+2 2 L), continue to monitor  - Thrombocytosis   - Platelets trending up (today at 428)   - Possibly reactive, continue to monitor  - DVT ppx: heparin SubQ 5000U q8h, SCDs    Endo:   - No acute issues, continue to monitor blood glucose (last 104, goal 140-180)                Msk/Skin:  - Repositioning q2h, OOB with assistance as tolerated     Disposition: Continue ICU care    Chief Complaint: :"I'm very tired and I don't feel good"    HPI/24hr events: 23-year-old male with PMH of treated hep C (secondary to IVDU , s/p Gracie in 2017) who presented on 2/1 with RUQ abdominal pain, fever, diarrhea, and intermittent vomiting  Patient's family reports jaundiced appearance for several days prior to admission  On admission, temperature was 103 1, WBC 29 37, and T  Bili 9 5  CT A/P showed innumerable multilocular / multiseptated liver masses determined to be most likely pyogenic abscesses  The patient was taken to IR for placement of abscess drains (x5) using CT guidance (tubes to DANNY bulb suction)  The patient returned to the ICU following the post-procedure due to inability to extubate  Patient was successfully extubated on 2/2 and started on HFNC (70/55)  This morning, the patient states he is very tired  He currently denies pain, shortness of breath, or difficulty breathing  He states he does not feel well, but could not elaborate on the details       Physical Exam:     Temp: febrile yesterday morning with Tmax 101 5 (2/3 @ 09:15), afebrile throughout yesterday late AM / PM   HR: tachycardic to 114 ( @ 06:00)  BP: stable, 100-120s / 50-70s  Resp: persistently tachypneic in mid 30s, max: 40 (2/3 @ 20:00)  SpO2: 92+% (HFNC 60/55)    Intake: 4417 5 (1920 PO, 300 drain flushes, 2197 5 IV)  Output: 2255 (1945 urine, 310 drains - 55/60/40/75/80, serosanguinous 1-3, purulent 4 and 5)    Vitals:    19 0306 19 0400 19 0500 19 0600   BP:  118/60 141/70 125/71   Pulse:  94 (!) 106 (!) 114   Resp:  (!) 36 (!) 37 (!) 38   Temp:  97 5 °F (36 4 °C)     TempSrc:  Oral     SpO2: 95% 97% 94% 95%   Weight:       Height:               General: somnolent, NAD, well-nourished  Head: atraumautic, normocephalic  Neck: normal ROM, atraumatic  Cardiovascular: tachycardic, regular rhythm, no murmur on auscultation  Pulmonary: slightly coarse sounds bilaterally, decreased breath sounds at right base, tachypnea  GI: abdominal distention, soft, no TTP  :  MSK:   Extremities:  Skin: no diaphoresis, rashes / lesions seen, slight jaundice? Neuro:   Psych:    Temperature:   Temp (24hrs), Av 2 °F (37 9 °C), Min:97 5 °F (36 4 °C), Max:101 7 °F (38 7 °C)    Current: Temperature: 97 5 °F (36 4 °C)    Weights:   IBW: 82 2 kg    Body mass index is 31 7 kg/m²  Weight (last 2 days)     None          Hemodynamic Monitoring:  N/A     Non-Invasive/Invasive Ventilation Settings:  Respiratory    Lab Data (Last 4 hours)    None         O2/Vent Data (Last 4 hours)    None              No results found for: PHART, PLF5JDH, PO2ART, BUV8DQO, K7QFDIHX, BEART, SOURCE  SpO2: SpO2: 93 %    Intake and Outputs:  I/O        07 -  0700  07 -  0700    P  O  2020 1320    I V  (mL/kg) 1832 4 (16 4) 617 5 (5 5)    NG/ 200    IV Piggyback 1600 620    Total Intake(mL/kg) 5752 4 (51 4) 2757 5 (24 6)    Urine (mL/kg/hr) 3675 (1 4) 1095 (0 7)    Drains 480 160    Total Output 4155 1255    Net +1597 4 +1502 5              UOP: /hour   Nutrition:        Diet Orders            Start Ordered    02/02/19 1149  Diet Clear Liquid  Diet effective now     Question Answer Comment   Diet Type Clear Liquid    RD to adjust diet per protocol? Yes        02/02/19 1148        TF currently running at /hour with a goal of   Formula:    Labs:     Results from last 7 days  Lab Units 02/04/19  0500 02/03/19  0524 02/02/19  0459   WBC Thousand/uL 21 25* 20 86* 26 90*   HEMOGLOBIN g/dL 9 1* 9 6* 10 1*   HEMATOCRIT % 27 3* 28 0* 28 9*   PLATELETS Thousands/uL 428* 407* 350   MONO PCT % 0* 4 4      Results from last 7 days  Lab Units 02/04/19  0500 02/03/19  0524 02/02/19  0459 02/01/19 2011   SODIUM mmol/L 135* 134* 134*  < >  --    POTASSIUM mmol/L 4 0 3 6 3 9  < >  --    CHLORIDE mmol/L 105 104 104  < >  --    CO2 mmol/L 24 23 22  < >  --    CO2, I-STAT mmol/L  --   --   --   --  24   BUN mg/dL 9 13 30*  < >  --    CREATININE mg/dL 0 83 0 92 1 49*  < >  --    CALCIUM mg/dL 7 3* 7 2* 6 7*  < >  --    ALK PHOS U/L 122* 137* 129*  < >  --    ALT U/L 190* 254* 316*  < >  --    AST U/L 137* 272* 596*  < >  --    GLUCOSE, ISTAT mg/dl  --   --   --   --  94   < > = values in this interval not displayed  Results from last 7 days  Lab Units 02/04/19  0500 02/03/19  0524 02/02/19  0459   MAGNESIUM mg/dL 2 0 2 3 2 7*       Results from last 7 days  Lab Units 02/04/19  0500 02/03/19  0524 02/02/19  0459   PHOSPHORUS mg/dL 1 4* 1 3* 2 9        Results from last 7 days  Lab Units 02/02/19  0459 02/01/19  2238 02/01/19  1234   INR  1 53* 1 40* 1 40*   PTT seconds  --  32 35       Results from last 7 days  Lab Units 02/02/19  0133   LACTIC ACID mmol/L 1 7     No results found for: TROPONINI    Imaging:  I have personally reviewed pertinent reports  EKG:     Micro:  Lab Results   Component Value Date    BLOODCX Streptococcus intermedius (A) 02/01/2019       Allergies:    Allergies   Allergen Reactions    Sulfa Antibiotics Hives       Medications:   Scheduled Meds:    Current Facility-Administered Medications:  acetaminophen 650 mg Oral Q8H Lisa Evans MD    ARIPiprazole 5 mg Oral Early Morning Ben Holt MD    buPROPion 300 mg Oral QAM Ben Holt MD    cefTRIAXone 2,000 mg Intravenous Q24H Renee Coffey MD Last Rate: Stopped (02/03/19 2210)   chlorhexidine 15 mL Swish & Spit Q12H Toya Mckenzie MD    dextrose 5 % and sodium chloride 0 45 % with KCl 20 mEq/L 75 mL/hr Intravenous Continuous Estuardo Obsorne MD Last Rate: 75 mL/hr (02/03/19 2201)   diazepam 10 mg Oral BID Ben Holt MD    famotidine 20 mg Oral Q12H Warren Card MD    heparin (porcine) 5,000 Units Subcutaneous [facility] Ben Holt MD    HYDROmorphone 1 mg Intravenous Q3H PRN Bernadette Hum, CRNP    levalbuterol 1 25 mg Nebulization Q6H Chuck Toussaint MD    LORazepam 1 mg Intravenous Q4H PRN Bernadette Hum, CRNP    methocarbamol 750 mg Oral Q6H [facility] Britney Almanza, AMANDANP    metroNIDAZOLE 500 mg Oral [facility] Renee Coffey MD    nicotine 21 mg Transdermal Daily AMANDA ScottNP    oxyCODONE 10 mg Oral Q4H PRN Bernadette Hum, CRNP    oxyCODONE 5 mg Oral Q4H PRN Britney Almanza, CRNP    prazosin 1 mg Oral Daily PRN Estuardo Osborne MD    QUEtiapine 200 mg Oral HS Ben Holt MD    senna-docusate sodium 1 tablet Oral HS Estuardo Osborne MD    sodium chloride 3 mL Nebulization Q6H Chuck Toussaint MD      Continuous Infusions:    dextrose 5 % and sodium chloride 0 45 % with KCl 20 mEq/L 75 mL/hr Last Rate: 75 mL/hr (02/03/19 2201)     PRN Meds:    HYDROmorphone 1 mg Q3H PRN   LORazepam 1 mg Q4H PRN   oxyCODONE 10 mg Q4H PRN   oxyCODONE 5 mg Q4H PRN   prazosin 1 mg Daily PRN       VTE Pharmacologic Prophylaxis: Heparin  VTE Mechanical Prophylaxis: sequential compression device    Invasive lines and devices:   Invasive Devices     Peripheral Intravenous Line            Peripheral IV 02/01/19 Left Wrist 2 days    Peripheral IV 02/03/19 Left Antecubital less than 1 day          Drain            Closed/Suction Drain Right Abdomen Bulb 10 2 Fr  2 days    Closed/Suction Drain Right; Inferior;Superior Abdomen Bulb 8 5 Fr  2 days    Closed/Suction Drain Right;Posterior; Inferior Abdomen Bulb 10 2 Fr  2 days    Closed/Suction Drain Right;Superior; Anterior Abdomen Bulb 8 5 Fr  2 days    Closed/Suction Drain Right;Superior;Medial Abdomen Bulb 8 5 Fr  2 days                   Counseling / Coordination of Care  Total Critical Care time spent 15 minutes excluding procedures, teaching and family updates  Code Status: Level 1 - Full Code     Portions of the record may have been created with voice recognition software  Occasional wrong word or "sound a like" substitutions may have occurred due to the inherent limitations of voice recognition software  Read the chart carefully and recognize, using context, where substitutions have occurred       Brianda Corona

## 2019-02-04 NOTE — PHYSICAL THERAPY NOTE
Physical Therapy Evaluation     Patient's Name: Mandy Alonzo    Admitting Diagnosis  Pyogenic hepatic abscess [K75 0]  Weakness [R53 1]  Acute hepatic failure [K72 00]  Fever [R50 9]  Sepsis ([place] [address] ) [A41 9]    Problem List  Patient Active Problem List   Diagnosis    History of hepatitis C    Vasectomy evaluation    Hepatic abscess    Hyponatremia    Depression    Anxiety    IV drug abuse ([place] [address] )    Transaminitis    Anemia    Leukocytosis    Gram-positive bacteremia    Tobacco abuse    Hypophosphatemia    Urinary retention       Past Medical History  Past Medical History:   Diagnosis Date    ADHD     Anxiety     Bipolar 1 disorder ([place] [address] )     Delusion ([place] [address] )     Hepatitis C     Infectious viral hepatitis     Hep C- No Symptoms( Took a course of Harvoni)    Psoriasis     PTSD (post-traumatic stress disorder)     long term       Past Surgical History  Past Surgical History:   Procedure Laterality Date    CT GUIDED PERC DRAINAGE CATHETER PLACEMENT  2/1/2019    KS REMOVAL OF SPERM DUCT(S) Bilateral 9/25/2018    Procedure: VASECTOMY;  Surgeon: Ta Jackman MD;  [facility]: [facility];  Service: Urology    WISDOM TOOTH EXTRACTION        02/04/19 1155   Note Type   Note type Eval only   Pain Assessment   Pain Assessment 0-10   Pain Score 4   Pain Type Acute pain   Pain Location Abdomen   Pain Orientation Right   Home Living   Type of [address] One level;Stairs to enter with rails  (12 SHEA)   Bathroom Shower/Tub Walk-in shower   Bathroom Toilet Standard   Bathroom Equipment (DENIES)   [address] (DENIES)   Prior Function   Level of [place] Independent with ADLs and functional mobility   Lives With Significant other   ADL Assistance Independent   IADLs Independent   Falls in the last 6 months 0   Vocational (NOT WORKING)   Restrictions/Precautions   Weight Bearing Precautions Per Order No   Braces or Orthoses (NONE)   Other Precautions Pain;Fall Risk;O2;Telemetry;Multiple lines; Bed Alarm; Chair Alarm;Cognitive  (5 DANNY DRAINS )   General   Family/Caregiver Present No   Cognition   Arousal/Participation Lethargic   Orientation Level Oriented to person;Oriented to situation;Disoriented to time;Oriented to place   Memory Decreased recall of precautions;Decreased recall of recent events   Following Commands Follows one step commands with increased time or repetition   RUE Assessment   RUE Assessment WFL   LUE Assessment   LUE Assessment WFL   RLE Assessment   RLE Assessment WFL  (4-/5 (GROSSLY))   LLE Assessment   LLE Assessment WFL  (4-/5 (GROSSLY))   Bed Mobility   Supine to Sit Unable to assess   Sit to Supine 3  Moderate assistance   Additional items Assist x 2; Increased time required;LE management;Verbal cues   Transfers   Sit to Stand 3  Moderate assistance   Additional items Assist x 2; Increased time required;Verbal cues   Stand to Sit 3  Moderate assistance   Additional items Assist x 2;Verbal cues; Increased time required   Stand pivot 3  Moderate assistance   Additional items Assist x 2; Increased time required;Verbal cues   Ambulation/Elevation   Gait pattern Excessively slow; Short stride;Decreased foot clearance; Foward flexed   Gait Assistance 3  Moderate assist   Additional items Assist x 2   Assistive Device Other (Comment)  (B/L HHA)   Distance 2 FEET (CHAIR TO BED)    Balance   Static Sitting Fair -   Static Standing Poor   Ambulatory Poor -   Endurance Deficit   Endurance Deficit Yes   Endurance Deficit Description LETHARGY; SUPPLEMENTAL O2   Activity Tolerance   Activity Tolerance Patient limited by fatigue   Medical Staff Made Aware OT Des[id number]   Nurse Made Aware JAIME CHO    Assessment   Prognosis Fair   Problem List Decreased strength;Decreased endurance; Impaired balance;Decreased mobility;Pain;Decreased safety awareness; Impaired judgement;Decreased cognition   Assessment PT COMPLETED EVALUATION OF 35YEAR OLD MALE ADMITTED TO hospitals ON 2/1/19 WITH SYMPTOMS OF ABDOMINAL PAIN  CURRENT DIAGNOSIS INCLUDES MULTIPLE HEPATIC ABSCESSES S/P IR DRAINAGE 2/1/19  CURRENT MEDICAL AND PHYSICAL INSTABILITIES INCLUDE ICU ADMISSION, CONTINUOUS O2/HR/BP MONITORING, 5 DANNY DRAINS, FALLS RISK, BED/CHAIR ALARMS, AND A REGRESSION IN FUNCTIONAL STATUS FROM BASELINE  PMH IS SIGNIFICANT FOR HEP C, IV DRUG USE, PTSD ([facility]), BIPOLAR DISORDER, ADHD, AND ANXIETY  PRIOR TO THIS ADMISSION PATIENT RESIDED WITH SIG  OTHER (HOME, DOES NOT WORK) IN A 2ND FLOOR APARTMENT (NO ELEVATOR ACCESS, 12 SHEA) WHERE HE REPORTS PRIOR I WITH MOBILITY (NO AD), ADLS, AND IADLS  -  OR EMPLOYMENT  CURRENT IMPAIRMENTS INCLUDE PAIN, DECREASED GROSS STRENGTH AND ACTIVITY TOLERANCE AND GAIT DEVIATIONS  DURING PT EVALUATION PATIENT WAS SIGNIFICANTLY LETHARGIC LIMITING MOBILITY ASSESSMENT  HE REQUIRED MAX-AX2 FOR SIT-->STAND TRANSFER AND SHORT DISTANCE AMBULATION CHAIR-->BED  PATIENT AMBULATED 2 FEET WITH B/L HHA PRESENTING WITH SHUFFLED GAIT  AT THIS TIME PATIENT WOULD BENEFIT FROM STR- AS HE IS FUNCTIONING BELOW BASELINE  PT WILL CONTINUE TO FOLLOW ON [place] AND IF PROGRESS IS MADE/PT GOALS ACHIEVED PATIENT MAY D/C HOME W/ FAMILY ASSIST  WILL CONTINUE TO MONITOR PROGRESS  PATIENT WILL BENEFIT FROM CONTINUED SKILLED PT THIS ADMISSION TO ACHIEVE MAXIMAL FUNCTION AND SAFETY  Goals   Patient Goals TO GET INTO BED    LTG Expiration Date 02/18/19   Long Term Goal #1 1) PERFORM BED MOBILITY MOD-I TO PARTICIPATE IN FREQUENT REPOSITIONING AND IMPROVE SKIN INTEGRITY; 2) PERFORM SIT<-->STAND TRANSFER MOD-I TO PROMOTE I WITH TOILETING AND OOB MOBILITY; 3) AMBULATE 200 FEET MOD-I W/ LEAST RESTRICTIVE DEVICE TO PARTICIPATE IN HOUSEHOLD AND COMMUNITY LEVEL AMBULATION; 4) IMPROVE B/L LE STRENGTH BY 1/2 GRADE TO IMPROVE EFFICIENCY OF TRANSFERS; 5) IMPROVE BALANCE BY 1 GRADE TO REDUCE RISK FOR FALLS; 6) NAVIGATE 12 STEPS S LEVEL IN ORDER TO SAFELY NAVIGATE MULTIPLE FLOORS AT HOME      Treatment Day 0   Plan Treatment/Interventions Functional transfer training;LE strengthening/ROM; Elevations; Therapeutic exercise; Endurance training;Patient/family training;Equipment eval/education; Bed mobility;Gait training;OT;Spoke to nursing   PT Frequency Other (Comment)  (3-5X/WK)   Recommendation   Recommendation Short-term skilled PT   Equipment Recommended Walker  (RW)   PT - OK to Discharge Yes  (TO STR WHEN MED CLAUDINE )   Barthel Index   Feeding 5  (MOD  DIET)   Bathing 0   Grooming Score 0   Dressing Score 0   Bladder Score 0   Bowels Score 5   Toilet Use Score 0   Transfers (Bed/Chair) Score 5   Mobility (Level Surface) Score 0   Stairs Score 0   Barthel Index Score 15       Ron Ennis, PT

## 2019-02-04 NOTE — SOCIAL WORK
CM was informed by JAIME Oropeza that pt is lethargic and on HFNC d/t tachypnea  CM called pt's mother Noman Delgado, left VM  CM will follow

## 2019-02-04 NOTE — SOCIAL WORK
CM received a call back from pt's mother Marybeth Lu  CM introduced self and made aware of CM role at [facility]  Marybeth Lu reported that pt has no LW or POA  Pt's primary contact is mother Marybeth Lu- [phone]  Pt lives with her Macie Padilla in a 2nd flr apt with 1 flight of stairs to enter  Pt was IPTA with all ADL's, does not drive and on disability  Pt has no DME  Pt's grandfather transport pt to his appts  Pt does not have hx with HHC, STR and alc tx  Pt has hx of drug abuse and had drug rehab hx at [facility]  Marybeth Lu reported that pt was dx with PTSD, depression, anxiety and Bipolar disorder  Pt's last [address] IP admission was [facility] [address] [facility] [address] last fall of 2018  Pt follows with his psychiatrist with [facility] in [place]  Pharmacy is [facility] in [place]  Pt has no PCP  CM will follow  CM reviewed d/c planning process including the following: identifying help at home, patient preference for d/c planning needs, Discharge Lounge, [facility] Meds to Bed program, availability of treatment team to discuss questions or concerns patient and/or family may have regarding understanding medications and recognizing signs and symptoms once discharged  CM also encouraged patient to follow up with all recommended appointments after discharge  Patient advised of importance for patient and family to participate in managing patients medical well being

## 2019-02-04 NOTE — OCCUPATIONAL THERAPY NOTE
[address] Evaluation     Patient Name: Melissa Ibrahim  VECBD'Y Date: 2/4/2019  Problem List  Patient Active Problem List   Diagnosis    History of hepatitis C    Vasectomy evaluation    Hepatic abscess    Hyponatremia    Depression    Anxiety    [facility] ([email or web address] )    Transaminitis    Anemia    Leukocytosis    Gram-positive bacteremia    Tobacco abuse    Hypophosphatemia    Urinary retention     Past Medical History  Past Medical History:   Diagnosis Date    ADHD     Anxiety     Bipolar 1 disorder ([email or web address] )     Delusion ([email or web address] )     Hepatitis C     Infectious viral hepatitis     Hep C- No Symptoms( Took a course of Harvoni)    Psoriasis     PTSD (post-traumatic stress disorder)     skilled nursing     Past Surgical History  Past Surgical History:   Procedure Laterality Date    CT GUIDED PERC DRAINAGE CATHETER PLACEMENT  2/1/2019    [place] REMOVAL OF SPERM DUCT(S) Bilateral 9/25/2018    Procedure: VASECTOMY;  Surgeon: Tereza Soriano MD;  [facility];  Service: Urology    WISDOM TOOTH EXTRACTION           02/04/19 1153   Note Type   Note type Eval/Treat   Restrictions/Precautions   Weight Bearing Precautions Per Order No   Other Precautions Pain; Fall Risk;O2;Telemetry;Multiple lines;Cognitive  (5 DANNY drains)   Pain Assessment   Pain Assessment 0-10   Pain Score 4   Pain Type Acute pain   Pain Location Abdomen   Pain Orientation Right   Home Living   Type of [address] One level;Stairs to enter with rails  (12 SHEA)   Bathroom Shower/Tub Walk-in shower   Bathroom Toilet Standard   Bathroom Equipment (none)   [address] (none)   Prior Function   Level of [place] Independent with ADLs and functional mobility   Lives With Significant other   ADL Assistance Independent   IADLs Independent   Falls in the last 6 months 0   Vocational (not working)   Lifestyle   Autonomy Pt I w/ ADLs, IADLs, and mobility at baseline  (-)    No DME     Reciprocal Relationships Lives w/ s/o who is home   Service to Others Not working   Intrinsic Gratification Enjoys reading   Psychosocial   Psychosocial (WDL) WDL   ADL   Where Assessed Chair   Eating Assistance 6  Modified independent   Eating Deficit Setup   Grooming Assistance 4  Minimal Assistance   UB Bathing Assistance 4  Minimal Assistance   LB Bathing Assistance 3  Moderate Assistance   [address] 4  [address] 3  Moderate Assistance   Bed Mobility   Sit to Supine 3  Moderate assistance   Additional items Assist x 2; Increased time required   Transfers   Sit to Stand 3  Moderate assistance   Additional items Assist x 2   Stand to Sit 3  Moderate assistance   Additional items Assist x 2   Stand pivot 3  Moderate assistance   Additional items Assist x 2   Additional Comments Pt performed SPT from chair to bed w/ HHAx2 and cues for safety w/ transfer   Functional Mobility   Additional Comments HHAx2 for SPT from chair to bed   Balance   Static Sitting Fair -   Dynamic Sitting Poor +   Static Standing Poor   Dynamic Standing Poor -   Activity Tolerance   Activity Tolerance Patient limited by fatigue   Medical Staff Made Aware PT, Mariana   Nurse Made Aware RN, Poncho Frazier Assessment   RUE Assessment WFL   LUE Assessment   LUE Assessment WFL   Hand Function   Gross Motor Coordination Functional   Fine Motor Coordination Functional   Cognition   Overall Cognitive Status Impaired   Arousal/Participation Responsive; Cooperative   Attention Attends with cues to redirect   Orientation Level Oriented to person;Oriented to place   Memory Decreased recall of recent events   Following Commands Follows one step commands with increased time or repetition   Comments Pt agreeable to session but very limited by fatigue/decreased engagement at this time  Pt required cues to remain alert and keep eyes open   He answered questions appropriately, but required increased time and repetition   Assessment   Limitation Decreased ADL status; Decreased Safe judgement during ADL;Decreased cognition;Decreased endurance;Decreased self-care trans;Decreased high-level ADLs   Prognosis Good   Assessment Pt is a 35 y o  male who was [facility] to [facility] on 2/1/2019 with multiple Hepatic abscesses  Pt is s/p IR drainage on 2/1/19 and now has 5 DANNY drains  Pt's comorbidities include Hep C, h/o of IV drug use, depression, anxiety, anemia, tobacco abuse, ADHD, Bipolar I, delusions, and PTSD from prison  At baseline pt was I w/ ADLs, IADLs, and mobility  Was not using DME and (-)   Pt lives in a on level apt w/ 12 SHEA w/ his s/o  Currently pt requires min-mod A for overall ADLS and mod Ax2 for functional mobility/transfers w/ HHA  Pt performed SPT from chair to bed w/ Ax2 and vc for safety  Pt very fatigued w/ decreased alertness during session, requiring vc to keep his his open and remain engaged  Pt currently presents with impairments in the following categories -steps to enter environment, difficulty performing ADLS, difficulty performing IADLS, limited insight into deficits, decreased initiation and engagement,  activity tolerance, endurance, standing balance/tolerance, sitting balance/tolerance, insight, safety  and judgement   These impairments, as well as pt's fatigue, pain, decreased caregiver support and risk for falls  limit pt's ability to safely engage in all baseline areas of occupation, including grooming, bathing, dressing, toileting, functional mobility/transfers and leisure activities  From OT standpoint, recommend short term rehab at this time, however will cont to follow/monitor progress and update rec as needed  OT will continue to follow to address the below stated goals  Goals   Patient Goals to go to bed   LTG Time Frame 10-14   Long Term Goal see below goals    Plan   Treatment Interventions ADL retraining;Functional transfer training; Endurance training;Patient/family training;Equipment evaluation/education; Compensatory technique education; Energy conservation; Activityengagement   Goal Expiration Date 02/18/19   OT Frequency 3-5x/wk   Recommendation   OT Discharge Recommendation Short Term Rehab  (pending progress)   Barthel Index   Feeding 5   Bathing 0   Grooming Score 0   Dressing Score 0   Bladder Score 0   Bowels Score 5   Toilet Use Score 0   Transfers (Bed/Chair) Score 5   Mobility (Level Surface) Score 0   Stairs Score 0   Barthel Index Score 15   Modified [place] Scale   Modified [place] Scale 4     LTG (10-14 DAYS)  Pt will improve activity tolerance to G for min 30-45 min txment sessions to increase participation in ADLs    Pt will complete ADLs/self care w/ mod I using adaptive device and DME as needed    Pt will complete toileting w/ mod I w/ G hygiene/thoroughness using DME as needed    Pt will improve functional transfers on/off all surfaces to mod I using DME as needed w/ G balance/safety  Pt will improve functional mobility during ADL/IADL/leisure tasks to mod I using DME as needed w/ G balance/safety  Pt will improve standing balance to G for 8-10 minutes of purposeful activity w/ G endurance      Pt will engage in ongoing cognitive assessment (as needed) w/ G participation w/ mod I to assist w/ safe d/c planning/recommendations    Pt will demonstrate G carryover of pt/caregiver education and training as appropriate w/ mod I w/o cues w/ good tolerance    Pt will demonstrate 100% carryover of energy conservation techniques w/ mod I t/o functional I/ADL/leisure tasks w/o cues s/p skilled education           Ryann John, OTR/L

## 2019-02-04 NOTE — PLAN OF CARE
Problem: OCCUPATIONAL THERAPY ADULT  Goal: Performs self-care activities at highest level of function for planned discharge setting  See evaluation for individualized goals  Treatment Interventions: ADL retraining, Functional transfer training, Endurance training, Patient/family training, Equipment evaluation/education, Compensatory technique education, Energy conservation, Activityengagement          See flowsheet documentation for full assessment, interventions and recommendations  Limitation: Decreased ADL status, Decreased Safe judgement during ADL, Decreased cognition, Decreased endurance, Decreased self-care trans, Decreased high-level ADLs  Prognosis: Good  Assessment: Pt is a 35 y o  male who was admitted to [facility] on 2/1/2019 with multiple Hepatic abscesses  Pt is s/p IR drainage on 2/1/19 and now has 5 DANNY drains  Pt's comorbidities include Hep C, h/o of IV drug use, depression, anxiety, anemia, tobacco abuse, ADHD, Bipolar I, delusions, and [place] from [place]  At baseline pt was I w/ ADLs, IADLs, and mobility  Was not using DME and (-)   Pt lives in a on level apt w/ 12 SHEA w/ his s/o  Currently pt requires min-mod A for overall ADLS and mod Ax2 for functional mobility/transfers w/ HHA  Pt performed SPT from chair to bed w/ Ax2 and vc for safety  Pt very fatigued w/ decreased alertness during session, requiring vc to keep his his open and remain engaged  Pt currently presents with impairments in the following categories -steps to enter environment, difficulty performing ADLS, difficulty performing IADLS, limited insight into deficits, decreased initiation and engagement,  activity tolerance, endurance, standing balance/tolerance, sitting balance/tolerance, insight, safety  and judgement    These impairments, as well as pt's fatigue, pain, decreased caregiver support and risk for falls  limit pt's ability to safely engage in all baseline areas of occupation, including grooming, bathing, dressing, toileting, functional mobility/transfers and leisure activities  From OT standpoint, recommend short term rehab at this time, however will cont to follow/monitor progress and update rec as needed  OT will continue to follow to address the below stated goals       OT Discharge Recommendation: Short Term Rehab (pending progress)

## 2019-02-04 NOTE — CONSULTS
CONSULT    Patient Name: Yumiko Mcintosh  Patient MRN: [id number]  Date of Service: 2/4/2019   Date / Time Note Created: 2/4/2019 2:14 PM   Referring Provider:adolfo Edwards  Provider Creating Note: ESTRELLITA Goode    PCP: No primary care provider on file  Attending Provider:  Jordi Garrett MD    Reason for Consult: Urinary Retention    HPI --Yumiko Mcintosh is a 65-year-old male known to Dr Wendy Johnson status post vasectomy last fall admitted with multiple hepatic abscesses  Patient is lethargic and minimally cooperative but denies any recent  irritative or obstructive urinary symptoms  Patient is status post multiple interventional Radiology placed drains  Nursing staff inserted Mckeon catheter per protocol for bladder volumes exceeding 700 mL  CT scan demonstrated normal kidneys, bladder and reproductive organs  Urologic consultation was requested for Mckeon catheter management recommendations  Source:chart review and the patient         Patient Active Problem List   Diagnosis    History of hepatitis C    Vasectomy evaluation    Hepatic abscess    Hyponatremia    Depression    Anxiety    IV drug abuse ([facility] [address] )    Transaminitis    Anemia    Leukocytosis    Gram-positive bacteremia    Tobacco abuse    Hypophosphatemia    Urinary retention         Impressions  Acute Urinary Retention/Post-procedural Urinary Retention (Multi-factorial) secondary to recent administration of anesthesia/sedatives, acute illness episode (hepatic abscess and infectious illness), narcotics, debilitation, and recumbency  Recommendations  1  Maintain mckeon catheter  2  Do not remove  Not Nsg managed   3  Will add Flomax   4  In interim, increase ambulation, wean narcotics, hydrate and treat constipation  5  Proceed with void trial if patient is ambulatory morning after transfer out of ICU and to medical surgical unit    6  Patient may require mckeon catheter at home or short term rehab if patient fails repeat void trial Will follow peripherally  Past Medical History:   Diagnosis Date    ADHD     Anxiety     Bipolar 1 disorder ([facility] [address] )     Delusion (HCC)     Hepatitis C     Infectious viral hepatitis     Hep C- No Symptoms( Took a course of Harvoni)    Psoriasis     PTSD (post-traumatic stress disorder)     senior care       Past Surgical History:   Procedure Laterality Date    CT GUIDED PERC DRAINAGE CATHETER PLACEMENT  2/1/2019    IN REMOVAL OF SPERM DUCT(S) Bilateral 9/25/2018    Procedure: VASECTOMY;  Surgeon: Leif Alonso MD;  Location: [facility];  Service: Urology    WISDOM TOOTH EXTRACTION         History reviewed  No pertinent family history  Social History     Social History    Marital status: Single     Spouse name: N/A    Number of children: N/A    Years of education: N/A     Occupational History    Not on file       Social History Main Topics    Smoking status: Current Every Day Smoker     Packs/day: 1 00    Smokeless tobacco: Never Used    Alcohol use Yes      Comment: socially    Drug use: Yes     Types: Marijuana, Methaqualone      Comment: medical- vaping 4x/day    Sexual activity: Not on file     Other Topics Concern    Not on file     Social History Narrative    No narrative on file       Allergies   Allergen Reactions    Sulfa Antibiotics Hives       Review of Systems  Review of Systems - History obtained from chart review and the patient  Respiratory ROS: no cough, shortness of breath, or wheezing  Cardiovascular ROS: no chest pain or dyspnea on exertion  Gastrointestinal ROS: positive for - abdominal pain  Genito-Urinary ROS: no dysuria, trouble voiding, or hematuria  Neurological ROS: no TIA or stroke symptoms    Chart Review   Allergies, current medications, history, problem list    Vital Signs & Physical Exam  General appearance: alert and oriented, in no acute distress  Head: Normocephalic, without obvious abnormality, atraumatic  Neck: no adenopathy, no carotid bruit, no JVD, supple, symmetrical, trachea midline and thyroid not enlarged, symmetric, no tenderness/mass/nodules  Lungs: diminished breath sounds  Heart: regular rate and rhythm, S1, S2 normal, no murmur, click, rub or gallop  Abdomen: soft, non-tender; bowel sounds normal; no masses,  no organomegaly and Multiple drains in Place  Extremities: extremities normal, warm and well-perfused; no cyanosis, clubbing, or edema  Pulses: 2+ and symmetric  Neurologic: Mental status: alertness: lethargic  Lopez patent for clear yellow urine     Laboratory Studies  Lab Results   Component Value Date     03/21/2015    K 4 0 02/04/2019    K 3 6 03/22/2015     02/04/2019     03/21/2015    CO2 24 02/04/2019    CO2 24 02/01/2019    GLUCOSE 94 02/01/2019    GLUCOSE 110 03/21/2015    CREATININE 0 83 02/04/2019    CREATININE 0 76 03/21/2015    BUN 9 02/04/2019    BUN 14 03/21/2015    MG 2 0 02/04/2019    PHOS 1 4 (L) 02/04/2019               Imaging and Other Studies  )Xr Chest Portable    Result Date: 2/4/2019  Narrative: CHEST INDICATION:   f/u opacities/RLL effusion  COMPARISON:  2/3/2019  EXAM PERFORMED/VIEWS:  XR CHEST PORTABLE FINDINGS: Cardiomediastinal silhouette is stable  Diffuse bilateral pulmonary opacities are seen representing pulmonary edema or multilobar pneumonia  Moderate right pleural effusion  Osseous structures appear within normal limits for patient age  Impression: Diffuse bilateral pulmonary opacities again seen  Moderate right pleural effusion  Workstation performed: [id number]     Xr Chest Portable    Result Date: 2/4/2019  Narrative: CHEST INDICATION:   tachypnea, desats  COMPARISON:  None EXAM PERFORMED/VIEWS:  XR CHEST PORTABLE FINDINGS:  Monitoring leads and clips project over the chest  Cardiomediastinal silhouette appears unremarkable  Diffusely increased interstitial prominence indicative of fluid overload  There has been interval development of a moderate right pleural effusion  Osseous structures appear within normal limits for patient age  Impression: Vascular congestion with new moderate right pleural effusion  Workstation performed: [id number]     Xr Chest 2 Views    Result Date: 2/1/2019  Narrative: CHEST INDICATION:   sepsis  COMPARISON:  12/25/2011 EXAM PERFORMED/VIEWS:  XR CHEST PA & LATERAL Images: 2 FINDINGS: Cardiomediastinal silhouette appears unremarkable  No congestive failure  No pneumothorax  Elevation of the right hemidiaphragm with blunting of the costophrenic angle suggesting small effusion  Right basilar subsegmental atelectasis  There is left basilar subsegmental atelectasis present as well  Osseous structures appear within normal limits for patient age  Impression: Elevation of the right hemidiaphragm with bibasilar subsegmental atelectasis and suspected trace right effusion  Workstation performed: [id number]     Ct Guided Perc Drainage Catheter Placeme    Result Date: 2/1/2019  Narrative: CT scan guided abscess drainage of 5 separate liver fluid collection History: Multiple liver abscesses Conscious sedation time: General anesthesia Technique: This examination, like all CT scans performed in the [facility], was performed utilizing techniques to minimize radiation dose exposure, including the use of iterative reconstruction and automated exposure control  The patient was brought to the CT scanner and placed supine on the table  After axial images were obtained through the liver, 5 separate areas of the skin was then marked, prepped, and draped in usual sterile fashion  Lidocaine was administered to the skin and a small skin incision was made  An 18-gauge Chiba needle was advanced into 5 different collections under CT-scan guidance  Pus was aspirated from each collection    An Amplatz wire was coiled each of the 5 abscesses, and after tract dilatation, 8 5 [employer]li all-purpose drainage catheters were placed into the 3 superior collections and 10-Italian drainage catheters were placed into the central collection and inferior posterior collection  A total of 300 mL of pus was aspirated  The catheters were  sutured in place, sterilely dressed, and connected to DANNY bulb suction  A specimen was sent to the laboratory for analysis The patient tolerated the procedure well and suffered no complications  Impression: Impression: Successful placement of a total of 5  all-purpose drainage catheters into the 5 largest liver abscess collections  A total of 300 cc of purulent material was aspirated and a specimen sent to the laboratory as described  The 5 drains were labeled as anterior superior, middle superior, posterior superior, central, and posterior inferior  The two 10-Italian drains were the central drain and the posterior-inferior drain  The 3 superior drains were 8 5-Italian  Plan: Tubes to DANNY bulb suction  Flush each tube with 20 mL normal saline solution  The patient will likely need more drains placed since there are several other abscesses  We will follow patient and see on follow-up CAT scan if 2 further drains are needed or if the existing drains require repositioning and or wire disruption of the loculations to [employer]  Workstation performed: [id number]     Ct Abdomen Pelvis With Contrast    Result Date: 2/1/2019  Narrative: CT ABDOMEN AND PELVIS WITH IV CONTRAST INDICATION:   54-year-old man presenting with right upper quadrant abdominal pain and fever  Reported history of hepatitis C  COMPARISON:  None  TECHNIQUE:  CT examination of the abdomen and pelvis was performed  In addition to portal venous phase postcontrast scanning through the abdomen and pelvis, delayed phase postcontrast scanning was performed through the upper abdominal viscera  Axial, sagittal, and coronal 2D reformatted images were created from the source data and submitted for interpretation  Radiation dose length product (DLP) for this visit:  1096 96 mGy-cm   This examination, like all CT scans performed in the [facility], was performed utilizing techniques to minimize radiation dose exposure, including the use of iterative reconstruction and automated exposure control  IV Contrast:  100 mL of iodixanol (VISIPAQUE) Enteric Contrast:  Enteric contrast was not administered  FINDINGS: ABDOMEN LOWER CHEST:  Small right pleural effusion  Patchy opacities in lung bases most likely atelectasis  LIVER/BILIARY TREE:  Liver has normal morphology and surface contour  The liver is severely enlarged measuring 27 cm craniocaudal  There are there are approximately 9 multi multiseptated/multiloculated liver masses  The largest in segment 6 measures approximately 9 x 7 cm (series 2 image 40)  There is marked low attenuation around many of the largest mass, most likely representing  surrounding edema  Mariza Skipper GALLBLADDER:  Mild gallbladder wall thickening and equivocal hyperemia in the nondistended gallbladder  This may be reactive    SPLEEN:  Unremarkable  PANCREAS:  Unremarkable  ADRENAL GLANDS:  Unremarkable  KIDNEYS/URETERS:  Unremarkable  No hydronephrosis  STOMACH AND BOWEL:  Unremarkable  APPENDIX:  No findings to suggest appendicitis  ABDOMINOPELVIC CAVITY:  No ascites or free intraperitoneal air  No lymphadenopathy  VESSELS:  Unremarkable for patient's age  PELVIS REPRODUCTIVE ORGANS:  Unremarkable for patient's age  URINARY BLADDER:  Unremarkable  ABDOMINAL WALL/INGUINAL REGIONS:  Unremarkable  OSSEOUS STRUCTURES:  No acute fracture or destructive osseous lesion  Remote fracture left L1 transverse process  Impression: 1  In the provided clinical setting of fever and severe leukocytosis, the innumerable hepatic multilocular/multiseptated liver masses are most likely pyogenic abscesses versus less likely anemic abscesses  Other causes of cystic liver masses are less likely in this clinical setting  The patient's reported history of hepatitis C is noted    After patient's acute issues have resolved and the above liver lesions have been treated, surveillance liver imaging could be performed  2   Nondistended gallbladder with mild wall thickening and mucosal hyperemia is likely reactive    I personally discussed this study with Lonnie Layman on 2/1/2019 at 3:21PM  Workstation performed: [id number]         Medications     Current Facility-Administered Medications:  ARIPiprazole 5 mg Oral Early Morning Yamil Velarde MD    bisacodyl 10 mg Rectal Daily Sarah Flatness, PA-C    buPROPion 300 mg Oral QAM Yamil Velarde MD    cefazolin 2,000 mg Intravenous Q8H Dannie Donovan MD Last Rate: 2,000 mg (02/04/19 1353)   chlorhexidine 15 mL Swish & Spit Q12H [address] Yamil Velarde MD    dextrose 5 % and sodium chloride 0 45 % with KCl 20 mEq/L 75 mL/hr Intravenous Continuous Yesy Givens MD Last Rate: 75 mL/hr (02/04/19 1104)   diazepam 10 mg Oral BID Sarah Flatness, PA-C    docusate sodium 100 mg Oral BID Sarah Flatness, PA-C    famotidine 20 mg Oral Q12H [address] Salome Jacobson MD    heparin (porcine) 5,000 Units Subcutaneous [facility] Yamil Velarde MD    HYDROmorphone 1 mg Intravenous Q3H PRN Kailyn Loose, CRNP    levalbuterol 1 25 mg Nebulization Q6H Salome Jacobson MD    LORazepam 1 mg Intravenous Q4H PRN Kailyn Loose, CRNP    metroNIDAZOLE 500 mg Oral [facility] Dannie Donovan MD    nicotine 21 mg Transdermal Daily Kailyn Loose, CRNP    oxyCODONE 10 mg Oral Q4H PRN Kailyn Loose, CRNP    oxyCODONE 5 mg Oral Q4H PRN Kailyn Loose, CRNP    polyethylene glycol 17 g Oral Daily PRN Aliyah Hassan MD    [START ON 2/5/2019] polyethylene glycol 17 g Oral Daily Sarah Flatness, PA-C    potassium-sodium phosphateS 2 tablet Oral BID With Meals Sarah Flatness, PA-C    prazosin 1 mg Oral Daily PRN Yesy Givens MD    QUEtiapine 200 mg Oral HS Yamil Velarde MD    senna 1 tablet Oral HS Sarah Flatness, PA-C    sodium chloride 3 mL Nebulization Q6H Salome Jacobson MD    vancomycin 1,500 mg Intravenous [facility] Demetrice Caro MD Manjit Last Rate: 1,500 mg (02/04/19 6945)       Total time spent with patient 20 minutes, >50% spent counseling and/or coordination of care           )ESTRELLITA Garces

## 2019-02-04 NOTE — PROGRESS NOTES
Vancomycin Assessment    Melissa Shabazz is a 35 y o  male who is currently receiving vancomycin 1500 mg q8h for MRSA suspected, bacteremia Hepatic Abscess   Relevant clinical data and objective history reviewed:  Creatinine   Date Value Ref Range Status   02/04/2019 0 83 0 60 - 1 30 mg/dL Final     Comment:     Standardized to IDMS reference method   02/03/2019 0 92 0 60 - 1 30 mg/dL Final     Comment:     Standardized to IDMS reference method   02/02/2019 1 49 (H) 0 60 - 1 30 mg/dL Final     Comment:     Standardized to IDMS reference method   03/21/2015 0 76 0 60 - 1 30 mg/dL Final     Comment:     Standardized to IDMS reference method     /66 (BP Location: Right arm)   Pulse 94   Temp 100 °F (37 8 °C) (Oral)   Resp (!) 30   Ht 6' 2" (1 88 m)   Wt 112 kg (246 lb 14 6 oz)   SpO2 93%   BMI 31 70 kg/m²   I/O last 3 completed shifts: In: 5817 5 [P O :2520; I V :1717 5; NG/GT:400; IV Piggyback:1180]  Out: 2230 [Urine:3595; Drains:460]  Lab Results   Component Value Date/Time    BUN 9 02/04/2019 05:00 AM    BUN 14 03/21/2015 11:33 PM    WBC 21 25 (H) 02/04/2019 05:00 AM    WBC 9 33 03/31/2015 10:12 AM    HGB 9 1 (L) 02/04/2019 05:00 AM    HGB 14 4 03/31/2015 10:12 AM    HCT 27 3 (L) 02/04/2019 05:00 AM    HCT 43 4 03/31/2015 10:12 AM    MCV 89 02/04/2019 05:00 AM    MCV 87 03/31/2015 10:12 AM     (H) 02/04/2019 05:00 AM     03/31/2015 10:12 AM     Temp Readings from Last 3 Encounters:   02/04/19 100 °F (37 8 °C) (Oral)   09/25/18 (!) 97 °F (36 1 °C)   08/02/18 (!) 96 1 °F (35 6 °C) (Tympanic)     Vancomycin Days of Therapy: 3    Assessment/Plan  The patient is currently on vancomycin utilizing scheduled dosing  Baseline risks associated with therapy include: concomitant nephrotoxic medications  The patient was previously consulted and vancomycin D/C'ed on 2/3/19  ID and trauma team decided to restart vancomycin today   Based on previous recommendations per pharmacy, will begin 1500 mg q8h based on a goal of 15-20 (appropriate for most indications)  Pharmacy will continue to follow closely for s/sx of nephrotoxicity, infusion reactions and appropriateness of therapy  BMP and CBC will be ordered per protocol  Plan for trough as patient approaches steady state, prior to the 4th  dose at approximately 1215 on 2/5/19  Pharmacy will continue to follow the patients culture results and clinical progress daily      Mei De La Torre, Pharmacist

## 2019-02-04 NOTE — PLAN OF CARE
Problem: PHYSICAL THERAPY ADULT  Goal: Performs mobility at highest level of function for planned discharge setting  See evaluation for individualized goals  Treatment/Interventions: Functional transfer training, LE strengthening/ROM, Elevations, Therapeutic exercise, Endurance training, Patient/family training, Equipment eval/education, Bed mobility, Gait training, OT, Spoke to nursing  Equipment Recommended: Edgardo Logan (CLARA)       See flowsheet documentation for full assessment, interventions and recommendations  Prognosis: Fair  Problem List: Decreased strength, Decreased endurance, Impaired balance, Decreased mobility, Pain, Decreased safety awareness, Impaired judgement, Decreased cognition  Assessment: PT COMPLETED EVALUATION OF 35YEAR OLD MALE AD[facility]ITTED TO [facility] ON 2/1/19 WITH SYMPTOMS OF ABDOMINAL PAIN  CURRENT DIAGNOSIS INCLUDES MULTIPLE HEPATIC ABSCESSES S/P IR DRAINAGE 2/1/19  CURRENT MEDICAL AND PHYSICAL INSTABILITIES INCLUDE ICU ADMISSION, CONTINUOUS O2/HR/BP MONITORING, 5 DANNY DRAINS, FALLS RISK, BED/CHAIR ALARMS, AND A REGRESSION IN FUNCTIONAL STATUS FROM BASELINE  PMH IS SIGNIFICANT FOR HEP C, IV DRUG USE, PTSD ([facility]), BIPOLAR DISORDER, ADHD, AND ANXIETY  PRIOR TO THIS ADMISSION PATIENT RESIDED WITH SIG  OTHER (HOME, DOES NOT WORK) IN A 2ND FLOOR APARTMENT (NO ELEVATOR ACCESS, 12 SHE[address]) WHERE HE REPORTS PRIOR I WITH MOBILITY (NO AD), ADLS, AND IADLS  -  OR EMPLOYMENT  CURRENT IMPAIRMENTS INCLUDE PAIN, DECREASED GROSS STRENGTH AND ACTIVITY TOLERANCE AND GAIT DEVIATIONS  DURING PT EVALUATION PATIENT WAS SIGNIFICANTLY LETHARGIC LIMITING MOBILITY ASSESSMENT  HE REQUIRED MAX-AX2 FOR SIT-->STAND TRANSFER AND SHORT DISTANCE AMBULATION CHAIR-->BED  PATIENT AMBULATED 2 FEET WITH B/L HHA PRESENTING WITH SHUFFLED GAIT  AT THIS TIME PATIENT WOULD BENEFIT FROM STR- AS HE IS FUNCTIONING BELOW BASELINE   PT WILL CONTINUE TO FOLLOW ON [facility]ELOAD AND IF PROGRESS IS MADE/PT GOALS ACHIEVED PATIENT MAY D/C HOME W/ FAMILY ASSIST  WILL CONTINUE TO MONITOR PROGRESS  PATIENT WILL BENEFIT FROM CONTINUED SKILLED PT THIS ADMISSION TO ACHIEVE MAXIMAL FUNCTION AND SAFETY  Recommendation: (S) Short-term skilled PT     PT - OK to Discharge: (S) Yes (TO STR WHEN MED CLAUDINE )    See flowsheet documentation for full assessment     Sulema Dumont, PT

## 2019-02-04 NOTE — CONSULTS
Inpatient consult to Acute Pain Service  Consult performed by: Valdemar Arnett ordered by: Anderson Zaragoza        Consultation - Anesthesia Acute Pain Management   Bishop Santoyo 35 y o  male MRN: [id number]  Unit/Bed#: ICU 06 Encounter: [id number]               Consult Time:  39 minutes    [address]; Patient aged 72 years & older:  [address] was not discussed  Assessment/Plan     Assessment:   · 35year old male having acute pain, admitted with sepsis 2/2 multiple hepatic abscesses s/p IR drainage  · HX IV drug use 2 years ago     Plan:   1  Acute pain recommendations:   Discontinue  · All tylenol products 2/2 elevated AST/ALT, and HX ofTransaminitis    · Robaxin     Continue  · Valium 10 mg PO BID - home med   · oxycodone 5 mg PO Q 4 HRS PRN moderate pain   · Oxycodone 10 mg PO Q 4 HRS PRN severe pain   · Dilaudid 1 mg IV Q 3 HRS PRN breakthrough pain     2  PDMP REVIEWED   01/07/2019  1  01/07/2019  DIAZEPAM 10 MG TABLET  60 0  30      01/07/2019  1  01/07/2019  DEXTROAMP-AMPHETAMIN 20 MG TAB  90 0  30     12/05/2018  1  12/03/2018  DIAZEPAM 10 MG TABLET  60 0  30     12/05/2018  1  12/03/2018  DEXTROAMP-AMPHETAMIN 20 MG TAB  90 0  30      3  Bowel regimen - if appropriate per primary team   Patient was having diarrhea  Will need prevention of opioid induced constipation in future  History of Present Illness    Admit Date:  2/1/2019  Hospital Day:  3 days  Primary Service:  Surgery-General  Attending Provider:  Estela Hansen MD  Physician Requesting Consult: Estela Hansen MD  Reason for Consult / Principal Problem: acute pain  HPI: Bishop Santoyo is a 35y o  year old male who presents with acute chest and abdominal pain  Patient admitted to hospital with sepsis 2/2 multiple hepatic abscesses s/p IR drainage  Opioid naive with HX of IV drug use  [facility]: anxiety, depression, IVDU, and transaminitis  I examined patient in room  Patient was OOB to chair    Patient was experiencing difficulty staying awake during our conversation  Admits to feeling "very sleepy" from medications  I recommended discontinuing to robaxin, muscle relaxer  Patient is on Vlaium 10 mg PO BID  Patient takes valium at home for years  PDMP reviewed and correlates  Patient denies pain at this time  Rates "zero pain"  States he feels good  I spoke to primary team, unsure if patient will require further IR procedures  See recommendations above       Pain History:  Pain History: none   Current pain location(s): none   Pain Scale:   Zero     Pain Relief Goal:  5  Treatment History:  Valium Historical Information   Past Medical History:   Diagnosis Date    ADHD     Anxiety     Bipolar 1 disorder (HCC)     Delusion (HCC)     Hepatitis C     Infectious viral hepatitis     Hep C- No Symptoms( Took a course of Harvoni)    Psoriasis     PTSD (post-traumatic stress disorder)     [facility]     Past Surgical History:   Procedure Laterality Date    CT GUIDED PERC DRAINAGE CATHETER PLACEMENT  2/1/2019    NE REMOVAL OF SPERM DUCT(S) Bilateral 9/25/2018    Procedure: VASECTOMY;  Surgeon: Khoa Rothman MD;  Location: [facility];  Service: Urology    WISDOM TOOTH EXTRACTION       Social History   History   Alcohol Use    Yes     Comment: socially     History   Drug Use    Types: Marijuana, Methaqualone     Comment: medical- vaping 4x/day     History   Smoking Status    Current Every Day Smoker    Packs/day: 1 00   Smokeless Tobacco    Never Used     Family History: non-contributory    Meds/Allergies   Current Facility-Administered Medications   Medication Dose Route Frequency    acetaminophen (TYLENOL) tablet 650 mg  650 mg Oral Q8H [address]    ARIPiprazole (ABILIFY) tablet 5 mg  5 mg Oral Early Morning    buPROPion (WELLBUTRIN XL) 24 hr tablet 300 mg  300 mg Oral QAM    cefTRIAXone (ROCEPHIN) 2,000 mg in dextrose 5 % 50 mL IVPB  2,000 mg Intravenous Q24H    chlorhexidine (PERIDEX) 0 12 % oral rinse 15 mL  15 mL Swish & Spit Q12H [address]    dextrose 5 % and sodium chloride 0 45 % with KCl 20 mEq/L infusion  75 mL/hr Intravenous Continuous    diazepam (VALIUM) tablet 10 mg  10 mg Oral BID    famotidine (PEPCID) tablet 20 mg  20 mg Oral Q12H [address]    heparin (porcine) subcutaneous injection 5,000 Units  5,000 Units Subcutaneous Q8H [address]    HYDROmorphone (DILAUDID) injection 1 mg  1 mg Intravenous Q3H PRN    levalbuterol (XOPENEX) inhalation solution 1 25 mg  1 25 mg Nebulization Q6H    LORazepam (ATIVAN) 2 mg/mL injection 1 mg  1 mg Intravenous Q4H PRN    methocarbamol (ROBAXIN) tablet 750 mg  750 mg Oral Q6H GAIL    metroNIDAZOLE (FLAGYL) tablet 500 mg  500 mg Oral Q8H [address]    nicotine (NICODERM CQ) 21 mg/24 hr TD 24 hr patch 21 mg  21 mg Transdermal Daily    oxyCODONE (ROXICODONE) immediate release tablet 10 mg  10 mg Oral Q4H PRN    oxyCODONE (ROXICODONE) IR tablet 5 mg  5 mg Oral Q4H PRN    polyethylene glycol (MIRALAX) packet 17 g  17 g Oral Daily PRN    prazosin (MINIPRESS) capsule 1 mg  1 mg Oral Daily PRN    QUEtiapine (SEROquel) tablet 200 mg  200 mg Oral HS    senna-docusate sodium (SENOKOT S) 8 6-50 mg per tablet 1 tablet  1 tablet Oral HS    sodium chloride 0 9 % inhalation solution 3 mL  3 mL Nebulization Q6H     Prescriptions Prior to Admission   Medication    amphetamine-dextroamphetamine (ADDERALL) 20 mg tablet    ARIPiprazole (ABILIFY) 5 mg tablet    buPROPion (WELLBUTRIN XL) 300 mg 24 hr tablet    diazepam (VALIUM) 10 mg tablet    prazosin (MINIPRESS) 1 mg capsule    QUEtiapine (SEROquel) 200 mg tablet    docusate sodium (COLACE) 100 mg capsule         Allergies   Allergen Reactions    Sulfa Antibiotics Hives       Objective   Temp:  [97 5 °F (36 4 °C)-101 5 °F (38 6 °C)] 100 °F (37 8 °C)  HR:  [] 104  Resp:  [22-40] 30  BP: (108-145)/(55-78) 126/72  FiO2 (%):  [60] 60    Intake/Output Summary (Last 24 hours) at 02/04/19 0911  Last data filed at 02/04/19 0800   Gross per 24 hour Intake          4366 25 ml   Output             2035 ml   Net          2331 25 ml     Lab Results: I have personally reviewed pertinent labs  Counseling / Coordination of Care  Total floor / unit time spent today 45 minutes minutes  Greater than 50% of total time was spent with the patient and / or family counseling and / or coordination of care  Please note that the [facility] provides consultative services regarding pain management only  With the exception of ketamine and epidural infusions and except when indicated, final decisions regarding starting or changing doses of analgesic medications are at the discretion of the consulting service  Off hours consultation and/or medication management is generally not available  ESTRELLITA Mtaa  February 4, 2019  9:11 AM          Review of Systems   Constitutional: Negative for activity change, chills and fever  Respiratory: Negative for shortness of breath  Cardiovascular: Negative for chest pain and palpitations  Genitourinary: Negative for difficulty urinating  Musculoskeletal: Negative for arthralgias  Skin: Negative for color change  Neurological: Negative for seizures and numbness  Psychiatric/Behavioral: Negative for agitation and hallucinations  Physical Exam   Constitutional: He is oriented to person, place, and time  He appears well-developed and well-nourished  No distress  HENT:   Head: Normocephalic  Eyes: Pupils are equal, round, and reactive to light  Neck: Normal range of motion  Cardiovascular: Normal rate and regular rhythm  Pulmonary/Chest: Effort normal and breath sounds normal    Abdominal: There is tenderness  Neurological: He is alert and oriented to person, place, and time  Skin: Skin is warm and dry  He is not diaphoretic  Psychiatric: He has a normal mood and affect   His behavior is normal  Judgment and thought content normal

## 2019-02-04 NOTE — PROGRESS NOTES
Progress Note - Infectious Disease   Gustabo Goltz 35 y o  male MRN: [id number]  Unit/Bed#: ICU 06 Encounter: [id number]      Impression/Recommendations:  1  Sepsis, POA, presented with fever and leukocytosis   Source of sepsis is most likely liver abscesses   Overall, patient is stable   Temperature still up and down   WBC stable   Admission blood cultures are now positive  Antibiotic plan as in below  Monitor temperature/WB C  Monitor hemodynamics      2  Streptococcus intermedius bacteremia  Source is most likely liver abscesses  However, we need to consider the possibility of endocarditis with septic emboli to liver  Blood cultures are now also positive for GPC in clusters  This may be all Streptococcus but will restart vancomycin until we are sure there is no Staphylococcus in blood cultures also  2D echo without vegetation  Restart IV vancomycin  Change ceftriaxone to cefazolin  Repeat blood cultures today  Check 2D echo      3  Multiple liver abscesses   Patient has history of diarrhea   Liver abscess is may be all secondary to translocation from gut   Abdomen/pelvis CT without other obvious intra-abdominal pathologies   It may be worthwhile to look at colonoscopy to look for colonic lesions  Patient remains with fever  He may need repeat CT to look for undrained abscess if fever persists  Antibiotic plan as in above  Continue Flagyl for anaerobic coverage  Monitor temperature/WBC  Monitor abdominal pain      3  Intermittent hypotension   This may be all secondary to sepsis above   SBP is much better now   Patient is currently not on any pressors  Antibiotic plan as in above  Monitor SBP      4  HENRIQUE, POA  This may be secondary sepsis   Dehydration may contribute   Creatinine has normalized  Antibiotic at full dose  Monitor creatinine      5  Elevated LFTs   This is most likely secondary to hepatic abscesses   Will monitor LFTs with treatment and drainage of abscesses    LFTs continue to improve  Monitor LFTs      6  History of IVDU   I have some concern regarding possible active IVDU   For these reasons, patient is not a candidate for home IV antibiotic   Prior HIV screen negative      Discussed with patient in detail regarding the above plan  Discussed with surgery service      Antibiotics:  Ceftriaxone/Flagyl  Post drainage # 3      Subjective:  Patient remains in ICU  He is more awake and alert  Stable RUQ abdominal pain  Temperature stays up  No diarrhea  Objective:  Vitals:  Temp:  [97 5 °F (36 4 °C)-102 2 °F (39 °C)] 102 2 °F (39 °C)  HR:  [] 112  Resp:  [22-44] 34  BP: (109-154)/(55-86) 154/83  SpO2:  [92 %-99 %] 96 %  Temp (24hrs), Av 4 °F (38 °C), Min:97 5 °F (36 4 °C), Max:102 2 °F (39 °C)  Current: Temperature: (!) 102 2 °F (39 °C)    Physical Exam:     General: Awake, alert, cooperative, no distress  Neck:  Supple  No mass  No lymphadenopathy  Lungs: Expansion symmetric, no rales, no wheezing, respirations unlabored  Heart:  Regular rate and rhythm, S1 and S2 normal, no murmur  Abdomen: Soft, nondistended, stable RUQ tenderness  Drains in place, serous  Extremities: Trace edema  No erythema/warmth  No ulcer  Nontender to palpation  Skin:  No rash  Neuro: Moves all extremities  Invasive Devices     Peripheral Intravenous Line            Peripheral IV 19 Left Wrist 2 days    Peripheral IV 19 Left Antecubital 1 day          Drain            Closed/Suction Drain Right Abdomen Bulb 10 2 Fr  2 days    Closed/Suction Drain Right; Inferior;Superior Abdomen Bulb 8 5 Fr  2 days    Closed/Suction Drain Right;Posterior; Inferior Abdomen Bulb 10 2 Fr  2 days    Closed/Suction Drain Right;Superior; Anterior Abdomen Bulb 8 5 Fr  2 days    Closed/Suction Drain Right;Superior;Medial Abdomen Bulb 8 5 Fr  2 days    Urethral Catheter Temperature probe less than 1 day                Labs studies:   I have personally reviewed pertinent labs      Results from last 7 days  Lab Units 02/04/19  0500 02/03/19  0524 02/02/19 0459  02/01/19 2011   POTASSIUM mmol/L 4 0 3 6 3 9  < >  --    CHLORIDE mmol/L 105 104 104  < >  --    CO2 mmol/L 24 23 22  < >  --    CO2, I-STAT mmol/L  --   --   --   --  24   BUN mg/dL 9 13 30*  < >  --    CREATININE mg/dL 0 83 0 92 1 49*  < >  --    EGFR ml/min/1 73sq m 110 104 58  < >  --    GLUCOSE, ISTAT mg/dl  --   --   --   --  94   CALCIUM mg/dL 7 3* 7 2* 6 7*  < >  --    AST U/L 137* 272* 596*  < >  --    ALT U/L 190* 254* 316*  < >  --    ALK PHOS U/L 122* 137* 129*  < >  --    < > = values in this interval not displayed  Results from last 7 days  Lab Units 02/04/19  0500 02/03/19 0524 02/02/19 0459   WBC Thousand/uL 21 25* 20 86* 26 90*   HEMOGLOBIN g/dL 9 1* 9 6* 10 1*   PLATELETS Thousands/uL 428* 407* 350       Results from last 7 days  Lab Units 02/01/19  1952 02/01/19  1301 02/01/19  1235   BLOOD CULTURE   --  Streptococcus intermedius* Streptococcus intermedius*   GRAM STAIN RESULT  3+ Polys  4+ Gram positive cocci in pairs and chains Gram positive cocci in chains  Gram positive cocci in clusters Gram positive cocci in chains  Gram positive cocci in clusters   BODY FLUID CULTURE, STERILE  3+ Growth of Streptococcus intermedius*  --   --        Imaging Studies:   I have personally reviewed pertinent imaging study reports and images in PACS  EKG, Pathology, and Other Studies:   I have personally reviewed pertinent reports

## 2019-02-04 NOTE — PROGRESS NOTES
Progress Note - General Surgery   Stephen Anderson 35 y o  male MRN: [id number]  Unit/Bed#: ICU 06 Encounter: [id number]    Assessment:  36 yo M w/ sepsis from multiple hepatic abscesses s/p IR drainage  Extubated to HFNC, stable  Has been afebrile over the past 24 hrs    JPs: 66, 60, 40, 75, 80    Plan:  Okay to advance diet after bedside swallow   Would likely benefit from diuresis  Continue all drains to bulb suction, flush per IR recs  Continue Rocephin/Flagyl per ID  F/U cultures  OOB, pulm toilet, IS    Subjective/Objective   Chief Complaint:     Subjective: Complains of pain & feeling thirsty  No acute events overnight, still on HFNC 53% this AM  Mental status still somewhat altered    Objective:     Blood pressure 125/71, pulse (!) 114, temperature 97 5 °F (36 4 °C), temperature source Oral, resp  rate (!) 38, height 6' 2" (1 88 m), weight 112 kg (246 lb 14 6 oz), SpO2 98 %  ,Body mass index is 31 7 kg/m²  Intake/Output Summary (Last 24 hours) at 02/04/19 0847  Last data filed at 02/04/19 0601   Gross per 24 hour   Intake           4317 5 ml   Output             2120 ml   Net           2197 5 ml       Invasive Devices     Peripheral Intravenous Line            Peripheral IV 02/01/19 Left Wrist 2 days    Peripheral IV 02/03/19 Left Antecubital less than 1 day          Drain            Closed/Suction Drain Right Abdomen Bulb 10 2 Fr  2 days    Closed/Suction Drain Right; Inferior;Superior Abdomen Bulb 8 5 Fr  2 days    Closed/Suction Drain Right;Posterior; Inferior Abdomen Bulb 10 2 Fr  2 days    Closed/Suction Drain Right;Superior; Anterior Abdomen Bulb 8 5 Fr  2 days    Closed/Suction Drain Right;Superior;Medial Abdomen Bulb 8 5 Fr  2 days              Physical Exam:   Gen: A&O, NAD  Cardio: RRR  Lungs: CTAB  Abd: Soft, non distended, tender in LUQ  JPs range from serous to serosanginous   #4 & 5 with some purulent material      Lab, Imaging and other studies:  CBC:   Lab Results   Component Value Date    WBC 21 25 (H) 02/04/2019    HGB 9 1 (L) 02/04/2019    HCT 27 3 (L) 02/04/2019    MCV 89 02/04/2019     (H) 02/04/2019    MCH 29 5 02/04/2019    MCHC 33 3 02/04/2019    RDW 15 8 (H) 02/04/2019    MPV 9 9 02/04/2019    NRBC 0 02/04/2019   , CMP:   Lab Results   Component Value Date    SODIUM 135 (L) 02/04/2019    K 4 0 02/04/2019     02/04/2019    CO2 24 02/04/2019    BUN 9 02/04/2019    CREATININE 0 83 02/04/2019    CALCIUM 7 3 (L) 02/04/2019     (H) 02/04/2019     (H) 02/04/2019    ALKPHOS 122 (H) 02/04/2019    EGFR 110 02/04/2019   , Coagulation: No results found for: PT, INR, APTT  VTE Pharmacologic Prophylaxis: Heparin  VTE Mechanical Prophylaxis: sequential compression device

## 2019-02-04 NOTE — SPEECH THERAPY NOTE
Patient still on clear liquid diet, and RN states taking medications whole with water without difficulty  Resp rate in the 40's at this time  Respiratory to come assess patient  Will hold off on dysphagia therapy until appropriate  D/W RN

## 2019-02-05 ENCOUNTER — APPOINTMENT (INPATIENT)
Dept: RADIOLOGY | Facility: HOSPITAL | Age: 39
DRG: 871 | End: 2019-02-05
Payer: MEDICARE

## 2019-02-05 LAB
ANION GAP SERPL CALCULATED.3IONS-SCNC: 6 MMOL/L (ref 4–13)
BACTERIA BLD CULT: ABNORMAL
BACTERIA BLD CULT: ABNORMAL
BASOPHILS # BLD MANUAL: 0 THOUSAND/UL (ref 0–0.1)
BASOPHILS NFR MAR MANUAL: 0 % (ref 0–1)
BUN SERPL-MCNC: 10 MG/DL (ref 5–25)
CALCIUM SERPL-MCNC: 7.4 MG/DL (ref 8.3–10.1)
CHLORIDE SERPL-SCNC: 103 MMOL/L (ref 100–108)
CO2 SERPL-SCNC: 25 MMOL/L (ref 21–32)
CREAT SERPL-MCNC: 0.78 MG/DL (ref 0.6–1.3)
EOSINOPHIL # BLD MANUAL: 0.19 THOUSAND/UL (ref 0–0.4)
EOSINOPHIL NFR BLD MANUAL: 1 % (ref 0–6)
ERYTHROCYTE [DISTWIDTH] IN BLOOD BY AUTOMATED COUNT: 15.4 % (ref 11.6–15.1)
GFR SERPL CREATININE-BSD FRML MDRD: 113 ML/MIN/1.73SQ M
GLUCOSE SERPL-MCNC: 100 MG/DL (ref 65–140)
GLUCOSE SERPL-MCNC: 90 MG/DL (ref 65–140)
GLUCOSE SERPL-MCNC: 92 MG/DL (ref 65–140)
GLUCOSE SERPL-MCNC: 94 MG/DL (ref 65–140)
GLUCOSE SERPL-MCNC: 98 MG/DL (ref 65–140)
GRAM STN SPEC: ABNORMAL
HCT VFR BLD AUTO: 23.9 % (ref 36.5–49.3)
HGB BLD-MCNC: 8.2 G/DL (ref 12–17)
LYMPHOCYTES # BLD AUTO: 0.77 THOUSAND/UL (ref 0.6–4.47)
LYMPHOCYTES # BLD AUTO: 4 % (ref 14–44)
MAGNESIUM SERPL-MCNC: 1.7 MG/DL (ref 1.6–2.6)
MCH RBC QN AUTO: 29.8 PG (ref 26.8–34.3)
MCHC RBC AUTO-ENTMCNC: 34.3 G/DL (ref 31.4–37.4)
MCV RBC AUTO: 87 FL (ref 82–98)
METAMYELOCYTES NFR BLD MANUAL: 1 % (ref 0–1)
MONOCYTES # BLD AUTO: 0.58 THOUSAND/UL (ref 0–1.22)
MONOCYTES NFR BLD: 3 % (ref 4–12)
MYELOCYTES NFR BLD MANUAL: 1 % (ref 0–1)
NEUTROPHILS # BLD MANUAL: 17.28 THOUSAND/UL (ref 1.85–7.62)
NEUTS SEG NFR BLD AUTO: 90 % (ref 43–75)
NRBC BLD AUTO-RTO: 0 /100 WBCS
PHOSPHATE SERPL-MCNC: 1.9 MG/DL (ref 2.7–4.5)
PLATELET # BLD AUTO: 452 THOUSANDS/UL (ref 149–390)
PLATELET BLD QL SMEAR: ABNORMAL
PMV BLD AUTO: 9.5 FL (ref 8.9–12.7)
POLYCHROMASIA BLD QL SMEAR: PRESENT
POTASSIUM SERPL-SCNC: 4.2 MMOL/L (ref 3.5–5.3)
RBC # BLD AUTO: 2.75 MILLION/UL (ref 3.88–5.62)
RBC MORPH BLD: PRESENT
SODIUM SERPL-SCNC: 134 MMOL/L (ref 136–145)
TARGETS BLD QL SMEAR: PRESENT
VANCOMYCIN TROUGH SERPL-MCNC: 13.8 UG/ML (ref 10–20)
WBC # BLD AUTO: 19.2 THOUSAND/UL (ref 4.31–10.16)

## 2019-02-05 PROCEDURE — 80202 ASSAY OF VANCOMYCIN: CPT | Performed by: EMERGENCY MEDICINE

## 2019-02-05 PROCEDURE — 85027 COMPLETE CBC AUTOMATED: CPT | Performed by: EMERGENCY MEDICINE

## 2019-02-05 PROCEDURE — 99152 MOD SED SAME PHYS/QHP 5/>YRS: CPT

## 2019-02-05 PROCEDURE — 94669 MECHANICAL CHEST WALL OSCILL: CPT

## 2019-02-05 PROCEDURE — 87075 CULTR BACTERIA EXCEPT BLOOD: CPT | Performed by: SURGERY

## 2019-02-05 PROCEDURE — 84100 ASSAY OF PHOSPHORUS: CPT | Performed by: EMERGENCY MEDICINE

## 2019-02-05 PROCEDURE — 97530 THERAPEUTIC ACTIVITIES: CPT

## 2019-02-05 PROCEDURE — 94660 CPAP INITIATION&MGMT: CPT

## 2019-02-05 PROCEDURE — 71260 CT THORAX DX C+: CPT

## 2019-02-05 PROCEDURE — 87070 CULTURE OTHR SPECIMN AEROBIC: CPT | Performed by: SURGERY

## 2019-02-05 PROCEDURE — C1769 GUIDE WIRE: HCPCS

## 2019-02-05 PROCEDURE — 85007 BL SMEAR W/DIFF WBC COUNT: CPT | Performed by: EMERGENCY MEDICINE

## 2019-02-05 PROCEDURE — 32557 INSERT CATH PLEURA W/ IMAGE: CPT

## 2019-02-05 PROCEDURE — C1729 CATH, DRAINAGE: HCPCS

## 2019-02-05 PROCEDURE — 82948 REAGENT STRIP/BLOOD GLUCOSE: CPT

## 2019-02-05 PROCEDURE — 83735 ASSAY OF MAGNESIUM: CPT | Performed by: EMERGENCY MEDICINE

## 2019-02-05 PROCEDURE — 99153 MOD SED SAME PHYS/QHP EA: CPT

## 2019-02-05 PROCEDURE — 94640 AIRWAY INHALATION TREATMENT: CPT

## 2019-02-05 PROCEDURE — 94760 N-INVAS EAR/PLS OXIMETRY 1: CPT

## 2019-02-05 PROCEDURE — 80048 BASIC METABOLIC PNL TOTAL CA: CPT | Performed by: EMERGENCY MEDICINE

## 2019-02-05 PROCEDURE — 97112 NEUROMUSCULAR REEDUCATION: CPT

## 2019-02-05 PROCEDURE — 99233 SBSQ HOSP IP/OBS HIGH 50: CPT | Performed by: INTERNAL MEDICINE

## 2019-02-05 PROCEDURE — 0W9930Z DRAINAGE OF RIGHT PLEURAL CAVITY WITH DRAINAGE DEVICE, PERCUTANEOUS APPROACH: ICD-10-PCS | Performed by: RADIOLOGY

## 2019-02-05 PROCEDURE — 32561 LYSE CHEST FIBRIN INIT DAY: CPT

## 2019-02-05 PROCEDURE — 99152 MOD SED SAME PHYS/QHP 5/>YRS: CPT | Performed by: RADIOLOGY

## 2019-02-05 PROCEDURE — 3E0L3GC INTRODUCTION OF OTHER THERAPEUTIC SUBSTANCE INTO PLEURAL CAVITY, PERCUTANEOUS APPROACH: ICD-10-PCS | Performed by: RADIOLOGY

## 2019-02-05 PROCEDURE — 71045 X-RAY EXAM CHEST 1 VIEW: CPT

## 2019-02-05 PROCEDURE — 99232 SBSQ HOSP IP/OBS MODERATE 35: CPT | Performed by: SURGERY

## 2019-02-05 PROCEDURE — 87205 SMEAR GRAM STAIN: CPT | Performed by: SURGERY

## 2019-02-05 PROCEDURE — 97535 SELF CARE MNGMENT TRAINING: CPT

## 2019-02-05 PROCEDURE — 99233 SBSQ HOSP IP/OBS HIGH 50: CPT | Performed by: EMERGENCY MEDICINE

## 2019-02-05 PROCEDURE — 32557 INSERT CATH PLEURA W/ IMAGE: CPT | Performed by: RADIOLOGY

## 2019-02-05 PROCEDURE — 74177 CT ABD & PELVIS W/CONTRAST: CPT

## 2019-02-05 RX ORDER — FUROSEMIDE 10 MG/ML
INJECTION INTRAMUSCULAR; INTRAVENOUS
Status: COMPLETED
Start: 2019-02-05 | End: 2019-02-05

## 2019-02-05 RX ORDER — FUROSEMIDE 10 MG/ML
40 INJECTION INTRAMUSCULAR; INTRAVENOUS ONCE
Status: COMPLETED | OUTPATIENT
Start: 2019-02-05 | End: 2019-02-05

## 2019-02-05 RX ORDER — MIDAZOLAM HYDROCHLORIDE 1 MG/ML
INJECTION INTRAMUSCULAR; INTRAVENOUS CODE/TRAUMA/SEDATION MEDICATION
Status: COMPLETED | OUTPATIENT
Start: 2019-02-05 | End: 2019-02-05

## 2019-02-05 RX ORDER — FUROSEMIDE 10 MG/ML
40 INJECTION INTRAMUSCULAR; INTRAVENOUS
Status: DISCONTINUED | OUTPATIENT
Start: 2019-02-06 | End: 2019-02-06

## 2019-02-05 RX ORDER — FENTANYL CITRATE 50 UG/ML
INJECTION, SOLUTION INTRAMUSCULAR; INTRAVENOUS CODE/TRAUMA/SEDATION MEDICATION
Status: COMPLETED | OUTPATIENT
Start: 2019-02-05 | End: 2019-02-05

## 2019-02-05 RX ORDER — MAGNESIUM SULFATE 1 G/100ML
1 INJECTION INTRAVENOUS ONCE
Status: COMPLETED | OUTPATIENT
Start: 2019-02-05 | End: 2019-02-05

## 2019-02-05 RX ORDER — ACETAMINOPHEN 325 MG/1
650 TABLET ORAL ONCE
Status: COMPLETED | OUTPATIENT
Start: 2019-02-05 | End: 2019-02-05

## 2019-02-05 RX ADMIN — OXYCODONE HYDROCHLORIDE 10 MG: 10 TABLET ORAL at 20:37

## 2019-02-05 RX ADMIN — DOCUSATE SODIUM 100 MG: 100 CAPSULE, LIQUID FILLED ORAL at 08:10

## 2019-02-05 RX ADMIN — MAGNESIUM SULFATE HEPTAHYDRATE 1 G: 1 INJECTION, SOLUTION INTRAVENOUS at 08:12

## 2019-02-05 RX ADMIN — ISODIUM CHLORIDE 3 ML: 0.03 SOLUTION RESPIRATORY (INHALATION) at 20:02

## 2019-02-05 RX ADMIN — FAMOTIDINE 20 MG: 20 TABLET, FILM COATED ORAL at 08:10

## 2019-02-05 RX ADMIN — ISODIUM CHLORIDE 3 ML: 0.03 SOLUTION RESPIRATORY (INHALATION) at 07:54

## 2019-02-05 RX ADMIN — CEFTRIAXONE SODIUM 2000 MG: 10 INJECTION, POWDER, FOR SOLUTION INTRAVENOUS at 13:57

## 2019-02-05 RX ADMIN — Medication 10 MG: at 08:09

## 2019-02-05 RX ADMIN — CHLORHEXIDINE GLUCONATE 0.12% ORAL RINSE 15 ML: 1.2 LIQUID ORAL at 22:09

## 2019-02-05 RX ADMIN — CEFAZOLIN SODIUM 2000 MG: 2 SOLUTION INTRAVENOUS at 05:07

## 2019-02-05 RX ADMIN — LEVALBUTEROL 1.25 MG: 1.25 SOLUTION, CONCENTRATE RESPIRATORY (INHALATION) at 13:23

## 2019-02-05 RX ADMIN — LEVALBUTEROL 1.25 MG: 1.25 SOLUTION, CONCENTRATE RESPIRATORY (INHALATION) at 20:02

## 2019-02-05 RX ADMIN — LEVALBUTEROL 1.25 MG: 1.25 SOLUTION, CONCENTRATE RESPIRATORY (INHALATION) at 07:54

## 2019-02-05 RX ADMIN — FUROSEMIDE 40 MG: 10 INJECTION, SOLUTION INTRAMUSCULAR; INTRAVENOUS at 14:18

## 2019-02-05 RX ADMIN — STANDARDIZED SENNA CONCENTRATE 8.6 MG: 8.6 TABLET ORAL at 22:08

## 2019-02-05 RX ADMIN — NICOTINE 21 MG: 21 PATCH, EXTENDED RELEASE TRANSDERMAL at 17:47

## 2019-02-05 RX ADMIN — ALTEPLASE 5 MG: 2.2 INJECTION, POWDER, LYOPHILIZED, FOR SOLUTION INTRAVENOUS at 16:50

## 2019-02-05 RX ADMIN — METRONIDAZOLE 500 MG: 500 TABLET ORAL at 05:07

## 2019-02-05 RX ADMIN — ISODIUM CHLORIDE 3 ML: 0.03 SOLUTION RESPIRATORY (INHALATION) at 13:23

## 2019-02-05 RX ADMIN — HYDROMORPHONE HYDROCHLORIDE 1 MG: 1 INJECTION, SOLUTION INTRAMUSCULAR; INTRAVENOUS; SUBCUTANEOUS at 12:08

## 2019-02-05 RX ADMIN — OXYCODONE HYDROCHLORIDE 10 MG: 10 TABLET ORAL at 09:09

## 2019-02-05 RX ADMIN — FENTANYL CITRATE 50 MCG: 50 INJECTION INTRAMUSCULAR; INTRAVENOUS at 16:36

## 2019-02-05 RX ADMIN — MIDAZOLAM 1 MG: 1 INJECTION INTRAMUSCULAR; INTRAVENOUS at 16:36

## 2019-02-05 RX ADMIN — ACETAMINOPHEN 650 MG: 325 TABLET, FILM COATED ORAL at 05:07

## 2019-02-05 RX ADMIN — METRONIDAZOLE 500 MG: 500 TABLET ORAL at 14:03

## 2019-02-05 RX ADMIN — FUROSEMIDE 40 MG: 10 INJECTION INTRAMUSCULAR; INTRAVENOUS at 14:18

## 2019-02-05 RX ADMIN — DEXTROSE, SODIUM CHLORIDE, AND POTASSIUM CHLORIDE 75 ML/HR: 5; .45; .15 INJECTION INTRAVENOUS at 00:36

## 2019-02-05 RX ADMIN — ISODIUM CHLORIDE 3 ML: 0.03 SOLUTION RESPIRATORY (INHALATION) at 00:36

## 2019-02-05 RX ADMIN — CHLORHEXIDINE GLUCONATE 0.12% ORAL RINSE 15 ML: 1.2 LIQUID ORAL at 08:10

## 2019-02-05 RX ADMIN — VANCOMYCIN HYDROCHLORIDE 1500 MG: 10 INJECTION, POWDER, LYOPHILIZED, FOR SOLUTION INTRAVENOUS at 04:33

## 2019-02-05 RX ADMIN — HEPARIN SODIUM 5000 UNITS: 5000 INJECTION INTRAVENOUS; SUBCUTANEOUS at 22:09

## 2019-02-05 RX ADMIN — DOCUSATE SODIUM 100 MG: 100 CAPSULE, LIQUID FILLED ORAL at 17:49

## 2019-02-05 RX ADMIN — DIAZEPAM 10 MG: 5 TABLET ORAL at 08:10

## 2019-02-05 RX ADMIN — METRONIDAZOLE 500 MG: 500 TABLET ORAL at 22:08

## 2019-02-05 RX ADMIN — LEVALBUTEROL 1.25 MG: 1.25 SOLUTION, CONCENTRATE RESPIRATORY (INHALATION) at 00:36

## 2019-02-05 RX ADMIN — QUETIAPINE FUMARATE 200 MG: 100 TABLET ORAL at 22:07

## 2019-02-05 RX ADMIN — HEPARIN SODIUM 5000 UNITS: 5000 INJECTION INTRAVENOUS; SUBCUTANEOUS at 05:07

## 2019-02-05 RX ADMIN — IOHEXOL 100 ML: 350 INJECTION, SOLUTION INTRAVENOUS at 13:24

## 2019-02-05 RX ADMIN — BUPROPION HYDROCHLORIDE 300 MG: 150 TABLET, FILM COATED, EXTENDED RELEASE ORAL at 08:11

## 2019-02-05 RX ADMIN — ARIPIPRAZOLE 5 MG: 5 TABLET ORAL at 05:07

## 2019-02-05 RX ADMIN — POTASSIUM & SODIUM PHOSPHATES POWDER PACK 280-160-250 MG 1 PACKET: 280-160-250 PACK at 08:10

## 2019-02-05 RX ADMIN — LORAZEPAM 1 MG: 2 INJECTION INTRAMUSCULAR; INTRAVENOUS at 05:22

## 2019-02-05 RX ADMIN — DIBASIC SODIUM PHOSPHATE, MONOBASIC POTASSIUM PHOSPHATE AND MONOBASIC SODIUM PHOSPHATE 2 TABLET: 852; 155; 130 TABLET ORAL at 08:09

## 2019-02-05 RX ADMIN — POLYETHYLENE GLYCOL 3350 17 G: 17 POWDER, FOR SOLUTION ORAL at 08:09

## 2019-02-05 RX ADMIN — FAMOTIDINE 20 MG: 20 TABLET, FILM COATED ORAL at 22:08

## 2019-02-05 NOTE — PROGRESS NOTES
Progress Note - Acute Pain Service    Samantha Benavides 35 y o  male MRN: [id number]  Unit/Bed#: ICU 06 Encounter: [id number]      Assessment:   · 35year old male having acute pain, admitted with sepsis 2/2 multiple hepatic abscesses s/p IR drainage  · HX IV drug use 2 years ago     Plan:   1  Acute pain:  Discontinue  · Dilaudid 1 mg IV Q 3 HRS PRN breakthrough pain- Patient has not required in the last 48 hours     Continue  · Valium 10 mg PO BID - home med   · oxycodone 5 mg PO Q 4 HRS PRN moderate pain   · Oxycodone 10 mg PO Q 4 HRS PRN severe pain     [facility] sign off  Thank you for the Consult  Please call  / [phone] ( [phone]) with any further questions    Pain History  24 hour history: patient states he had a good night with minimal pain  Patient still drowsy  Admits to upper abdominal pain that comes and goes  Admits to great relief from oxycodone  Patient requiring minimum dose of opioids  States the medications make him sleepy  Please re-consult if needed  24 hour opoid requirement: 20 mg Po oxycodone     Meds/Allergies   all current active meds have been reviewed      Allergies   Allergen Reactions    Sulfa Antibiotics Hives       Objective     Temp:  [100 °F (37 8 °C)-102 2 °F (39 °C)] 100 °F (37 8 °C)  HR:  [] 92  Resp:  [24-44] 24  BP: (105-154)/(51-86) 114/54    Physical Exam   Constitutional: He appears well-developed and well-nourished  No distress  HENT:   Head: Normocephalic  Eyes: Pupils are equal, round, and reactive to light  Neck: Normal range of motion  Cardiovascular: Normal rate  Pulmonary/Chest: Effort normal    Abdominal: Soft  There is tenderness  Neurological: He is alert  Skin: Skin is warm and dry  Psychiatric: He has a normal mood and affect  His behavior is normal  Judgment and thought content normal        Lab Results: I have personally reviewed pertinent labs    ILevels of Care: Level 1 = 15 minutes

## 2019-02-05 NOTE — CONSULTS
Consultation - Oncology Surgery   Vester Liner 35 y o  male MRN: [id number]  Unit/Bed#: ICU 06 Encounter: [id number]    Assessment/Plan     Assessment:  34 yo M with extensive hepatic abscesses throughout the right lobe of his liver, s/p IR drainage  Heavy abscess burden that may not be completely drainable / cleared percutaneously however operative intervention at this time would be very difficult and possibly dangerous due to his acute infection  Plan:  No operative intervention at this time- recommend continuing drainage, may require tPA ' ing drains to break up loculations      History of Present Illness     HPI:  Danii Liner is a 35 y o  male with history of drug abuse and Hep C (s/p treatment, states he hasnt done drugs in 2 years) presents with abdominal pain and sepsis  On CT imaging he was found to have multiple (>7) hepatic abscesses, mostly in the right lobe of the liver  That evening he went to IR for drain placement  5 drains were placed and put out between 250-400 ml / day  However, the patient has been persistently febrile despite drainage and antibiotic therapy (currently on Rocephin/Flagyl)  Interim CT was performed today which shows well placed IR catheters however persistence of these collections  Cultures are growing Strep intermedius  WBC today 19 2  Tmax over past 24 hrs is 101 8  Consults    Review of Systems   + nausea, fever, fatigue, SOB, abdominal pain, jaundice    - dizziness, constipation, diarrhea, blurred vision   All other systems negative     Historical Information   Past Medical History:   Diagnosis Date    ADHD     Anxiety     Bipolar 1 disorder ([facility] [address] )     Delusion (HCC)     Hepatitis C     Infectious viral hepatitis     Hep C- No Symptoms( Took a course of Harvoni)    Psoriasis     PTSD (post-traumatic stress disorder)     residential     Past Surgical History:   Procedure Laterality Date    CT GUIDED PERC DRAINAGE CATHETER PLACEMENT  2/1/2019    OK REMOVAL OF SPERM DUCT(S) Bilateral 9/25/2018    Procedure: VASECTOMY;  Surgeon: Rachel Ramirez MD;  Location: [facility];  Service: Urology    WISDOM TOOTH EXTRACTION       Social History   History   Alcohol Use    Yes     Comment: socially     History   Drug Use    Types: Marijuana, Methaqualone     Comment: medical- vaping 4x/day     History   Smoking Status    Current Every Day Smoker    Packs/day: 1 00   Smokeless Tobacco    Never Used     Family History: History reviewed  No pertinent family history      Meds/Allergies   all current active meds have been reviewed, current meds:   Current Facility-Administered Medications   Medication Dose Route Frequency    ARIPiprazole (ABILIFY) tablet 5 mg  5 mg Oral Early Morning    bisacodyl (DULCOLAX) rectal suppository 10 mg  10 mg Rectal Daily    buPROPion (WELLBUTRIN XL) 24 hr tablet 300 mg  300 mg Oral QAM    cefTRIAXone (ROCEPHIN) 2,000 mg in dextrose 5 % 50 mL IVPB  2,000 mg Intravenous Q24H    chlorhexidine (PERIDEX) 0 12 % oral rinse 15 mL  15 mL Swish & Spit Q12H [facility]    diazepam (VALIUM) tablet 10 mg  10 mg Oral BID    docusate sodium (COLACE) capsule 100 mg  100 mg Oral BID    famotidine (PEPCID) tablet 20 mg  20 mg Oral Q12H [facility]    [START ON 2/6/2019] furosemide (LASIX) injection 40 mg  40 mg Intravenous BID (diuretic)    heparin (porcine) subcutaneous injection 5,000 Units  5,000 Units Subcutaneous Q8H [facility]    HYDROmorphone (DILAUDID) injection 1 mg  1 mg Intravenous Q3H PRN    levalbuterol (XOPENEX) inhalation solution 1 25 mg  1 25 mg Nebulization Q6H    LORazepam (ATIVAN) 2 mg/mL injection 1 mg  1 mg Intravenous Q4H PRN    metroNIDAZOLE (FLAGYL) tablet 500 mg  500 mg Oral Q8H [facility]    nicotine (NICODERM CQ) 21 mg/24 hr TD 24 hr patch 21 mg  21 mg Transdermal Daily    oxyCODONE (ROXICODONE) immediate release tablet 10 mg  10 mg Oral Q4H PRN    oxyCODONE (ROXICODONE) IR tablet 5 mg  5 mg Oral Q4H PRN    polyethylene glycol (MIRALAX) packet 17 g  17 g Oral Daily PRN    polyethylene glycol (MIRALAX) packet 17 g  17 g Oral Daily    prazosin (MINIPRESS) capsule 1 mg  1 mg Oral Daily PRN    QUEtiapine (SEROquel) tablet 200 mg  200 mg Oral HS    senna (SENOKOT) tablet 8 6 mg  1 tablet Oral HS    sodium chloride 0 9 % inhalation solution 3 mL  3 mL Nebulization Q6H    and PTA meds:   Prior to Admission Medications   Prescriptions Last Dose Informant Patient Reported? Taking?    ARIPiprazole (ABILIFY) 5 mg tablet 1/31/2019 at Unknown time Self Yes Yes   Sig: Take 5 mg by mouth daily in the early morning     QUEtiapine (SEROquel) 200 mg tablet 1/31/2019 at Unknown time Self Yes Yes   Sig: Take 200 mg by mouth daily at bedtime   amphetamine-dextroamphetamine (ADDERALL) 20 mg tablet 1/31/2019 at Unknown time Self Yes Yes   Sig: TAKE 1 TABLET BY MOUTH AT 8AM, NOON, AND 4PM   buPROPion (WELLBUTRIN XL) 300 mg 24 hr tablet 1/31/2019 at Unknown time Self Yes Yes   Sig: Take 300 mg by mouth every morning   diazepam (VALIUM) 10 mg tablet 1/31/2019 at Unknown time Self Yes Yes   Sig: Take 10 mg by mouth 2 (two) times a day   docusate sodium (COLACE) 100 mg capsule  Self No No   Sig: Take 1 capsule (100 mg total) by mouth 3 (three) times a day for 30 days   prazosin (MINIPRESS) 1 mg capsule 1/31/2019 at Unknown time Self Yes Yes   Sig: Take 1 mg by mouth as needed Anxiety       Facility-Administered Medications: None     Allergies   Allergen Reactions    Sulfa Antibiotics Hives       Objective   First Vitals:   Blood Pressure: 111/72 (02/01/19 1224)  Pulse: 76 (02/01/19 1224)  Temperature: (!) 103 1 °F (39 5 °C) (02/01/19 1224)  Temp Source: Rectal (02/01/19 1224)  Respirations: (!) 24 (02/01/19 1224)  Height: 6' 2" (188 cm) (02/01/19 2138)  Weight - Scale: 112 kg (246 lb 14 6 oz) (02/01/19 2138)  SpO2: 96 % (02/01/19 1224)    Current Vitals:   Blood Pressure: 124/70 (02/05/19 1000)  Pulse: 96 (02/05/19 1000)  Temperature: 99 7 °F (37 6 °C) (02/05/19 1000)  Temp Source: Probe (02/05/19 0800)  Respirations: (!) 33 (02/05/19 1000)  Height: 6' 2" (188 cm) (02/01/19 2138)  Weight - Scale: 120 kg (263 lb 7 2 oz) (02/05/19 0600)  SpO2: 95 % (02/05/19 1323)      Intake/Output Summary (Last 24 hours) at 02/05/19 1413  Last data filed at 02/05/19 1100   Gross per 24 hour   Intake           3627 4 ml   Output             1945 ml   Net           1682 4 ml       Invasive Devices     Peripheral Intravenous Line            Peripheral IV 02/01/19 Left Wrist 3 days    Peripheral IV 02/03/19 Left Antecubital 2 days          Drain            Closed/Suction Drain Right Abdomen Bulb 10 2 Fr  3 days    Closed/Suction Drain Right; Inferior;Superior Abdomen Bulb 8 5 Fr  3 days    Closed/Suction Drain Right;Posterior; Inferior Abdomen Bulb 10 2 Fr  3 days    Closed/Suction Drain Right;Superior; Anterior Abdomen Bulb 8 5 Fr  3 days    Closed/Suction Drain Right;Superior;Medial Abdomen Bulb 8 5 Fr  3 days    Urethral Catheter Temperature probe 1 day                Physical Exam   Constitutional: He is oriented to person, place, and time  He appears lethargic  He appears ill  No distress  HENT:   Head: Normocephalic and atraumatic  Eyes: Pupils are equal, round, and reactive to light  Conjunctivae are normal    Neck: Normal range of motion  Neck supple  Cardiovascular: Normal rate, regular rhythm and intact distal pulses  Pulmonary/Chest: Effort normal and breath sounds normal  No respiratory distress  Abdominal: Soft  He exhibits distension  He exhibits no mass  There is tenderness  There is no guarding  Multiple DANNY drains in place, serous/serosanginous   Musculoskeletal: Normal range of motion  Neurological: He is oriented to person, place, and time  He appears lethargic  Skin: Skin is warm and dry  Jaundiced   Vitals reviewed        Lab Results:   CBC:   Lab Results   Component Value Date    WBC 19 20 (H) 02/05/2019    HGB 8 2 (L) 02/05/2019    HCT 23 9 (L) 02/05/2019    MCV 87 02/05/2019    PLT 452 (H) 02/05/2019    MCH 29 8 02/05/2019    MCHC 34 3 02/05/2019    RDW 15 4 (H) 02/05/2019    MPV 9 5 02/05/2019    NRBC 0 02/05/2019   , CMP:   Lab Results   Component Value Date    SODIUM 134 (L) 02/05/2019    K 4 2 02/05/2019     02/05/2019    CO2 25 02/05/2019    BUN 10 02/05/2019    CREATININE 0 78 02/05/2019    CALCIUM 7 4 (L) 02/05/2019    EGFR 113 02/05/2019   , Coagulation: No results found for: PT, INR, APTT  Imaging: I have personally reviewed pertinent reports  XR chest portable ICU   Final Result by Shy Hightower MD (02/05 0932)      Increased right pleural effusion  Stable diffuse airspace disease  Workstation performed: [id number]         XR chest portable   Final Result by Lord Tata MD (02/04 1106)      Diffuse bilateral pulmonary opacities again seen  Moderate right pleural effusion  Workstation performed: [id number]         XR chest portable   Final Result by Nelson Ca MD (02/04 0630)      Vascular congestion with new moderate right pleural effusion  Workstation performed: [id number]         CT guided perc drainage catheter placeme   Final Result by Kimani Ng MD (02/01 2242)   Impression: Successful placement of a total of 5  all-purpose drainage catheters into the 5 largest liver abscess collections  A total of 300 cc of purulent material was aspirated and a specimen sent to the laboratory as described  The 5 drains were labeled as anterior superior, middle superior, posterior superior, central, and posterior inferior  The two 10-Japanese drains were the central drain and the posterior-inferior drain  The 3 superior drains were 8 5-Japanese  Plan: Tubes to DANNY bulb suction  Flush each tube with 20 mL normal saline solution  The patient will likely need more drains placed since there are several other abscesses    We will follow patient and see on follow-up CAT scan if 2 further drains are    needed or if the existing drains require repositioning and or wire disruption of the loculations to improvedrainage  Workstation performed: [id number]         CT abdomen pelvis with contrast   Final Result by Boby Cordon MD (02/01 0089)         1  In the provided clinical setting of fever and severe leukocytosis, the innumerable hepatic multilocular/multiseptated liver masses are most likely pyogenic abscesses versus less likely anemic abscesses  Other causes of cystic liver masses are less    likely in this clinical setting  The patient's reported history of hepatitis C is noted  After patient's acute issues have resolved and the above liver lesions have been treated, surveillance liver imaging could be performed  2   Nondistended gallbladder with mild wall thickening and mucosal hyperemia is likely reactive  I personally discussed this study with Lennox Cumming on 2/1/2019 at 3:21PM                Workstation performed: [id number]         XR chest 2 views   Final Result by Jnuo Valera DO (02/01 2970)      Elevation of the right hemidiaphragm with bibasilar subsegmental atelectasis and suspected trace right effusion  Workstation performed: [id number]         CT chest abdomen pelvis w contrast    (Results Pending)   IR consult    (Results Pending)       EKG, Pathology, and Other Studies: I have personally reviewed pertinent reports  Counseling / Coordination of Care  Total floor / unit time spent today 45 minutes  Greater than 50% of total time was spent with the patient and / or family counseling and / or coordination of care  A description of the counseling / coordination of care: Francisco Farfan

## 2019-02-05 NOTE — SEDATION DOCUMENTATION
10f chest tube inserted to be hooked up to -20 suction  Sent for aerobic and anaerobic cultures  5 hepatic drains capped with 1 mg TPA/6mL NSS   To be capped for 2 hours then hooked back up to bulb suction

## 2019-02-05 NOTE — UTILIZATION REVIEW
Continued Stay Review    Date: 2-8-24     35year old male with hepatic abscess and history of iv drug abuse  S/p IR drainage on 2-1-19 with multiple drains  And positive blood cultures       JPs:   #1: 20mL (serous)   #2: 15mL (serous)  #3: 25mL (serous)  #4: 15mL (purulent)  #5: 35mL (serous)      Vital Signs: /70   Pulse 96   Temp 99 7 °F (37 6 °C)   Resp (!) 33   Ht 6' 2" (1 88 m)   Wt 120 kg (263 lb 7 2 oz)   SpO2 96%   BMI 33 82 kg/m²      Pertinent Lab and Diagnostic  Results     Blood Culture  1 Streptococcus intermedius (A)     Gram Stain Result Gram positive cocci in chains     Gram positive cocci in clusters     Blood Culture   2 Streptococcus intermedius (A)     Comment: see related culture for Identification and/or Susceptibilitiy       Gram Stain Result Gram positive cocci in chains     Gram positive cocci in clusters         Assessment/Plan:    Continue diuresis  Continue all drains to bulb suction   Monitor temperature which continues to fluctuate   Today  101  8     Respiratory rate  Continues   24 to  38     Heart rate   90 to  100    ID considering  Possibility of endocarditis with septic emboli to liver     Blood cultures  Positive for possible strep however restarting vancomycin until staphlococus ruled out   Change iv ceftriaxone to iv ancef   Repeat Echo     Medications:     ARIPiprazole 5 mg Oral Early Morning   bisacodyl 10 mg Rectal Daily   buPROPion 300 mg Oral QAM   cefazolin 2,000 mg Intravenous Q8H   chlorhexidine 15 mL Swish & Spit Q12H [facility]   dextrose 5 % and sodium chloride 0 45 % with KCl 20 mEq/L 75 mL/hr Intravenous Continuous   diazepam 10 mg Oral BID   docusate sodium 100 mg Oral BID   famotidine 20 mg Oral Q12H [address]   heparin (porcine) 5,000 Units Subcutaneous Q8H [address]   HYDROmorphone 1 mg Intravenous Q3H PRN   levalbuterol 1 25 mg Nebulization Q6H   LORazepam 1 mg Intravenous Q4H PRN   metroNIDAZOLE 500 mg Oral Q8H [address]   nicotine 21 mg Transdermal Daily oxyCODONE 10 mg Oral Q4H PRN   oxyCODONE 5 mg Oral Q4H PRN   polyethylene glycol 17 g Oral Daily PRN   polyethylene glycol 17 g Oral Daily   prazosin 1 mg Oral Daily PRN   QUEtiapine 200 mg Oral HS   senna 1 tablet Oral HS   sodium chloride 3 mL Nebulization Q6H   vancomycin 1,500 mg Intravenous Q8H       Discharge Plan:   To be determined

## 2019-02-05 NOTE — SPEECH THERAPY NOTE
Patient made NPO for CT scan  Will f/u when able and if diet is to be advanced  Notes are questioning aspiration pneumonia  May consider VBS to r/o

## 2019-02-05 NOTE — BRIEF OP NOTE (RAD/CATH)
Right Chest Tube Placement Procedure Note    PATIENT NAME: Junito Husbands  : 1980  MRN: [id number]     Pre-op Diagnosis:   1  Pyogenic hepatic abscess    2  Fever    3  Sepsis ([facility] [address] )    4  Acute hepatic failure    5  Urinary retention      Post-op Diagnosis:   1  Pyogenic hepatic abscess    2  Fever    3  Sepsis ([facility] [address] )    4  Acute hepatic failure    5  Urinary retention        Surgeon:   Aleyda Martinez DO  Assistants:     No qualified resident was available  Estimated Blood Loss:  None  Findings:   Large right pleural effusion  Ten Ukrainian pigtail drain placed, connected to pleura vac  Right pleural fluid sent for lab analysis  tPA instillation into 5 hepatic abscess drains for 2 hour dwell time      Specimens:  Right pleural fluid    Complications:  None    Anesthesia: Conscious sedation and Local    Aleyda Martinez DO     Date: 2019  Time: 4:49 PM

## 2019-02-05 NOTE — PLAN OF CARE
Problem: PHYSICAL THERAPY ADULT  Goal: Performs mobility at highest level of function for planned discharge setting  See evaluation for individualized goals  Treatment/Interventions: Functional transfer training, LE strengthening/ROM, Elevations, Therapeutic exercise, Endurance training, Patient/family training, Equipment eval/education, Bed mobility, Gait training, OT, Spoke to nursing  Equipment Recommended: Halina Matthews (CLARA)       See flowsheet documentation for full assessment, interventions and recommendations  Outcome: Progressing  Prognosis: Fair  Problem List: Decreased strength, Decreased endurance, Impaired balance, Decreased mobility, Decreased cognition, Impaired judgement, Decreased safety awareness, Pain  Assessment: PT INITIATED TREATMENT SESSION IN ORDER TO ASSIST PATIENT IN ACHIEVING GOALS FOR IMPROVED TRANSFERS, AMBULATION, AND OVERALL ACTIVITY TOLERANCE  PATIENT DID NOT HAVE SELF EXPRESSED GOAL  FUNCTIONAL MOBILITY LIMITED TODAY BY SIGNIFICANT LETHARGY (REQUIRED CONTINUOUS VC TO MAINTAIN EYES OPEN) AND COGNITIVE DEFICITS (ORIENTED X1, REDUCED COMMAND FOLLOWING)  PATIENT PERFORMED LOG ROLL SUPINE-->SIT TO REDUCE ABDOMINAL PAIN, REQUIRING MOD-AX1  HE MAINTAINED FAIR- STATIC SITTING BALANCE EOB X 5 MINUTES  PATIENT PARTICIPATED IN TWO SIT<-->STAND TRANSFERS W/ HEIGHT OF BED ELEVATED  HE REQUIRED MOD-AX2 WITH SKILLED THERAPISTS POSITIONED ON EITHER SIDE AND RN MANAGING 5 DANNY DRAINS  VC PROVIDED FOR EFFICIENT SEQUENCING (SCOOT EDGE OF BED, NOSE OVER TOES, PUSH FROM BED)  PATIENT PARTICIPATED IN ONE STAND PIVOT TRANSFER (BED TO CHAIR) WITH MOD-AX2 HHA  VC PROVIDED TO INITIATE EACH STEP  FURTHER AMBULATION NOT APPROPRIATE AT THIS TIME SECONDARY TO GROSS LETHARGY/POOR COMMAND FOLLOWING  NEXT SESSION PLAN TO PROGRESS AMBULATION  TRIAL RW WITH AX2 AND 3RD PERSON FOR CHAIR FOLLOW AS APPROPRIATE  PATIENT REMAINS APPROPRIATE FOR PT D/C REC OF STR   HE WILL BENEFIT FROM CONTINUED SKILLED PT THIS ADMISSION TO ACHIEVE MAXIMAL FUNCTION AND SAFETY  Recommendation: (S) Short-term skilled PT     PT - OK to Discharge: (S) Yes (TO STR WHEN MED CLAUDINE )    See flowsheet documentation for full assessment     Lisa Castillo PT

## 2019-02-05 NOTE — PROGRESS NOTES
Progress Note - Critical Care   Waqas Stratton 35 y o  male MRN: [id number]  Unit/Bed#: ICU 06 Encounter: [id number]    Assessment: 34 yo M w/ sepsis from multiple hepatic abscesses s/p IR drainage on 2/1     Principal Problem:    Hepatic abscess  Active Problems:    History of hepatitis C    Hyponatremia    Depression    Anxiety    IV drug abuse ([address] )  Resolved Problems:    * No resolved hospital problems  *     Neuro:   Anxiety: continue home meds of Abilify, Wellbutrin, Valium, Seroquel  PRN minipress restarted today also  Analgesia: per pain mgmt  · Valium 10 mg PO BID - home med   · oxycodone 5 mg PO Q 4 HRS PRN moderate pain   · Oxycodone 10 mg PO Q 4 HRS PRN severe pain   · Dilaudid 1 mg IV Q 3 HRS PRN breakthrough pain      CV: No acute issues  Currently hemodynamically stable, continue to trend cuff pressures     Pulm:   1  Acute hypoxic respiratory failure:   -HFNC, 55% LPM, 52% FiO2  Will continue HFNC titrating for sats>88% given history of smoking   -Trial wean  -Respiratory protocol and airway protocol ordered   -ABG relatively unremarkable    -2/3 CXR demonstrates increased R pleural effusion as well as opacity on RLL/RML, Possibility of aspiration pneumonitis given location  -2/4 CXR appears mildly improved  -Aggressive pulmonary toilet/IS, encourage coughing   -2/5 CXR appears unchanged      GI:   1  Hepatitis C: s/p Harvoni treatment x12 months [id number]  Hep C Ab highly reactive  Could consider obtaining viral PCR to evaluate for virologic response  2  Transaminitis: continue to trend LFT's, all improving  Bowel regimen: senokot, no BM, will add miralax     :   1  HENRIQUE   -resolved, creatinine this morning 0 78  2   Urinary retention   -Mckeon in   -Total in: 4721 3  -Total out: 4170, UOP 3930cc/24hrs, 164cc/hr  -Net +551 3  -Since admit: +6005 3  -Per uro, leave mckeon and add flomax     F/E/N:   Fluids: D5 1/2 NS with 20 KCl 75cc/hr  Electrolytes: Na 134, K 4 2, Mag 1 7, Phos 1 9 repleted -repleting lytes for Mg>2, phos>3, K>4  Continue to trend hyponatremia  Nutrition: continue clears, may advance if CTAP negative      ID:   1  Hepatic abscesses: s/p IR drain placement x5   -Continue DANNY bulbs to suction  -Culture grew alpha hemolytic strep  -elevated LFTs, improving   -CTAP today per surgery, follow up, if negative may advance diet   2  Alpha strep bacteremia   --WBC 20 86-> 21 25->19 2  -2/1 blood cultures grew GPC in pairs and chains, one set grew Strep intermedius  -cefazolin, flagyl, and vanco as per ID   -vanco trough today  -Blood culture set x2 redrawn today, follow up   -TTE negative for any valvular lesions but given cultures would consider obtaining TAMICA to evaluate for endocarditis  -ID following      Heme:   1  Thrombocytosis: likely reactive in nature, will trend  2  Anemia   -9 6->9 1->8 2  -monitor H&H  DVT ppx: SQH, SCDs     Endo: No acute issues  QID blood sugar checks while in ICU     Msk/Skin:   Frequent turns/repositioning  OOB  PT/OT     Disposition:   Consider transfer to [address] [place] [address] Road: "Tired"    HPI/24hr events: Continues to be febrile, Tmax 101 8F  RR in th 35s  Physical Exam:     Physical Exam   Constitutional: He is oriented to person, place, and time  Vital signs are normal  He is sleeping  He has a sickly appearance  HENT:   Head: Atraumatic  Eyes: Pupils are equal, round, and reactive to light  Conjunctivae and EOM are normal  Scleral icterus is present  Neck: Neck supple  Carotid bruit is not present  Cardiovascular: Normal rate, regular rhythm, normal heart sounds, intact distal pulses and normal pulses  Pulmonary/Chest: Tachypnea noted  He has rales in the right lower field and the left lower field  Abdominal: Soft  Bowel sounds are normal  Mass:   35  There is no tenderness  Distension improved   Musculoskeletal: He exhibits no edema or tenderness  Neurological: He is alert and oriented to person, place, and time     Skin: Skin is warm  Capillary refill takes less than 2 seconds  Jaundice   Psychiatric: He has a normal mood and affect  His behavior is normal    Nursing note and vitals reviewed  Vitals:    19 0755 19 0800 19 0900 19 1000   BP:  122/64 127/70 124/70   BP Location:  Right arm     Pulse: 92 90 98 96   Resp: (!) 24 (!) 38 (!) 29 (!) 33   Temp:  99 3 °F (37 4 °C) 99 3 °F (37 4 °C) 99 7 °F (37 6 °C)   TempSrc:  Probe     SpO2: 97% 98% 93% 96%   Weight:       Height:                 Temperature:   Temp (24hrs), Av 9 °F (38 3 °C), Min:99 3 °F (37 4 °C), Max:102 2 °F (39 °C)    Current: Temperature: 99 7 °F (37 6 °C)    Weights:   IBW: 82 2 kg    Body mass index is 33 82 kg/m²  Weight (last 2 days)     Date/Time   Weight    19 0600  120 (263 45)              Hemodynamic Monitoring:  BP cuff     Non-Invasive/Invasive Ventilation Settings:  Respiratory    Lab Data (Last 4 hours)    None         O2/Vent Data (Last 4 hours)       0755          Non-Invasive Ventilation Mode HFNC                 Lab Results   Component Value Date    PHART 7 484 (H) 2019    EES4USU 33 7 (L) 2019    PO2ART 73 9 (L) 2019    LFW2IEE 24 8 2019    BEART 1 5 2019    SOURCE Radial, Right 2019         Intake and Outputs:  I/O        07 -  07 07 -  07 07 -  0700    P  O  1920 1720     I V  (mL/kg) 1517 5 (13 5) 1791 3 (15 1)     NG/ 300     IV Piggyback 680 910     Total Intake(mL/kg) 4417 5 (39 4) 4721 3 (39 7)     Urine (mL/kg/hr) 1945 (0 7) 3930 (1 4)     Drains 310 240     Total Output 2255 4170      Net +2162 5 +551 3                 UOP: 164cc/hour     Nutrition:        Diet Orders            Start     Ordered    19 0908  Diet NPO  Diet effective now     Question Answer Comment   Diet Type NPO    RD to adjust diet per protocol?  No        19 0580          Labs:     Results from last 7 days  Lab Units 19  6401 02/04/19  0500 02/03/19  0524   WBC Thousand/uL 19 20* 21 25* 20 86*   HEMOGLOBIN g/dL 8 2* 9 1* 9 6*   HEMATOCRIT % 23 9* 27 3* 28 0*   PLATELETS Thousands/uL 452* 428* 407*   MONO PCT % 3* 0* 4      Results from last 7 days  Lab Units 02/05/19  0518 02/04/19  0500 02/03/19  0524 02/02/19 0459 02/01/19 2011   SODIUM mmol/L 134* 135* 134* 134*  < >  --    POTASSIUM mmol/L 4 2 4 0 3 6 3 9  < >  --    CHLORIDE mmol/L 103 105 104 104  < >  --    CO2 mmol/L 25 24 23 22  < >  --    CO2, I-STAT mmol/L  --   --   --   --   --  24   BUN mg/dL 10 9 13 30*  < >  --    CREATININE mg/dL 0 78 0 83 0 92 1 49*  < >  --    CALCIUM mg/dL 7 4* 7 3* 7 2* 6 7*  < >  --    ALK PHOS U/L  --  122* 137* 129*  < >  --    ALT U/L  --  190* 254* 316*  < >  --    AST U/L  --  137* 272* 596*  < >  --    GLUCOSE, ISTAT mg/dl  --   --   --   --   --  94   < > = values in this interval not displayed  Results from last 7 days  Lab Units 02/05/19  0518 02/04/19  0500 02/03/19  0524   MAGNESIUM mg/dL 1 7 2 0 2 3       Results from last 7 days  Lab Units 02/05/19  0518 02/04/19  0500 02/03/19  0524   PHOSPHORUS mg/dL 1 9* 1 4* 1 3*        Results from last 7 days  Lab Units 02/02/19  0459 02/01/19  2238 02/01/19  1234   INR  1 53* 1 40* 1 40*   PTT seconds  --  32 35       Results from last 7 days  Lab Units 02/02/19  0133   LACTIC ACID mmol/L 1 7     No results found for: TROPONINI    Imaging:  I have personally reviewed pertinent films in PACS        Micro:  Lab Results   Component Value Date    BLOODCX No Growth at 24 hrs  02/04/2019    BLOODCX No Growth at 24 hrs  02/04/2019    BLOODCX Streptococcus intermedius (A) 02/01/2019       Allergies:    Allergies   Allergen Reactions    Sulfa Antibiotics Hives       Medications:   Scheduled Meds:    Current Facility-Administered Medications:  ARIPiprazole 5 mg Oral Early Morning Edison Alicia MD    bisacodyl 10 mg Rectal Daily Portillo Dukes PA-C    buPROPion 300 mg Oral FRANCE Alicia MD cefazolin 2,000 mg Intravenous Q8H Dannie Donovan MD Last Rate: Stopped (02/05/19 0600)   chlorhexidine 15 mL Swish & Spit Q12H [facility] Yamil Velarde MD    dextrose 5 % and sodium chloride 0 45 % with KCl 20 mEq/L 75 mL/hr Intravenous Continuous Yesy Givens MD Last Rate: 75 mL/hr (02/05/19 0036)   diazepam 10 mg Oral BID Sarah Flatness, PA-C    docusate sodium 100 mg Oral BID Sarah Flatness, PA-C    famotidine 20 mg Oral Q12H [facility] Salome Jacobson MD    heparin (porcine) 5,000 Units Subcutaneous [facility] Yamil Velarde MD    HYDROmorphone 1 mg Intravenous Q3H PRN Kailyn Loose, CRNP    levalbuterol 1 25 mg Nebulization Q6H Salome Jacobson MD    LORazepam 1 mg Intravenous Q4H PRN Kailyn Loose, CRNP    metroNIDAZOLE 500 mg Oral [facility] Dannie Donovan MD    nicotine 21 mg Transdermal Daily Kailyn Loose, CRNP    oxyCODONE 10 mg Oral Q4H PRN Kailyn Loose, CRNP    oxyCODONE 5 mg Oral Q4H PRN Kailyn Loose, CRNP    polyethylene glycol 17 g Oral Daily PRN Aliyah Hassan MD    polyethylene glycol 17 g Oral Daily Sarah Flatness, PA-C    prazosin 1 mg Oral Daily PRN Yesy Givens MD    QUEtiapine 200 mg Oral HS Yamil Velarde MD    senna 1 tablet Oral HS Sarah Flatness, PA-C    sodium chloride 3 mL Nebulization Q6H Salome Jacobson MD    vancomycin 1,500 mg Intravenous Q8H Nitza Rudolph MD Last Rate: Stopped (02/05/19 0600)     Continuous Infusions:    dextrose 5 % and sodium chloride 0 45 % with KCl 20 mEq/L 75 mL/hr Last Rate: 75 mL/hr (02/05/19 0036)     PRN Meds:    HYDROmorphone 1 mg Q3H PRN   LORazepam 1 mg Q4H PRN   oxyCODONE 10 mg Q4H PRN   oxyCODONE 5 mg Q4H PRN   polyethylene glycol 17 g Daily PRN   prazosin 1 mg Daily PRN       VTE Pharmacologic Prophylaxis: Heparin  VTE Mechanical Prophylaxis: sequential compression device    Invasive lines and devices:   Invasive Devices     Peripheral Intravenous Line            Peripheral IV 02/01/19 Left Wrist 3 days    Peripheral IV 02/03/19 Left Antecubital 2 days          Drain Closed/Suction Drain Right Abdomen Bulb 10 2 Fr  3 days    Closed/Suction Drain Right; Inferior;Superior Abdomen Bulb 8 5 Fr  3 days    Closed/Suction Drain Right;Posterior; Inferior Abdomen Bulb 10 2 Fr  3 days    Closed/Suction Drain Right;Superior; Anterior Abdomen Bulb 8 5 Fr  3 days    Closed/Suction Drain Right;Superior;Medial Abdomen Bulb 8 5 Fr  3 days    Urethral Catheter Temperature probe less than 1 day                   Counseling / Coordination of Care  Total Critical Care time spent 35 minutes excluding procedures, teaching and family updates  Code Status: Level 1 - Full Code     Portions of the record may have been created with voice recognition software  Occasional wrong word or "sound a like" substitutions may have occurred due to the inherent limitations of voice recognition software  Read the chart carefully and recognize, using context, where substitutions have occurred       Nyla Fu MD

## 2019-02-05 NOTE — PROGRESS NOTES
Progress Note - General Surgery   Corazon Pel 35 y o  male MRN: [id number]  Unit/Bed#: ICU 06 Encounter: [id number]    Assessment:  Mr Naida Akers is a 34 y/o male with sepsis secondary to bacteremia and multiple hepatic abscesses s/p IR drainage (2/1/2019)  Acute hypoxic respiratory insufficiency s/p extubation  Febrile and tachypneic overnight, right pleural effusion  JPs:   #1: 20mL (serous)   #2: 15mL (serous)  #3: 25mL (serous)  #4: 15mL (purulent)  #5: 35mL (serous)    Plan:  Advance diet  Continue diuresis  Continue all drains to bulb suction, follow IR recs  Antibiotics as per ID  Continue to wean HFNC  F/U surveillance cultures  OOB, pulm toilet, ISS  Consider TAMICA and repeat CT scan  Care as per ICU team      Subjective/Objective   Chief Complaint: "I'm ok, considering "    Subjective: Complains of feeling thirsty  Febrile and tachypneic overnight  Still on HFNC 50/50  Objective:     Blood pressure 113/56, pulse 100, temperature (!) 101 5 °F (38 6 °C), temperature source Probe, resp  rate (!) 37, height 6' 2" (1 88 m), weight 112 kg (246 lb 14 6 oz), SpO2 96 %  ,Body mass index is 31 7 kg/m²  Intake/Output Summary (Last 24 hours) at 02/05/19 0535  Last data filed at 02/05/19 0400   Gross per 24 hour   Intake           4422 5 ml   Output             4020 ml   Net            402 5 ml       Invasive Devices     Peripheral Intravenous Line            Peripheral IV 02/01/19 Left Wrist 3 days    Peripheral IV 02/03/19 Left Antecubital 1 day          Drain            Closed/Suction Drain Right Abdomen Bulb 10 2 Fr  3 days    Closed/Suction Drain Right; Inferior;Superior Abdomen Bulb 8 5 Fr  3 days    Closed/Suction Drain Right;Posterior; Inferior Abdomen Bulb 10 2 Fr  3 days    Closed/Suction Drain Right;Superior; Anterior Abdomen Bulb 8 5 Fr  3 days    Closed/Suction Drain Right;Superior;Medial Abdomen Bulb 8 5 Fr  3 days    Urethral Catheter Temperature probe less than 1 day                Physical Exam: Gen: AAOx3, in NAD  Cardio: RRR  Lungs: Right lung base rales, tachypneic  Abd: Soft, not distended, mildly tender upper abdomen   JPs as above      Lab, Imaging and other studies:No results at this time  VTE Pharmacologic Prophylaxis: Heparin  VTE Mechanical Prophylaxis: sequential compression device

## 2019-02-05 NOTE — PHYSICAL THERAPY NOTE
PT TREATMENT       02/05/19 1423   Pain Assessment   Pain Assessment 0-10   Pain Score Worst Possible Pain  ("19/10")   Pain Location Abdomen   Restrictions/Precautions   Other Precautions Pain; Fall Risk;O2;Telemetry;Multiple lines;Cognitive  (PER RN (BERTA) DOES NOT REQUIRE CHAIR ALARM )   General   Chart Reviewed Yes   Response to Previous Treatment Patient with no complaints from previous session  Family/Caregiver Present No   Cognition   Overall Cognitive Status Impaired   Arousal/Participation Lethargic   Attention Attends with cues to redirect   Orientation Level Oriented to person;Disoriented to situation;Disoriented to time;Disoriented to place   Memory Decreased recall of precautions;Decreased recall of recent events;Decreased short term memory   Following Commands Follows one step commands with increased time or repetition   Subjective   Subjective 'I DONT KNOW, I CANT THINK" - ENCEPHALOPATHIC, POOR COMMAND FOLLOWING    Bed Mobility   Rolling R 4  Minimal assistance   Additional items Assist x 1; Increased time required; Bedrails   Supine to Sit 3  Moderate assistance   Additional items Assist x 1; Increased time required;LE management;Verbal cues   Transfers   Sit to Stand 3  Moderate assistance   Additional items Assist x 2; Increased time required;Verbal cues   Stand to Sit 3  Moderate assistance   Additional items Assist x 2; Increased time required;Verbal cues   Stand pivot 3  Moderate assistance   Additional items Assist x 2; Increased time required;Verbal cues   Ambulation/Elevation   Gait pattern Excessively slow; Step to;Short stride;Decreased foot clearance   Gait Assistance 2  Maximal assist   Additional items Assist x 2   Assistive Device Other (Comment)  (B/L HHA)   Distance 2 FEET (BED TO CHAIR)    Balance   Static Sitting Poor +   Static Standing Poor   Ambulatory Poor -   Endurance Deficit   Endurance Deficit Yes   Endurance Deficit Description SIGNIFICANT LETHARGY   Activity Tolerance Activity Tolerance Patient limited by fatigue   Medical Staff Made Aware OT [address]   Nurse Made Aware RN BERTA   Exercises   Knee AROM Long Arc Quad Sitting;5 reps;Bilateral;AAROM   Assessment   Prognosis Fair   Problem List Decreased strength;Decreased endurance; Impaired balance;Decreased mobility; Decreased cognition; Impaired judgement;Decreased safety awareness;Pain   Assessment PT INITIATED TREATMENT SESSION IN ORDER TO ASSIST PATIENT IN ACHIEVING GOALS FOR IMPROVED TRANSFERS, AMBULATION, AND OVERALL ACTIVITY TOLERANCE  PATIENT DID NOT HAVE SELF EXPRESSED GOAL  FUNCTIONAL MOBILITY LIMITED TODAY BY SIGNIFICANT LETHARGY (REQUIRED CONTINUOUS VC TO MAINTAIN EYES OPEN) AND COGNITIVE DEFICITS (ORIENTED X1, REDUCED COMMAND FOLLOWING)  PATIENT PERFORMED LOG ROLL SUPINE-->SIT TO REDUCE ABDOMINAL PAIN, REQUIRING MOD-AX1  HE MAINTAINED FAIR- STATIC SITTING BALANCE EOB X 5 MINUTES  PATIENT PARTICIPATED IN TWO SIT<-->STAND TRANSFERS W/ HEIGHT OF BED ELEVATED  HE REQUIRED MOD-AX2 WITH SKILLED THERAPISTS POSITIONED ON EITHER SIDE AND RN MANAGING 5 DANNY DRAINS  VC PROVIDED FOR EFFICIENT SEQUENCING (SCOOT EDGE OF BED, NOSE OVER TOES, PUSH FROM BED)  PATIENT PARTICIPATED IN ONE STAND PIVOT TRANSFER (BED TO CHAIR) WITH MOD-AX2 HHA  VC PROVIDED TO INITIATE EACH STEP  FURTHER AMBULATION NOT APPROPRIATE AT THIS TIME SECONDARY TO GROSS LETHARGY/POOR COMMAND FOLLOWING  NEXT SESSION PLAN TO PROGRESS AMBULATION  TRIAL RW WITH AX2 AND 3RD PERSON FOR CHAIR FOLLOW AS APPROPRIATE  PATIENT REMAINS APPROPRIATE FOR PT D/C REC OF STR  HE WILL BENEFIT FROM CONTINUED SKILLED PT THIS ADMISSION TO ACHIEVE MAXIMAL FUNCTION AND SAFETY  Goals   Patient Goals AGREEABLE TO GETTING OUT OF BEDTO CHAIR; NO SELF EXPRESSED GOAL 2* COG  DEFICITS    LTG Expiration Date 02/18/19   Treatment Day 1   Plan   Treatment/Interventions Functional transfer training;LE strengthening/ROM; Therapeutic exercise; Endurance training;Patient/family training;Equipment eval/education; Bed mobility;Gait training;OT;Spoke to nursing   Progress Slow progress, decreased activity tolerance   PT Frequency Other (Comment)  (3-5X/WK)   Recommendation   Recommendation Short-term skilled PT   PT - OK to Discharge Yes  (TO STR WHEN MED CLAUDINE )   Jose Palm, PT

## 2019-02-05 NOTE — OCCUPATIONAL THERAPY NOTE
Occupational Therapy Treatment Note      Nohemy Marroquin    2/5/2019    Patient Active Problem List   Diagnosis    History of hepatitis C    Vasectomy evaluation    Hepatic abscess    Hyponatremia    Depression    Anxiety    IV drug abuse ([facility] )    Transaminitis    Anemia    Leukocytosis    Gram-positive bacteremia    Tobacco abuse    Hypophosphatemia    Urinary retention       Past Medical History:   Diagnosis Date    ADHD     Anxiety     Bipolar 1 disorder ([facility] )     Delusion ([facility] )     Hepatitis C     Infectious viral hepatitis     Hep C- No Symptoms( Took a course of Harvoni)    Psoriasis     PTSD (post-traumatic stress disorder)     jail       Past Surgical History:   Procedure Laterality Date    CT GUIDED PERC DRAINAGE CATHETER PLACEMENT  2/1/2019    MA REMOVAL OF SPERM DUCT(S) Bilateral 9/25/2018    Procedure: VASECTOMY;  Surgeon: Seven Bear MD;  Location: [facility];  Service: Urology    WISDOM TOOTH EXTRACTION          02/05/19 1424   Restrictions/Precautions   Weight Bearing Precautions Per Order No   Other Precautions Cognitive; Chair Alarm; Bed Alarm;Multiple lines; Fall Risk;O2;Telemetry;Pain   Lifestyle   Autonomy Pt I w/ ADLs, IADLs, and mobility at baseline  (-)   No DME  Reciprocal Relationships Lives w/ s/o who is home   Service to Others Not working   Intrinsic Gratification Enjoys reading   Pain Assessment   Pain Assessment 0-10   Pain Score Worst Possible Pain  ("19")   Pain Location Abdomen   ADL   Grooming Assistance 4  Minimal Assistance   Grooming Deficit Setup;Verbal cueing;Supervision/safety; Increased time to complete; Teeth care   Grooming Comments Pt completed grooming task while seated in recliner w/ setup  Pt required increased VC to initiate task and sequence  Pt unable to identify toothebrush out of 3 objects   If given 1 step instruction, pt able to carry out 75% of the time w/ increased time   LB Dressing Assistance 2  Maximal Assistance   LB Dressing Deficit Don/doff R sock; Don/doff L sock   LB Dressing Comments Pt required Max A to don socks; pt able to assist w/ elevating heals of bed    Bed Mobility   Rolling L 4  Minimal assistance   Additional items Assist x 1; Increased time required;Verbal cues   Supine to Sit 3  Moderate assistance   Additional items Assist x 1; Increased time required;HOB elevated;Verbal cues;LE management   Additional Comments Pt rolled to L side w/ Min A x1 , +VC to initiate and mild force production into rolling- use of bed rails for assist  Pt went from supine to sit w/ Mod  Ax1 for UB support and LE Management; HOB elevated for assist  Pt sat EOB w/ Min A for sitting balance   Transfers   Sit to Stand 3  Moderate assistance   Additional items Assist x 2; Increased time required;Verbal cues   Stand to Sit 3  Moderate assistance   Additional items Assist x 2; Increased time required;Verbal cues   Stand pivot 3  Moderate assistance   Additional items Assist x 2; Increased time required;Verbal cues   Additional Comments Pt performed sit-stand from EOB w/ Mod A x2 for moderate force production into standing and VC for proper technique  Pt then performed SPT from EOB to chair w/ Mod A x2, HHA used and +VC for safety and initiation of task   Cognition   Overall Cognitive Status Impaired   Arousal/Participation Cooperative;Lethargic;Arousable   Attention Attends with cues to redirect   Orientation Level Oriented to person;Disoriented to place; Disoriented to time;Disoriented to situation   Memory Decreased recall of precautions;Decreased recall of recent events;Decreased short term memory   Following Commands Follows one step commands without difficulty   Comments Pt is cooperative; is lethargic and requires repetitive VC to attend to task; only oriented to self; requires simple 1 step directions to complete tasks- successful following 1 step commands approx 50% of the time   Activity Tolerance   Activity Tolerance Patient tolerated treatment well;Patient limited by fatigue;Patient limited by pain   Medical Staff Made Aware PT, Mariana and RN, Lorrie   Assessment   Assessment Patient participated in Skilled OT session 2/5/19 with interventions consisting of ADL re training with the use of correct body mechnaics, Energy Conservation techniques, deep breathing technique, safety awareness and fall prevention techniques, therapeutic exercise to: increase functional use of BUEs, increase BUE muscle strength ,  therapeutic activities to: increase activity tolerance, increase dynamic sit/ stand balance during functional activity , increase postural control and increase trunk control   Patient agreeable to OT treatment session, upon arrival patient was found supine in bed  In comparison to previous session, patient with improvements in bed mobility, activity tolerance, grooming, EOB sitting balance and transfers  Patient requiring frequent re direction, verbal cues for safety, verbal cues for correct technique, verbal cues for pacing thru activity steps, cognitive assistance to anticipate next step, one step directives and frequent rest periods  Patient continues to be functioning below baseline level, occupational performance remains limited secondary to factors listed above and increased risk for falls and injury  From OT standpoint, recommendation at time of d/c would be Short Term Rehab when medically stable  Patient to benefit from continued Occupational Therapy treatment while in the hospital to address deficits as defined above and maximize level of functional independence with ADLs and functional mobility  Plan   Treatment Interventions ADL retraining;Functional transfer training;UE strengthening/ROM; Endurance training;Cognitive reorientation;Patient/family training;Equipment evaluation/education; Compensatory technique education;Continued evaluation; Energy conservation; Activityengagement   Goal Expiration Date 02/18/19   Treatment Day 1   OT Frequency 3-5x/wk   Recommendation   OT Discharge Recommendation Short Term Rehab   OT - OK to Discharge Yes  (when medically stable)   Barthel Index   Feeding 5   Bathing 0   Grooming Score 0   Dressing Score 0   Bladder Score 0   Bowels Score 5   Toilet Use Score 0   Transfers (Bed/Chair) Score 5   Mobility (Level Surface) Score 0   Stairs Score 0   Barthel Index Score 15   Modified [place] Scale   Modified [place] Scale 4       Ashley Luciano MS, OTR/L

## 2019-02-05 NOTE — PROGRESS NOTES
Progress Note - Infectious Disease   Gustabo Goltz 35 y o  male MRN: [id number]  Unit/Bed#: ICU 06 Encounter: [id number]      Impression/Recommendations:  1  Sepsis, POA, presented with fever and leukocytosis   Source of sepsis is most likely liver abscesses   Overall, patient is stable   Temperature still up and down   WBC stable   Admission blood cultures are now positive  Antibiotic plan as in below  Monitor temperature/WB C  Monitor hemodynamics      2  Streptococcus intermedius bacteremia   Source is most likely liver abscesses   However, we need to consider the possibility of endocarditis with septic emboli to liver  Blood cultures are now also positive for GPC in clusters  Per discussion with microbiology, this GPC in clusters seen on Gram stain were clumping of GPC in chains, most likely Streptococcus intermedius also  Patient's blood culture results have been confusing, with changes daily over last 2 days  However, at this point, it is clear that all he has is Streptococcus intermedius bacteremia  Repeat blood cultures negative so far  2D echo without obvious vegetation  Discontinue IV vancomycin again  Change cefazolin to back to ceftriaxone  Follow-up repeat blood cultures  As long as repeat blood cultures are negative, treat x 14 days total      3  Multiple liver abscesses   Patient has history of diarrhea   Liver abscess is may be all secondary to translocation from gut   Abdomen/pelvis CT without other obvious intra-abdominal pathologies   It may be worthwhile to look at colonoscopy to look for colonic lesions   Patient remains with fever   He may need repeat CT to look for undrained abscess if fever persists  Antibiotic plan as in above  Continue Flagyl for anaerobic coverage  Monitor temperature/WBC  Monitor abdominal pain      3  Intermittent hypotension   This may be all secondary to sepsis above   SBP is much better now   Patient is currently not on any pressors    Antibiotic plan as in above  Monitor SBP      4  HENRIQUE, POA  This may be secondary sepsis   Dehydration may contribute   Creatinine has normalized  Antibiotic at full dose  Monitor creatinine      5  Elevated LFTs   This is most likely secondary to hepatic abscesses   Will monitor LFTs with treatment and drainage of abscesses   LFTs continue to improve  Monitor LFTs      6  History of IVDU   I have some concern regarding possible active IVDU   For these reasons, patient is not a candidate for home IV antibiotic   Prior HIV screen negative      Discussed with patient in detail regarding the above plan  Discussed with surgery service earlier      Antibiotics:  Vancomycin/cefazolin/Flagyl  Post drainage # 4      Subjective:  Patient remains in ICU  He is awake and alert  Stable RUQ abdominal pain  Temperature trending down  No further chills  No diarrhea  Objective:  Vitals:  Temp:  [99 3 °F (37 4 °C)-102 2 °F (39 °C)] 99 7 °F (37 6 °C)  HR:  [] 96  Resp:  [24-44] 33  BP: (105-154)/(51-86) 124/70  SpO2:  [93 %-98 %] 96 %  Temp (24hrs), Av 9 °F (38 3 °C), Min:99 3 °F (37 4 °C), Max:102 2 °F (39 °C)  Current: Temperature: 99 7 °F (37 6 °C)    Physical Exam:     General: Awake, alert, cooperative, no distress  Neck:  Supple  No mass  No lymphadenopathy  Lungs: Expansion symmetric, no rales, no wheezing, respirations unlabored  Heart:  Tachycardic with regular rhythm, S1 and S2 normal, no murmur  Abdomen: Soft, mild distention, stable RUQ tenderness  Drains in place, with moderate serous drainage  Extremities: Trace edema  No erythema/warmth  No ulcer  Nontender to palpation  Skin:  No rash  Neuro: Moves all extremities  Invasive Devices     Peripheral Intravenous Line            Peripheral IV 19 Left Wrist 3 days    Peripheral IV 19 Left Antecubital 2 days          Drain            Closed/Suction Drain Right Abdomen Bulb 10 2 Fr  3 days    Closed/Suction Drain Right; Inferior;Superior Abdomen Bulb 8 5 Fr  3 days    Closed/Suction Drain Right;Posterior; Inferior Abdomen Bulb 10 2 Fr  3 days    Closed/Suction Drain Right;Superior; Anterior Abdomen Bulb 8 5 Fr  3 days    Closed/Suction Drain Right;Superior;Medial Abdomen Bulb 8 5 Fr  3 days    Urethral Catheter Temperature probe 1 day                Labs studies:   I have personally reviewed pertinent labs  Results from last 7 days  Lab Units 02/05/19  0518 02/04/19  0500 02/03/19  0524 02/02/19  0459  02/01/19 2011   POTASSIUM mmol/L 4 2 4 0 3 6 3 9  < >  --    CHLORIDE mmol/L 103 105 104 104  < >  --    CO2 mmol/L 25 24 23 22  < >  --    CO2, I-STAT mmol/L  --   --   --   --   --  24   BUN mg/dL 10 9 13 30*  < >  --    CREATININE mg/dL 0 78 0 83 0 92 1 49*  < >  --    EGFR ml/min/1 73sq m 113 110 104 58  < >  --    GLUCOSE, ISTAT mg/dl  --   --   --   --   --  94   CALCIUM mg/dL 7 4* 7 3* 7 2* 6 7*  < >  --    AST U/L  --  137* 272* 596*  < >  --    ALT U/L  --  190* 254* 316*  < >  --    ALK PHOS U/L  --  122* 137* 129*  < >  --    < > = values in this interval not displayed  Results from last 7 days  Lab Units 02/05/19  0518 02/04/19  0500 02/03/19  0524   WBC Thousand/uL 19 20* 21 25* 20 86*   HEMOGLOBIN g/dL 8 2* 9 1* 9 6*   PLATELETS Thousands/uL 452* 428* 407*       Results from last 7 days  Lab Units 02/04/19  0459 02/04/19  0458 02/01/19  1952 02/01/19  1301 02/01/19  1235   BLOOD CULTURE  No Growth at 24 hrs  No Growth at 24 hrs   --  Streptococcus intermedius* Streptococcus intermedius*   GRAM STAIN RESULT   --   --  3+ Polys  4+ Gram positive cocci in pairs and chains Gram positive cocci in chains  Gram positive cocci in clusters Gram positive cocci in chains  Gram positive cocci in clusters   BODY FLUID CULTURE, STERILE   --   --  3+ Growth of Streptococcus intermedius*  --   --        Imaging Studies:   I have personally reviewed pertinent imaging study reports and images in PACS      EKG, Pathology, and Other Studies:   I have personally reviewed pertinent reports

## 2019-02-05 NOTE — PROGRESS NOTES
The vancomycin therapy has been discontinued  Pharmacy will sign off now  Thank you for this consult

## 2019-02-05 NOTE — PLAN OF CARE
Problem: OCCUPATIONAL THERAPY ADULT  Goal: Performs self-care activities at highest level of function for planned discharge setting  See evaluation for individualized goals  Treatment Interventions: ADL retraining, Functional transfer training, Endurance training, Patient/family training, Equipment evaluation/education, Compensatory technique education, Energy conservation, Activityengagement          See flowsheet documentation for full assessment, interventions and recommendations  Outcome: Progressing  Limitation: Decreased ADL status, Decreased Safe judgement during ADL, Decreased cognition, Decreased endurance, Decreased self-care trans, Decreased high-level ADLs  Prognosis: Good  Assessment: Patient participated in Skilled OT session 2/5/19 with interventions consisting of ADL re training with the use of correct body mechnaics, Energy Conservation techniques, deep breathing technique, safety awareness and fall prevention techniques, therapeutic exercise to: increase functional use of BUEs, increase BUE muscle strength ,  therapeutic activities to: increase activity tolerance, increase dynamic sit/ stand balance during functional activity , increase postural control and increase trunk control   Patient agreeable to OT treatment session, upon arrival patient was found supine in bed  In comparison to previous session, patient with improvements in bed mobility, activity tolerance, grooming, EOB sitting balance and transfers  Patient requiring frequent re direction, verbal cues for safety, verbal cues for correct technique, verbal cues for pacing thru activity steps, cognitive assistance to anticipate next step, one step directives and frequent rest periods  Patient continues to be functioning below baseline level, occupational performance remains limited secondary to factors listed above and increased risk for falls and injury     From OT standpoint, recommendation at time of d/c would be Short Term Rehab when medically stable  Patient to benefit from continued Occupational Therapy treatment while in the hospital to address deficits as defined above and maximize level of functional independence with ADLs and functional mobility        OT Discharge Recommendation: Short Term Rehab  OT - OK to Discharge: Yes (when medically stable)      Comments: Ashley Luciano MS, OTR/L

## 2019-02-05 NOTE — RESPIRATORY THERAPY NOTE
RT Ventilator Management Note  Jayne López 35 y o  male MRN: [id number]  Unit/Bed#: ICU 06 Encounter: [id number]      Daily Screen       2/2/2019 0830 2/2/2019 5814          Patient safety screen outcome[de-identified] Passed -      Spont breathing trial % for 30 min: No -      Spont breathing trial outcome[de-identified] - Passed      Name of Medical Team Notified[de-identified] - Dr Ag Cancer      Preparing to extubate/ Notify Nurse: - Yes      Extubation order obtained: - Yes      Patient extubated: - Yes              Physical Exam:   Assessment Type: During-treatment  General Appearance: Awake, Drowsy  Respiratory Pattern: Spontaneous  Chest Assessment: Chest expansion symmetrical  Bilateral Breath Sounds: Diminished, Coarse  Cough: Weak, Dry, Non-productive  O2 Device: 6LNC  Subjective Data: none      Resp Comments: Pt udn tx and vest therapy done  Pt taken off HFNC after tx and placed on 6LPM NC  Pt tolerating well, RR same on HFNC as regular NC  Will continue to monitor

## 2019-02-06 ENCOUNTER — APPOINTMENT (INPATIENT)
Dept: RADIOLOGY | Facility: HOSPITAL | Age: 39
DRG: 871 | End: 2019-02-06
Payer: MEDICARE

## 2019-02-06 LAB
ANION GAP SERPL CALCULATED.3IONS-SCNC: 7 MMOL/L (ref 4–13)
ANISOCYTOSIS BLD QL SMEAR: PRESENT
BASOPHILS # BLD MANUAL: 0 THOUSAND/UL (ref 0–0.1)
BASOPHILS NFR MAR MANUAL: 0 % (ref 0–1)
BUN SERPL-MCNC: 14 MG/DL (ref 5–25)
CA-I BLD-SCNC: 1.06 MMOL/L (ref 1.12–1.32)
CALCIUM SERPL-MCNC: 7.6 MG/DL (ref 8.3–10.1)
CHLORIDE SERPL-SCNC: 101 MMOL/L (ref 100–108)
CO2 SERPL-SCNC: 25 MMOL/L (ref 21–32)
CREAT SERPL-MCNC: 0.81 MG/DL (ref 0.6–1.3)
EOSINOPHIL # BLD MANUAL: 0.19 THOUSAND/UL (ref 0–0.4)
EOSINOPHIL NFR BLD MANUAL: 1 % (ref 0–6)
ERYTHROCYTE [DISTWIDTH] IN BLOOD BY AUTOMATED COUNT: 15.6 % (ref 11.6–15.1)
GFR SERPL CREATININE-BSD FRML MDRD: 111 ML/MIN/1.73SQ M
GLUCOSE SERPL-MCNC: 102 MG/DL (ref 65–140)
GLUCOSE SERPL-MCNC: 102 MG/DL (ref 65–140)
GLUCOSE SERPL-MCNC: 117 MG/DL (ref 65–140)
GLUCOSE SERPL-MCNC: 90 MG/DL (ref 65–140)
GLUCOSE SERPL-MCNC: 95 MG/DL (ref 65–140)
HCT VFR BLD AUTO: 23.8 % (ref 36.5–49.3)
HGB BLD-MCNC: 8.1 G/DL (ref 12–17)
LYMPHOCYTES # BLD AUTO: 16 % (ref 14–44)
LYMPHOCYTES # BLD AUTO: 2.98 THOUSAND/UL (ref 0.6–4.47)
MAGNESIUM SERPL-MCNC: 1.8 MG/DL (ref 1.6–2.6)
MCH RBC QN AUTO: 30 PG (ref 26.8–34.3)
MCHC RBC AUTO-ENTMCNC: 34 G/DL (ref 31.4–37.4)
MCV RBC AUTO: 88 FL (ref 82–98)
METAMYELOCYTES NFR BLD MANUAL: 1 % (ref 0–1)
MONOCYTES # BLD AUTO: 1.12 THOUSAND/UL (ref 0–1.22)
MONOCYTES NFR BLD: 6 % (ref 4–12)
NEUTROPHILS # BLD MANUAL: 13.79 THOUSAND/UL (ref 1.85–7.62)
NEUTS BAND NFR BLD MANUAL: 3 % (ref 0–8)
NEUTS SEG NFR BLD AUTO: 71 % (ref 43–75)
NRBC BLD AUTO-RTO: 0 /100 WBCS
PHOSPHATE SERPL-MCNC: 3 MG/DL (ref 2.7–4.5)
PLATELET # BLD AUTO: 505 THOUSANDS/UL (ref 149–390)
PLATELET BLD QL SMEAR: ABNORMAL
PMV BLD AUTO: 9.8 FL (ref 8.9–12.7)
POTASSIUM SERPL-SCNC: 4.5 MMOL/L (ref 3.5–5.3)
RBC # BLD AUTO: 2.7 MILLION/UL (ref 3.88–5.62)
SODIUM SERPL-SCNC: 133 MMOL/L (ref 136–145)
TOTAL CELLS COUNTED SPEC: 100
VARIANT LYMPHS # BLD AUTO: 2 %
WBC # BLD AUTO: 18.64 THOUSAND/UL (ref 4.31–10.16)

## 2019-02-06 PROCEDURE — 92526 ORAL FUNCTION THERAPY: CPT

## 2019-02-06 PROCEDURE — 97110 THERAPEUTIC EXERCISES: CPT

## 2019-02-06 PROCEDURE — 85007 BL SMEAR W/DIFF WBC COUNT: CPT | Performed by: EMERGENCY MEDICINE

## 2019-02-06 PROCEDURE — 94640 AIRWAY INHALATION TREATMENT: CPT | Performed by: SOCIAL WORKER

## 2019-02-06 PROCEDURE — 82330 ASSAY OF CALCIUM: CPT | Performed by: EMERGENCY MEDICINE

## 2019-02-06 PROCEDURE — 82948 REAGENT STRIP/BLOOD GLUCOSE: CPT

## 2019-02-06 PROCEDURE — 97535 SELF CARE MNGMENT TRAINING: CPT

## 2019-02-06 PROCEDURE — 85027 COMPLETE CBC AUTOMATED: CPT | Performed by: EMERGENCY MEDICINE

## 2019-02-06 PROCEDURE — 97116 GAIT TRAINING THERAPY: CPT

## 2019-02-06 PROCEDURE — 84100 ASSAY OF PHOSPHORUS: CPT | Performed by: EMERGENCY MEDICINE

## 2019-02-06 PROCEDURE — 94760 N-INVAS EAR/PLS OXIMETRY 1: CPT

## 2019-02-06 PROCEDURE — 94760 N-INVAS EAR/PLS OXIMETRY 1: CPT | Performed by: SOCIAL WORKER

## 2019-02-06 PROCEDURE — 83735 ASSAY OF MAGNESIUM: CPT | Performed by: EMERGENCY MEDICINE

## 2019-02-06 PROCEDURE — 71045 X-RAY EXAM CHEST 1 VIEW: CPT

## 2019-02-06 PROCEDURE — 80048 BASIC METABOLIC PNL TOTAL CA: CPT | Performed by: EMERGENCY MEDICINE

## 2019-02-06 PROCEDURE — 99233 SBSQ HOSP IP/OBS HIGH 50: CPT | Performed by: INTERNAL MEDICINE

## 2019-02-06 PROCEDURE — 94640 AIRWAY INHALATION TREATMENT: CPT

## 2019-02-06 PROCEDURE — 99232 SBSQ HOSP IP/OBS MODERATE 35: CPT | Performed by: SURGERY

## 2019-02-06 RX ORDER — ALBUTEROL SULFATE 2.5 MG/3ML
2.5 SOLUTION RESPIRATORY (INHALATION) EVERY 4 HOURS PRN
Status: DISCONTINUED | OUTPATIENT
Start: 2019-02-06 | End: 2019-02-08

## 2019-02-06 RX ORDER — FUROSEMIDE 10 MG/ML
40 INJECTION INTRAMUSCULAR; INTRAVENOUS DAILY
Status: DISCONTINUED | OUTPATIENT
Start: 2019-02-06 | End: 2019-02-07

## 2019-02-06 RX ORDER — SODIUM CHLORIDE FOR INHALATION 0.9 %
3 VIAL, NEBULIZER (ML) INHALATION
Status: DISCONTINUED | OUTPATIENT
Start: 2019-02-06 | End: 2019-02-08

## 2019-02-06 RX ORDER — LEVALBUTEROL 1.25 MG/.5ML
1.25 SOLUTION, CONCENTRATE RESPIRATORY (INHALATION)
Status: DISCONTINUED | OUTPATIENT
Start: 2019-02-06 | End: 2019-02-08

## 2019-02-06 RX ORDER — MAGNESIUM SULFATE HEPTAHYDRATE 40 MG/ML
2 INJECTION, SOLUTION INTRAVENOUS ONCE
Status: COMPLETED | OUTPATIENT
Start: 2019-02-06 | End: 2019-02-06

## 2019-02-06 RX ADMIN — ISODIUM CHLORIDE 3 ML: 0.03 SOLUTION RESPIRATORY (INHALATION) at 20:46

## 2019-02-06 RX ADMIN — FAMOTIDINE 20 MG: 20 TABLET, FILM COATED ORAL at 22:16

## 2019-02-06 RX ADMIN — STANDARDIZED SENNA CONCENTRATE 8.6 MG: 8.6 TABLET ORAL at 22:16

## 2019-02-06 RX ADMIN — FUROSEMIDE 40 MG: 10 INJECTION, SOLUTION INTRAMUSCULAR; INTRAVENOUS at 09:27

## 2019-02-06 RX ADMIN — CEFTRIAXONE SODIUM 2000 MG: 10 INJECTION, POWDER, FOR SOLUTION INTRAVENOUS at 13:44

## 2019-02-06 RX ADMIN — LORAZEPAM 1 MG: 2 INJECTION INTRAMUSCULAR; INTRAVENOUS at 20:04

## 2019-02-06 RX ADMIN — CHLORHEXIDINE GLUCONATE 0.12% ORAL RINSE 15 ML: 1.2 LIQUID ORAL at 09:25

## 2019-02-06 RX ADMIN — LEVALBUTEROL 1.25 MG: 1.25 SOLUTION, CONCENTRATE RESPIRATORY (INHALATION) at 20:46

## 2019-02-06 RX ADMIN — HEPARIN SODIUM 5000 UNITS: 5000 INJECTION INTRAVENOUS; SUBCUTANEOUS at 22:16

## 2019-02-06 RX ADMIN — METRONIDAZOLE 500 MG: 500 TABLET ORAL at 05:27

## 2019-02-06 RX ADMIN — METRONIDAZOLE 500 MG: 500 TABLET ORAL at 17:05

## 2019-02-06 RX ADMIN — LEVALBUTEROL 1.25 MG: 1.25 SOLUTION, CONCENTRATE RESPIRATORY (INHALATION) at 07:59

## 2019-02-06 RX ADMIN — MAGNESIUM SULFATE HEPTAHYDRATE 2 G: 40 INJECTION, SOLUTION INTRAVENOUS at 10:16

## 2019-02-06 RX ADMIN — FAMOTIDINE 20 MG: 20 TABLET, FILM COATED ORAL at 09:25

## 2019-02-06 RX ADMIN — CHLORHEXIDINE GLUCONATE 0.12% ORAL RINSE 15 ML: 1.2 LIQUID ORAL at 22:16

## 2019-02-06 RX ADMIN — OXYCODONE HYDROCHLORIDE 10 MG: 10 TABLET ORAL at 12:57

## 2019-02-06 RX ADMIN — ISODIUM CHLORIDE 3 ML: 0.03 SOLUTION RESPIRATORY (INHALATION) at 07:59

## 2019-02-06 RX ADMIN — HEPARIN SODIUM 5000 UNITS: 5000 INJECTION INTRAVENOUS; SUBCUTANEOUS at 05:27

## 2019-02-06 RX ADMIN — HEPARIN SODIUM 5000 UNITS: 5000 INJECTION INTRAVENOUS; SUBCUTANEOUS at 17:06

## 2019-02-06 RX ADMIN — LEVALBUTEROL 1.25 MG: 1.25 SOLUTION, CONCENTRATE RESPIRATORY (INHALATION) at 13:23

## 2019-02-06 RX ADMIN — BUPROPION HYDROCHLORIDE 300 MG: 150 TABLET, FILM COATED, EXTENDED RELEASE ORAL at 09:25

## 2019-02-06 RX ADMIN — DIAZEPAM 10 MG: 5 TABLET ORAL at 17:06

## 2019-02-06 RX ADMIN — ARIPIPRAZOLE 5 MG: 5 TABLET ORAL at 05:27

## 2019-02-06 RX ADMIN — LEVALBUTEROL 1.25 MG: 1.25 SOLUTION, CONCENTRATE RESPIRATORY (INHALATION) at 01:00

## 2019-02-06 RX ADMIN — DIAZEPAM 10 MG: 5 TABLET ORAL at 09:25

## 2019-02-06 RX ADMIN — ISODIUM CHLORIDE 3 ML: 0.03 SOLUTION RESPIRATORY (INHALATION) at 01:00

## 2019-02-06 RX ADMIN — NICOTINE 21 MG: 21 PATCH, EXTENDED RELEASE TRANSDERMAL at 17:07

## 2019-02-06 RX ADMIN — OXYCODONE HYDROCHLORIDE 5 MG: 5 TABLET ORAL at 02:56

## 2019-02-06 RX ADMIN — METRONIDAZOLE 500 MG: 500 TABLET ORAL at 22:16

## 2019-02-06 RX ADMIN — ISODIUM CHLORIDE 3 ML: 0.03 SOLUTION RESPIRATORY (INHALATION) at 13:23

## 2019-02-06 RX ADMIN — QUETIAPINE FUMARATE 200 MG: 100 TABLET ORAL at 22:16

## 2019-02-06 NOTE — OCCUPATIONAL THERAPY NOTE
Occupational Therapy Treatment Note:       02/06/19 1233   Restrictions/Precautions   Other Precautions Cognitive; Chair Alarm; Bed Alarm   Pain Assessment   Pain Assessment 0-10   Pain Score 5   Pain Type Acute pain   Pain Location Abdomen   ADL   LB Dressing Assistance 2  Maximal Assistance   LB Dressing Deficit Don/doff R sock; Don/doff L sock   Functional Standing Tolerance   Time 1 mi during transfer fair balance   Bed Mobility   Supine to Sit 3  Moderate assistance   Additional items Assist x 2   Transfers   Sit to Stand 3  Moderate assistance   Additional items Assist x 2   Stand to Sit 3  Moderate assistance   Additional items Assist x 2   Stand pivot 3  Moderate assistance   Additional items Assist x 2   Additional Comments pt with chest tube, iv os and multiple kari drains  Cognition   Overall Cognitive Status Impaired   Arousal/Participation Cooperative   Attention Attends with cues to redirect   Memory Decreased short term memory   Following Commands Follows one step commands without difficulty   Activity Tolerance   Activity Tolerance Patient tolerated treatment well   Assessment   Assessment pt participated in am ot session and was seen focusing on bed mobility, sit to stand transfer with asst x 2, pt was lethargic but answered questions when spoken to  pt  required mod asst x 2 for bed mobility and spt  pt now with chest tube and multiple kari drains s/p ir yesterday  [place]/l spoke with nsg and otr/l prior to session  pt reports being hungry for lunch and was requesting oob to chair  pt with mckeon catheter, chest tube on wall suction,  o2 and iv and multiple kari drains pt motivated and cooperative although lethargic   Plan   Treatment Interventions ADL retraining;Functional transfer training; Activityengagement; Endurance training;Patient/family training;Equipment evaluation/education   Goal Expiration Date 02/18/19   Treatment Day 2   OT Frequency 3-5x/wk   Recommendation   OT Discharge Recommendation Short Term Rehab   Barthel Index   Feeding 5   Bathing 0   Grooming Score 0   Dressing Score 0   Bladder Score 0   Bowels Score 5   Toilet Use Score 5   Transfers (Bed/Chair) Score 5   Mobility (Level Surface) Score 0   Stairs Score 0   Barthel Index Score 20   Modified [place] Scale   Modified [place] Scale 4   April A FE Gonzalez

## 2019-02-06 NOTE — PHYSICAL THERAPY NOTE
Physical Therapy Progress Note     02/06/19 1440   Pain Assessment   Pain Assessment 0-10   Pain Score No Pain   Restrictions/Precautions   Weight Bearing Precautions Per Order No   Other Precautions Cognitive; Chair Alarm; Bed Alarm;Multiple lines;Telemetry;O2;Fall Risk;Pain   General   Chart Reviewed Yes   Response to Previous Treatment Patient with no complaints from previous session  Family/Caregiver Present No   Cognition   Overall Cognitive Status Impaired   Arousal/Participation Responsive; Cooperative;Lethargic   Attention Attends with cues to redirect   Following Commands Follows one step commands with increased time or repetition   Subjective   Subjective Patient supine in bed, initially unagreeable to physical therapy stating "i am more of a bed kenny " Therapist educated patient on importance of increased activity and positional changes with patient reporting understanding and becoming agreeable to physical therapy session  Bed Mobility   Supine to Sit 3  Moderate assistance   Additional items Assist x 2;HOB elevated; Bedrails; Increased time required;Verbal cues;LE management   Transfers   Sit to Stand 3  Moderate assistance   Additional items Assist x 2; Increased time required;Verbal cues   Stand to Sit 3  Moderate assistance   Additional items Assist x 2; Increased time required;Verbal cues   Ambulation/Elevation   Gait pattern Decreased foot clearance; Excessively slow; Step to;Short stride; Forward Flexion   Gait Assistance 2  Maximal assist   Additional items Assist x 2;Verbal cues; Tactile cues   Assistive Device Other (Comment)  (B/L HHA)   Distance 2 feet (bed to chair)   Balance   Static Sitting Fair -   Dynamic Sitting Poor +   Static Standing Poor   Ambulatory Poor   Endurance Deficit   Endurance Deficit Yes   Endurance Deficit Description fatigue   Activity Tolerance   Activity Tolerance Patient limited by fatigue   Nurse Made Aware appropriate to see   Exercises   Hip Flexion Sitting;20 reps;AROM; Bilateral   Hip Abduction Sitting;20 reps;AROM; Bilateral   Hip Adduction Sitting;20 reps;AROM; Bilateral   Knee AROM Long Arc Quad Sitting;20 reps;AROM;AAROM; Bilateral   Ankle Pumps Sitting;20 reps;AROM; Bilateral   Assessment   Prognosis Fair   Problem List Decreased strength;Decreased endurance; Impaired balance;Decreased mobility;Pain;Decreased safety awareness;Decreased cognition; Impaired judgement   Assessment Patient was moderate assist x2 for supine to sit transfer to EOB, with patient denying any dizzines upon sitting  Patient was max assist x2, B/L HHA, with patient able to walk 2 feet from EOB to chair  Patient had fair tolerance to seated exercise, motivational cues required to complete increased reps secondary to fatigue  Patient educated on importance of increased activity and positional changes with patient reporting understanding  Patient limited this session by significant fatigue  Patient would continue to benefit from physical therapy to improve functional mobility until medically cleared  Goals   Patient Goals to be comfortable   LTG Expiration Date 02/18/19   Long Term Goal #1 1) PERFORM BED MOBILITY MOD-I TO PARTICIPATE IN FREQUENT REPOSITIONING AND IMPROVE SKIN INTEGRITY; 2) PERFORM SIT<-->STAND TRANSFER MOD-I TO PROMOTE I WITH TOILETING AND OOB MOBILITY; 3) AMBULATE 200 FEET MOD-I W/ LEAST RESTRICTIVE DEVICE TO PARTICIPATE IN HOUSEHOLD AND COMMUNITY LEVEL AMBULATION; 4) IMPROVE B/L LE STRENGTH BY 1/2 GRADE TO IMPROVE EFFICIENCY OF TRANSFERS; 5) IMPROVE BALANCE BY 1 GRADE TO REDUCE RISK FOR FALLS; 6) NAVIGATE 12 STEPS S LEVEL IN ORDER TO SAFELY NAVIGATE MULTIPLE FLOORS AT HOME  Treatment Day 2   Plan   Treatment/Interventions Functional transfer training;LE strengthening/ROM; Therapeutic exercise; Endurance training;Cognitive reorientation;Patient/family training;Gait training;Spoke to nursing   Progress Slow progress, decreased activity tolerance   PT Frequency Other (Comment)  (3-5x/wk)   Recommendation   Recommendation Short-term skilled PT   Equipment Recommended Walker  (RW)   PT - OK to Discharge Yes  (To STR when medically cleared)     Lavern Dobson, PTA

## 2019-02-06 NOTE — PLAN OF CARE
CARDIOVASCULAR - ADULT     Maintains optimal cardiac output and hemodynamic stability Progressing     Absence of cardiac dysrhythmias or at baseline rhythm Progressing        GASTROINTESTINAL - ADULT     Minimal or absence of nausea and/or vomiting Progressing     Maintains or returns to baseline bowel function Progressing     Maintains adequate nutritional intake Progressing        INFECTION - ADULT     Absence or prevention of progression during hospitalization Progressing     Absence of fever/infection during neutropenic period Progressing        METABOLIC, FLUID AND ELECTROLYTES - ADULT     Electrolytes maintained within normal limits Progressing     Fluid balance maintained Progressing        MUSCULOSKELETAL - ADULT     Maintain or return mobility to safest level of function Progressing        Nutrition/Hydration-ADULT     Nutrient/Hydration intake appropriate for improving, restoring or maintaining nutritional needs Progressing        PAIN - ADULT     Verbalizes/displays adequate comfort level or baseline comfort level Progressing        Potential for Falls     Patient will remain free of falls Progressing        Prexisting or High Potential for Compromised Skin Integrity     Skin integrity is maintained or improved Progressing        RESPIRATORY - ADULT     Achieves optimal ventilation and oxygenation Progressing        SKIN/TISSUE INTEGRITY - ADULT     Incision(s), wounds(s) or drain site(s) healing without S/S of infection Progressing

## 2019-02-06 NOTE — PLAN OF CARE
Problem: PHYSICAL THERAPY ADULT  Goal: Performs mobility at highest level of function for planned discharge setting  See evaluation for individualized goals  Treatment/Interventions: Functional transfer training, LE strengthening/ROM, Elevations, Therapeutic exercise, Endurance training, Patient/family training, Equipment eval/education, Bed mobility, Gait training, OT, Spoke to nursing  Equipment Recommended: Tayler Morejon (CLARA)       See flowsheet documentation for full assessment, interventions and recommendations  Outcome: Progressing  Prognosis: Fair  Problem List: Decreased strength, Decreased endurance, Impaired balance, Decreased mobility, Pain, Decreased safety awareness, Decreased cognition, Impaired judgement  Assessment: Patient was moderate assist x2 for supine to sit transfer to EOB, with patient denying any dizzines upon sitting  Patient was max assist x2, B/L HHA, with patient able to walk 2 feet from EOB to chair  Patient had fair tolerance to seated exercise, motivational cues required to complete increased reps secondary to fatigue  Patient educated on importance of increased activity and positional changes with patient reporting understanding  Patient limited this session by significant fatigue  Patient would continue to benefit from physical therapy to improve functional mobility until medically cleared  Recommendation: Short-term skilled PT     PT - OK to Discharge: (S) Yes (To STR when medically cleared)    See flowsheet documentation for full assessment

## 2019-02-06 NOTE — PROGRESS NOTES
Progress Note - Infectious Disease   Nomi Cortes 35 y o  male MRN: [id number]  Unit/Bed#: [facility] 604-01 Encounter: [id number]      Impression/Recommendations:  1  Sepsis, POA, presented with fever and leukocytosis   Source of sepsis is most likely liver abscesses   Overall, patient is stable   Temperature still up and down   WBC stable   Admission blood cultures are now positive  Persistently elevated WBC and temperature most likely secondary to persistent abscess  Now the patient has drainage revision and chest tube, hopefully, his temperature and WBC will begin to decrease  Antibiotic plan as in below  Monitor temperature/WBC  Monitor hemodynamics      2  Streptococcus intermedius bacteremia   Source is most likely liver abscesses   However, we need to consider the possibility of endocarditis with septic emboli to liver   Blood cultures are now also positive for GPC in clusters  Per discussion with microbiology, this GPC in clusters seen on Gram stain were clumping of GPC in chains, most likely Streptococcus intermedius also  Patient's blood culture results have been confusing, with changes daily over last 2 days  However, at this point, it is clear that all he has is Streptococcus intermedius bacteremia  Repeat blood cultures negative so far  2D echo without obvious vegetation  Continue high-dose IV ceftriaxone  Follow-up repeat blood cultures  As long as repeat blood cultures are negative, treat x 14 days total      3  Multiple liver abscesses   Patient has history of diarrhea   Liver abscess is may be all secondary to translocation from gut   Abdomen/pelvis CT without other obvious intra-abdominal pathologies   It may be worthwhile to look at colonoscopy to look for colonic lesions  Antibiotic plan as in above  Continue Flagyl for anaerobic coverage  Monitor temperature/WBC  Monitor abdominal pain      3   Intermittent hypotension   This may be all secondary to sepsis above   SBP is much better now  Arlie Seip is currently not on any pressors  Antibiotic plan as in above  Monitor SBP      4  HENRIQUE, POA  This may be secondary sepsis   Dehydration may contribute   Creatinine has normalized  Antibiotic at full dose  Monitor creatinine      5  Elevated LFTs   This is most likely secondary to hepatic abscesses   Will monitor LFTs with treatment and drainage of abscesses   LFTs continue to improve  Monitor LFTs      6  History of IVDU   I have some concern regarding possible active IVDU   For these reasons, patient is not a candidate for home IV antibiotic   Prior HIV screen negative      Discussed with patient in detail regarding the above plan      Antibiotics:  Ceftriaxone/Flagyl  Post drainage # 5      Subjective:  Patient is out of ICU  He is status post placement of right-sided chest tube and tPA infusion of hepatic abscess drainage  He is awake and alert  RUQ abdominal pain improved  Temperature up and down but trending down  No further chills  No diarrhea  Objective:  Vitals:  Temp:  [97 3 °F (36 3 °C)-102 6 °F (39 2 °C)] 97 3 °F (36 3 °C)  HR:  [] 99  Resp:  [18-44] 21  BP: (100-157)/(54-84) 143/71  SpO2:  [92 %-95 %] 93 %  Temp (24hrs), Av 7 °F (38 2 °C), Min:97 3 °F (36 3 °C), Max:102 6 °F (39 2 °C)  Current: Temperature: (!) 97 3 °F (36 3 °C)    Physical Exam:     General: Awake, alert, cooperative, no distress  Neck:  Supple  No mass  No lymphadenopathy  Lungs: Decreased breath sounds, sparse basilar rales, no wheezing, respirations unlabored  Heart:  Regular rate and rhythm, S1 and S2 normal, no murmur  Abdomen: Soft, mildly distended, improved RUQ tenderness  Drain serous sanguinous  Extremities: Trace edema  No erythema/warmth  No ulcer  Nontender to palpation  Skin:  No rash  Neuro: Moves all extremities       Invasive Devices     Peripheral Intravenous Line            Peripheral IV 19 Left Antecubital 3 days          Drain            Closed/Suction Drain Right Abdomen Bulb 10 2 Fr  4 days    Closed/Suction Drain Right; Inferior;Superior Abdomen Bulb 8 5 Fr  4 days    Closed/Suction Drain Right;Posterior; Inferior Abdomen Bulb 10 2 Fr  4 days    Closed/Suction Drain Right;Superior; Anterior Abdomen Bulb 8 5 Fr  4 days    Closed/Suction Drain Right;Superior;Medial Abdomen Bulb 8 5 Fr  4 days    Urethral Catheter Temperature probe 1 day    Chest Tube 1 Right less than 1 day                Labs studies:   I have personally reviewed pertinent labs  Results from last 7 days  Lab Units 02/06/19  0437 02/05/19  0518 02/04/19  0500 02/03/19  0524 02/02/19  0459  02/01/19 2011   POTASSIUM mmol/L 4 5 4 2 4 0 3 6 3 9  < >  --    CHLORIDE mmol/L 101 103 105 104 104  < >  --    CO2 mmol/L 25 25 24 23 22  < >  --    CO2, I-STAT mmol/L  --   --   --   --   --   --  24   BUN mg/dL 14 10 9 13 30*  < >  --    CREATININE mg/dL 0 81 0 78 0 83 0 92 1 49*  < >  --    EGFR ml/min/1 73sq m 111 113 110 104 58  < >  --    GLUCOSE, ISTAT mg/dl  --   --   --   --   --   --  94   CALCIUM mg/dL 7 6* 7 4* 7 3* 7 2* 6 7*  < >  --    AST U/L  --   --  137* 272* 596*  < >  --    ALT U/L  --   --  190* 254* 316*  < >  --    ALK PHOS U/L  --   --  122* 137* 129*  < >  --    < > = values in this interval not displayed  Results from last 7 days  Lab Units 02/06/19  0437 02/05/19  0518 02/04/19  0500   WBC Thousand/uL 18 64* 19 20* 21 25*   HEMOGLOBIN g/dL 8 1* 8 2* 9 1*   PLATELETS Thousands/uL 505* 452* 428*       Results from last 7 days  Lab Units 02/05/19  1651 02/04/19  0459 02/04/19  0458 02/01/19  1952 02/01/19  1301 02/01/19  1235   BLOOD CULTURE   --  No Growth at 48 hrs  No Growth at 48 hrs    --  Streptococcus intermedius* Streptococcus intermedius*   GRAM STAIN RESULT  Rare Polys  Rare Mononuclear Cells  No bacteria seen  --   --  3+ Polys  4+ Gram positive cocci in pairs and chains Gram positive cocci in chains  Gram positive cocci in clusters Gram positive cocci in chains Gram positive cocci in clusters   BODY FLUID CULTURE, STERILE  No growth  --   --  3+ Growth of Streptococcus intermedius*  --   --        Imaging Studies:   I have personally reviewed pertinent imaging study reports and images in PACS  Chest/abdomen/pelvis CT reviewed personally  Decreased size of liver abscesses  Large right pleural effusion with compressive atelectasis  Possible MARY infiltrate  EKG, Pathology, and Other Studies:   I have personally reviewed pertinent reports

## 2019-02-06 NOTE — SPEECH THERAPY NOTE
Speech Language/Pathology    Speech/Language Pathology Progress Note    Patient Name: Ashli MEDEIROS Date: 2/6/2019     Problem List  Patient Active Problem List   Diagnosis    History of hepatitis C    Vasectomy evaluation    Hepatic abscess    Hyponatremia    Depression    Anxiety    [facility] drug abuse ([facility] [address] [facility]    Transaminitis    Anemia    Leukocytosis    Gram-positive bacteremia    Tobacco abuse    Hypophosphatemia    Urinary retention        Past Medical History  Past Medical History:   Diagnosis Date    ADHD     Anxiety     Bipolar 1 disorder ([facility] [address] )     Delusion [facility]     Hepatitis C     Infectious viral hepatitis     Hep C- No Symptoms( Took a course of Harvoni)    Psoriasis     PTSD (post-traumatic stress disorder)     jail        Past Surgical History  Past Surgical History:   Procedure Laterality Date    CT GUIDED PERC DRAINAGE CATHETER PLACEMENT  2/1/2019    IR CHEST TUBE  2/5/2019    NJ REMOVAL OF SPERM DUCT(S) Bilateral 9/25/2018    Procedure: VASECTOMY;  Surgeon: Khoa Rothman MD;  Location: [facility];  Service: Urology    WISDOM TOOTH EXTRACTION           Subjective:  "I just can't eat things that are really dry"  Patient is awake and alert, OOB in chair after therapy  Objective:  Patient is assessed with regular solids, including turkey and whole potatoes, with thin liquids  SLP assists with tray set up and cuts food  Patient does feed himself  Bite size and rate are adequate  The patient has minimally prolonged mastication time  Some oral residue is observed, but the patient eventually clears with liquid wash  The patient takes small, consecutive sips of thin liquids via straw  No overt s/s aspiration observed  Patient is educated to use liquid wash while eating, and to order softer or more moist solids if warranted  Assessment:  Patient appears to be tolerating a regular diet and thin liquids well       Plan/Recommendations:  No further ST warranted at this time  Please re-consult with concerns

## 2019-02-06 NOTE — PROGRESS NOTES
Progress Note - General Surgery   Job Menendez 35 y o  male MRN: [id number]  Unit/Bed#: [facility] 604-01 Encounter: [id number]    Assessment:  35year old with multiple hepatic abscesses s/p IR drainage, chest tube placement    Plan:  Continue hepatic drains to bulb suction  Chest tube to suction  Continue antibiotics  Appreciate surgical oncology assistance  Wean Lasix 40 daily  Wean O2  Pulmonary toilet  Repeat interval CT scan  SQH    Subjective/Objective     Subjective: No acute events  Tolerating diet  Voiding well  Objective:    Blood pressure 126/70, pulse 99, temperature 98 8 °F (37 1 °C), resp  rate 18, height 6' 2" (1 88 m), weight 116 kg (256 lb 2 8 oz), SpO2 94 %  ,Body mass index is 32 89 kg/m²  Intake/Output Summary (Last 24 hours) at 02/06/19 0713  Last data filed at 02/06/19 0555   Gross per 24 hour   Intake           1274 9 ml   Output             4023 ml   Net          -2748 1 ml       Invasive Devices     Peripheral Intravenous Line            Peripheral IV 02/03/19 Left Antecubital 2 days          Drain            Closed/Suction Drain Right Abdomen Bulb 10 2 Fr  4 days    Closed/Suction Drain Right; Inferior;Superior Abdomen Bulb 8 5 Fr  4 days    Closed/Suction Drain Right;Posterior; Inferior Abdomen Bulb 10 2 Fr  4 days    Closed/Suction Drain Right;Superior; Anterior Abdomen Bulb 8 5 Fr  4 days    Closed/Suction Drain Right;Superior;Medial Abdomen Bulb 8 5 Fr  4 days    Urethral Catheter Temperature probe 1 day    Chest Tube 1 Right less than 1 day                Physical Exam:   General: NAD, AAOx3  Eyes: PERRL  ENT: moist mucous membranes  Neck: supple  CV: RRR +S1/S2  Chest: breath sounds bilaterally  Chest tube in place, serous, no air leak  Abdomen: soft, NT ND  DANNY drains serosanguinous  Extremities: atraumatic        Results from last 7 days  Lab Units 02/06/19  0437 02/05/19  0518 02/04/19  0500   WBC Thousand/uL 18 64* 19 20* 21 25*   HEMOGLOBIN g/dL 8 1* 8 2* 9 1*   HEMATOCRIT % 23 8* 23 9* 27 3*   PLATELETS Thousands/uL 505* 452* 428*       Results from last 7 days  Lab Units 02/06/19  0437 02/05/19  0518 02/04/19  0500  02/01/19 2011   POTASSIUM mmol/L 4 5 4 2 4 0  < >  --    CHLORIDE mmol/L 101 103 105  < >  --    CO2 mmol/L 25 25 24  < >  --    CO2, I-STAT mmol/L  --   --   --   --  24   BUN mg/dL 14 10 9  < >  --    CREATININE mg/dL 0 81 0 78 0 83  < >  --    GLUCOSE, ISTAT mg/dl  --   --   --   --  94   CALCIUM mg/dL 7 6* 7 4* 7 3*  < >  --    < > = values in this interval not displayed      Results from last 7 days  Lab Units 02/02/19  0459 02/01/19 2238 02/01/19  1234   INR  1 53* 1 40* 1 40*   PTT seconds  --  32 35

## 2019-02-06 NOTE — RESPIRATORY THERAPY NOTE
resp care      02/06/19 0759   Inhalation Therapy Tx   $ Inhalation Therapy Performed Yes   $ Pulse Oximetry Spot Check Charge Completed   Pre-Treatment Pulse 97   Breath Sounds Pre-Treatment Bilateral Diminished;Clear   Breath Sounds Post-Treatment Bilateral Diminished   Post-Treatment Pulse 98   Resp Comments Pt has no resp c/o  On 3 lpm 02  Bs clear  Will change udn to tid , d/c vest therapy and encourage IS  Pt remains off hfnc  Will d/c from room

## 2019-02-06 NOTE — RESTORATIVE TECHNICIAN NOTE
Restorative Specialist Mobility Note       Activity: Chair (Assisted PT, please refer to PT notes for all information )     Assistive Device: Other (Comment) (HHAx2)     Ambulation Response: Tolerated fairly well  Repositioned: Sitting, Up in chair           Range of Motion: Active, All extremities      Patient left resting comfortably in bedside recliner, with chair alarm activated and PTA present in room for end of session

## 2019-02-06 NOTE — PLAN OF CARE
Problem: OCCUPATIONAL THERAPY ADULT  Goal: Performs self-care activities at highest level of function for planned discharge setting  See evaluation for individualized goals  Treatment Interventions: ADL retraining, Functional transfer training, Endurance training, Patient/family training, Equipment evaluation/education, Compensatory technique education, Energy conservation, Activityengagement          See flowsheet documentation for full assessment, interventions and recommendations  Outcome: Progressing  Limitation: Decreased ADL status, Decreased Safe judgement during ADL, Decreased cognition, Decreased endurance, Decreased self-care trans, Decreased high-level ADLs  Prognosis: Good  Assessment: pt participated in am ot session and was seen focusing on bed mobility, sit to stand transfer with asst x 2, pt was lethargic but answered questions when spoken to  pt  required mod asst x 2 for bed mobility and spt  pt now with chest tube and multiple kari drains s/p ir yesterday  [place]/l spoke with nsg and otr/l prior to session  pt reports being hungry for lunch and was requesting oob to chair   pt with mckeon catheter, chest tube on wall suction,  o2 and iv and multiple kari drains pt motivated and cooperative although lethargic     OT Discharge Recommendation: Short Term Rehab  OT - OK to Discharge: Yes (when medically stable)  Mel Meade

## 2019-02-06 NOTE — PROGRESS NOTES
Progress Note - Surgical Oncology   Murl Pert 35 y o  male MRN: [id number]  Unit/Bed#: [facility] 604-01 Encounter: [id number]    Assessment:  34 y/o M w/ multiple liver abscesses in R lobe of liver, s/p IR drainage on 2/1, IR R CT placement/tPA of IR drains on 2/5    Plan:  --No acute surgical intervention at this time  --IR drains may require additional tPA at some point to break up loculations  --Continue IV Abx  --Surgical Oncology available for additional questions or concerns    Subjective/Objective     Subjective:     No acute events overnight  Pt denies any complaints  Objective:     Blood pressure 126/70, pulse 99, temperature 98 8 °F (37 1 °C), resp  rate 18, height 6' 2" (1 88 m), weight 120 kg (263 lb 7 2 oz), SpO2 94 %  ,Body mass index is 33 82 kg/m²  Intake/Output Summary (Last 24 hours) at 02/06/19 0450  Last data filed at 02/06/19 0253   Gross per 24 hour   Intake           1524 9 ml   Output             4013 ml   Net          -2488  1 ml       Invasive Devices     Peripheral Intravenous Line            Peripheral IV 02/03/19 Left Antecubital 2 days          Drain            Closed/Suction Drain Right Abdomen Bulb 10 2 Fr  4 days    Closed/Suction Drain Right; Inferior;Superior Abdomen Bulb 8 5 Fr  4 days    Closed/Suction Drain Right;Posterior; Inferior Abdomen Bulb 10 2 Fr  4 days    Closed/Suction Drain Right;Superior; Anterior Abdomen Bulb 8 5 Fr  4 days    Closed/Suction Drain Right;Superior;Medial Abdomen Bulb 8 5 Fr  4 days    Urethral Catheter Temperature probe 1 day    Chest Tube 1 Right less than 1 day                Physical Exam:     GEN: NAD  HEENT: MMM  CV: RRR  Chest: R CT x1 to -20 sxn, -AL  Lung: normal effort  Ab: Soft, NT/ND, abdominal DANNY x 5 in place w/ serous/serosanguinous outputs  Extrem: No CCE  Neuro:  A+Ox3, motor and sensation grossly intact      Lab, Imaging and other studies:  CBC:   Lab Results   Component Value Date    WBC 19 20 (H) 02/05/2019    HGB 8 2 (L) 02/05/2019 HCT 23 9 (L) 02/05/2019    MCV 87 02/05/2019     (H) 02/05/2019    MCH 29 8 02/05/2019    MCHC 34 3 02/05/2019    RDW 15 4 (H) 02/05/2019    MPV 9 5 02/05/2019    NRBC 0 02/05/2019   , CMP:   Lab Results   Component Value Date    SODIUM 134 (L) 02/05/2019    K 4 2 02/05/2019     02/05/2019    CO2 25 02/05/2019    BUN 10 02/05/2019    CREATININE 0 78 02/05/2019    CALCIUM 7 4 (L) 02/05/2019    EGFR 113 02/05/2019   , Coagulation: No results found for: PT, INR, APTT, Urinalysis: No results found for: COLORU, CLARITYU, SPECGRAV, PHUR, LEUKOCYTESUR, NITRITE, PROTEINUA, GLUCOSEU, KETONESU, BILIRUBINUR, BLOODU, Amylase: No results found for: AMYLASE, Lipase: No results found for: LIPASE  VTE Pharmacologic Prophylaxis: Heparin  VTE Mechanical Prophylaxis: sequential compression device

## 2019-02-07 LAB
ANION GAP SERPL CALCULATED.3IONS-SCNC: 7 MMOL/L (ref 4–13)
ANISOCYTOSIS BLD QL SMEAR: PRESENT
BASOPHILS # BLD MANUAL: 0 THOUSAND/UL (ref 0–0.1)
BASOPHILS NFR MAR MANUAL: 0 % (ref 0–1)
BUN SERPL-MCNC: 14 MG/DL (ref 5–25)
CALCIUM SERPL-MCNC: 7.6 MG/DL (ref 8.3–10.1)
CHLORIDE SERPL-SCNC: 100 MMOL/L (ref 100–108)
CO2 SERPL-SCNC: 24 MMOL/L (ref 21–32)
CREAT SERPL-MCNC: 0.78 MG/DL (ref 0.6–1.3)
EOSINOPHIL # BLD MANUAL: 0.18 THOUSAND/UL (ref 0–0.4)
EOSINOPHIL NFR BLD MANUAL: 1 % (ref 0–6)
ERYTHROCYTE [DISTWIDTH] IN BLOOD BY AUTOMATED COUNT: 15.4 % (ref 11.6–15.1)
GFR SERPL CREATININE-BSD FRML MDRD: 113 ML/MIN/1.73SQ M
GLUCOSE SERPL-MCNC: 109 MG/DL (ref 65–140)
GLUCOSE SERPL-MCNC: 118 MG/DL (ref 65–140)
GLUCOSE SERPL-MCNC: 124 MG/DL (ref 65–140)
GLUCOSE SERPL-MCNC: 83 MG/DL (ref 65–140)
GLUCOSE SERPL-MCNC: 96 MG/DL (ref 65–140)
HCT VFR BLD AUTO: 23.9 % (ref 36.5–49.3)
HGB BLD-MCNC: 7.9 G/DL (ref 12–17)
HYPERCHROMIA BLD QL SMEAR: PRESENT
LYMPHOCYTES # BLD AUTO: 1.11 THOUSAND/UL (ref 0.6–4.47)
LYMPHOCYTES # BLD AUTO: 6 % (ref 14–44)
MCH RBC QN AUTO: 29.6 PG (ref 26.8–34.3)
MCHC RBC AUTO-ENTMCNC: 33.1 G/DL (ref 31.4–37.4)
MCV RBC AUTO: 90 FL (ref 82–98)
METAMYELOCYTES NFR BLD MANUAL: 5 % (ref 0–1)
MONOCYTES # BLD AUTO: 0.74 THOUSAND/UL (ref 0–1.22)
MONOCYTES NFR BLD: 4 % (ref 4–12)
MYELOCYTES NFR BLD MANUAL: 2 % (ref 0–1)
NEUTROPHILS # BLD MANUAL: 14.97 THOUSAND/UL (ref 1.85–7.62)
NEUTS BAND NFR BLD MANUAL: 3 % (ref 0–8)
NEUTS SEG NFR BLD AUTO: 78 % (ref 43–75)
NRBC BLD AUTO-RTO: 0 /100 WBCS
PLATELET # BLD AUTO: 535 THOUSANDS/UL (ref 149–390)
PLATELET BLD QL SMEAR: ABNORMAL
PMV BLD AUTO: 9.7 FL (ref 8.9–12.7)
POIKILOCYTOSIS BLD QL SMEAR: PRESENT
POLYCHROMASIA BLD QL SMEAR: PRESENT
POTASSIUM SERPL-SCNC: 4.6 MMOL/L (ref 3.5–5.3)
RBC # BLD AUTO: 2.67 MILLION/UL (ref 3.88–5.62)
RBC MORPH BLD: PRESENT
SODIUM SERPL-SCNC: 131 MMOL/L (ref 136–145)
TARGETS BLD QL SMEAR: PRESENT
VARIANT LYMPHS # BLD AUTO: 1 %
WBC # BLD AUTO: 18.48 THOUSAND/UL (ref 4.31–10.16)

## 2019-02-07 PROCEDURE — 85007 BL SMEAR W/DIFF WBC COUNT: CPT | Performed by: STUDENT IN AN ORGANIZED HEALTH CARE EDUCATION/TRAINING PROGRAM

## 2019-02-07 PROCEDURE — 97530 THERAPEUTIC ACTIVITIES: CPT

## 2019-02-07 PROCEDURE — 80048 BASIC METABOLIC PNL TOTAL CA: CPT | Performed by: STUDENT IN AN ORGANIZED HEALTH CARE EDUCATION/TRAINING PROGRAM

## 2019-02-07 PROCEDURE — 82948 REAGENT STRIP/BLOOD GLUCOSE: CPT

## 2019-02-07 PROCEDURE — 99222 1ST HOSP IP/OBS MODERATE 55: CPT | Performed by: INTERNAL MEDICINE

## 2019-02-07 PROCEDURE — 94640 AIRWAY INHALATION TREATMENT: CPT

## 2019-02-07 PROCEDURE — 94760 N-INVAS EAR/PLS OXIMETRY 1: CPT

## 2019-02-07 PROCEDURE — 85027 COMPLETE CBC AUTOMATED: CPT | Performed by: STUDENT IN AN ORGANIZED HEALTH CARE EDUCATION/TRAINING PROGRAM

## 2019-02-07 PROCEDURE — 97535 SELF CARE MNGMENT TRAINING: CPT

## 2019-02-07 PROCEDURE — 99232 SBSQ HOSP IP/OBS MODERATE 35: CPT | Performed by: INTERNAL MEDICINE

## 2019-02-07 RX ADMIN — HEPARIN SODIUM 5000 UNITS: 5000 INJECTION INTRAVENOUS; SUBCUTANEOUS at 06:13

## 2019-02-07 RX ADMIN — LEVALBUTEROL 1.25 MG: 1.25 SOLUTION, CONCENTRATE RESPIRATORY (INHALATION) at 14:26

## 2019-02-07 RX ADMIN — HEPARIN SODIUM 5000 UNITS: 5000 INJECTION INTRAVENOUS; SUBCUTANEOUS at 14:58

## 2019-02-07 RX ADMIN — ARIPIPRAZOLE 5 MG: 5 TABLET ORAL at 06:13

## 2019-02-07 RX ADMIN — HEPARIN SODIUM 5000 UNITS: 5000 INJECTION INTRAVENOUS; SUBCUTANEOUS at 21:21

## 2019-02-07 RX ADMIN — ISODIUM CHLORIDE 3 ML: 0.03 SOLUTION RESPIRATORY (INHALATION) at 07:22

## 2019-02-07 RX ADMIN — LEVALBUTEROL 1.25 MG: 1.25 SOLUTION, CONCENTRATE RESPIRATORY (INHALATION) at 07:22

## 2019-02-07 RX ADMIN — CEFTRIAXONE SODIUM 2000 MG: 10 INJECTION, POWDER, FOR SOLUTION INTRAVENOUS at 12:48

## 2019-02-07 RX ADMIN — STANDARDIZED SENNA CONCENTRATE 8.6 MG: 8.6 TABLET ORAL at 21:21

## 2019-02-07 RX ADMIN — ISODIUM CHLORIDE 3 ML: 0.03 SOLUTION RESPIRATORY (INHALATION) at 19:40

## 2019-02-07 RX ADMIN — DIAZEPAM 10 MG: 5 TABLET ORAL at 18:34

## 2019-02-07 RX ADMIN — POLYETHYLENE GLYCOL 3350 17 G: 17 POWDER, FOR SOLUTION ORAL at 08:25

## 2019-02-07 RX ADMIN — DOCUSATE SODIUM 100 MG: 100 CAPSULE, LIQUID FILLED ORAL at 18:34

## 2019-02-07 RX ADMIN — LEVALBUTEROL 1.25 MG: 1.25 SOLUTION, CONCENTRATE RESPIRATORY (INHALATION) at 19:40

## 2019-02-07 RX ADMIN — FAMOTIDINE 20 MG: 20 TABLET, FILM COATED ORAL at 21:21

## 2019-02-07 RX ADMIN — METRONIDAZOLE 500 MG: 500 TABLET ORAL at 21:21

## 2019-02-07 RX ADMIN — QUETIAPINE FUMARATE 200 MG: 100 TABLET ORAL at 21:21

## 2019-02-07 RX ADMIN — BUPROPION HYDROCHLORIDE 300 MG: 150 TABLET, FILM COATED, EXTENDED RELEASE ORAL at 08:26

## 2019-02-07 RX ADMIN — METRONIDAZOLE 500 MG: 500 TABLET ORAL at 14:57

## 2019-02-07 RX ADMIN — DIAZEPAM 10 MG: 5 TABLET ORAL at 08:26

## 2019-02-07 RX ADMIN — DOCUSATE SODIUM 100 MG: 100 CAPSULE, LIQUID FILLED ORAL at 08:26

## 2019-02-07 RX ADMIN — CHLORHEXIDINE GLUCONATE 0.12% ORAL RINSE 15 ML: 1.2 LIQUID ORAL at 21:21

## 2019-02-07 RX ADMIN — OXYCODONE HYDROCHLORIDE 5 MG: 5 TABLET ORAL at 11:01

## 2019-02-07 RX ADMIN — FAMOTIDINE 20 MG: 20 TABLET, FILM COATED ORAL at 08:26

## 2019-02-07 RX ADMIN — NICOTINE 21 MG: 21 PATCH, EXTENDED RELEASE TRANSDERMAL at 18:35

## 2019-02-07 RX ADMIN — OXYCODONE HYDROCHLORIDE 10 MG: 10 TABLET ORAL at 03:30

## 2019-02-07 RX ADMIN — ISODIUM CHLORIDE 3 ML: 0.03 SOLUTION RESPIRATORY (INHALATION) at 14:26

## 2019-02-07 RX ADMIN — METRONIDAZOLE 500 MG: 500 TABLET ORAL at 06:13

## 2019-02-07 NOTE — NURSING NOTE
Notified Vladislav with red sx regarding patients large output  1700mL was emptied from patients mckeon in addition to 850mL when OT worked with him and another 400mL at this time  For dayshift, pt voided 2950mL of urine  Vladislav notified  Per Rhonda Alex, continue to monitor but they do want and expect patient to be heavily diuresing  Will continue to monitor

## 2019-02-07 NOTE — SOCIAL WORK
CM reviewed pt at care coordination rounds  Pt is not medically stable for discharge  Pt has drains, chest tube, and IV ABX  CM awaiting medical clearance and discharge plan  CM will continue to follow

## 2019-02-07 NOTE — CONSULTS
Consultation - PEPITO   Stephen Anderson 35 y o  male MRN: [id number]  Unit/Bed#: [facility] 604-01 Encounter: [id number]      Assessment/Plan     1  Multiple pyogenic hepatic abscesses - cultures growing Streptococcus intermedius  Status post IR drainage (x5 DANNY drains) POD #6  Source of infection unclear  Possible causes include ascending biliary infection possibly from transient obstruction versus small bowel/colonic bacterial translocation versus embolic/hematogenous seeding from other source such as cardiac or oral   2D echo without obvious vegetation  No biliary dilatation or apparent obstruction noted on review of CT scan  Afebrile over last 24 hours  · Will plan for colonoscopy on Monday  · Monitor fever trends and leukocytosis  · Start split prep Sunday evening  · NPO after midnight on Sunday   · Continue IV antibiotics per Infectious Disease  · Drain management and supportive care per primary team    2  Transaminitis, hyperbilirubinemia - AST peaked at 706 and has since down trended gradually to 137 on most recent labs from 02/04  ALT peaked at 375 and also gradually down trended to 190  Alkaline phosphatase has remained mainly stable and was 122 at last check  Total bilirubin noted to be elevated at 9 45 at time of admission  Total bilirubin 7 76 at last check, slightly elevated from 7 2  Suspect secondary to multiple liver abscesses  Medications (likely ceftriaxone) may also be causing some element of cholestasis causing persistent elevation of total bilirubin  No evidence of biliary obstruction or dilatation on review of CT imaging  Although there is concern for possible Enoch's disease particularly given patient's psychiatric history, serum ceruloplasmin in February 2015 was within normal limits  Patient was also negative for alpha-1-antitrypsin, MELANIA, ASMA, and AMA at that time    · Trend liver chemistries and total bilirubin  · If there is worsening of LFTs or bilirubin, can consider evaluation with MRCP versus EUS  · Continue management per primary team    3  History of hepatitis-C - completed outpatient treatment with Harvoni  HCV RNA PCR on 06/19/2018 noted to be negative, suggestive of sustained virologic response after treatment  History of Present Illness   Physician Requesting Consult: Lindy Rebollar MD  Reason for Consult / Principal Problem:  Pyogenic hepatic abscess   HPI: Horace Delgadillo is a 35y o  year old male with past medical history of polysubstance abuse including IV drug use although patient quit all drug use in 2017, bipolar disorder, anxiety/depression, and hepatitis-C infection status post treatment with Harvoni  Patient [facility] to [facility] on 02/01/2019 in severe sepsis secondary to liver abscesses  Cause of liver abscesses was unknown  Patient underwent IR drainage on 02/01/2019 (x5 drains) and has since been managed on IV antibiotics  Blood cultures and culture of abscess fluid has grown Streptococcus intermedius  Again, source of patient's infection is unknown  Gastroenterology consultation requested for pyogenic hepatic abscesses  Patient seen and evaluated at bedside  Patient is pleasant and cooperative, and answers all questions  Patient denies any previous GI history although he does have a history of hepatitis-C which was treated a few years ago  He also admits to a history of GERD for which he states he was taking over-the-counter medications  Otherwise, patient denies any significant GI history  Patient denies any family history of gastrointestinal malignancy or diseases  According to patient, he developed fever, nausea, vomiting, diarrhea, and abdominal discomfort acutely prior to hospital presentation  He adamantly denies any recent drug use  At time of presentation, patient was found be in severe sepsis with also findings of abnormal liver chemistries    Since undergoing IR drainage of the multiple hepatic abscesses, there has been interval improvement on repeat CT imaging  The patient appears to be gradually improving with current management  However, source of patient's abscesses remains unknown  A transthoracic echocardiogram does not reveal any evidence of endocarditis  Apart from thirst, the patient offers no other acute complaints  Inpatient consult to gastroenterology     Date/Time 2/7/2019 11:23 AM     Performed by  Heron Rebolledo by Lisa Whitaker          Review of Systems   Constitutional:        Thirsty   All other systems reviewed and are negative  Historical Information   Past Medical History:   Diagnosis Date    ADHD     Anxiety     [facility] disorder [facility] [address] [facility]     [facility]     Hepatitis C     Infectious viral hepatitis     Hep C- No Symptoms( Took a course of Harvoni)    Psoriasis     PTSD (post-traumatic stress disorder)     penitentiary     Past Surgical History:   Procedure Laterality Date    CT GUIDED PERC DRAINAGE CATHETER PLACEMENT  2/1/2019    IR CHEST TUBE  2/5/2019    UT REMOVAL OF SPERM DUCT(S) Bilateral 9/25/2018    Procedure: VASECTOMY;  Surgeon: Kristin Rivera MD;  Location: [facility]  MAIN [facility];  Service: Urology    WISDOM TOOTH EXTRACTION       Social History   History   Alcohol Use    Yes     Comment: socially     History   Drug Use    Types: Marijuana, Methaqualone     Comment: medical- vaping 4x/day     History   Smoking Status    Current Every Day Smoker    Packs/day: 1 00   Smokeless Tobacco    Never Used     Family History: History reviewed  No pertinent family history      Meds/Allergies   all current active meds have been reviewed, current meds:   Current Facility-Administered Medications   Medication Dose Route Frequency    albuterol inhalation solution 2 5 mg  2 5 mg Nebulization Q4H PRN    ARIPiprazole (ABILIFY) tablet 5 mg  5 mg Oral Early Morning    bisacodyl (DULCOLAX) rectal suppository 10 mg  10 mg Rectal Daily    buPROPion (WELLBUTRIN XL) 24 hr tablet 300 mg  300 mg Oral QAM    cefTRIAXone (ROCEPHIN) 2,000 mg in dextrose 5 % 50 mL IVPB  2,000 mg Intravenous Q24H    chlorhexidine (PERIDEX) 0 12 % oral rinse 15 mL  15 mL Swish & Spit Q12H [address]    diazepam (VALIUM) tablet 10 mg  10 mg Oral BID    docusate sodium (COLACE) capsule 100 mg  100 mg Oral BID    famotidine (PEPCID) tablet 20 mg  20 mg Oral Q12H [address]    heparin (porcine) subcutaneous injection 5,000 Units  5,000 Units Subcutaneous Q8H [address]    HYDROmorphone (DILAUDID) injection 1 mg  1 mg Intravenous Q3H PRN    levalbuterol (XOPENEX) inhalation solution 1 25 mg  1 25 mg Nebulization TID    LORazepam (ATIVAN) 2 mg/mL injection 1 mg  1 mg Intravenous Q4H PRN    metroNIDAZOLE (FLAGYL) tablet 500 mg  500 mg Oral Q8H [address]    nicotine (NICODERM CQ) 21 mg/24 hr TD 24 hr patch 21 mg  21 mg Transdermal Daily    oxyCODONE (ROXICODONE) immediate release tablet 10 mg  10 mg Oral Q4H PRN    oxyCODONE (ROXICODONE) IR tablet 5 mg  5 mg Oral Q4H PRN    polyethylene glycol (MIRALAX) packet 17 g  17 g Oral Daily PRN    polyethylene glycol (MIRALAX) packet 17 g  17 g Oral Daily    prazosin (MINIPRESS) capsule 1 mg  1 mg Oral Daily PRN    QUEtiapine (SEROquel) tablet 200 mg  200 mg Oral HS    senna (SENOKOT) tablet 8 6 mg  1 tablet Oral HS    sodium chloride 0 9 % inhalation solution 3 mL  3 mL Nebulization TID    and PTA meds:   Prior to Admission Medications   Prescriptions Last Dose Informant Patient Reported? Taking?    ARIPiprazole (ABILIFY) 5 mg tablet 1/31/2019 at Unknown time Self Yes Yes   Sig: Take 5 mg by mouth daily in the early morning     QUEtiapine (SEROquel) 200 mg tablet 1/31/2019 at Unknown time Self Yes Yes   Sig: Take 200 mg by mouth daily at bedtime   amphetamine-dextroamphetamine (ADDERALL) 20 mg tablet 1/31/2019 at Unknown time Self Yes Yes   Sig: TAKE 1 TABLET BY MOUTH AT 8AM, NOON, AND 4PM   buPROPion (WELLBUTRIN XL) 300 mg 24 hr tablet 1/31/2019 at Unknown time Self Yes Yes   Sig: Take 300 mg by mouth every morning diazepam (VALIUM) 10 mg tablet 1/31/2019 at Unknown time Self Yes Yes   Sig: Take 10 mg by mouth 2 (two) times a day   docusate sodium (COLACE) 100 mg capsule  Self No No   Sig: Take 1 capsule (100 mg total) by mouth 3 (three) times a day for 30 days   prazosin (MINIPRESS) 1 mg capsule 1/31/2019 at Unknown time Self Yes Yes   Sig: Take 1 mg by mouth as needed Anxiety       Facility-Administered Medications: None       Allergies   Allergen Reactions    Sulfa Antibiotics Hives     Objective     Intake/Output Summary (Last 24 hours) at 02/07/19 1123  Last data filed at 02/07/19 0934   Gross per 24 hour   Intake              782 ml   Output             6270 ml   Net            -5488 ml     Invasive Devices:   Peripheral IV 02/03/19 Left Antecubital (Active)   Site Assessment Clean;Dry; Intact 2/7/2019 11:01 AM   Dressing Type Transparent 2/7/2019 11:01 AM   Line Status Flushed;Saline locked 2/7/2019 11:01 AM   Dressing Status Clean;Dry; Intact 2/7/2019 11:01 AM   Dressing Intervention Dressing changed 2/4/2019  8:00 PM   Dressing Change Due 02/07/19 2/7/2019 11:01 AM   Reason Not Rotated Not due 2/7/2019 11:01 AM       Chest Tube 1 Right (Active)   Function -20 cm H2O 2/7/2019  6:30 AM   Chest Tube Air Leak No 2/7/2019  6:30 AM   Patency Intervention Tip/tilt 2/7/2019  6:30 AM   Drainage Description Serosanguineous 2/7/2019  6:30 AM   Dressing Status Clean;Dry; Intact 2/7/2019  6:30 AM   Site Assessment Unable to assess 2/7/2019  6:30 AM   Surrounding Skin Unable to view 2/7/2019  6:30 AM   Output (mL) 0 mL 2/7/2019  6:30 AM       Closed/Suction Drain Right;Superior; Anterior Abdomen Bulb 8 5 Fr  (Active)   Site Description Unable to view 2/7/2019  6:30 AM   Dressing Status Clean;Dry; Intact 2/7/2019  6:30 AM   Drainage Appearance Purulent 2/7/2019  6:30 AM   Status To bulb suction 2/7/2019  6:30 AM   Intake (mL) 20 mL 2/7/2019 12:15 AM   Output (mL) 70 mL 2/7/2019  6:30 AM       Closed/Suction Drain Right;Superior;Medial Abdomen Bulb 8 5 Fr  (Active)   Site Description Unable to view 2/7/2019  6:30 AM   Dressing Status Clean;Dry; Intact 2/7/2019  6:30 AM   Drainage Appearance Serosanguineous;Purulent 2/7/2019  6:30 AM   Status To bulb suction 2/7/2019  6:30 AM   Intake (mL) 20 mL 2/7/2019 12:15 AM   Output (mL) 50 mL 2/7/2019  6:30 AM       Closed/Suction Drain Right; Inferior;Superior Abdomen Bulb 8 5 Fr  (Active)   Site Description Unable to view 2/7/2019  6:30 AM   Dressing Status Clean;Dry; Intact 2/7/2019  6:30 AM   Drainage Appearance Serosanguineous 2/7/2019  6:30 AM   Status To bulb suction 2/7/2019  6:30 AM   Intake (mL) 20 mL 2/7/2019 12:15 AM   Output (mL) 30 mL 2/7/2019  6:30 AM       Closed/Suction Drain Right Abdomen Bulb 10 2 Fr  (Active)   Site Description Unable to view 2/7/2019  6:30 AM   Dressing Status Clean;Dry; Intact 2/7/2019  6:30 AM   Drainage Appearance Serosanguineous 2/7/2019  6:30 AM   Status To bulb suction 2/7/2019  6:30 AM   Intake (mL) 20 mL 2/7/2019 12:15 AM   Output (mL) 15 mL 2/7/2019  6:30 AM       Closed/Suction Drain Right;Posterior; Inferior Abdomen Bulb 10 2 Fr  (Active)   Site Description Unable to view 2/7/2019  6:30 AM   Dressing Status Clean;Dry; Intact 2/7/2019  6:30 AM   Drainage Appearance Serosanguineous 2/7/2019  6:30 AM   Status To bulb suction 2/7/2019  6:30 AM   Intake (mL) 20 mL 2/7/2019 12:15 AM   Output (mL) 80 mL 2/7/2019  6:30 AM       Urethral Catheter Temperature probe (Active)   Amt returned on insertion(mL) 150 mL 2/6/2019  1:01 AM   Reasons to continue Urinary Catheter  Acute urinary retention/obstruction failing urinary retention protocol 2/5/2019  2:26 PM   Goal for Removal Voiding trial when ambulation improves 2/5/2019  2:26 PM   Site Assessment Clean;Skin intact 2/6/2019  9:25 AM   Collection Container Standard drainage bag 2/6/2019  9:25 AM   Securement Method Securing device (Describe) 2/6/2019  9:25 AM   Output (mL) 825 mL 2/7/2019  9:34 AM     Physical Exam Constitutional: He is oriented to person, place, and time  He appears well-developed  No distress  HENT:   Head: Normocephalic and atraumatic  Nose: Nose normal    Mouth/Throat: No oropharyngeal exudate  Mucosal membranes dry   Eyes: EOM are normal  Scleral icterus is present  Neck: Neck supple  No JVD present  No tracheal deviation present  Cardiovascular: Regular rhythm, normal heart sounds and intact distal pulses  Tachycardic   Pulmonary/Chest:   Decreased breath sounds most notably on the right, soft basilar crackles, right-sided chest tube present   Abdominal: Soft  He exhibits no mass  There is tenderness (Mild tenderness to deep palpation in the right upper quadrant in areas of drains)  There is no rebound and no guarding  Palpable hepatomegaly, hypoactive bowel sounds   Musculoskeletal: He exhibits no edema or deformity  Neurological: He is alert and oriented to person, place, and time  No cranial nerve deficit  Skin: Skin is dry  No rash noted  He is not diaphoretic  No erythema  No pallor  Jaundice   Psychiatric: He has a normal mood and affect  His behavior is normal    Nursing note and vitals reviewed  Lab Results: I have personally reviewed pertinent reports         Results from last 7 days  Lab Units 02/07/19  0446 02/06/19  0437 02/05/19  0518 02/04/19  0500 02/03/19  0524 02/02/19  0459  02/01/19 2011   POTASSIUM mmol/L 4 6 4 5 4 2 4 0 3 6 3 9  < >  --    CHLORIDE mmol/L 100 101 103 105 104 104  < >  --    CO2 mmol/L 24 25 25 24 23 22  < >  --    CO2, I-STAT mmol/L  --   --   --   --   --   --   --  24   BUN mg/dL 14 14 10 9 13 30*  < >  --    CREATININE mg/dL 0 78 0 81 0 78 0 83 0 92 1 49*  < >  --    CALCIUM mg/dL 7 6* 7 6* 7 4* 7 3* 7 2* 6 7*  < >  --    ALK PHOS U/L  --   --   --  122* 137* 129*  < >  --    ALT U/L  --   --   --  190* 254* 316*  < >  --    AST U/L  --   --   --  137* 272* 596*  < >  --    GLUCOSE, ISTAT mg/dl  --   --   --   --   --   --   --  94   < > = values in this interval not displayed  Imaging Studies: I have personally reviewed pertinent films in PACS   Xr Chest Portable    Result Date: 2/6/2019  Impression: 1  There are 5 drains projecting over the right base  Patient is known to have 5 drains in his liver abscesses  It is unclear if the superior most pigtail is pleural   Consider repeat chest radiograph with better inclusion of all of the drains on the patient's right side  2   Decrease moderate right effusion  3   Diffuse left lung opacity is unchanged  Workstation performed: [id number]     Xr Chest Portable    Result Date: 2/4/2019  Impression: Diffuse bilateral pulmonary opacities again seen  Moderate right pleural effusion  Workstation performed: [id number]     Xr Chest Portable    Result Date: 2/4/2019  Impression: Vascular congestion with new moderate right pleural effusion  Workstation performed: [id number]     Xr Chest 2 Views    Result Date: 2/1/2019  Impression: Elevation of the right hemidiaphragm with bibasilar subsegmental atelectasis and suspected trace right effusion  Workstation performed: [id number]     Ct Guided Perc Drainage Catheter Placeme    Result Date: 2/1/2019  Impression: Impression: Successful placement of a total of 5  all-purpose drainage catheters into the 5 largest liver abscess collections  A total of 300 cc of purulent material was aspirated and a specimen sent to the laboratory as described  The 5 drains were labeled as anterior superior, middle superior, posterior superior, central, and posterior inferior  The two 10-Arabic drains were the central drain and the posterior-inferior drain  The 3 superior drains were 8 5-Arabic  Plan: Tubes to DANNY bulb suction  Flush each tube with 20 mL normal saline solution  The patient will likely need more drains placed since there are several other abscesses    We will follow patient and see on follow-up CAT scan if 2 further drains are needed or if the existing drains require repositioning and or wire disruption of the loculations to [facility]  Workstation performed: [id number]     Ct Chest Abdomen Pelvis W Contrast    Result Date: 2/5/2019  Impression: 1  New patchy opacity in the left upper lobe likely representing pneumonia  2   Large right pleural effusion, increased from the prior study, with new trace left pleural effusion  Compressive atelectasis  3   Decreased size of numerous liver abscesses status post drainage of the largest abscesses  New gas within the collections is likely iatrogenic  4   Moderate free fluid in the pelvis  The study was marked in [facility] for immediate notification  Workstation performed: [id number]     Xr Chest Portable Icu    Result Date: 2/5/2019  Impression: Increased right pleural effusion  Stable diffuse airspace disease  Workstation performed: [id number]     Ct Abdomen Pelvis With Contrast    Result Date: 2/1/2019  Impression: 1  In the provided clinical setting of fever and severe leukocytosis, the innumerable hepatic multilocular/multiseptated liver masses are most likely pyogenic abscesses versus less likely anemic abscesses  Other causes of cystic liver masses are less likely in this clinical setting  The patient's reported history of hepatitis C is noted  After patient's acute issues have resolved and the above liver lesions have been treated, surveillance liver imaging could be performed  2   Nondistended gallbladder with mild wall thickening and mucosal hyperemia is likely reactive  I personally discussed this study with Selwyn Ha on 2/1/2019 at 3:21PM  Workstation performed: [id number]     Ir Chest Tube    Result Date: 2/6/2019  Impression: Successful placement of a right chest pigtail drain and connected to Pleur-evac  No kee purulence obtained  Sample sent for laboratory analysis   _______________________________________________________________ PROCEDURE DETAILS: Operators: Dr Joshua Hooks, [address] [place] [address] attending, performed the procedure  Anesthesia: Conscious sedation was provided throughout a total intra-service time of 44 minutes during which the patient's hemodynamic parameters were continuously monitored by an independent trained radiology nurse  1% lidocaine was injected in the skin and subcutaneous tissues overlying the access site  Medications: 1% lidocaine, fentanyl, Versed Contrast: 0 mL of Omnipaque 300 Fluoroscopy time: 0 minutes COMMENTS: Following the discussion of the risks, benefits and alternatives to the procedure, written informed consent was obtained from the patient  The patient was placed left lateral decubitus on the patient's bed  A preprocedure timeout was performed per [facility] protocol  Ultrasound evaluation of right chest demonstrated a pleural effusion  The right lateral chest was prepped and draped in the usual sterile fashion  All elements of maximal sterile barrier technique were followed (cap, mask, sterile gown, sterile gloves, large sterile sheet, hand hygiene, and 2% chlorhexidine for cutaneous antisepsis)  Lidocaine was administered to the skin, and a small skin incision  was made  Under direct ultrasound guidance, an 18 gauge needle was advanced into the pleural fluid  Guidewire was advanced through the needle  Needle removed, access was serially dilated, and 10-Romanian pigtail drain advanced  Pigtail formed in collection  Drain connected to Pleur-evac and sutured to the skin with 2-0 Prolene  Workstation performed: [id number]     EKG, Pathology, and Other Studies: I have personally reviewed pertinent reports  VTE Prophylaxis: Sequential compression device (Venodyne)  and Heparin    Counseling / Coordination of Care  Total floor / unit time spent today 30 minutes  Greater than 50% of total time was spent with the patient and / or family counseling and / or coordination of care  A description of the counseling / coordination of care:  See above

## 2019-02-07 NOTE — PHYSICAL THERAPY NOTE
PHYSICAL THERAPY NOTE          Patient Name: Melissa Shabazz  [id number] Date: 2/7/2019 02/07/19 1133   Pain Assessment   Pain Assessment 0-10   Pain Score 7   Pain Type Acute pain   Pain Location Abdomen   Hospital Pain Intervention(s) Repositioned; Ambulation/increased activity   Response to Interventions tolerated   Restrictions/Precautions   Weight Bearing Precautions Per Order No   Other Precautions Cognitive; Fall Risk;Pain;O2;Multiple lines   General   Chart Reviewed Yes   Family/Caregiver Present No   Cognition   Overall Cognitive Status Impaired   Arousal/Participation Cooperative; Alert   Attention Attends with cues to redirect   Orientation Level Oriented to person;Oriented to place  (pt grossly oriented to time, off on day of week/date)   Following Commands Follows one step commands with increased time or repetition   Subjective   Subjective "I will give it a try"   Bed Mobility   Supine to Sit 3  Moderate assistance   Additional items Assist x 2; Increased time required;HOB elevated   Additional Comments Pt left sitting in bedside chair at termination of session, all needs within reach   Transfers   Sit to Stand 4  Minimal assistance   Additional items Assist x 2; Increased time required;Verbal cues   Stand to Sit 3  Moderate assistance   Additional items Assist x 2; Increased time required;Verbal cues   Stand pivot 3  Moderate assistance   Additional items Assist x 2; Increased time required;Verbal cues   Ambulation/Elevation   Gait pattern Improper Weight shift;Decreased foot clearance; Short stride; Step to;Excessively slow   Gait Assistance 3  Moderate assist   Additional items Assist x 2;Verbal cues; Tactile cues   Assistive Device Other (Comment)  (B/L HHA)   Distance 2'   Balance   Static Sitting Fair   Dynamic Sitting Fair -   Static Standing Fair -   Dynamic Standing Poor +   Ambulatory Poor +   Endurance Deficit   Endurance Deficit Yes   Endurance Deficit Description pain and fatigue   Activity Tolerance   Activity Tolerance Patient limited by fatigue;Patient limited by pain   Medical Staff Made Aware Mel MIRAMONTES   Nurse Made Aware RNRussell   Assessment   Prognosis Fair   Problem List Decreased strength;Decreased endurance; Impaired balance;Decreased mobility; Decreased coordination;Decreased cognition; Impaired judgement;Decreased safety awareness;Decreased skin integrity;Pain   Assessment Pt participated in therapy session focusing on functional mobility tasks  Pt performed supine to sit transfer with mod A x 2 and verbal cues for technique and safety  Pt performed sit to stand transfer with min A x 2 and verbal cues for hand placement and safety  Pt ambulated 2' with RW and min A x 2 and verbal cues for sequencing and safety and line management  Pt displays decreased step and stride length, improper weight shift, improper weight shift  Provided pt education regarding benefits of participating in therapy session, benefits of sitting OOB, safe functional mobility strategies  Skilled physical therapy is indicated to address listed functional deficits focusing on strength, endurance and functional mobility  Goals   Patient Goals feel better   LTG Expiration Date 02/18/19   Long Term Goal #1 Per PT eval 2/4: 1) PERFORM BED MOBILITY MOD-I TO PARTICIPATE IN FREQUENT REPOSITIONING AND IMPROVE SKIN INTEGRITY; 2) PERFORM SIT<-->STAND TRANSFER MOD-I TO PROMOTE I WITH TOILETING AND OOB MOBILITY; 3) AMBULATE 200 FEET MOD-I W/ LEAST RESTRICTIVE DEVICE TO PARTICIPATE IN HOUSEHOLD AND COMMUNITY LEVEL AMBULATION; 4) IMPROVE B/L LE STRENGTH BY 1/2 GRADE TO IMPROVE EFFICIENCY OF TRANSFERS; 5) IMPROVE BALANCE BY 1 GRADE TO REDUCE RISK FOR FALLS; 6) NAVIGATE 12 STEPS S LEVEL IN ORDER TO SAFELY NAVIGATE MULTIPLE FLOORS AT HOME  Treatment Day 3   Plan   Treatment/Interventions Functional transfer training;LE strengthening/ROM; Endurance training; Bed mobility;Gait training;Spoke to nursing;OT   Progress Slow progress, decreased activity tolerance   PT Frequency Other (Comment)  (3-5x/wk)   Recommendation   Recommendation Short-term skilled PT   Equipment Recommended Walker  (RW)   PT - OK to Discharge Yes  (to rehab when medically stable)     Broderick Gustafson PT, DPT

## 2019-02-07 NOTE — OCCUPATIONAL THERAPY NOTE
Occupational Therapy Treatment Note:       02/07/19 1135   Restrictions/Precautions   Other Precautions Cognitive   Pain Assessment   Pain Assessment 0-10   Pain Score 7   Pain Type Acute pain   Pain Location Abdomen   ADL   LB Dressing Assistance 3  Moderate Assistance   LB Dressing Comments seated to hazel pants, mod asst to thread l le and to pull over hips  pt with good rom to lift legs while sitting to place into pants  Toileting Comments pt with catheter removed   Bed Mobility   Supine to Sit 3  Moderate assistance   Additional items Assist x 2  (lob  slightly elevated)   Transfers   Sit to Stand 4  Minimal assistance   Additional items Assist x 2   Stand to Sit 3  Moderate assistance   Additional items Assist x 1   Stand pivot 3  Moderate assistance   Additional items Assist x 1  (rw  pt with multiple lines/ drains)   Cognition   Overall Cognitive Status Impaired   Arousal/Participation Cooperative; Alert   Attention Attends with cues to redirect   Orientation Level (pt grossly oriented to time  off on day of week/ date)   Memory Decreased recall of precautions   Following Commands Follows one step commands without difficulty   Comments pt less lethargic today   pt continues to do well with concrete directions during mobility secondary to pain increases with mobility  Activity Tolerance   Activity Tolerance Patient tolerated treatment well   Assessment   Assessment pt participated in am ot session and was seen focusing on lb dressing seated eob, bedm obility and initation of tasks requested  pt with noted improvement in functional transfers and was much less lethargic this session  pt with increased pain with mobility  nsg notified  pt followed commands with increased time and simple commands provided espicially needed during mobility  pt was using tablet to listen to music this session with asst for set up pt is cooperative and motivated for oob  pt appears confused at times noted during conversation  Raymundo Bustamante will continue to follow focusing on goals from ie  Plan   Treatment Interventions ADL retraining;Functional transfer training; Activityengagement   Goal Expiration Date 02/18/19   Treatment Day 3   OT Frequency 3-5x/wk   Recommendation   OT Discharge Recommendation Short Term Rehab   Barthel Index   Feeding 5   Bathing 0   Grooming Score 5   Dressing Score 5   Bladder Score 10   Bowels Score 5   Toilet Use Score 10   Transfers (Bed/Chair) Score 5   Mobility (Level Surface) Score 0   Stairs Score 0   Barthel Index Score 45   Modified [place] Scale   Modified [place] Scale 4   April A FE Gonzalez

## 2019-02-07 NOTE — PLAN OF CARE
Problem: PHYSICAL THERAPY ADULT  Goal: Performs mobility at highest level of function for planned discharge setting  See evaluation for individualized goals  Treatment/Interventions: Functional transfer training, LE strengthening/ROM, Elevations, Therapeutic exercise, Endurance training, Patient/family training, Equipment eval/education, Bed mobility, Gait training, OT, Spoke to nursing  Equipment Recommended: Dilma Couch (CLARA)       See flowsheet documentation for full assessment, interventions and recommendations  Outcome: Progressing  Prognosis: Fair  Problem List: Decreased strength, Decreased endurance, Impaired balance, Decreased mobility, Decreased coordination, Decreased cognition, Impaired judgement, Decreased safety awareness, Decreased skin integrity, Pain  Assessment: Pt participated in therapy session focusing on functional mobility tasks  Pt performed supine to sit transfer with mod A x 2 and verbal cues for technique and safety  Pt performed sit to stand transfer with min A x 2 and verbal cues for hand placement and safety  Pt ambulated 2' with RW and min A x 2 and verbal cues for sequencing and safety and line management  Pt displays decreased step and stride length, improper weight shift, improper weight shift  Provided pt education regarding benefits of participating in therapy session, benefits of sitting OOB, safe functional mobility strategies  Skilled physical therapy is indicated to address listed functional deficits focusing on strength, endurance and functional mobility  Recommendation: Short-term skilled PT     PT - OK to Discharge: Yes (to rehab when medically stable)    See flowsheet documentation for full assessment

## 2019-02-07 NOTE — PROGRESS NOTES
Progress Note - Surgical Oncology   Stephen Anderson 35 y o  male MRN: [id number]  Unit/Bed#: [facility] 604-01 Encounter: [id number]    Assessment:  36 y/o M w/ multiple liver abscesses in R lobe of liver, s/p IR drainage on 2/1, IR R CT placement/tPA of IR drains on 2/5    Plan:  --Monitor drain outputs  --Continue IV Abx per ID  --Care per primary    Subjective/Objective     Subjective:     No acute events overnight  Tolerating diet  Pain well controlled  No new complaints  Objective:     Blood pressure 128/77, pulse 100, temperature (!) 96 8 °F (36 °C), resp  rate 22, height 6' 2" (1 88 m), weight 116 kg (256 lb 2 8 oz), SpO2 (!) 86 %  ,Body mass index is 32 89 kg/m²  Intake/Output Summary (Last 24 hours) at 02/07/19 0616  Last data filed at 02/07/19 0015   Gross per 24 hour   Intake              782 ml   Output             3950 ml   Net            -3168 ml       Invasive Devices     Peripheral Intravenous Line            Peripheral IV 02/03/19 Left Antecubital 3 days          Drain            Closed/Suction Drain Right Abdomen Bulb 10 2 Fr  5 days    Closed/Suction Drain Right; Inferior;Superior Abdomen Bulb 8 5 Fr  5 days    Closed/Suction Drain Right;Posterior; Inferior Abdomen Bulb 10 2 Fr  5 days    Closed/Suction Drain Right;Superior; Anterior Abdomen Bulb 8 5 Fr  5 days    Closed/Suction Drain Right;Superior;Medial Abdomen Bulb 8 5 Fr  5 days    Urethral Catheter Temperature probe 2 days    Chest Tube 1 Right 1 day                Physical Exam:     GEN: NAD  HEENT: MMM  CV: RRR  Chest: R CT x1 to -20 sxn, -AL  Lung: normal effort  Ab: Soft, NT/ND, abdominal DANNY x 5 in place w/ mostly SS drainage  Extrem: No CCE  Neuro:  A+Ox3, motor and sensation grossly intact      Lab, Imaging and other studies:  CBC:   Lab Results   Component Value Date    WBC 18 48 (H) 02/07/2019    HGB 7 9 (L) 02/07/2019    HCT 23 9 (L) 02/07/2019    MCV 90 02/07/2019     (H) 02/07/2019    MCH 29 6 02/07/2019    MCHC 33 1 02/07/2019 RDW 15 4 (H) 02/07/2019    MPV 9 7 02/07/2019   , CMP:   No results found for: SODIUM, K, CL, CO2, ANIONGAP, BUN, CREATININE, GLUCOSE, CALCIUM, AST, ALT, ALKPHOS, PROT, BILITOT, EGFR, Coagulation: No results found for: PT, INR, APTT, Urinalysis: No results found for: COLORU, CLARITYU, SPECGRAV, PHUR, LEUKOCYTESUR, NITRITE, PROTEINUA, GLUCOSEU, KETONESU, BILIRUBINUR, BLOODU, Amylase: No results found for: AMYLASE, Lipase: No results found for: LIPASE  VTE Pharmacologic Prophylaxis: Heparin  VTE Mechanical Prophylaxis: sequential compression device

## 2019-02-07 NOTE — PLAN OF CARE
Problem: OCCUPATIONAL THERAPY ADULT  Goal: Performs self-care activities at highest level of function for planned discharge setting  See evaluation for individualized goals  Treatment Interventions: ADL retraining, Functional transfer training, Endurance training, Patient/family training, Equipment evaluation/education, Compensatory technique education, Energy conservation, Activityengagement          See flowsheet documentation for full assessment, interventions and recommendations  Outcome: Progressing  Limitation: Decreased ADL status, Decreased Safe judgement during ADL, Decreased cognition, Decreased endurance, Decreased self-care trans, Decreased high-level ADLs  Prognosis: Good  Assessment: pt participated in am ot session and was seen focusing on lb dressing seated eob, bedm obility and initation of tasks requested  pt with noted improvement in functional transfers and was much less lethargic this session  pt with increased pain with mobility  nsg notified  pt followed commands with increased time and simple commands provided espicially needed during mobility  pt was using tablet to listen to music this session with asst for set up pt is cooperative and motivated for oob  pt appears confused at times noted during conversation    will continue to follow focusing on goals from ie      April FE Vallejo  OT Discharge Recommendation: Short Term Rehab  OT - OK to Discharge: Yes (when medically stable)

## 2019-02-07 NOTE — PROGRESS NOTES
Progress Note - Infectious Disease   Ashli Montes 35 y o  male MRN: [id number]  Unit/Bed#: [facility] 604-01 Encounter: [id number]      Impression/Recommendations:  1  Sepsis, POA, presented with fever and leukocytosis   Source of sepsis is most likely liver abscesses   Overall, patient is stable   Temperature still up and down   WBC stable   Admission blood cultures are now positive  Persistently elevated WBC and temperature most likely secondary to persistent abscess  Now the patient has drainage revision and chest tube, hopefully, temperature is down and WBC begins to decreased  Antibiotic plan as in below  Monitor temperature/WBC  Monitor hemodynamics      2  Streptococcus intermedius bacteremia   Source is most likely liver abscesses   However, we need to consider the possibility of endocarditis with septic emboli to liver   Bacteremia cleared with IV antibiotic regimen  2D echo without vegetation  Given bacteremia, patient will need 2 weeks of IV antibiotic  Continue high-dose IV ceftriaxone  Follow-up repeat blood cultures  Treat times 14 days total, through 2/15      3  Multiple liver abscesses   Patient has history of diarrhea   Liver abscess is may be all secondary to translocation from gut   Abdomen/pelvis CT without other obvious intra-abdominal pathologies   It may be worthwhile to look at colonoscopy to look for colonic lesions  Antibiotic plan as in above  Continue Flagyl for anaerobic coverage  Monitor temperature/WBC  Monitor abdominal pain      3  Intermittent hypotension   This may be all secondary to sepsis above   SBP is much better now   Patient is currently not on any pressors  Antibiotic plan as in above  Monitor SBP      4  HENRIQUE, POA  This may be secondary sepsis   Dehydration may contribute   Creatinine has normalized  Antibiotic at full dose    Monitor creatinine      5  Elevated LFTs   This is most likely secondary to hepatic abscesses   Will monitor LFTs with treatment and drainage of abscesses   LFTs continue to improve  Monitor LFTs      6  History of IVDU   I have some concern regarding possible active IVDU   For these reasons, patient is not a candidate for home IV antibiotic   Prior HIV screen negative      Discussed with patient in detail regarding the above plan  Discussed with primary service      Antibiotics:  Ceftriaxone/Flagyl  Post drainage # 6      Subjective:  Patient is comfortable  Abdominal pain better  He is awake and alert  Temperature is down   No further chills  No diarrhea  Objective:  Vitals:  Temp:  [96 8 °F (36 °C)-99 °F (37 2 °C)] 99 °F (37 2 °C)  HR:  [] 104  Resp:  [20-22] 20  BP: (126-132)/(74-77) 126/74  SpO2:  [86 %-94 %] 86 %  Temp (24hrs), Av 9 °F (36 6 °C), Min:96 8 °F (36 °C), Max:99 °F (37 2 °C)  Current: Temperature: 99 °F (37 2 °C)    Physical Exam:     General: Awake, alert, cooperative, no distress  Neck:  Supple  No mass  No lymphadenopathy  Lungs: Expansion symmetric, no rales, no wheezing, respirations unlabored  Heart:  Regular rate and rhythm, S1 and S2 normal, no murmur  Abdomen: Soft, left distension, improved RUQ tenderness, bowel sounds active all four quadrants,        no masses, no organomegaly  Drain serous  Extremities: Trace edema  No erythema/warmth  No ulcer  Nontender to palpation  Skin:  No rash  Neuro: Moves all extremities  Invasive Devices     Peripheral Intravenous Line            Peripheral IV 19 Left Antecubital 4 days          Drain            Closed/Suction Drain Right Abdomen Bulb 10 2 Fr  5 days    Closed/Suction Drain Right; Inferior;Superior Abdomen Bulb 8 5 Fr  5 days    Closed/Suction Drain Right;Posterior; Inferior Abdomen Bulb 10 2 Fr  5 days    Closed/Suction Drain Right;Superior; Anterior Abdomen Bulb 8 5 Fr  5 days    Closed/Suction Drain Right;Superior;Medial Abdomen Bulb 8 5 Fr  5 days    Urethral Catheter Temperature probe 3 days    Chest Tube 1 Right 1 day Labs studies:   I have personally reviewed pertinent labs  Results from last 7 days  Lab Units 02/07/19  0446 02/06/19  0437 02/05/19  0518 02/04/19  0500 02/03/19  0524 02/02/19  0459  02/01/19 2011   POTASSIUM mmol/L 4 6 4 5 4 2 4 0 3 6 3 9  < >  --    CHLORIDE mmol/L 100 101 103 105 104 104  < >  --    CO2 mmol/L 24 25 25 24 23 22  < >  --    CO2, I-STAT mmol/L  --   --   --   --   --   --   --  24   BUN mg/dL 14 14 10 9 13 30*  < >  --    CREATININE mg/dL 0 78 0 81 0 78 0 83 0 92 1 49*  < >  --    EGFR ml/min/1 73sq m 113 111 113 110 104 58  < >  --    GLUCOSE, ISTAT mg/dl  --   --   --   --   --   --   --  94   CALCIUM mg/dL 7 6* 7 6* 7 4* 7 3* 7 2* 6 7*  < >  --    AST U/L  --   --   --  137* 272* 596*  < >  --    ALT U/L  --   --   --  190* 254* 316*  < >  --    ALK PHOS U/L  --   --   --  122* 137* 129*  < >  --    < > = values in this interval not displayed  Results from last 7 days  Lab Units 02/07/19  0446 02/06/19  0437 02/05/19  0518   WBC Thousand/uL 18 48* 18 64* 19 20*   HEMOGLOBIN g/dL 7 9* 8 1* 8 2*   PLATELETS Thousands/uL 535* 505* 452*       Results from last 7 days  Lab Units 02/05/19  1651 02/04/19  0459 02/04/19  0458 02/01/19  1952 02/01/19  1301 02/01/19  1235   BLOOD CULTURE   --  No Growth at 72 hrs  No Growth at 72 hrs   --  Streptococcus intermedius* Streptococcus intermedius*   GRAM STAIN RESULT  Rare Polys  Rare Mononuclear Cells  No bacteria seen  --   --  3+ Polys  4+ Gram positive cocci in pairs and chains Gram positive cocci in chains  Gram positive cocci in clusters Gram positive cocci in chains  Gram positive cocci in clusters   BODY FLUID CULTURE, STERILE  No growth  --   --  3+ Growth of Streptococcus intermedius*  --   --        Imaging Studies:   I have personally reviewed pertinent imaging study reports and images in PACS  EKG, Pathology, and Other Studies:   I have personally reviewed pertinent reports

## 2019-02-07 NOTE — PROGRESS NOTES
Progress Note - General Surgery   Yumiko Cost 35 y o  male MRN: [id number]  Unit/Bed#: [facility] 604-01 Encounter: [id number]    Assessment:  35year old with multiple hepatic abscesses s/p IR drainage, chest tube placement    JPs: 185, 110, 110, 80, 115  CT: pending output charting  WBC- 18 5 (18 6)    Plan:  Regular diet  Continue JPs to suction- good output  If output drops, will consider re-administration of tPA  F/U CT cultures- no grown at this time  Antibiotics per ID- Rocephin Flagyl  OOB, ambulation  PRN pain control  MARI Lopez    Subjective/Objective   Chief Complaint:     Subjective: Pain improved, tolerating PO  Passing flatus  Mental status seems improved  On 3L NC  Hopes to be able to leave soon  Objective:     Blood pressure 128/77, pulse 100, temperature (!) 96 8 °F (36 °C), resp  rate 22, height 6' 2" (1 88 m), weight 116 kg (256 lb 2 8 oz), SpO2 (!) 86 %  ,Body mass index is 32 89 kg/m²  Intake/Output Summary (Last 24 hours) at 02/07/19 0616  Last data filed at 02/07/19 0015   Gross per 24 hour   Intake              782 ml   Output             3950 ml   Net            -3168 ml       Invasive Devices     Peripheral Intravenous Line            Peripheral IV 02/03/19 Left Antecubital 3 days          Drain            Closed/Suction Drain Right Abdomen Bulb 10 2 Fr  5 days    Closed/Suction Drain Right; Inferior;Superior Abdomen Bulb 8 5 Fr  5 days    Closed/Suction Drain Right;Posterior; Inferior Abdomen Bulb 10 2 Fr  5 days    Closed/Suction Drain Right;Superior; Anterior Abdomen Bulb 8 5 Fr  5 days    Closed/Suction Drain Right;Superior;Medial Abdomen Bulb 8 5 Fr  5 days    Urethral Catheter Temperature probe 2 days    Chest Tube 1 Right 1 day              Physical Exam:   Gen: A&O, NAD  Cardio: RRR  Lungs: Decreased b/l/ CT pm R  Suction no AL  Serous output  Abd: Soft, non distended, non tender   DANNY x 5, serous/serosang      Lab, Imaging and other studies:  CBC:   Lab Results   Component Value Date WBC 18 48 (H) 02/07/2019    HGB 7 9 (L) 02/07/2019    HCT 23 9 (L) 02/07/2019    MCV 90 02/07/2019     (H) 02/07/2019    MCH 29 6 02/07/2019    MCHC 33 1 02/07/2019    RDW 15 4 (H) 02/07/2019    MPV 9 7 02/07/2019   , CMP: No results found for: SODIUM, K, CL, CO2, ANIONGAP, BUN, CREATININE, GLUCOSE, CALCIUM, AST, ALT, ALKPHOS, PROT, BILITOT, EGFR, Coagulation: No results found for: PT, INR, APTT  VTE Pharmacologic Prophylaxis: Heparin  VTE Mechanical Prophylaxis: sequential compression device

## 2019-02-08 ENCOUNTER — APPOINTMENT (INPATIENT)
Dept: RADIOLOGY | Facility: HOSPITAL | Age: 39
DRG: 871 | End: 2019-02-08
Payer: MEDICARE

## 2019-02-08 LAB
ANION GAP SERPL CALCULATED.3IONS-SCNC: 6 MMOL/L (ref 4–13)
ANISOCYTOSIS BLD QL SMEAR: PRESENT
BACTERIA SPEC ANAEROBE CULT: NO GROWTH
BACTERIA SPEC BFLD CULT: NO GROWTH
BASOPHILS # BLD MANUAL: 0 THOUSAND/UL (ref 0–0.1)
BASOPHILS NFR MAR MANUAL: 0 % (ref 0–1)
BUN SERPL-MCNC: 11 MG/DL (ref 5–25)
CALCIUM SERPL-MCNC: 8.1 MG/DL (ref 8.3–10.1)
CHLORIDE SERPL-SCNC: 102 MMOL/L (ref 100–108)
CO2 SERPL-SCNC: 22 MMOL/L (ref 21–32)
CREAT SERPL-MCNC: 0.72 MG/DL (ref 0.6–1.3)
EOSINOPHIL # BLD MANUAL: 0.42 THOUSAND/UL (ref 0–0.4)
EOSINOPHIL NFR BLD MANUAL: 2 % (ref 0–6)
ERYTHROCYTE [DISTWIDTH] IN BLOOD BY AUTOMATED COUNT: 15.7 % (ref 11.6–15.1)
GFR SERPL CREATININE-BSD FRML MDRD: 117 ML/MIN/1.73SQ M
GLUCOSE SERPL-MCNC: 109 MG/DL (ref 65–140)
GLUCOSE SERPL-MCNC: 109 MG/DL (ref 65–140)
GLUCOSE SERPL-MCNC: 111 MG/DL (ref 65–140)
GLUCOSE SERPL-MCNC: 124 MG/DL (ref 65–140)
GLUCOSE SERPL-MCNC: 98 MG/DL (ref 65–140)
GRAM STN SPEC: NORMAL
HCT VFR BLD AUTO: 24.6 % (ref 36.5–49.3)
HGB BLD-MCNC: 8 G/DL (ref 12–17)
HYPERCHROMIA BLD QL SMEAR: PRESENT
LYMPHOCYTES # BLD AUTO: 1.49 THOUSAND/UL (ref 0.6–4.47)
LYMPHOCYTES # BLD AUTO: 7 % (ref 14–44)
MAGNESIUM SERPL-MCNC: 2 MG/DL (ref 1.6–2.6)
MCH RBC QN AUTO: 29.9 PG (ref 26.8–34.3)
MCHC RBC AUTO-ENTMCNC: 32.5 G/DL (ref 31.4–37.4)
MCV RBC AUTO: 92 FL (ref 82–98)
MONOCYTES # BLD AUTO: 0.85 THOUSAND/UL (ref 0–1.22)
MONOCYTES NFR BLD: 4 % (ref 4–12)
NEUTROPHILS # BLD MANUAL: 18.47 THOUSAND/UL (ref 1.85–7.62)
NEUTS SEG NFR BLD AUTO: 87 % (ref 43–75)
NRBC BLD AUTO-RTO: 0 /100 WBCS
PLATELET # BLD AUTO: 628 THOUSANDS/UL (ref 149–390)
PLATELET BLD QL SMEAR: ABNORMAL
PMV BLD AUTO: 9.6 FL (ref 8.9–12.7)
POTASSIUM SERPL-SCNC: 4.4 MMOL/L (ref 3.5–5.3)
RBC # BLD AUTO: 2.68 MILLION/UL (ref 3.88–5.62)
RBC MORPH BLD: PRESENT
SODIUM SERPL-SCNC: 130 MMOL/L (ref 136–145)
WBC # BLD AUTO: 21.23 THOUSAND/UL (ref 4.31–10.16)

## 2019-02-08 PROCEDURE — 97535 SELF CARE MNGMENT TRAINING: CPT

## 2019-02-08 PROCEDURE — 94640 AIRWAY INHALATION TREATMENT: CPT

## 2019-02-08 PROCEDURE — 83735 ASSAY OF MAGNESIUM: CPT | Performed by: STUDENT IN AN ORGANIZED HEALTH CARE EDUCATION/TRAINING PROGRAM

## 2019-02-08 PROCEDURE — 94760 N-INVAS EAR/PLS OXIMETRY 1: CPT

## 2019-02-08 PROCEDURE — 82948 REAGENT STRIP/BLOOD GLUCOSE: CPT

## 2019-02-08 PROCEDURE — 85007 BL SMEAR W/DIFF WBC COUNT: CPT | Performed by: STUDENT IN AN ORGANIZED HEALTH CARE EDUCATION/TRAINING PROGRAM

## 2019-02-08 PROCEDURE — 85027 COMPLETE CBC AUTOMATED: CPT | Performed by: STUDENT IN AN ORGANIZED HEALTH CARE EDUCATION/TRAINING PROGRAM

## 2019-02-08 PROCEDURE — 80048 BASIC METABOLIC PNL TOTAL CA: CPT | Performed by: STUDENT IN AN ORGANIZED HEALTH CARE EDUCATION/TRAINING PROGRAM

## 2019-02-08 PROCEDURE — 99233 SBSQ HOSP IP/OBS HIGH 50: CPT | Performed by: INTERNAL MEDICINE

## 2019-02-08 PROCEDURE — 71045 X-RAY EXAM CHEST 1 VIEW: CPT

## 2019-02-08 RX ORDER — ALBUTEROL SULFATE 90 UG/1
2 AEROSOL, METERED RESPIRATORY (INHALATION) EVERY 4 HOURS PRN
Status: DISCONTINUED | OUTPATIENT
Start: 2019-02-08 | End: 2019-02-15 | Stop reason: HOSPADM

## 2019-02-08 RX ADMIN — ARIPIPRAZOLE 5 MG: 5 TABLET ORAL at 06:04

## 2019-02-08 RX ADMIN — LORAZEPAM 1 MG: 2 INJECTION INTRAMUSCULAR; INTRAVENOUS at 03:33

## 2019-02-08 RX ADMIN — ISODIUM CHLORIDE 3 ML: 0.03 SOLUTION RESPIRATORY (INHALATION) at 07:51

## 2019-02-08 RX ADMIN — STANDARDIZED SENNA CONCENTRATE 8.6 MG: 8.6 TABLET ORAL at 22:58

## 2019-02-08 RX ADMIN — DORNASE ALFA 5 MG: 1 SOLUTION RESPIRATORY (INHALATION) at 14:04

## 2019-02-08 RX ADMIN — METRONIDAZOLE 500 MG: 500 TABLET ORAL at 06:04

## 2019-02-08 RX ADMIN — OXYCODONE HYDROCHLORIDE 10 MG: 10 TABLET ORAL at 22:58

## 2019-02-08 RX ADMIN — QUETIAPINE FUMARATE 200 MG: 100 TABLET ORAL at 22:58

## 2019-02-08 RX ADMIN — DOCUSATE SODIUM 100 MG: 100 CAPSULE, LIQUID FILLED ORAL at 17:43

## 2019-02-08 RX ADMIN — CEFTRIAXONE SODIUM 2000 MG: 10 INJECTION, POWDER, FOR SOLUTION INTRAVENOUS at 11:42

## 2019-02-08 RX ADMIN — FAMOTIDINE 20 MG: 20 TABLET, FILM COATED ORAL at 08:18

## 2019-02-08 RX ADMIN — OXYCODONE HYDROCHLORIDE 5 MG: 5 TABLET ORAL at 01:23

## 2019-02-08 RX ADMIN — CHLORHEXIDINE GLUCONATE 0.12% ORAL RINSE 15 ML: 1.2 LIQUID ORAL at 08:17

## 2019-02-08 RX ADMIN — ALTEPLASE 10 MG: 2.2 INJECTION, POWDER, LYOPHILIZED, FOR SOLUTION INTRAVENOUS at 14:04

## 2019-02-08 RX ADMIN — METRONIDAZOLE 500 MG: 500 TABLET ORAL at 22:58

## 2019-02-08 RX ADMIN — HEPARIN SODIUM 5000 UNITS: 5000 INJECTION INTRAVENOUS; SUBCUTANEOUS at 22:58

## 2019-02-08 RX ADMIN — BUPROPION HYDROCHLORIDE 300 MG: 150 TABLET, FILM COATED, EXTENDED RELEASE ORAL at 08:18

## 2019-02-08 RX ADMIN — HYDROMORPHONE HYDROCHLORIDE 1 MG: 1 INJECTION, SOLUTION INTRAMUSCULAR; INTRAVENOUS; SUBCUTANEOUS at 19:58

## 2019-02-08 RX ADMIN — NICOTINE 21 MG: 21 PATCH, EXTENDED RELEASE TRANSDERMAL at 17:42

## 2019-02-08 RX ADMIN — FAMOTIDINE 20 MG: 20 TABLET, FILM COATED ORAL at 20:01

## 2019-02-08 RX ADMIN — DIAZEPAM 10 MG: 5 TABLET ORAL at 17:43

## 2019-02-08 RX ADMIN — DIAZEPAM 10 MG: 5 TABLET ORAL at 08:18

## 2019-02-08 RX ADMIN — METRONIDAZOLE 500 MG: 500 TABLET ORAL at 14:14

## 2019-02-08 RX ADMIN — HEPARIN SODIUM 5000 UNITS: 5000 INJECTION INTRAVENOUS; SUBCUTANEOUS at 06:04

## 2019-02-08 RX ADMIN — CHLORHEXIDINE GLUCONATE 0.12% ORAL RINSE 15 ML: 1.2 LIQUID ORAL at 20:01

## 2019-02-08 RX ADMIN — LEVALBUTEROL 1.25 MG: 1.25 SOLUTION, CONCENTRATE RESPIRATORY (INHALATION) at 07:51

## 2019-02-08 RX ADMIN — HEPARIN SODIUM 5000 UNITS: 5000 INJECTION INTRAVENOUS; SUBCUTANEOUS at 14:14

## 2019-02-08 RX ADMIN — DOCUSATE SODIUM 100 MG: 100 CAPSULE, LIQUID FILLED ORAL at 08:18

## 2019-02-08 RX ADMIN — OXYCODONE HYDROCHLORIDE 5 MG: 5 TABLET ORAL at 16:59

## 2019-02-08 NOTE — SOCIAL WORK
Cm received phone call from patient's mother domonique Stewart, [phone] requesting update on discharge plan  Cm stated that she is awaiting final determination on antibiotic duration and that patient most likely would not be admitted to any subacute facility for rehab due to recent IV drug use  Patient's mother reports that she is a nurse at [employer] and suspected as much  Patient's mother requesting updates once cm receives them

## 2019-02-08 NOTE — PROGRESS NOTES
Progress Note - Infectious Disease   Corazon Grady 35 y o  male MRN: [id number]  Unit/Bed#: [facility] 604-01 Encounter: [id number]      Impression/Recommendations:  1  Sepsis, POA, presented with fever and leukocytosis   Source of sepsis is most likely liver abscesses   Overall, patient is stable   Temperature still up and down   WBC stable   Admission blood cultures are now positive   Persistently elevated WBC and temperature most likely secondary to persistent abscess   Now the patient has drainage revision and chest tube, hopefully, temperature is down and WBC begins to decreased  Antibiotic plan as in below  Monitor temperature/WBC  Monitor hemodynamics      2  Streptococcus intermedius bacteremia   Source is most likely liver abscesses   However, we need to consider the possibility of endocarditis with septic emboli to liver   Bacteremia cleared with IV antibiotic regimen  2D echo without vegetation  Given bacteremia, patient will need 2 weeks of IV antibiotic  Continue high-dose IV ceftriaxone  Follow-up repeat blood cultures  Treat times 14 days total, through 2/15      3  Multiple liver abscesses   Patient has history of diarrhea   Liver abscess is may be all secondary to translocation from gut   Abdomen/pelvis CT without other obvious intra-abdominal pathologies   It may be worthwhile to look at colonoscopy to look for colonic lesions  Antibiotic plan as in above  Continue Flagyl for anaerobic coverage  Monitor temperature/WBC  Monitor abdominal pain  Plan for colonoscopy next week noted      3  Recurrent fever/leukocytosis   Temperature and WBC had been decreasing but increased today  Will need to monitor closely  If both fever and WBC continues to increase, patient will need repeat CT imaging to look for more undrained abscesses  Antibiotic plan as in above  Monitor temperature/WBC      4  HENRIQUE, POA   This may be secondary sepsis   Dehydration may contribute   Creatinine has normalized  Antibiotic at full dose  Monitor creatinine      5  Elevated LFTs   This is most likely secondary to hepatic abscesses   Will monitor LFTs with treatment and drainage of abscesses   LFTs continue to improve  Monitor LFTs      6  History of IVDU   I have some concern regarding possible active IVDU   For these reasons, patient is not a candidate for home IV antibiotic   Prior HIV screen negative      Discussed with patient in detail regarding the above plan      Antibiotics:  Ceftriaxone/Flagyl  Post drainage # 7      Subjective:  Patient's temperature was up last night  WBC increased today  He remains comfortable  Abdominal pain better  He is awake and alert  No diarrhea  Objective:  Vitals:  Temp:  [97 9 °F (36 6 °C)-102 4 °F (39 1 °C)] 97 9 °F (36 6 °C)  HR:  [103-109] 108  Resp:  [18-20] 20  BP: (121-126)/(72-76) 121/72  SpO2:  [86 %-97 %] 97 %  Temp (24hrs), Av 5 °F (37 5 °C), Min:97 9 °F (36 6 °C), Max:102 4 °F (39 1 °C)  Current: Temperature: 97 9 °F (36 6 °C)    Physical Exam:     General: Awake, alert, cooperative, no distress  Neck:  Supple  No mass  No lymphadenopathy  Lungs: Expansion symmetric, no rales, no wheezing, respirations unlabored  Heart:  Regular rate and rhythm, S1 and S2 normal, no murmur  Abdomen: Soft, mildly distended, improving RUQ tenderness, bowel sounds active all four quadrants,        no masses, no organomegaly  Drain is mostly serous  Extremities: No edema  No erythema/warmth  No ulcer  Nontender to palpation  Skin:  No rash  Neuro: Moves all extremities  Invasive Devices     Peripheral Intravenous Line            Peripheral IV 19 Right Arm less than 1 day          Drain            Closed/Suction Drain Right Abdomen Bulb 10 2 Fr  6 days    Closed/Suction Drain Right; Inferior;Superior Abdomen Bulb 8 5 Fr  6 days    Closed/Suction Drain Right;Posterior; Inferior Abdomen Bulb 10 2 Fr  6 days    Closed/Suction Drain Right;Superior; Anterior Abdomen Bulb 8 5 Fr  6 days    Closed/Suction Drain Right;Superior;Medial Abdomen Bulb 8 5 Fr  6 days    Chest Tube 1 Right 2 days                Labs studies:   I have personally reviewed pertinent labs  Results from last 7 days  Lab Units 02/08/19 0449 02/07/19 0446 02/06/19 0437  02/04/19  0500 02/03/19  0524 02/02/19 0459 02/01/19 2011   POTASSIUM mmol/L 4 4 4 6 4 5  < > 4 0 3 6 3 9  < >  --    CHLORIDE mmol/L 102 100 101  < > 105 104 104  < >  --    CO2 mmol/L 22 24 25  < > 24 23 22  < >  --    CO2, I-STAT mmol/L  --   --   --   --   --   --   --   --  24   BUN mg/dL 11 14 14  < > 9 13 30*  < >  --    CREATININE mg/dL 0 72 0 78 0 81  < > 0 83 0 92 1 49*  < >  --    EGFR ml/min/1 73sq m 117 113 111  < > 110 104 58  < >  --    GLUCOSE, ISTAT mg/dl  --   --   --   --   --   --   --   --  94   CALCIUM mg/dL 8 1* 7 6* 7 6*  < > 7 3* 7 2* 6 7*  < >  --    AST U/L  --   --   --   --  137* 272* 596*  < >  --    ALT U/L  --   --   --   --  190* 254* 316*  < >  --    ALK PHOS U/L  --   --   --   --  122* 137* 129*  < >  --    < > = values in this interval not displayed  Results from last 7 days  Lab Units 02/08/19 0449 02/07/19 0446 02/06/19 0437   WBC Thousand/uL 21 23* 18 48* 18 64*   HEMOGLOBIN g/dL 8 0* 7 9* 8 1*   PLATELETS Thousands/uL 628* 535* 505*       Results from last 7 days  Lab Units 02/05/19  1651 02/04/19  0459 02/04/19  0458 02/01/19  1952 02/01/19  1301 02/01/19  1235   BLOOD CULTURE   --  No Growth After 4 Days  No Growth After 4 Days    --  Streptococcus intermedius* Streptococcus intermedius*   GRAM STAIN RESULT  Rare Polys  Rare Mononuclear Cells  No bacteria seen  --   --  3+ Polys  4+ Gram positive cocci in pairs and chains Gram positive cocci in chains  Gram positive cocci in clusters Gram positive cocci in chains  Gram positive cocci in clusters   BODY FLUID CULTURE, STERILE  No growth  --   --  3+ Growth of Streptococcus intermedius*  --   -- Imaging Studies:   I have personally reviewed pertinent imaging study reports and images in PACS  EKG, Pathology, and Other Studies:   I have personally reviewed pertinent reports

## 2019-02-08 NOTE — PROGRESS NOTES
Surgery Progress Note    TPA / DNAse injected into chest tube at 13:15  Full volume was unable to be injected due to patient intolerance and extreme resistance in the pigtail tubing, approx 80% of each injected  Tube clamped and nursing instructed to unclamp in 4 hours  Will check CXR tomorrow

## 2019-02-08 NOTE — OCCUPATIONAL THERAPY NOTE
Occupational Therapy Treatment Note:       02/08/19 1405   Restrictions/Precautions   Other Precautions Cognitive   Pain Assessment   Pain Assessment FLACC   Pain Rating: FLACC (Rest) - Face 0   Pain Rating: FLACC (Rest) - Legs 0   Pain Rating: FLACC (Rest) - Activity 0   Pain Rating: FLACC (Rest) - Cry 0   Pain Rating: FLACC (Rest) - Consolability 0   Score: FLACC (Rest) 0   Pain Rating: FLACC (Activity) - Face 1   Pain Rating: FLACC (Activity) - Legs 0   Pain Rating: FLACC (Activity) - Activity 1   Pain Rating: FLACC (Activity) - Cry 1   Pain Rating: FLACC (Activity) - Consolability 0   Score: FLACC (Activity) 3   ADL   Toileting Assistance  5  Supervision/Setup   Toileting Comments s to use urinal seated eob   Bed Mobility   Supine to Sit 5  Supervision   Sit to Supine 5  Supervision   Additional items Assist x 1   Cognition   Overall Cognitive Status Impaired   Arousal/Participation Cooperative   Orientation Level Oriented to person;Oriented to time   Memory Decreased recall of recent events;Decreased short term memory;Decreased recall of precautions   Following Commands Follows one step commands without difficulty   Activity Tolerance   Activity Tolerance Patient tolerated treatment well   Assessment   Assessment pt  participated in pm ot session and was seen focusing on toileting sitting eob and bed mobility supine to from sit in flat bed  pt was s for same  pt is limited by pain although has improved  mobility since previous session  pt with brighter affect, asking for tablet  pt questioning when he can go home  pt reports he sees his son 1 x a week and resides with his girlfriend  pt was able to id his room number and  date/ month from dry erase board  pt was oob earlier today and declined oob again at this time  Plan   Treatment Interventions ADL retraining;Functional transfer training; Activityengagement   Goal Expiration Date 02/18/19   Treatment Day 4   OT Frequency 3-5x/wk   Recommendation   OT Discharge Recommendation Short Term Rehab   Barthel Index   Feeding 5   Bathing 0   Grooming Score 5   Dressing Score 5   Bladder Score 10   Bowels Score 10   Toilet Use Score 5   Transfers (Bed/Chair) Score 10   Mobility (Level Surface) Score 0   Stairs Score 0   Barthel Index Score 50   Modified Crane Scale   Modified Landry Scale 4   April A FE Gonzalez

## 2019-02-08 NOTE — PROGRESS NOTES
Progress Note - General Surgery   Melissa Shabazz 35 y o  male MRN: [id number]  Unit/Bed#: [facility] 604-01 Encounter: [id number]    Assessment:  34 yo M with multiple hepatic abscesses, sp IR drainage    Drains: 130, 75, 50, 50, 168  CT: 0  Tmax: 102 4 (1 fever, 3 pm yesterday)    Plan:  Regular diet  Continue IR drains, likely re-tPA over weekend  May be able to d/c chest tube today- no output x 48 hrs, cultures NGTD  GI- C scope Monday  ID- 14 days Rocephin/Flagyl if blood cx remain negative  OOB, ambulation  Monitor fevers, WBC    Subjective/Objective   Chief Complaint:     Subjective: No complaints, states he's doing "good" and feels much better than last week  Denies pain  No n/V, tolerating PO, passing gas  On 2L NC    Objective:     Blood pressure 121/72, pulse (!) 108, temperature 97 9 °F (36 6 °C), resp  rate 20, height 6' 2" (1 88 m), weight 116 kg (256 lb 2 8 oz), SpO2 (!) 89 %  ,Body mass index is 32 89 kg/m²  Intake/Output Summary (Last 24 hours) at 02/08/19 1031  Last data filed at 02/08/19 0215   Gross per 24 hour   Intake              400 ml   Output             3548 ml   Net            -3148 ml       Invasive Devices     Peripheral Intravenous Line            Peripheral IV 02/07/19 Right Arm less than 1 day          Drain            Closed/Suction Drain Right Abdomen Bulb 10 2 Fr  6 days    Closed/Suction Drain Right; Inferior;Superior Abdomen Bulb 8 5 Fr  6 days    Closed/Suction Drain Right;Posterior; Inferior Abdomen Bulb 10 2 Fr  6 days    Closed/Suction Drain Right;Superior; Anterior Abdomen Bulb 8 5 Fr  6 days    Closed/Suction Drain Right;Superior;Medial Abdomen Bulb 8 5 Fr  6 days    Chest Tube 1 Right 2 days                Physical Exam:   Gen: A&O, NAD  Cardio: RRR  Lungs: CTAB  Abd: Soft, non distended, non tender  DANNY x 5, serous/serosang  Chest: CT x 1, serous output, no AL    Lab, Imaging and other studies:  I have personally reviewed pertinent lab results    , CBC:   Lab Results   Component Value Date    WBC 21 23 (H) 02/08/2019    HGB 8 0 (L) 02/08/2019    HCT 24 6 (L) 02/08/2019    MCV 92 02/08/2019     (H) 02/08/2019    MCH 29 9 02/08/2019    MCHC 32 5 02/08/2019    RDW 15 7 (H) 02/08/2019    MPV 9 6 02/08/2019   , CMP:   Lab Results   Component Value Date    SODIUM 130 (L) 02/08/2019    K 4 4 02/08/2019     02/08/2019    CO2 22 02/08/2019    BUN 11 02/08/2019    CREATININE 0 72 02/08/2019    CALCIUM 8 1 (L) 02/08/2019    EGFR 117 02/08/2019     VTE Pharmacologic Prophylaxis: Heparin  VTE Mechanical Prophylaxis: sequential compression device

## 2019-02-08 NOTE — RESPIRATORY THERAPY NOTE
RT Protocol Note  Ashli Montes 35 y o  male MRN: [id number]  Unit/Bed#: [facility] 604-01 Encounter: [id number]    Assessment    Principal Problem:    Hepatic abscess  Active Problems:    History of hepatitis C    Hyponatremia    Depression    Anxiety    IV drug abuse ([place] [address] )    Transaminitis    Anemia    Leukocytosis    Gram-positive bacteremia    Tobacco abuse    Hypophosphatemia    Urinary retention      Home Pulmonary Medications:  None       Past Medical History:   Diagnosis Date    ADHD     Anxiety     Bipolar 1 disorder ([place] [address] )     Delusion ([place] [address] )     Hepatitis C     Infectious viral hepatitis     Hep C- No Symptoms( Took a course of Harvoni)    Psoriasis     PTSD (post-traumatic stress disorder)     senior living     Social History     Social History    Marital status: Single     Spouse name: N/A    Number of children: N/A    Years of education: N/A     Social History Main Topics    Smoking status: Current Every Day Smoker     Packs/day: 1 00    Smokeless tobacco: Never Used    Alcohol use Yes      Comment: socially    Drug use: Yes     Types: Marijuana, Methaqualone      Comment: medical- vaping 4x/day    Sexual activity: Not Asked     Other Topics Concern    None     Social History Narrative    None       Subjective    Subjective Data: none    Objective    Physical Exam:   Assessment Type: Assess only  General Appearance: Alert, Awake  Respiratory Pattern: Normal  Chest Assessment: Chest expansion symmetrical  Bilateral Breath Sounds: Diminished, Clear  Cough: None  O2 Device: RA    Vitals:  Blood pressure 121/72, pulse (!) 108, temperature 97 9 °F (36 6 °C), resp  rate 20, height 6' 2" (1 88 m), weight 104 kg (228 lb 9 9 oz), SpO2 97 %        Results from last 7 days  Lab Units 02/04/19  1435   PH ART  7 484*   PCO2 ART mm Hg 33 7*   PO2 ART mm Hg 73 9*   HCO3 ART mmol/L 24 8   BASE EXC ART mmol/L 1 5   O2 CONTENT ART mL/dL 13 0*   O2 HGB, ARTERIAL % 94 9   ABG SOURCE  Radial, Right   BRIAN TEST  Yes Imaging and other studies: I have personally reviewed pertinent reports  O2 Device: RA     Plan    Respiratory Plan: Discontinue Protocol, No distress/Pulmonary history        Resp Comments: Pt evaluated per resp protocol  He is here due to liver complications, has not pulmonary history and doesnt take breathing medications at home  Pt is on RA 92%, VSS and no increased WOB  Will d/c bronchodilatorsTID, respiratory protocol and will order albuterol HFA PRN Q4 for SOB

## 2019-02-08 NOTE — PROGRESS NOTES
Chest tube canister appears to have been knocked over; all 3 chambers show collections at half full levels  Canister changed at this time  Will continue to monitor

## 2019-02-08 NOTE — PROGRESS NOTES
Progress Note - General Surgery   Samantha Benavides 35 y o  male MRN: [id number]  Unit/Bed#: [facility] 604-01 Encounter: [id number]    Assessment:  35 y o  M w/ multiple hepatic abscesses s/p IR drainage w/ step intermedius bacteremia    Plan:  Appreciate ID and GI recs  Needs C-scope  Tentatively planned for Monday  Cont abx    Subjective/Objective   Chief Complaint:     Subjective:     Objective:     Blood pressure 121/72, pulse (!) 108, temperature 97 9 °F (36 6 °C), resp  rate 20, height 6' 2" (1 88 m), weight 104 kg (228 lb 9 9 oz), SpO2 (!) 89 %  ,Body mass index is 29 35 kg/m²  Intake/Output Summary (Last 24 hours) at 02/08/19 0746  Last data filed at 02/08/19 0601   Gross per 24 hour   Intake              400 ml   Output             3743 ml   Net            -3343 ml       Invasive Devices     Peripheral Intravenous Line            Peripheral IV 02/07/19 Right Arm less than 1 day          Drain            Closed/Suction Drain Right Abdomen Bulb 10 2 Fr  6 days    Closed/Suction Drain Right; Inferior;Superior Abdomen Bulb 8 5 Fr  6 days    Closed/Suction Drain Right;Posterior; Inferior Abdomen Bulb 10 2 Fr  6 days    Closed/Suction Drain Right;Superior; Anterior Abdomen Bulb 8 5 Fr  6 days    Closed/Suction Drain Right;Superior;Medial Abdomen Bulb 8 5 Fr  6 days    Chest Tube 1 Right 2 days                Physical Exam:   No acute distress  Chest tube w/ minimal drainage  Hepatic drain w/ 473 down from 845    Lab, Imaging and other studies:  CBC:   Lab Results   Component Value Date    WBC 21 23 (H) 02/08/2019    HGB 8 0 (L) 02/08/2019    HCT 24 6 (L) 02/08/2019    MCV 92 02/08/2019     (H) 02/08/2019    MCH 29 9 02/08/2019    MCHC 32 5 02/08/2019    RDW 15 7 (H) 02/08/2019    MPV 9 6 02/08/2019    NRBC 0 02/08/2019   , CMP:   Lab Results   Component Value Date    SODIUM 130 (L) 02/08/2019    K 4 4 02/08/2019     02/08/2019    CO2 22 02/08/2019    BUN 11 02/08/2019    CREATININE 0 72 02/08/2019    CALCIUM 8 1 (L) 02/08/2019    EGFR 117 02/08/2019   , Coagulation: No results found for: PT, INR, APTT  VTE Pharmacologic Prophylaxis: Heparin  VTE Mechanical Prophylaxis: sequential compression device

## 2019-02-08 NOTE — SOCIAL WORK
CM reviewed pt with RN CC  Pt is not medically stable for discharge today  Has chest tube to suction and x5 drains  Will have GI consult  CM to continue to follow for medical stability and final discharge plan

## 2019-02-09 ENCOUNTER — APPOINTMENT (INPATIENT)
Dept: RADIOLOGY | Facility: HOSPITAL | Age: 39
DRG: 871 | End: 2019-02-09
Payer: MEDICARE

## 2019-02-09 LAB
ALBUMIN SERPL BCP-MCNC: 1.7 G/DL (ref 3.5–5)
ALP SERPL-CCNC: 221 U/L (ref 46–116)
ALT SERPL W P-5'-P-CCNC: 32 U/L (ref 12–78)
ANION GAP SERPL CALCULATED.3IONS-SCNC: 10 MMOL/L (ref 4–13)
AST SERPL W P-5'-P-CCNC: 25 U/L (ref 5–45)
BACTERIA BLD CULT: NORMAL
BACTERIA BLD CULT: NORMAL
BILIRUB SERPL-MCNC: 2.63 MG/DL (ref 0.2–1)
BUN SERPL-MCNC: 11 MG/DL (ref 5–25)
CALCIUM SERPL-MCNC: 8.3 MG/DL (ref 8.3–10.1)
CHLORIDE SERPL-SCNC: 104 MMOL/L (ref 100–108)
CO2 SERPL-SCNC: 19 MMOL/L (ref 21–32)
CREAT SERPL-MCNC: 0.7 MG/DL (ref 0.6–1.3)
ERYTHROCYTE [DISTWIDTH] IN BLOOD BY AUTOMATED COUNT: 15.9 % (ref 11.6–15.1)
GFR SERPL CREATININE-BSD FRML MDRD: 118 ML/MIN/1.73SQ M
GLUCOSE SERPL-MCNC: 104 MG/DL (ref 65–140)
GLUCOSE SERPL-MCNC: 119 MG/DL (ref 65–140)
GLUCOSE SERPL-MCNC: 92 MG/DL (ref 65–140)
GLUCOSE SERPL-MCNC: 98 MG/DL (ref 65–140)
HCT VFR BLD AUTO: 26 % (ref 36.5–49.3)
HGB BLD-MCNC: 8.1 G/DL (ref 12–17)
MCH RBC QN AUTO: 30.1 PG (ref 26.8–34.3)
MCHC RBC AUTO-ENTMCNC: 31.2 G/DL (ref 31.4–37.4)
MCV RBC AUTO: 97 FL (ref 82–98)
PLATELET # BLD AUTO: 720 THOUSANDS/UL (ref 149–390)
PMV BLD AUTO: 10 FL (ref 8.9–12.7)
POTASSIUM SERPL-SCNC: 4.4 MMOL/L (ref 3.5–5.3)
PROT SERPL-MCNC: 7.3 G/DL (ref 6.4–8.2)
RBC # BLD AUTO: 2.69 MILLION/UL (ref 3.88–5.62)
SODIUM SERPL-SCNC: 133 MMOL/L (ref 136–145)
WBC # BLD AUTO: 21.71 THOUSAND/UL (ref 4.31–10.16)

## 2019-02-09 PROCEDURE — 80053 COMPREHEN METABOLIC PANEL: CPT | Performed by: SURGERY

## 2019-02-09 PROCEDURE — 82948 REAGENT STRIP/BLOOD GLUCOSE: CPT

## 2019-02-09 PROCEDURE — 85027 COMPLETE CBC AUTOMATED: CPT | Performed by: SURGERY

## 2019-02-09 PROCEDURE — 71045 X-RAY EXAM CHEST 1 VIEW: CPT

## 2019-02-09 PROCEDURE — 99233 SBSQ HOSP IP/OBS HIGH 50: CPT | Performed by: INTERNAL MEDICINE

## 2019-02-09 PROCEDURE — 99232 SBSQ HOSP IP/OBS MODERATE 35: CPT | Performed by: SURGERY

## 2019-02-09 RX ADMIN — METRONIDAZOLE 500 MG: 500 TABLET ORAL at 13:30

## 2019-02-09 RX ADMIN — DOCUSATE SODIUM 100 MG: 100 CAPSULE, LIQUID FILLED ORAL at 08:53

## 2019-02-09 RX ADMIN — DORNASE ALFA 5 MG: 1 SOLUTION RESPIRATORY (INHALATION) at 11:23

## 2019-02-09 RX ADMIN — ARIPIPRAZOLE 5 MG: 5 TABLET ORAL at 06:16

## 2019-02-09 RX ADMIN — BUPROPION HYDROCHLORIDE 300 MG: 150 TABLET, FILM COATED, EXTENDED RELEASE ORAL at 08:53

## 2019-02-09 RX ADMIN — OXYCODONE HYDROCHLORIDE 10 MG: 10 TABLET ORAL at 20:05

## 2019-02-09 RX ADMIN — QUETIAPINE FUMARATE 200 MG: 100 TABLET ORAL at 22:46

## 2019-02-09 RX ADMIN — DOCUSATE SODIUM 100 MG: 100 CAPSULE, LIQUID FILLED ORAL at 18:14

## 2019-02-09 RX ADMIN — STANDARDIZED SENNA CONCENTRATE 8.6 MG: 8.6 TABLET ORAL at 22:46

## 2019-02-09 RX ADMIN — OXYCODONE HYDROCHLORIDE 10 MG: 10 TABLET ORAL at 15:53

## 2019-02-09 RX ADMIN — HEPARIN SODIUM 5000 UNITS: 5000 INJECTION INTRAVENOUS; SUBCUTANEOUS at 13:28

## 2019-02-09 RX ADMIN — METRONIDAZOLE 500 MG: 500 TABLET ORAL at 05:39

## 2019-02-09 RX ADMIN — HEPARIN SODIUM 5000 UNITS: 5000 INJECTION INTRAVENOUS; SUBCUTANEOUS at 22:46

## 2019-02-09 RX ADMIN — CEFTRIAXONE SODIUM 2000 MG: 10 INJECTION, POWDER, FOR SOLUTION INTRAVENOUS at 12:15

## 2019-02-09 RX ADMIN — FAMOTIDINE 20 MG: 20 TABLET, FILM COATED ORAL at 20:04

## 2019-02-09 RX ADMIN — OXYCODONE HYDROCHLORIDE 10 MG: 10 TABLET ORAL at 05:39

## 2019-02-09 RX ADMIN — CHLORHEXIDINE GLUCONATE 0.12% ORAL RINSE 15 ML: 1.2 LIQUID ORAL at 20:04

## 2019-02-09 RX ADMIN — HEPARIN SODIUM 5000 UNITS: 5000 INJECTION INTRAVENOUS; SUBCUTANEOUS at 05:39

## 2019-02-09 RX ADMIN — HYDROMORPHONE HYDROCHLORIDE 1 MG: 1 INJECTION, SOLUTION INTRAMUSCULAR; INTRAVENOUS; SUBCUTANEOUS at 18:14

## 2019-02-09 RX ADMIN — ALTEPLASE 10 MG: 2.2 INJECTION, POWDER, LYOPHILIZED, FOR SOLUTION INTRAVENOUS at 11:23

## 2019-02-09 RX ADMIN — DIAZEPAM 10 MG: 5 TABLET ORAL at 08:53

## 2019-02-09 RX ADMIN — CHLORHEXIDINE GLUCONATE 0.12% ORAL RINSE 15 ML: 1.2 LIQUID ORAL at 08:53

## 2019-02-09 RX ADMIN — OXYCODONE HYDROCHLORIDE 10 MG: 10 TABLET ORAL at 11:26

## 2019-02-09 RX ADMIN — DIAZEPAM 10 MG: 5 TABLET ORAL at 18:14

## 2019-02-09 RX ADMIN — FAMOTIDINE 20 MG: 20 TABLET, FILM COATED ORAL at 08:53

## 2019-02-09 RX ADMIN — METRONIDAZOLE 500 MG: 500 TABLET ORAL at 22:46

## 2019-02-09 NOTE — PROGRESS NOTES
Progress Note - General Surgery   Lillian Gr 35 y o  male MRN: [id number]  Unit/Bed#: [facility] 604-01      Subjective:    35 y o male with multiple hepatic abscesses s/p IR drainage  No acute events overnight, no complaints  Feels a "little down" about being in the hospital for so long and would like to go home, but understands the need for IV abx  Chest feels better s/p TPA to chest tube yesterday  Otherwise no N/V/C/D, CP, or SOB        Labs:    0  Lab Value Date/Time   HCT 24 6 (L) 02/08/2019 0449   HCT 23 9 (L) 02/07/2019 0446   HCT 23 8 (L) 02/06/2019 0437   HCT 43 4 03/31/2015 1012   HCT 41 4 03/21/2015 2333   HGB 8 0 (L) 02/08/2019 0449   HGB 7 9 (L) 02/07/2019 0446   HGB 8 1 (L) 02/06/2019 0437   HGB 14 4 03/31/2015 1012   HGB 14 1 03/21/2015 2333   INR 1 53 (H) 02/02/2019 0459   INR 1 00 02/26/2015 1025   WBC 21 23 (H) 02/08/2019 0449   WBC 18 48 (H) 02/07/2019 0446   WBC 18 64 (H) 02/06/2019 0437   WBC 9 33 03/31/2015 1012   WBC 10 88 (H) 03/21/2015 2333       Meds:    Current Facility-Administered Medications:     albuterol (PROVENTIL HFA,VENTOLIN HFA) inhaler 2 puff, 2 puff, Inhalation, Q4H PRN, Jessica Abad MD    ARIPiprazole (ABILIFY) tablet 5 mg, 5 mg, Oral, Early Morning, Layne Funez MD, 5 mg at 02/08/19 0604    bisacodyl (DULCOLAX) rectal suppository 10 mg, 10 mg, Rectal, Daily, Alyx Becerril PA-C, 10 mg at 02/05/19 0809    buPROPion (WELLBUTRIN XL) 24 hr tablet 300 mg, 300 mg, Oral, QAM, Layne Funez MD, 300 mg at 02/08/19 0818    cefTRIAXone (ROCEPHIN) 2,000 mg in dextrose 5 % 50 mL IVPB, 2,000 mg, Intravenous, Q24H, Rancho Gonzalez MD, Last Rate: 100 mL/hr at 02/08/19 1142, 2,000 mg at 02/08/19 1142    chlorhexidine (PERIDEX) 0 12 % oral rinse 15 mL, 15 mL, [employer], Q12H [facility], Layne Funez MD, 15 mL at 02/08/19 2001    diazepam (VALIUM) tablet 10 mg, 10 mg, Oral, BID, Alxy Becerril PA-C, 10 mg at 02/08/19 1743    docusate sodium (COLACE) capsule 100 mg, 100 mg, Oral, BID, Jen Almonte [facility], KAUR, 100 mg at 02/08/19 1743    famotidine (PEPCID) tablet 20 mg, 20 mg, Oral, Q12H [address], Luis Daniel Rollins MD, 20 mg at 02/08/19 2001    heparin (porcine) subcutaneous injection 5,000 Units, 5,000 Units, Subcutaneous, Q8H [address], 5,000 Units at 02/09/19 0539 **AND** Platelet count, , , Once, Emmalene Soulier, MD    HYDROmorphone (DILAUDID) injection 1 mg, 1 mg, Intravenous, Q3H PRN, ESTRELLITA Scott, 1 mg at 02/08/19 1958    LORazepam (ATIVAN) 2 mg/mL injection 1 mg, 1 mg, Intravenous, Q4H PRN, ESTRELLITA Scott, 1 mg at 02/08/19 0333    metroNIDAZOLE (FLAGYL) tablet 500 mg, 500 mg, Oral, Q8H [address], Logan Aguero MD, 500 mg at 02/09/19 0539    nicotine (NICODERM CQ) 21 mg/24 hr TD 24 hr patch 21 mg, 21 mg, Transdermal, Daily, ESTRELLITA Scott, 21 mg at 02/08/19 1742    oxyCODONE (ROXICODONE) immediate release tablet 10 mg, 10 mg, Oral, Q4H PRN, ESTRELLITA Scott, 10 mg at 02/09/19 0539    oxyCODONE (ROXICODONE) IR tablet 5 mg, 5 mg, Oral, Q4H PRN, ESTRELLITA Scott, 5 mg at 02/08/19 1659    polyethylene glycol (MIRALAX) packet 17 g, 17 g, Oral, Daily PRN, Adamaris Cheung MD, 17 g at 02/04/19 0902    prazosin (MINIPRESS) capsule 1 mg, 1 mg, Oral, Daily PRN, Tino Billingsley MD    QUEtiapine (SEROquel) tablet 200 mg, 200 mg, Oral, HS, Emmalene Soulier, MD, 200 mg at 02/08/19 2258    senna (SENOKOT) tablet 8 6 mg, 1 tablet, Oral, HS, Only KAUR Evans, 8 6 mg at 02/08/19 6864    Blood Culture:   Lab Results   Component Value Date    BLOODCX No Growth After 4 Days  02/04/2019       Wound Culture:   No results found for: WOUNDCULT    Ins and Outs:  I/O last 24 hours:   In: 56 [P O :720; NG/GT:300]  Out: 3419 [Urine:2575; Drains:724; Chest Tube:120]          Physical:  Vitals:    02/09/19 0305   BP: 123/83   Pulse: 94   Resp:    Temp: 99 °F (37 2 °C)   SpO2: 92%       GENERAL APPEARANCE:  Well appearing, AAO x3  HEENT:  NC/AT, EOMI, PERRL  CV: regular rate, limbs well perfused   LUNGS: non labored respirations, no wheezing  ABD: soft, ND/NT  Serous drainage out of drains 1, 2, 5, serosanguinous drainage out of 3, ss purulent drainage out of 4        _*_*_*_*_*_*_*_*_*_*_*_*_*_*_*_*_*_*_*_*_*_*_*_*_*_*_*_*_*_*_*_*_*_*_*_*_*_*_*_*_*    Assessment:    39 y o male with multiple hepatic abscesses s/p IR drainage  Had TPA injected into chest tube yesterday, 115 output, fu morning cxr  Plan:  · Regular diet  · Monitor drain outputs  CXR ordered this am, will review upon completion     · IV abx through 2/15 per ID, appreciate recs  · GI - c-scope monday  · PT/OT  · Pain control  · DVT ppx  · Dispo: continue inpatient admission    Eugenie Rankin MD

## 2019-02-09 NOTE — PROGRESS NOTES
Progress Note - Infectious Disease   Efraín Arizmendi 35 y o  male MRN: [id number]  Unit/Bed#: [facility] 604-01 Encounter: [id number]      Impression/Recommendations:  1  Sepsis, POA, presented with fever and leukocytosis   Source of sepsis is most likely liver abscesses   Overall, patient is stable   Temperature still up and down   WBC stable   Admission blood cultures are now positive   Persistently elevated WBC and temperature most likely secondary to persistent abscess   Temperature appears to be down after drainage revision  Antibiotic plan as in below  Monitor temperature/WBC  Monitor hemodynamics      2  Streptococcus intermedius bacteremia   Source is most likely liver abscesses   However, we need to consider the possibility of endocarditis with septic emboli to liver   Bacteremia cleared with IV antibiotic regimen   2D echo without vegetation   Given bacteremia, patient will need 2 weeks of IV antibiotic  Continue high-dose IV ceftriaxone  Follow-up repeat blood cultures  Treat x 14 days total, through 2/15      3  Multiple liver abscesses   Patient has history of diarrhea   Liver abscess is may be all secondary to translocation from gut   Abdomen/pelvis CT without other obvious intra-abdominal pathologies   It may be worthwhile to look at colonoscopy to look for colonic lesions  Antibiotic plan as in above  Continue Flagyl for anaerobic coverage  Monitor temperature/WBC  Monitor abdominal pain  Plan for colonoscopy next week noted      3  Recurrent fever/leukocytosis   Temperature and WBC had been decreasing but increased again  Will need to monitor closely  If both fever and WBC continues to increase, patient will need repeat CT imaging to look for more undrained abscesses  For now, with resolution of fever again, we will monitor  Antibiotic plan as in above  Monitor temperature/WBC      4  Right-sided pleural effusion, status post chest tube placement  Culture is negative    This may be all sympathetic effusion from liver abscess  Plan for tPA and DNAse noted  5  HENRIQUE, POA  This may be secondary sepsis   Dehydration may contribute   Creatinine has normalized  Antibiotic at full dose  Monitor creatinine      6  Elevated LFTs   This is most likely secondary to hepatic abscesses   Will monitor LFTs with treatment and drainage of abscesses   LFTs continue to improve  Monitor LFTs      7  History of IVDU   I have some concern regarding possible active IVDU   For these reasons, patient is not a candidate for home IV antibiotic   Prior HIV screen negative      Discussed with patient in detail regarding the above plan      Antibiotics:  Ceftriaxone/Flagyl  Post drainage # 8      Subjective:  Patient's temperature is down again  WBC stable  He remains comfortable  Abdominal pain continues to improve  He is awake and alert  No diarrhea  Objective:  Vitals:  Temp:  [97 7 °F (36 5 °C)-99 °F (37 2 °C)] 97 7 °F (36 5 °C)  HR:  [] 92  Resp:  [18-20] 20  BP: (114-134)/(69-83) 114/70  SpO2:  [92 %-95 %] 93 %  Temp (24hrs), Av 6 °F (37 °C), Min:97 7 °F (36 5 °C), Max:99 °F (37 2 °C)  Current: Temperature: 97 7 °F (36 5 °C)    Physical Exam:     General: Awake, alert, cooperative, no distress  Neck:  Supple  No mass  No lymphadenopathy  Lungs: Decreased breath sounds on right, sparse basilar rales, no wheezing, respirations unlabored  Heart:  Tachycardic with regular rhythm, S1 and S2 normal, no murmur  Abdomen: Soft, nondistended, mild and improving RUQ tenderness, bowel sounds active all four quadrants,        no masses, no organomegaly  Drains all serous  Extremities: Trace edema  No erythema/warmth  No ulcer  Nontender to palpation  Skin:  No rash  Neuro: Moves all extremities       Invasive Devices     Peripheral Intravenous Line            Peripheral IV 19 Right Arm 1 day          Drain            Closed/Suction Drain Right Abdomen Bulb 10 2 Fr  7 days    Closed/Suction Drain Right; Inferior;Superior Abdomen Bulb 8 5 Fr  7 days    Closed/Suction Drain Right;Posterior; Inferior Abdomen Bulb 10 2 Fr  7 days    Closed/Suction Drain Right;Superior; Anterior Abdomen Bulb 8 5 Fr  7 days    Closed/Suction Drain Right;Superior;Medial Abdomen Bulb 8 5 Fr  7 days    Chest Tube 1 Right 3 days                Labs studies:   I have personally reviewed pertinent labs  Results from last 7 days  Lab Units 02/09/19  0445 02/08/19  0449 02/07/19  0446  02/04/19  0500 02/03/19  0524   POTASSIUM mmol/L 4 4 4 4 4 6  < > 4 0 3 6   CHLORIDE mmol/L 104 102 100  < > 105 104   CO2 mmol/L 19* 22 24  < > 24 23   BUN mg/dL 11 11 14  < > 9 13   CREATININE mg/dL 0 70 0 72 0 78  < > 0 83 0 92   EGFR ml/min/1 73sq m 118 117 113  < > 110 104   CALCIUM mg/dL 8 3 8 1* 7 6*  < > 7 3* 7 2*   AST U/L 25  --   --   --  137* 272*   ALT U/L 32  --   --   --  190* 254*   ALK PHOS U/L 221*  --   --   --  122* 137*   < > = values in this interval not displayed  Results from last 7 days  Lab Units 02/09/19 0445 02/08/19 0449 02/07/19  0446   WBC Thousand/uL 21 71* 21 23* 18 48*   HEMOGLOBIN g/dL 8 1* 8 0* 7 9*   PLATELETS Thousands/uL 720* 628* 535*       Results from last 7 days  Lab Units 02/05/19  1651 02/04/19  0459 02/04/19  0458   BLOOD CULTURE   --  No Growth After 5 Days  No Growth After 5 Days  GRAM STAIN RESULT  Rare Polys  Rare Mononuclear Cells  No bacteria seen  --   --    BODY FLUID CULTURE, STERILE  No growth  --   --        Imaging Studies:   I have personally reviewed pertinent imaging study reports and images in PACS  EKG, Pathology, and Other Studies:   I have personally reviewed pertinent reports

## 2019-02-09 NOTE — PLAN OF CARE
CARDIOVASCULAR - ADULT     Maintains optimal cardiac output and hemodynamic stability Progressing     Absence of cardiac dysrhythmias or at baseline rhythm Progressing        DISCHARGE PLANNING - CARE MANAGEMENT     Discharge to post-acute care or home with appropriate resources Progressing        GASTROINTESTINAL - ADULT     Minimal or absence of nausea and/or vomiting Progressing     Maintains or returns to baseline bowel function Progressing     Maintains adequate nutritional intake Progressing        INFECTION - ADULT     Absence or prevention of progression during hospitalization Progressing     Absence of fever/infection during neutropenic period Progressing        METABOLIC, FLUID AND ELECTROLYTES - ADULT     Electrolytes maintained within normal limits Progressing     Fluid balance maintained Progressing        MUSCULOSKELETAL - ADULT     Maintain or return mobility to safest level of function Progressing        Nutrition/Hydration-ADULT     Nutrient/Hydration intake appropriate for improving, restoring or maintaining nutritional needs Progressing        PAIN - ADULT     Verbalizes/displays adequate comfort level or baseline comfort level Progressing        Potential for Falls     Patient will remain free of falls Progressing        Prexisting or High Potential for Compromised Skin Integrity     Skin integrity is maintained or improved Progressing        RESPIRATORY - ADULT     Achieves optimal ventilation and oxygenation Progressing        SKIN/TISSUE INTEGRITY - ADULT     Incision(s), wounds(s) or drain site(s) healing without S/S of infection Progressing

## 2019-02-09 NOTE — PROGRESS NOTES
Surgery Progress Note    TPA and DNAse solution instilled into R sided chest tube at 11:20 AM  Patient tolerated better today and there was also less resistance in the tubing/push  Will keep clamped for 4 hours and check output afterwards  Will check CXR in AM, may need another dose tomorrow for 3 doses

## 2019-02-10 ENCOUNTER — APPOINTMENT (OUTPATIENT)
Dept: RADIOLOGY | Facility: HOSPITAL | Age: 39
DRG: 871 | End: 2019-02-10
Payer: MEDICARE

## 2019-02-10 LAB
ALBUMIN SERPL BCP-MCNC: 1.8 G/DL (ref 3.5–5)
ALP SERPL-CCNC: 233 U/L (ref 46–116)
ALT SERPL W P-5'-P-CCNC: 29 U/L (ref 12–78)
ANION GAP SERPL CALCULATED.3IONS-SCNC: 10 MMOL/L (ref 4–13)
ANISOCYTOSIS BLD QL SMEAR: PRESENT
AST SERPL W P-5'-P-CCNC: 27 U/L (ref 5–45)
BASOPHILS # BLD MANUAL: 0 THOUSAND/UL (ref 0–0.1)
BASOPHILS NFR MAR MANUAL: 0 % (ref 0–1)
BILIRUB SERPL-MCNC: 2.2 MG/DL (ref 0.2–1)
BUN SERPL-MCNC: 11 MG/DL (ref 5–25)
CALCIUM SERPL-MCNC: 8.2 MG/DL (ref 8.3–10.1)
CHLORIDE SERPL-SCNC: 100 MMOL/L (ref 100–108)
CO2 SERPL-SCNC: 20 MMOL/L (ref 21–32)
CREAT SERPL-MCNC: 0.81 MG/DL (ref 0.6–1.3)
EOSINOPHIL # BLD MANUAL: 0.51 THOUSAND/UL (ref 0–0.4)
EOSINOPHIL NFR BLD MANUAL: 2 % (ref 0–6)
ERYTHROCYTE [DISTWIDTH] IN BLOOD BY AUTOMATED COUNT: 15.7 % (ref 11.6–15.1)
GFR SERPL CREATININE-BSD FRML MDRD: 111 ML/MIN/1.73SQ M
GLUCOSE SERPL-MCNC: 104 MG/DL (ref 65–140)
GLUCOSE SERPL-MCNC: 128 MG/DL (ref 65–140)
GLUCOSE SERPL-MCNC: 83 MG/DL (ref 65–140)
HCT VFR BLD AUTO: 28 % (ref 36.5–49.3)
HGB BLD-MCNC: 8.9 G/DL (ref 12–17)
LYMPHOCYTES # BLD AUTO: 1.02 THOUSAND/UL (ref 0.6–4.47)
LYMPHOCYTES # BLD AUTO: 4 % (ref 14–44)
MCH RBC QN AUTO: 29.7 PG (ref 26.8–34.3)
MCHC RBC AUTO-ENTMCNC: 31.8 G/DL (ref 31.4–37.4)
MCV RBC AUTO: 93 FL (ref 82–98)
METAMYELOCYTES NFR BLD MANUAL: 2 % (ref 0–1)
MONOCYTES # BLD AUTO: 0.25 THOUSAND/UL (ref 0–1.22)
MONOCYTES NFR BLD: 1 % (ref 4–12)
MYELOCYTES NFR BLD MANUAL: 1 % (ref 0–1)
NEUTROPHILS # BLD MANUAL: 22.87 THOUSAND/UL (ref 1.85–7.62)
NEUTS SEG NFR BLD AUTO: 90 % (ref 43–75)
NRBC BLD AUTO-RTO: 0 /100 WBCS
PLATELET # BLD AUTO: 859 THOUSANDS/UL (ref 149–390)
PLATELET BLD QL SMEAR: ABNORMAL
PMV BLD AUTO: 9.1 FL (ref 8.9–12.7)
POLYCHROMASIA BLD QL SMEAR: PRESENT
POTASSIUM SERPL-SCNC: 4.2 MMOL/L (ref 3.5–5.3)
PROT SERPL-MCNC: 7.4 G/DL (ref 6.4–8.2)
RBC # BLD AUTO: 3 MILLION/UL (ref 3.88–5.62)
RBC MORPH BLD: PRESENT
SODIUM SERPL-SCNC: 130 MMOL/L (ref 136–145)
WBC # BLD AUTO: 25.41 THOUSAND/UL (ref 4.31–10.16)

## 2019-02-10 PROCEDURE — 80053 COMPREHEN METABOLIC PANEL: CPT | Performed by: SURGERY

## 2019-02-10 PROCEDURE — 82948 REAGENT STRIP/BLOOD GLUCOSE: CPT

## 2019-02-10 PROCEDURE — 71045 X-RAY EXAM CHEST 1 VIEW: CPT

## 2019-02-10 PROCEDURE — 99233 SBSQ HOSP IP/OBS HIGH 50: CPT | Performed by: INTERNAL MEDICINE

## 2019-02-10 PROCEDURE — 99232 SBSQ HOSP IP/OBS MODERATE 35: CPT | Performed by: INTERNAL MEDICINE

## 2019-02-10 PROCEDURE — 85007 BL SMEAR W/DIFF WBC COUNT: CPT | Performed by: SURGERY

## 2019-02-10 PROCEDURE — 85027 COMPLETE CBC AUTOMATED: CPT | Performed by: SURGERY

## 2019-02-10 RX ADMIN — ARIPIPRAZOLE 5 MG: 5 TABLET ORAL at 06:03

## 2019-02-10 RX ADMIN — DOCUSATE SODIUM 100 MG: 100 CAPSULE, LIQUID FILLED ORAL at 09:13

## 2019-02-10 RX ADMIN — HEPARIN SODIUM 5000 UNITS: 5000 INJECTION INTRAVENOUS; SUBCUTANEOUS at 21:22

## 2019-02-10 RX ADMIN — CHLORHEXIDINE GLUCONATE 0.12% ORAL RINSE 15 ML: 1.2 LIQUID ORAL at 09:13

## 2019-02-10 RX ADMIN — FAMOTIDINE 20 MG: 20 TABLET, FILM COATED ORAL at 09:13

## 2019-02-10 RX ADMIN — DOCUSATE SODIUM 100 MG: 100 CAPSULE, LIQUID FILLED ORAL at 17:01

## 2019-02-10 RX ADMIN — CEFTRIAXONE SODIUM 2000 MG: 10 INJECTION, POWDER, FOR SOLUTION INTRAVENOUS at 12:28

## 2019-02-10 RX ADMIN — HEPARIN SODIUM 5000 UNITS: 5000 INJECTION INTRAVENOUS; SUBCUTANEOUS at 06:03

## 2019-02-10 RX ADMIN — OXYCODONE HYDROCHLORIDE 10 MG: 10 TABLET ORAL at 17:01

## 2019-02-10 RX ADMIN — OXYCODONE HYDROCHLORIDE 10 MG: 10 TABLET ORAL at 06:03

## 2019-02-10 RX ADMIN — DIAZEPAM 10 MG: 5 TABLET ORAL at 18:10

## 2019-02-10 RX ADMIN — METRONIDAZOLE 500 MG: 500 TABLET ORAL at 21:22

## 2019-02-10 RX ADMIN — POLYETHYLENE GLYCOL 3350 17 G: 17 POWDER, FOR SOLUTION ORAL at 09:13

## 2019-02-10 RX ADMIN — HEPARIN SODIUM 5000 UNITS: 5000 INJECTION INTRAVENOUS; SUBCUTANEOUS at 14:25

## 2019-02-10 RX ADMIN — FAMOTIDINE 20 MG: 20 TABLET, FILM COATED ORAL at 21:22

## 2019-02-10 RX ADMIN — DIAZEPAM 10 MG: 5 TABLET ORAL at 09:13

## 2019-02-10 RX ADMIN — BUPROPION HYDROCHLORIDE 300 MG: 150 TABLET, FILM COATED, EXTENDED RELEASE ORAL at 09:13

## 2019-02-10 RX ADMIN — CHLORHEXIDINE GLUCONATE 0.12% ORAL RINSE 15 ML: 1.2 LIQUID ORAL at 21:22

## 2019-02-10 RX ADMIN — METRONIDAZOLE 500 MG: 500 TABLET ORAL at 14:25

## 2019-02-10 RX ADMIN — QUETIAPINE FUMARATE 200 MG: 100 TABLET ORAL at 21:22

## 2019-02-10 RX ADMIN — STANDARDIZED SENNA CONCENTRATE 8.6 MG: 8.6 TABLET ORAL at 21:22

## 2019-02-10 RX ADMIN — ALTEPLASE 10 MG: 2.2 INJECTION, POWDER, LYOPHILIZED, FOR SOLUTION INTRAVENOUS at 22:55

## 2019-02-10 RX ADMIN — METRONIDAZOLE 500 MG: 500 TABLET ORAL at 06:03

## 2019-02-10 NOTE — PROGRESS NOTES
Progress Note - General Surgery   Kiarra López 35 y o  male MRN: [id number]  Unit/Bed#: [facility] 604-01 Encounter: [id number]    Assessment:  36 yo M with multiple hepatic abscesses, sp IR drainage  R chest effusion s/p TPA x 2    JPs: 65, 53, 80, 85, 87  CT: 85  WBC: 25 4    Plan:  F/U Read from AM CXR- does not appear much improved from prior  May perform 3rd dose of TPA today  Continue DANNY drainage w/ flushes per IR  Increasing WBC- was planning to repeat scan this week, order for tomorrow  F/U GI- per team they are planning for C scope tomorrow, likely needs prep? Per GI  Antibiotics per ID- High dose Rocephin, Flagyl  OOB, ambulation        Subjective/Objective   Chief Complaint:     Subjective: No complaints, denies pain  Asks every day if he will need surgery  TPA performed to chest tube for the 2nd time yesterday, output 85 ml    Objective:     Blood pressure 128/74, pulse (!) 107, temperature 97 9 °F (36 6 °C), temperature source Oral, resp  rate 16, height 6' 2" (1 88 m), weight 104 kg (228 lb 9 9 oz), SpO2 91 %  ,Body mass index is 29 35 kg/m²  Intake/Output Summary (Last 24 hours) at 2/10/2019 0723  Last data filed at 2/10/2019 0543  Gross per 24 hour   Intake 590 ml   Output 2285 ml   Net -1695 ml       Invasive Devices     Peripheral Intravenous Line            Peripheral IV 02/07/19 Right Arm 2 days          Drain            Closed/Suction Drain Right Abdomen Bulb 10 2 Fr  8 days    Closed/Suction Drain Right; Inferior;Superior Abdomen Bulb 8 5 Fr  8 days    Closed/Suction Drain Right;Posterior; Inferior Abdomen Bulb 10 2 Fr  8 days    Closed/Suction Drain Right;Superior; Anterior Abdomen Bulb 8 5 Fr  8 days    Closed/Suction Drain Right;Superior;Medial Abdomen Bulb 8 5 Fr  8 days    Chest Tube 1 Right 4 days                Physical Exam:   Gen: A&O, NAD  Cardio: RRR  Lungs: CTAB  Abd: Soft, non distended, non tender  JPs serous/serosanginous   CT serosang, no AL      Lab, Imaging and other studies:  CBC: Lab Results   Component Value Date    WBC 25 41 (H) 02/10/2019    HGB 8 9 (L) 02/10/2019    HCT 28 0 (L) 02/10/2019    MCV 93 02/10/2019     (H) 02/10/2019    MCH 29 7 02/10/2019    MCHC 31 8 02/10/2019    RDW 15 7 (H) 02/10/2019    MPV 9 1 02/10/2019   , CMP:   Lab Results   Component Value Date    SODIUM 130 (L) 02/10/2019    K 4 2 02/10/2019     02/10/2019    CO2 20 (L) 02/10/2019    BUN 11 02/10/2019    CREATININE 0 81 02/10/2019    CALCIUM 8 2 (L) 02/10/2019    AST 27 02/10/2019    ALT 29 02/10/2019    ALKPHOS 233 (H) 02/10/2019    EGFR 111 02/10/2019   , Coagulation: No results found for: PT, INR, APTT  VTE Pharmacologic Prophylaxis: Heparin  VTE Mechanical Prophylaxis: sequential compression device

## 2019-02-10 NOTE — PLAN OF CARE
Problem: Potential for Falls  Goal: Patient will remain free of falls  Description  INTERVENTIONS:  - Assess patient frequently for physical needs  -  Identify cognitive and physical deficits and behaviors that affect risk of falls    -  [place] fall precautions as indicated by assessment   - Educate patient/family on patient safety including physical limitations  - Instruct patient to call for assistance with activity based on assessment  - Modify environment to reduce risk of injury  - Consider OT/PT consult to assist with strengthening/mobility   Outcome: Progressing     Problem: GASTROINTESTINAL - ADULT  Goal: Minimal or absence of nausea and/or vomiting  Description  INTERVENTIONS:  - Administer IV fluids as ordered to ensure adequate hydration  - Maintain NPO status until nausea and vomiting are resolved  - Nasogastric tube as ordered  - Administer ordered antiemetic medications as needed  - Provide nonpharmacologic comfort measures as appropriate  - Advance diet as tolerated, if ordered  - Nutrition services referral to assist patient with adequate nutrition and appropriate food choices   Outcome: Progressing  Goal: Maintains or returns to baseline bowel function  Description  INTERVENTIONS:  - Assess bowel function  - Encourage oral fluids to ensure adequate hydration  - Administer IV fluids as ordered to ensure adequate hydration  - Administer ordered medications as needed  - Encourage mobilization and activity  - Nutrition services referral to assist patient with appropriate food choices   Outcome: Progressing  Goal: Maintains adequate nutritional intake  Description  INTERVENTIONS:  - Monitor percentage of each meal consumed  - Identify factors contributing to decreased intake, treat as appropriate  - Assist with meals as needed  - Monitor I&O, WT and lab values  - Obtain nutrition services referral as needed   Outcome: Progressing     Problem: METABOLIC, FLUID AND ELECTROLYTES - ADULT  Goal: Electrolytes maintained within normal limits  Description  INTERVENTIONS:  - Monitor labs and assess patient for signs and symptoms of electrolyte imbalances  - Administer electrolyte replacement as ordered  - Monitor response to electrolyte replacements, including repeat lab results as appropriate  - Instruct patient on fluid and nutrition as appropriate   Outcome: Progressing  Goal: Fluid balance maintained  Description  INTERVENTIONS:  - Monitor labs and assess for signs and symptoms of volume excess or deficit  - Monitor I/O and WT  - Instruct patient on fluid and nutrition as appropriate   Outcome: Progressing     Problem: SKIN/TISSUE INTEGRITY - ADULT  Goal: Incision(s), wounds(s) or drain site(s) healing without S/S of infection  Description  INTERVENTIONS  - Assess and document risk factors for skin impairment   - Assess and document dressing, incision, wound bed, drain sites and surrounding tissue  - Initiate Nutrition services consult and/or wound management as needed   Outcome: Progressing     Problem: PAIN - ADULT  Goal: Verbalizes/displays adequate comfort level or baseline comfort level  Description  Interventions:  - Encourage patient to monitor pain and request assistance  - Assess pain using appropriate pain scale  - Administer analgesics based on type and severity of pain and evaluate response  - Implement non-pharmacological measures as appropriate and evaluate response  - Consider cultural and social influences on pain and pain management  - Notify physician/advanced practitioner if interventions unsuccessful or patient reports new pain   Outcome: Progressing     Problem: INFECTION - ADULT  Goal: Absence or prevention of progression during hospitalization  Description  INTERVENTIONS:  - Assess and monitor for signs and symptoms of infection  - Monitor lab/diagnostic results  - Monitor all insertion sites, i e  indwelling lines, tubes, and drains  - Monitor endotracheal (as able) and nasal secretions for changes in amount and color  - [place] appropriate cooling/warming therapies per order  - Administer medications as ordered  - Instruct and encourage patient and family to use good hand hygiene technique  - Identify and instruct in appropriate isolation precautions for identified infection/condition   Outcome: Progressing  Goal: Absence of fever/infection during neutropenic period  Description  INTERVENTIONS:  - Monitor WBC  - Implement neutropenic guidelines   Outcome: Progressing     Problem: Nutrition/Hydration-ADULT  Goal: Nutrient/Hydration intake appropriate for improving, restoring or maintaining nutritional needs  Description  Monitor and assess patient's nutrition/hydration status for malnutrition (ex- brittle hair, bruises, dry skin, pale skin and conjunctiva, muscle wasting, smooth red tongue, and disorientation)  Collaborate with interdisciplinary team and initiate plan and interventions as ordered  Monitor patient's weight and dietary intake as ordered or per policy  Utilize nutrition screening tool and intervene per policy  Determine patient's food preferences and provide high-protein, high-caloric foods as appropriate       INTERVENTIONS:  - Monitor oral intake, urinary output, labs, and treatment plans  - Assess nutrition and hydration status and recommend course of action  - Evaluate amount of meals eaten  - Assist patient with eating if necessary   - Allow adequate time for meals  - Recommend/ encourage appropriate diets, oral nutritional supplements, and vitamin/mineral supplements  - Order, calculate, and assess calorie counts as needed  - Recommend, monitor, and adjust tube feedings and TPN/PPN based on assessed needs  - Assess need for intravenous fluids  - Provide specific nutrition/hydration education as appropriate  - Include patient/family/caregiver in decisions related to nutrition   Outcome: Progressing     Problem: Prexisting or High Potential for Compromised Skin Integrity  Goal: Skin integrity is maintained or improved  Description  INTERVENTIONS:  - Identify patients at risk for skin breakdown  - Assess and monitor skin integrity  - Assess and monitor nutrition and hydration status  - Monitor labs (i e  albumin)  - Assess for incontinence   - Turn and reposition patient  - Assist with mobility/ambulation  - Relieve pressure over bony prominences  - Avoid friction and shearing  - Provide appropriate hygiene as needed including keeping skin clean and dry  - Evaluate need for skin moisturizer/barrier cream  - Collaborate with interdisciplinary team (i e  Nutrition, Rehabilitation, etc )   - Patient/family teaching   Outcome: Progressing     Problem: CARDIOVASCULAR - ADULT  Goal: Maintains optimal cardiac output and hemodynamic stability  Description  INTERVENTIONS:  - Monitor I/O, vital signs and rhythm  - Monitor for S/S and trends of decreased cardiac output i e  bleeding, hypotension  - Administer and titrate ordered vasoactive medications to optimize hemodynamic stability  - Assess quality of pulses, skin color and temperature  - Assess for signs of decreased coronary artery perfusion - ex   Angina  - Instruct patient to report change in severity of symptoms   Outcome: Progressing  Goal: Absence of cardiac dysrhythmias or at baseline rhythm  Description  INTERVENTIONS:  - Continuous cardiac monitoring, monitor vital signs, obtain 12 lead EKG if indicated  - Administer antiarrhythmic and heart rate control medications as ordered  - Monitor electrolytes and administer replacement therapy as ordered   Outcome: Progressing     Problem: RESPIRATORY - ADULT  Goal: Achieves optimal ventilation and oxygenation  Description  INTERVENTIONS:  - Assess for changes in respiratory status  - Assess for changes in mentation and behavior  - Position to facilitate oxygenation and minimize respiratory effort  - Oxygen administration by appropriate delivery method based on oxygen saturation (per order) or ABGs  - Initiate smoking cessation education as indicated  - Encourage broncho-pulmonary hygiene including cough, deep breathe, Incentive Spirometry  - Assess the need for suctioning and aspirate as needed  - Assess and instruct to report SOB or any respiratory difficulty  - Respiratory Therapy support as indicated   Outcome: Progressing     Problem: MUSCULOSKELETAL - ADULT  Goal: Maintain or return mobility to safest level of function  Description  INTERVENTIONS:  - Assess patient's ability to carry out ADLs; assess patient's baseline for ADL function and identify physical deficits which impact ability to perform ADLs (bathing, care of mouth/teeth, toileting, grooming, dressing, etc )  - Assess/evaluate cause of self-care deficits   - Assess range of motion  - Assess patient's mobility; develop plan if impaired  - Assess patient's need for assistive devices and provide as appropriate  - Encourage maximum independence but intervene and supervise when necessary  - Involve family in performance of ADLs  - Assess for home care needs following discharge   - Request OT consult to assist with ADL evaluation and planning for discharge  - Provide patient education as appropriate   Outcome: Progressing     Problem: DISCHARGE PLANNING - CARE MANAGEMENT  Goal: Discharge to post-acute care or home with appropriate resources  Description  INTERVENTIONS:  - Conduct assessment to determine patient/family and health care team treatment goals, and need for post-acute services based on payer coverage, community resources, and patient preferences, and barriers to discharge  - Address psychosocial, clinical, and financial barriers to discharge as identified in assessment in conjunction with the patient/family and health care team  - Arrange appropriate level of post-acute services according to patient's   needs and preference and payer coverage in collaboration with the physician and health care team  - Communicate with and update the patient/family, physician, and health care team regarding progress on the discharge plan  - Arrange appropriate transportation to post-acute venues   Outcome: Progressing

## 2019-02-10 NOTE — PROGRESS NOTES
Progress Note - Melissa Shabazz 35 y o  male MRN: [id number]    Unit/Bed#: [facility] 604-01 Encounter: [id number]    Subjective:     Patient seen and examined at bedside  He reports feeling much better today  Abdominal pain is improved    Objective:     Vitals: Blood pressure 119/70, pulse 98, temperature 97 9 °F (36 6 °C), resp  rate 18, height 6' 2" (1 88 m), weight 104 kg (228 lb 9 9 oz), SpO2 94 %  ,Body mass index is 29 35 kg/m²  Intake/Output Summary (Last 24 hours) at 2/10/2019 1434  Last data filed at 2/10/2019 1401  Gross per 24 hour   Intake 200 ml   Output 1810 ml   Net -1610 ml       Physical Exam:     General Appearance: Alert, appears stated age and cooperative  Lungs: Clear to auscultation bilaterally, no rales or rhonchi, no labored breathing/accessory muscle use  Heart: Regular rate and rhythm, S1, S2 normal, no murmur, click, rub or gallop  Abdomen: Soft, non-tender, non-distended; bowel sounds normal  DANNY drains with serous/serosanginous drainage  Extremities: No cyanosis, edema    Invasive Devices     Peripheral Intravenous Line            Peripheral IV 02/07/19 Right Arm 2 days          Drain            Closed/Suction Drain Right Abdomen Bulb 10 2 Fr  8 days    Closed/Suction Drain Right; Inferior;Superior Abdomen Bulb 8 5 Fr  8 days    Closed/Suction Drain Right;Posterior; Inferior Abdomen Bulb 10 2 Fr  8 days    Closed/Suction Drain Right;Superior; Anterior Abdomen Bulb 8 5 Fr  8 days    Closed/Suction Drain Right;Superior;Medial Abdomen Bulb 8 5 Fr  8 days    Chest Tube 1 Right 4 days                Lab Results:  Results from last 7 days   Lab Units 02/10/19  0456   WBC Thousand/uL 25 41*   HEMOGLOBIN g/dL 8 9*   HEMATOCRIT % 28 0*   PLATELETS Thousands/uL 859*   LYMPHO PCT % 4*   MONO PCT % 1*   EOS PCT % 2     Results from last 7 days   Lab Units 02/10/19  0456   POTASSIUM mmol/L 4 2   CHLORIDE mmol/L 100   CO2 mmol/L 20*   BUN mg/dL 11   CREATININE mg/dL 0 81   CALCIUM mg/dL 8 2*   ALK PHOS U/L 233*   ALT U/L 29   AST U/L 27               Imaging Studies: I have personally reviewed pertinent imaging studies  Xr Chest Portable    Result Date: 2/6/2019  Impression: 1  There are 5 drains projecting over the right base  Patient is known to have 5 drains in his liver abscesses  It is unclear if the superior most pigtail is pleural   Consider repeat chest radiograph with better inclusion of all of the drains on the patient's right side  2   Decrease moderate right effusion  3   Diffuse left lung opacity is unchanged  Workstation performed: [id number]     Xr Chest Portable    Result Date: 2/4/2019  Impression: Diffuse bilateral pulmonary opacities again seen  Moderate right pleural effusion  Workstation performed: [id number]     Xr Chest Portable    Result Date: 2/4/2019  Impression: Vascular congestion with new moderate right pleural effusion  Workstation performed: [id number]     Xr Chest 2 Views    Result Date: 2/1/2019  Impression: Elevation of the right hemidiaphragm with bibasilar subsegmental atelectasis and suspected trace right effusion  Workstation performed: [id number]     Ct Guided Perc Drainage Catheter Placeme    Result Date: 2/1/2019  Impression: Impression: Successful placement of a total of 5  all-purpose drainage catheters into the 5 largest liver abscess collections  A total of 300 cc of purulent material was aspirated and a specimen sent to the laboratory as described  The 5 drains were labeled as anterior superior, middle superior, posterior superior, central, and posterior inferior  The two 10-[place] drains were the central drain and the posterior-inferior drain  The 3 superior drains were 8 5-[place]an  Plan: Tubes to DANNY bulb suction  Flush each tube with 20 mL normal saline solution  The patient will likely need more drains placed since there are several other abscesses    We will follow patient and see on follow-up CAT scan if 2 further drains are needed or if the existing drains require repositioning and or wire disruption of the loculations to improvedrainage  Workstation performed: [id number]     Ct Chest Abdomen Pelvis W Contrast    Result Date: 2/5/2019  Impression: 1  New patchy opacity in the left upper lobe likely representing pneumonia  2   Large right pleural effusion, increased from the prior study, with new trace left pleural effusion  Compressive atelectasis  3   Decreased size of numerous liver abscesses status post drainage of the largest abscesses  New gas within the collections is likely iatrogenic  4   Moderate free fluid in the pelvis  The study was marked in [facility] for immediate notification  Workstation performed: [id number]     Xr Chest Portable Icu    Result Date: 2/5/2019  Impression: Increased right pleural effusion  Stable diffuse airspace disease  Workstation performed: [id number]     Ct Abdomen Pelvis With Contrast    Result Date: 2/1/2019  Impression: 1  In the provided clinical setting of fever and severe leukocytosis, the innumerable hepatic multilocular/multiseptated liver masses are most likely pyogenic abscesses versus less likely anemic abscesses  Other causes of cystic liver masses are less likely in this clinical setting  The patient's reported history of hepatitis C is noted  After patient's acute issues have resolved and the above liver lesions have been treated, surveillance liver imaging could be performed  2   Nondistended gallbladder with mild wall thickening and mucosal hyperemia is likely reactive  I personally discussed this study with Taylor Jewell on 2/1/2019 at 3:21PM  Workstation performed: [id number]     Ir Chest Tube    Result Date: 2/6/2019  Impression: Successful placement of a right chest pigtail drain and connected to Pleur-evac  No kee purulence obtained  Sample sent for laboratory analysis   _______________________________________________________________ PROCEDURE DETAILS: Operators: Dr Basia Pavon, [address] [place] [address] attending, performed the procedure  Anesthesia: Conscious sedation was provided throughout a total intra-service time of 44 minutes during which the patient's hemodynamic parameters were continuously monitored by an independent trained radiology nurse  1% lidocaine was injected in the skin and subcutaneous tissues overlying the access site  Medications: 1% lidocaine, fentanyl, Versed Contrast: 0 mL of Omnipaque 300 Fluoroscopy time: 0 minutes COMMENTS: Following the discussion of the risks, benefits and alternatives to the procedure, written informed consent was obtained from the patient  The patient was placed left lateral decubitus on the patient's bed  A preprocedure timeout was performed per [facility] protocol  Ultrasound evaluation of right chest demonstrated a pleural effusion  The right lateral chest was prepped and draped in the usual sterile fashion  All elements of maximal sterile barrier technique were followed (cap, mask, sterile gown, sterile gloves, large sterile sheet, hand hygiene, and 2% chlorhexidine for cutaneous antisepsis)  Lidocaine was administered to the skin, and a small skin incision  was made  Under direct ultrasound guidance, an 18 gauge needle was advanced into the pleural fluid  Guidewire was advanced through the needle  Needle removed, access was serially dilated, and 10-[place] pigtail drain advanced  Pigtail formed in collection  Drain connected to Pleur-evac and sutured to the skin with 2-0 Prolene  Workstation performed: [id number]       Assessment and Plan:    54-year-old male with history of polysubstance abuse, bipolar disorder, hepatitis-C infection status post treatment with hard bony admitted with multiple pyogenic liver abscesses secondary to Staphylococcus intermedius  1) Liver abscesses: Cultures from the abscesses are growing Streptococcus intermedius  Status post IR drainage  Source of infection unclear   Possible causes include ascending biliary infection versus small-bowel/colonic bacterial translocation     -Continue IV antibiotics through 2/15 as per ID  -Follow-up repeat blood cultures  -We will hold off on colonoscopy for now as colonoscopy would not change current management  -We could perform colonoscopy as outpatient once drains are removed    2) Elevated liver enzymes: Likely multifactorial secondary to antibiotics as well as hepatic abscesses  Fortunately liver enzymes are down trending   Total bilirubin 2 20, alkaline phosphatase 233, AST 27, ALT 29     -Monitor LFTs

## 2019-02-10 NOTE — PROGRESS NOTES
Progress Note - Infectious Disease   Jonelle Dowling 35 y o  male MRN: [id number]  Unit/Bed#: [facility] 604-01 Encounter: [id number]      Impression/Recommendations:  1  Sepsis, POA, presented with fever and leukocytosis   Source of sepsis is most likely liver abscesses   Overall, patient is stable   Temperature still up and down   WBC stable   Admission blood cultures are now positive   Temperature is down but WBC remains elevated  Antibiotic plan as in below  Monitor temperature/WBC  Monitor hemodynamics      2  Streptococcus intermedius bacteremia   Source is most likely liver abscesses   However, we need to consider the possibility of endocarditis with septic emboli to liver   Bacteremia cleared with IV antibiotic regimen   2D echo without vegetation   Given bacteremia, patient will need 2 weeks of IV antibiotic  Continue high-dose IV ceftriaxone  Follow-up repeat blood cultures  Treat x 14 days total, through 2/15      3  Multiple liver abscesses   Patient has history of diarrhea   Liver abscess is may be all secondary to translocation from gut   Abdomen/pelvis CT without other obvious intra-abdominal pathologies   It may be worthwhile to look at colonoscopy to look for colonic lesions  Antibiotic plan as in above  Continue Flagyl for anaerobic coverage  Monitor temperature/WBC  Monitor abdominal pain  Plan for colonoscopy next week noted      3  Recurrent fever/leukocytosis  Temperature is down but WBC remains elevated  This is most likely secondary to incompletely drained abscesses/collection  Patient may need repeat imaging to assess for undrained liver abscess and loculated pleural effusion  In the meanwhile, continue drainage  Antibiotic plan as in above  Monitor temperature/WBC      4  Right-sided pleural effusion, status post chest tube placement  Culture is negative  This may be all sympathetic effusion from liver abscess  Plan for tPA and DNAse noted    Patient may need repeat CT of chest  This may be reason for persistent leukocytosis      5  HENRIQUE, POA  This may be secondary sepsis   Dehydration may contribute   Creatinine has normalized  Antibiotic at full dose  Monitor creatinine      6  Elevated LFTs   This is most likely secondary to hepatic abscesses   Will monitor LFTs with treatment and drainage of abscesses   LFTs continue to improve  Monitor LFTs      7  History of IVDU   I have some concern regarding possible active IVDU   For these reasons, patient is not a candidate for home IV antibiotic   Prior HIV screen negative      Discussed with patient in detail regarding the above plan      Antibiotics:  Ceftriaxone/Flagyl  Post drainage # 8      Subjective:  Patient's temperature  stays down  WBC  remains up  He remains comfortable  Abdominal pain continues to improve  He is awake and alert  No diarrhea  Objective:  Vitals:  Temp:  [97 9 °F (36 6 °C)] 97 9 °F (36 6 °C)  HR:  [] 98  Resp:  [16-18] 18  BP: (119-133)/(70-77) 119/70  SpO2:  [90 %-94 %] 94 %  Temp (24hrs), Av 9 °F (36 6 °C), Min:97 9 °F (36 6 °C), Max:97 9 °F (36 6 °C)  Current: Temperature: 97 9 °F (36 6 °C)    Physical Exam:     General: Awake, alert, cooperative, no distress  Neck:  Supple  No mass  No lymphadenopathy  Lungs: Decreased breath sounds, mild right basilar rales, no wheezing, respirations unlabored  Heart:  Tachycardic with regular rhythm, S1 and S2 normal, no murmur  Abdomen: Soft, nondistended, mild RUQ tenderness, bowel sounds active all four quadrants,        no masses, no organomegaly  Drains all serous  Extremities: No edema  No erythema/warmth  No ulcer  Nontender to palpation  Skin:  No rash  Neuro: Moves all extremities  Invasive Devices     Peripheral Intravenous Line            Peripheral IV 19 Right Arm 2 days          Drain            Closed/Suction Drain Right Abdomen Bulb 10 2 Fr  8 days    Closed/Suction Drain Right; Inferior;Superior Abdomen Bulb 8 5 Fr  8 days    Closed/Suction Drain Right;Posterior; Inferior Abdomen Bulb 10 2 Fr  8 days    Closed/Suction Drain Right;Superior; Anterior Abdomen Bulb 8 5 Fr  8 days    Closed/Suction Drain Right;Superior;Medial Abdomen Bulb 8 5 Fr  8 days    Chest Tube 1 Right 4 days                Labs studies:   I have personally reviewed pertinent labs  Results from last 7 days   Lab Units 02/10/19  0456 02/09/19  0445 02/08/19  0449  02/04/19  0500   POTASSIUM mmol/L 4 2 4 4 4 4   < > 4 0   CHLORIDE mmol/L 100 104 102   < > 105   CO2 mmol/L 20* 19* 22   < > 24   BUN mg/dL 11 11 11   < > 9   CREATININE mg/dL 0 81 0 70 0 72   < > 0 83   EGFR ml/min/1 73sq m 111 118 117   < > 110   CALCIUM mg/dL 8 2* 8 3 8 1*   < > 7 3*   AST U/L 27 25  --   --  137*   ALT U/L 29 32  --   --  190*   ALK PHOS U/L 233* 221*  --   --  122*    < > = values in this interval not displayed  Results from last 7 days   Lab Units 02/10/19  0456 02/09/19 0445 02/08/19  0449   WBC Thousand/uL 25 41* 21 71* 21 23*   HEMOGLOBIN g/dL 8 9* 8 1* 8 0*   PLATELETS Thousands/uL 859* 720* 628*     Results from last 7 days   Lab Units 02/05/19  1651 02/04/19  0459 02/04/19  0458   BLOOD CULTURE   --  No Growth After 5 Days  No Growth After 5 Days  GRAM STAIN RESULT  Rare Polys  Rare Mononuclear Cells  No bacteria seen  --   --    BODY FLUID CULTURE, STERILE  No growth  --   --        Imaging Studies:   I have personally reviewed pertinent imaging study reports and images in PACS  CXR reviewed personally  No change in right-sided effusion  EKG, Pathology, and Other Studies:   I have personally reviewed pertinent reports

## 2019-02-11 ENCOUNTER — APPOINTMENT (INPATIENT)
Dept: RADIOLOGY | Facility: HOSPITAL | Age: 39
DRG: 871 | End: 2019-02-11
Payer: MEDICARE

## 2019-02-11 LAB
ALBUMIN SERPL BCP-MCNC: 1.9 G/DL (ref 3.5–5)
ALP SERPL-CCNC: 219 U/L (ref 46–116)
ALT SERPL W P-5'-P-CCNC: 27 U/L (ref 12–78)
ANION GAP SERPL CALCULATED.3IONS-SCNC: 9 MMOL/L (ref 4–13)
AST SERPL W P-5'-P-CCNC: 27 U/L (ref 5–45)
BASOPHILS NFR MAR MANUAL: 2 % (ref 0–1)
BILIRUB DIRECT SERPL-MCNC: 1.51 MG/DL (ref 0–0.2)
BILIRUB SERPL-MCNC: 1.83 MG/DL (ref 0.2–1)
BUN SERPL-MCNC: 12 MG/DL (ref 5–25)
CALCIUM SERPL-MCNC: 8.2 MG/DL (ref 8.3–10.1)
CHLORIDE SERPL-SCNC: 101 MMOL/L (ref 100–108)
CO2 SERPL-SCNC: 21 MMOL/L (ref 21–32)
CREAT SERPL-MCNC: 0.72 MG/DL (ref 0.6–1.3)
EOSINOPHIL NFR BLD MANUAL: 2 % (ref 0–6)
ERYTHROCYTE [DISTWIDTH] IN BLOOD BY AUTOMATED COUNT: 15.4 % (ref 11.6–15.1)
GFR SERPL CREATININE-BSD FRML MDRD: 117 ML/MIN/1.73SQ M
GLUCOSE SERPL-MCNC: 130 MG/DL (ref 65–140)
GLUCOSE SERPL-MCNC: 96 MG/DL (ref 65–140)
HCT VFR BLD AUTO: 29.4 % (ref 36.5–49.3)
HGB BLD-MCNC: 9.1 G/DL (ref 12–17)
LYMPHOCYTES # BLD AUTO: 2 % (ref 14–44)
MCH RBC QN AUTO: 29.3 PG (ref 26.8–34.3)
MCHC RBC AUTO-ENTMCNC: 31 G/DL (ref 31.4–37.4)
MCV RBC AUTO: 95 FL (ref 82–98)
MONOCYTES NFR BLD: 6 % (ref 4–12)
NEUTS SEG NFR BLD AUTO: 86 % (ref 43–75)
PLATELET # BLD AUTO: 889 THOUSANDS/UL (ref 149–390)
PLATELET BLD QL SMEAR: ABNORMAL
PMV BLD AUTO: 9 FL (ref 8.9–12.7)
POLYCHROMASIA BLD QL SMEAR: PRESENT
POTASSIUM SERPL-SCNC: 4 MMOL/L (ref 3.5–5.3)
PROT SERPL-MCNC: 7.8 G/DL (ref 6.4–8.2)
RBC # BLD AUTO: 3.11 MILLION/UL (ref 3.88–5.62)
SODIUM SERPL-SCNC: 131 MMOL/L (ref 136–145)
VARIANT LYMPHS # BLD AUTO: 3 %
WBC # BLD AUTO: 22.62 THOUSAND/UL (ref 4.31–10.16)

## 2019-02-11 PROCEDURE — 97116 GAIT TRAINING THERAPY: CPT

## 2019-02-11 PROCEDURE — 97535 SELF CARE MNGMENT TRAINING: CPT

## 2019-02-11 PROCEDURE — 80076 HEPATIC FUNCTION PANEL: CPT | Performed by: PHYSICIAN ASSISTANT

## 2019-02-11 PROCEDURE — 99231 SBSQ HOSP IP/OBS SF/LOW 25: CPT | Performed by: SURGERY

## 2019-02-11 PROCEDURE — 85007 BL SMEAR W/DIFF WBC COUNT: CPT | Performed by: SURGERY

## 2019-02-11 PROCEDURE — 85025 COMPLETE CBC W/AUTO DIFF WBC: CPT | Performed by: SURGERY

## 2019-02-11 PROCEDURE — 85027 COMPLETE CBC AUTOMATED: CPT | Performed by: SURGERY

## 2019-02-11 PROCEDURE — 74177 CT ABD & PELVIS W/CONTRAST: CPT

## 2019-02-11 PROCEDURE — 99232 SBSQ HOSP IP/OBS MODERATE 35: CPT | Performed by: INTERNAL MEDICINE

## 2019-02-11 PROCEDURE — 80048 BASIC METABOLIC PNL TOTAL CA: CPT | Performed by: SURGERY

## 2019-02-11 PROCEDURE — 71260 CT THORAX DX C+: CPT

## 2019-02-11 PROCEDURE — 99233 SBSQ HOSP IP/OBS HIGH 50: CPT | Performed by: INTERNAL MEDICINE

## 2019-02-11 PROCEDURE — 82948 REAGENT STRIP/BLOOD GLUCOSE: CPT

## 2019-02-11 RX ADMIN — CEFTRIAXONE SODIUM 2000 MG: 10 INJECTION, POWDER, FOR SOLUTION INTRAVENOUS at 12:15

## 2019-02-11 RX ADMIN — QUETIAPINE FUMARATE 200 MG: 100 TABLET ORAL at 21:36

## 2019-02-11 RX ADMIN — IOHEXOL 100 ML: 350 INJECTION, SOLUTION INTRAVENOUS at 13:46

## 2019-02-11 RX ADMIN — DIAZEPAM 10 MG: 5 TABLET ORAL at 18:00

## 2019-02-11 RX ADMIN — OXYCODONE HYDROCHLORIDE 5 MG: 5 TABLET ORAL at 09:55

## 2019-02-11 RX ADMIN — ARIPIPRAZOLE 5 MG: 5 TABLET ORAL at 05:41

## 2019-02-11 RX ADMIN — FAMOTIDINE 20 MG: 20 TABLET, FILM COATED ORAL at 21:36

## 2019-02-11 RX ADMIN — OXYCODONE HYDROCHLORIDE 5 MG: 5 TABLET ORAL at 22:44

## 2019-02-11 RX ADMIN — HEPARIN SODIUM 5000 UNITS: 5000 INJECTION INTRAVENOUS; SUBCUTANEOUS at 21:37

## 2019-02-11 RX ADMIN — BUPROPION HYDROCHLORIDE 300 MG: 150 TABLET, FILM COATED, EXTENDED RELEASE ORAL at 09:49

## 2019-02-11 RX ADMIN — HEPARIN SODIUM 5000 UNITS: 5000 INJECTION INTRAVENOUS; SUBCUTANEOUS at 05:41

## 2019-02-11 RX ADMIN — DIAZEPAM 10 MG: 5 TABLET ORAL at 09:48

## 2019-02-11 RX ADMIN — METRONIDAZOLE 500 MG: 500 TABLET ORAL at 14:03

## 2019-02-11 RX ADMIN — DOCUSATE SODIUM 100 MG: 100 CAPSULE, LIQUID FILLED ORAL at 18:00

## 2019-02-11 RX ADMIN — FAMOTIDINE 20 MG: 20 TABLET, FILM COATED ORAL at 09:49

## 2019-02-11 RX ADMIN — DOCUSATE SODIUM 100 MG: 100 CAPSULE, LIQUID FILLED ORAL at 09:49

## 2019-02-11 RX ADMIN — METRONIDAZOLE 500 MG: 500 TABLET ORAL at 21:36

## 2019-02-11 RX ADMIN — HEPARIN SODIUM 5000 UNITS: 5000 INJECTION INTRAVENOUS; SUBCUTANEOUS at 14:03

## 2019-02-11 RX ADMIN — STANDARDIZED SENNA CONCENTRATE 8.6 MG: 8.6 TABLET ORAL at 21:36

## 2019-02-11 RX ADMIN — POLYETHYLENE GLYCOL 3350 17 G: 17 POWDER, FOR SOLUTION ORAL at 09:48

## 2019-02-11 RX ADMIN — METRONIDAZOLE 500 MG: 500 TABLET ORAL at 05:41

## 2019-02-11 RX ADMIN — Medication 10 MG: at 09:48

## 2019-02-11 RX ADMIN — CHLORHEXIDINE GLUCONATE 0.12% ORAL RINSE 15 ML: 1.2 LIQUID ORAL at 21:36

## 2019-02-11 RX ADMIN — LORAZEPAM 1 MG: 2 INJECTION INTRAMUSCULAR; INTRAVENOUS at 21:34

## 2019-02-11 RX ADMIN — CHLORHEXIDINE GLUCONATE 0.12% ORAL RINSE 15 ML: 1.2 LIQUID ORAL at 09:49

## 2019-02-11 NOTE — PROGRESS NOTES
Progress Note - Christie Mendez 35 y o  male MRN: [id number]    [facility] Encounter: [id number]      ASSESSMENT/ PLAN:   36 Yo male with pmh polysubstance abuse, bipolar disorder, hep C s/p Harvoni who was admitted with liver abscesses secondary to staphylococcus intermedius     1  Liver Abscesses: cultures revealed streptococcus intermedius  He is s/p IR drainage  The source of his infection is unclear, possibly of colonic source  Colonoscopy was recommended however, given that this will not change the management recommend this is done as an outpatient    -continue antibiotics per ID recommendations  -continue management per surgery  -outpatient colonoscopy once drains are removed    2  Elevated Liver enzymes: this is most likely secondary to multiple hepatic abscesses  Fortunately his LFTs continue to improve with normal ALT/AST  tbili decreased to 1 83 from 2 20 and alk phos decreased to 219 from 233   -continue to monitor LFTs    GI will sign off at this time, please call with any further questions       Subjective:     Patient seen and examined  Objective:     Vitals: Blood pressure 125/73, pulse 92, temperature 98 4 °F (36 9 °C), temperature source Oral, resp  rate (!) 31, height 6' 2" (1 88 m), weight 104 kg (228 lb 9 9 oz), SpO2 95 %  ,Body mass index is 29 35 kg/m²        Intake/Output Summary (Last 24 hours) at 2/11/2019 1019  Last data filed at 2/11/2019 0930  Gross per 24 hour   Intake 2170 ml   Output 4390 ml   Net -2220 ml       Physical Exam:     General Appearance: A&Ox3, appears stated age and cooperative  Lungs: Clear to auscultation bilaterally, no rales or rhonchi  Heart: Regular rate and rhythm, S1, S2 normal, no murmur, click, rub or gallop  Abdomen: Soft, non-tender, non-distended; bowel sounds normal; no masses or no organomegaly, multiple hepatic drains noted  Extremities: No cyanosis, clubbing, edema    Invasive Devices     Peripheral Intravenous Line            Peripheral IV 02/07/19 Right Arm 3 days          Drain            Closed/Suction Drain Right Abdomen Bulb 10 2 Fr  9 days    Closed/Suction Drain Right; Inferior;Superior Abdomen Bulb 8 5 Fr  9 days    Closed/Suction Drain Right;Posterior; Inferior Abdomen Bulb 10 2 Fr  9 days    Closed/Suction Drain Right;Superior; Anterior Abdomen Bulb 8 5 Fr  9 days    Closed/Suction Drain Right;Superior;Medial Abdomen Bulb 8 5 Fr  9 days    Chest Tube 1 Right 5 days                Lab Results:    Results from last 7 days   Lab Units 02/11/19 0457 02/10/19  0456   WBC Thousand/uL 22 62* 25 41*   HEMOGLOBIN g/dL 9 1* 8 9*   HEMATOCRIT % 29 4* 28 0*   PLATELETS Thousands/uL 889* 859*   LYMPHO PCT %  --  4*   MONO PCT %  --  1*   EOS PCT %  --  2     Results from last 7 days   Lab Units 02/11/19 0457   POTASSIUM mmol/L 4 0   CHLORIDE mmol/L 101   CO2 mmol/L 21   BUN mg/dL 12   CREATININE mg/dL 0 72   CALCIUM mg/dL 8 2*   ALK PHOS U/L 219*   ALT U/L 27   AST U/L 27               Imaging Studies: I have personally reviewed pertinent imaging studies  Xr Chest Portable    Result Date: 2/6/2019  Impression: 1  There are 5 drains projecting over the right base  Patient is known to have 5 drains in his liver abscesses  It is unclear if the superior most pigtail is pleural   Consider repeat chest radiograph with better inclusion of all of the drains on the patient's right side  2   Decrease moderate right effusion  3   Diffuse left lung opacity is unchanged  Xr Chest Portable    Result Date: 2/4/2019  Impression: Diffuse bilateral pulmonary opacities again seen  Moderate right pleural effusion  Xr Chest Portable    Result Date: 2/4/2019  Impression: Vascular congestion with new moderate right pleural effusion  Xr Chest 2 Views    Result Date: 2/1/2019  Impression: Elevation of the right hemidiaphragm with bibasilar subsegmental atelectasis and suspected trace right effusion       Ct Guided Perc Drainage Catheter Placeme    Result Date: 2/1/2019  Impression: Impression: Successful placement of a total of 5  all-purpose drainage catheters into the 5 largest liver abscess collections  A total of 300 cc of purulent material was aspirated and a specimen sent to the laboratory as described  The 5 drains were labeled as anterior superior, middle superior, posterior superior, central, and posterior inferior  The two 10-English drains were the central drain and the posterior-inferior drain  The 3 superior drains were 8 5-English  Plan: Tubes to DANNY bulb suction  Flush each tube with 20 mL normal saline solution  The patient will likely need more drains placed since there are several other abscesses  We will follow patient and see on follow-up CAT scan if 2 further drains are needed or if the existing drains require repositioning and or wire disruption of the loculations to improvedrainage  Ct Chest Abdomen Pelvis W Contrast    Result Date: 2/5/2019  Impression: 1  New patchy opacity in the left upper lobe likely representing pneumonia  2   Large right pleural effusion, increased from the prior study, with new trace left pleural effusion  Compressive atelectasis  3   Decreased size of numerous liver abscesses status post drainage of the largest abscesses  New gas within the collections is likely iatrogenic  4   Moderate free fluid in the pelvis  Xr Chest Portable Icu    Result Date: 2/5/2019  Impression: Increased right pleural effusion  Stable diffuse airspace disease  Ct Abdomen Pelvis With Contrast    Result Date: 2/1/2019  Impression: 1  In the provided clinical setting of fever and severe leukocytosis, the innumerable hepatic multilocular/multiseptated liver masses are most likely pyogenic abscesses versus less likely anemic abscesses  Other causes of cystic liver masses are less likely in this clinical setting  The patient's reported history of hepatitis C is noted    After patient's acute issues have resolved and the above liver lesions have been treated, surveillance liver imaging could be performed  2   Nondistended gallbladder with mild wall thickening and mucosal hyperemia is likely reactive  Ir Chest Tube    Result Date: 2/6/2019  Impression: Successful placement of a right chest pigtail drain and connected to Pleur-evac  No kee purulence obtained  Sample sent for laboratory analysis  _______________________________________________________________ PROCEDURE DETAILS: Operators: Dr Marla Morris, [address] [place] [address] attending, performed the procedure  Anesthesia: Conscious sedation was provided throughout a total intra-service time of 44 minutes during which the patient's hemodynamic parameters were continuously monitored by an independent trained radiology nurse  1% lidocaine was injected in the skin and subcutaneous tissues overlying the access site  Medications: 1% lidocaine, fentanyl, Versed Contrast: 0 mL of Omnipaque 300 Fluoroscopy time: 0 minutes COMMENTS: Following the discussion of the risks, benefits and alternatives to the procedure, written informed consent was obtained from the patient  The patient was placed left lateral decubitus on the patient's bed  A preprocedure timeout was performed per [facility] protocol  Ultrasound evaluation of right chest demonstrated a pleural effusion  The right lateral chest was prepped and draped in the usual sterile fashion  All elements of maximal sterile barrier technique were followed (cap, mask, sterile gown, sterile gloves, large sterile sheet, hand hygiene, and 2% chlorhexidine for cutaneous antisepsis)  Lidocaine was administered to the skin, and a small skin incision  was made  Under direct ultrasound guidance, an 18 gauge needle was advanced into the pleural fluid  Guidewire was advanced through the needle  Needle removed, access was serially dilated, and 10-Lao pigtail drain advanced  Pigtail formed in collection  Drain connected to Pleur-evac and sutured to the skin with 2-0 Prolene

## 2019-02-11 NOTE — PROGRESS NOTES
Telephone call tp Dr Payal Yuen  Pt having odd behaviors, but asked for pain meds  Dr Payal Yuen asked me to hold pain meds until later  Pt given ice satisfied at this time

## 2019-02-11 NOTE — PHYSICAL THERAPY NOTE
Physical Therapy Progress Note     02/11/19 1321   Pain Assessment   Pain Assessment 0-10   Pain Score 3   Pain Location Abdomen   Pain Orientation Right   Hospital Pain Intervention(s) Repositioned;Distraction; Emotional support; Rest   Response to Interventions tolerated   Restrictions/Precautions   Other Precautions Cognitive;Pain; Fall Risk;Multiple lines   Subjective   Subjective Pt encountered supine in bed  Reported feeling better, but continues to have pain in abdomen at this time   Bed Mobility   Supine to Sit 4  Minimal assistance   Additional items Assist x 1; Increased time required;HOB elevated   Sit to Supine 5  Supervision   Additional items Assist x 1; Increased time required   Transfers   Sit to Stand 4  Minimal assistance   Additional items Assist x 1;Assist x 2;Armrests; Increased time required   Stand to Sit 4  Minimal assistance   Additional items Assist x 2;Assist x 1; Armrests; Increased time required   Ambulation/Elevation   Gait pattern Improper Weight shift; Antalgic;Decreased foot clearance; Excessively slow; Step to;Short stride; Foward flexed; Inconsistent nalini   Gait Assistance 4  Minimal assist   Additional items Assist x 1   Assistive Device Rolling walker   Distance 60' x 2   Endurance Deficit   Endurance Deficit Yes   Endurance Deficit Description pain, fatigue   Activity Tolerance   Activity Tolerance Patient tolerated treatment well;Patient limited by fatigue;Patient limited by pain   Nurse Cris Pires RN   Assessment   Prognosis Fair   Problem List Decreased strength;Decreased endurance; Impaired balance;Decreased mobility; Decreased coordination;Decreased cognition; Impaired judgement;Decreased safety awareness;Decreased skin integrity;Pain   Assessment Pt demonstrated improved functional mobility this session, ambulating household distances with assist to ensure safety throughout  Pt performed all transfers with decreased assist, but performed them slowly due to increased pain    Pt required instructions & tactile cues throughout transfers to ensure safety  Pt with difficulty attending to cues, but was not impulsive about transfers  Pt ambualted with short, step to gait with forward flexed posture  Seated rest given due to fatigue  No complaints of increased pain or dizziness with positional changes  Upon return to room, pt performed supine to sit transfer onto stretcher to be transported to CAT scan  Will continue to benefit from therapy services to improve functional mobiitly, safety awareness, strength, and activity tolerance to ensure safe return to OF  Barriers to Discharge Inaccessible home environment   Goals   Patient Goals to get better   LTG Expiration Date 02/18/19   Long Term Goal #1 Per PT eval 2/4: 1) PERFORM BED MOBILITY MOD-I TO PARTICIPATE IN FREQUENT REPOSITIONING AND IMPROVE SKIN INTEGRITY; 2) PERFORM SIT<-->STAND TRANSFER MOD-I TO PROMOTE I WITH TOILETING AND OOB MOBILITY; 3) AMBULATE 200 FEET MOD-I W/ LEAST RESTRICTIVE DEVICE TO PARTICIPATE IN HOUSEHOLD AND COMMUNITY LEVEL AMBULATION; 4) IMPROVE B/L LE STRENGTH BY 1/2 GRADE TO IMPROVE EFFICIENCY OF TRANSFERS; 5) IMPROVE BALANCE BY 1 GRADE TO REDUCE RISK FOR FALLS; 6) NAVIGATE 12 STEPS S LEVEL IN ORDER TO SAFELY NAVIGATE MULTIPLE FLOORS AT HOME  Treatment Day 4   Plan   Treatment/Interventions Functional transfer training;LE strengthening/ROM; Therapeutic exercise; Endurance training;Patient/family training;Bed mobility;Gait training;Equipment eval/education;Elevations   Progress Progressing toward goals   PT Frequency   (3-5x/week)   Recommendation   Recommendation Short-term skilled PT   Equipment Recommended Mateo Fees, PTA

## 2019-02-11 NOTE — UTILIZATION REVIEW
Continued Stay Review    Date: 02/09/19  Vital Signs: /73 (BP Location: Left arm)   Pulse 92   Temp 98 4 °F (36 9 °C) (Oral)   Resp (!) 31   Ht 6' 2" (1 88 m)   Wt 104 kg (228 lb 9 9 oz)   SpO2 95%   BMI 29 35 kg/m²   Assessment:    39 y o male with multiple hepatic abscesses s/p IR drainage  Had TPA injected into chest tube yesterday, 115 output, fu morning cxr  Plan:  · Regular diet  · Monitor drain outputs  CXR ordered this am, will review upon completion  · IV abx through 2/15 per ID, appreciate recs  · GI - c-scope monday  · PT/OT  · Pain control  · DVT ppx  · Dispo: continue inpatient admission  Medications:   Scheduled Meds:   Current Facility-Administered Medications:  albuterol 2 puff Inhalation Q4H PRN   ARIPiprazole 5 mg Oral Early Morning   bisacodyl 10 mg Rectal Daily   buPROPion 300 mg Oral QAM   cefTRIAXone 2,000 mg Intravenous Q24H   chlorhexidine 15 mL Swish & Spit Q12H [facility]   diazepam 10 mg Oral BID   docusate sodium 100 mg Oral BID   dornase lisa (PULMOZYME) 5 mg in 30 mL SWFI chest tube instilation 5 mg Chest Tube Once   famotidine 20 mg Oral Q12H [facility]   heparin (porcine) 5,000 Units Subcutaneous Q8H [facility]   HYDROmorphone 1 mg Intravenous Q3H PRN   LORazepam 1 mg Intravenous Q4H PRN   metroNIDAZOLE 500 mg Oral Q8H [facility]   oxyCODONE 10 mg Oral Q4H PRN   oxyCODONE 5 mg Oral Q4H PRN   polyethylene glycol 17 g Oral Daily PRN   prazosin 1 mg Oral Daily PRN   QUEtiapine 200 mg Oral HS   senna 1 tablet Oral HS     Continuous Infusions:    PRN Meds:   albuterol    HYDROmorphone    LORazepam    oxyCODONE    oxyCODONE    polyethylene glycol    prazosin  Pertinent Labs/Diagnostic Results: WBC 21 71, RBC 2 69, HGB 8 1, HCT 26 0, PLTS 720, , CO2 19, ALK PHOS 221, ALB 1 7, TOTAL BILI 2 63  Age/Sex: 35 y o  male   Discharge Plan: TBD    Network Utilization Review Department  Phone: [phone]; Fax [phone]  [email or web address]  [email or web address]  ATTENTION: Please call with any questions or concerns to [phone]  and carefully listen to the prompts so that you are directed to the right person  Send all requests for admission clinical reviews, approved or denied determinations and any other requests to fax [phone]   All voicemails are confidential

## 2019-02-11 NOTE — SOCIAL WORK
Cm reviewed patient during care coordination rounds  Patient is not medically stable for discharge at this time  Patient continues with chest tube and 5 drains in place  Cm sent options paperwork to [place] for review  However, patient may not be accepted to any subacute rehab facilities due to recent drug use  Cm to review options with patient and patient's family

## 2019-02-11 NOTE — PLAN OF CARE
Problem: OCCUPATIONAL THERAPY ADULT  Goal: Performs self-care activities at highest level of function for planned discharge setting  See evaluation for individualized goals  Description  Treatment Interventions: ADL retraining, Functional transfer training, Endurance training, Patient/family training, Equipment evaluation/education, Compensatory technique education, Energy conservation, Activityengagement          See flowsheet documentation for full assessment, interventions and recommendations      Outcome: Progressing

## 2019-02-11 NOTE — PLAN OF CARE
Problem: Potential for Falls  Goal: Patient will remain free of falls  Description  INTERVENTIONS:  - Assess patient frequently for physical needs  -  Identify cognitive and physical deficits and behaviors that affect risk of falls    -  [place] fall precautions as indicated by assessment   - Educate patient/family on patient safety including physical limitations  - Instruct patient to call for assistance with activity based on assessment  - Modify environment to reduce risk of injury  - Consider OT/PT consult to assist with strengthening/mobility   Outcome: Progressing     Problem: GASTROINTESTINAL - ADULT  Goal: Minimal or absence of nausea and/or vomiting  Description  INTERVENTIONS:  - Administer IV fluids as ordered to ensure adequate hydration  - Maintain NPO status until nausea and vomiting are resolved  - Nasogastric tube as ordered  - Administer ordered antiemetic medications as needed  - Provide nonpharmacologic comfort measures as appropriate  - Advance diet as tolerated, if ordered  - Nutrition services referral to assist patient with adequate nutrition and appropriate food choices   Outcome: Progressing  Goal: Maintains or returns to baseline bowel function  Description  INTERVENTIONS:  - Assess bowel function  - Encourage oral fluids to ensure adequate hydration  - Administer IV fluids as ordered to ensure adequate hydration  - Administer ordered medications as needed  - Encourage mobilization and activity  - Nutrition services referral to assist patient with appropriate food choices   Outcome: Progressing  Goal: Maintains adequate nutritional intake  Description  INTERVENTIONS:  - Monitor percentage of each meal consumed  - Identify factors contributing to decreased intake, treat as appropriate  - Assist with meals as needed  - Monitor I&O, WT and lab values  - Obtain nutrition services referral as needed   Outcome: Progressing     Problem: METABOLIC, FLUID AND ELECTROLYTES - ADULT  Goal: Electrolytes maintained within normal limits  Description  INTERVENTIONS:  - Monitor labs and assess patient for signs and symptoms of electrolyte imbalances  - Administer electrolyte replacement as ordered  - Monitor response to electrolyte replacements, including repeat lab results as appropriate  - Instruct patient on fluid and nutrition as appropriate   Outcome: Progressing  Goal: Fluid balance maintained  Description  INTERVENTIONS:  - Monitor labs and assess for signs and symptoms of volume excess or deficit  - Monitor I/O and WT  - Instruct patient on fluid and nutrition as appropriate   Outcome: Progressing     Problem: SKIN/TISSUE INTEGRITY - ADULT  Goal: Incision(s), wounds(s) or drain site(s) healing without S/S of infection  Description  INTERVENTIONS  - Assess and document risk factors for skin impairment   - Assess and document dressing, incision, wound bed, drain sites and surrounding tissue  - Initiate Nutrition services consult and/or wound management as needed   Outcome: Progressing     Problem: PAIN - ADULT  Goal: Verbalizes/displays adequate comfort level or baseline comfort level  Description  Interventions:  - Encourage patient to monitor pain and request assistance  - Assess pain using appropriate pain scale  - Administer analgesics based on type and severity of pain and evaluate response  - Implement non-pharmacological measures as appropriate and evaluate response  - Consider cultural and social influences on pain and pain management  - Notify physician/advanced practitioner if interventions unsuccessful or patient reports new pain   Outcome: Progressing     Problem: INFECTION - ADULT  Goal: Absence or prevention of progression during hospitalization  Description  INTERVENTIONS:  - Assess and monitor for signs and symptoms of infection  - Monitor lab/diagnostic results  - Monitor all insertion sites, i e  indwelling lines, tubes, and drains  - Monitor endotracheal (as able) and nasal secretions for changes in amount and color  - [place] appropriate cooling/warming therapies per order  - Administer medications as ordered  - Instruct and encourage patient and family to use good hand hygiene technique  - Identify and instruct in appropriate isolation precautions for identified infection/condition   Outcome: Progressing  Goal: Absence of fever/infection during neutropenic period  Description  INTERVENTIONS:  - Monitor WBC  - Implement neutropenic guidelines   Outcome: Progressing     Problem: Nutrition/Hydration-ADULT  Goal: Nutrient/Hydration intake appropriate for improving, restoring or maintaining nutritional needs  Description  Monitor and assess patient's nutrition/hydration status for malnutrition (ex- brittle hair, bruises, dry skin, pale skin and conjunctiva, muscle wasting, smooth red tongue, and disorientation)  Collaborate with interdisciplinary team and initiate plan and interventions as ordered  Monitor patient's weight and dietary intake as ordered or per policy  Utilize nutrition screening tool and intervene per policy  Determine patient's food preferences and provide high-protein, high-caloric foods as appropriate       INTERVENTIONS:  - Monitor oral intake, urinary output, labs, and treatment plans  - Assess nutrition and hydration status and recommend course of action  - Evaluate amount of meals eaten  - Assist patient with eating if necessary   - Allow adequate time for meals  - Recommend/ encourage appropriate diets, oral nutritional supplements, and vitamin/mineral supplements  - Order, calculate, and assess calorie counts as needed  - Recommend, monitor, and adjust tube feedings and TPN/PPN based on assessed needs  - Assess need for intravenous fluids  - Provide specific nutrition/hydration education as appropriate  - Include patient/family/caregiver in decisions related to nutrition   Outcome: Progressing     Problem: Prexisting or High Potential for Compromised Skin Integrity  Goal: Skin integrity is maintained or improved  Description  INTERVENTIONS:  - Identify patients at risk for skin breakdown  - Assess and monitor skin integrity  - Assess and monitor nutrition and hydration status  - Monitor labs (i e  albumin)  - Assess for incontinence   - Turn and reposition patient  - Assist with mobility/ambulation  - Relieve pressure over bony prominences  - Avoid friction and shearing  - Provide appropriate hygiene as needed including keeping skin clean and dry  - Evaluate need for skin moisturizer/barrier cream  - Collaborate with interdisciplinary team (i e  Nutrition, Rehabilitation, etc )   - Patient/family teaching   Outcome: Progressing     Problem: CARDIOVASCULAR - ADULT  Goal: Maintains optimal cardiac output and hemodynamic stability  Description  INTERVENTIONS:  - Monitor I/O, vital signs and rhythm  - Monitor for S/S and trends of decreased cardiac output i e  bleeding, hypotension  - Administer and titrate ordered vasoactive medications to optimize hemodynamic stability  - Assess quality of pulses, skin color and temperature  - Assess for signs of decreased coronary artery perfusion - ex   Angina  - Instruct patient to report change in severity of symptoms   Outcome: Progressing  Goal: Absence of cardiac dysrhythmias or at baseline rhythm  Description  INTERVENTIONS:  - Continuous cardiac monitoring, monitor vital signs, obtain 12 lead EKG if indicated  - Administer antiarrhythmic and heart rate control medications as ordered  - Monitor electrolytes and administer replacement therapy as ordered   Outcome: Progressing     Problem: RESPIRATORY - ADULT  Goal: Achieves optimal ventilation and oxygenation  Description  INTERVENTIONS:  - Assess for changes in respiratory status  - Assess for changes in mentation and behavior  - Position to facilitate oxygenation and minimize respiratory effort  - Oxygen administration by appropriate delivery method based on oxygen saturation (per order) or ABGs  - Initiate smoking cessation education as indicated  - Encourage broncho-pulmonary hygiene including cough, deep breathe, Incentive Spirometry  - Assess the need for suctioning and aspirate as needed  - Assess and instruct to report SOB or any respiratory difficulty  - Respiratory Therapy support as indicated   Outcome: Progressing     Problem: MUSCULOSKELETAL - ADULT  Goal: Maintain or return mobility to safest level of function  Description  INTERVENTIONS:  - Assess patient's ability to carry out ADLs; assess patient's baseline for ADL function and identify physical deficits which impact ability to perform ADLs (bathing, care of mouth/teeth, toileting, grooming, dressing, etc )  - Assess/evaluate cause of self-care deficits   - Assess range of motion  - Assess patient's mobility; develop plan if impaired  - Assess patient's need for assistive devices and provide as appropriate  - Encourage maximum independence but intervene and supervise when necessary  - Involve family in performance of ADLs  - Assess for home care needs following discharge   - Request OT consult to assist with ADL evaluation and planning for discharge  - Provide patient education as appropriate   Outcome: Progressing     Problem: DISCHARGE PLANNING - CARE MANAGEMENT  Goal: Discharge to post-acute care or home with appropriate resources  Description  INTERVENTIONS:  - Conduct assessment to determine patient/family and health care team treatment goals, and need for post-acute services based on payer coverage, community resources, and patient preferences, and barriers to discharge  - Address psychosocial, clinical, and financial barriers to discharge as identified in assessment in conjunction with the patient/family and health care team  - Arrange appropriate level of post-acute services according to patient's   needs and preference and payer coverage in collaboration with the physician and health care team  - Communicate with and update the patient/family, physician, and health care team regarding progress on the discharge plan  - Arrange appropriate transportation to post-acute venues   Outcome: Progressing

## 2019-02-11 NOTE — PROGRESS NOTES
Progress Note - Infectious Disease   Vester Liner 35 y o  male MRN: [id number]  Unit/Bed#: [facility] 604-01 Encounter: [id number]      Impression/Recommendations:  1  Sepsis, POA, presented with fever and leukocytosis   Source of sepsis is most likely liver abscesses   Overall, patient is stable   Temperature still up and down   WBC stable   Admission blood cultures are now positive   Temperature is down but WBC remains elevated  Antibiotic plan as in below  Monitor temperature/WBC  Monitor hemodynamics      2  Streptococcus intermedius bacteremia   Source is most likely liver abscesses   However, we need to consider the possibility of endocarditis with septic emboli to liver   Bacteremia cleared with IV antibiotic regimen   2D echo without vegetation   Given bacteremia, patient will need 2 weeks of IV antibiotic  Continue high-dose IV ceftriaxone  Treat x 14 days total, through 2/15      3  Multiple liver abscesses   Patient has history of diarrhea   Liver abscess is may be all secondary to translocation from gut   Abdomen/pelvis CT without other obvious intra-abdominal pathologies   It may be worthwhile to look at colonoscopy to look for colonic lesions  Antibiotic plan as in above  Continue Flagyl for anaerobic coverage  Monitor temperature/WBC  Monitor abdominal pain  Plan for colonoscopy next week noted      3  Recurrent/persistent fever/leukocytosis  Temperature is down but WBC remains elevated  This is most likely secondary to incompletely drained abscesses/collection  Patient may need repeat imaging to assess for undrained liver abscess and loculated pleural effusion  In the meanwhile, continue drainage  Antibiotic plan as in above  Monitor temperature/WBC      4  Right-sided pleural effusion, status post chest tube placement   Culture is negative   This may be all sympathetic effusion from liver abscess   Patient did have increased drainage with tPA and DNAse    However, CXR still with significant loculated effusion  Patient may need repeat CT of chest   This may be reason for persistent leukocytosis      5  HENRIQUE, POA  This may be secondary sepsis   Dehydration may contribute   Creatinine has normalized  Antibiotic at full dose  Monitor creatinine      6  Elevated LFTs   This is most likely secondary to hepatic abscesses   Will monitor LFTs with treatment and drainage of abscesses   LFTs continue to improve  Monitor LFTs      7  History of IVDU   I have some concern regarding possible active IVDU   For these reasons, patient is not a candidate for home IV antibiotic   Prior HIV screen negative      Discussed with patient in detail regarding the above plan      Antibiotics:  Ceftriaxone/Flagyl  Post drainage # 8      Subjective:  Patient feels well, quite comfortable  Abdominal pain mild, well controlled  He is awake and alert  No diarrhea  Temperature stays down  No chills  Objective:  Vitals:  Temp:  [97 8 °F (36 6 °C)-98 8 °F (37 1 °C)] 98 4 °F (36 9 °C)  HR:  [] 92  Resp:  [18-31] 31  BP: (110-125)/(68-74) 125/73  SpO2:  [95 %] 95 %  Temp (24hrs), Av 3 °F (36 8 °C), Min:97 8 °F (36 6 °C), Max:98 8 °F (37 1 °C)  Current: Temperature: 98 4 °F (36 9 °C)    Physical Exam:     General: Awake, alert, cooperative, no distress  Neck:  Supple  No mass  No lymphadenopathy  Lungs: Decreased breath sounds on right, right basilar rales, no wheezing, respirations unlabored  Heart:  Tachycardic with regular rhythm, S1 and S2 normal, no murmur  Abdomen: Soft, nondistended, mild RUQ tenderness, bowel sounds active all four quadrants,        no masses, no organomegaly  All drain serous  Extremities: Trace edema  No erythema/warmth  No ulcer  Nontender to palpation  Skin:  No rash  Neuro: Moves all extremities       Invasive Devices     Peripheral Intravenous Line            Peripheral IV 19 Right Arm 3 days    Peripheral IV 19 Left Forearm less than 1 day Drain            Closed/Suction Drain Right Abdomen Bulb 10 2 Fr  9 days    Closed/Suction Drain Right; Inferior;Superior Abdomen Bulb 8 5 Fr  9 days    Closed/Suction Drain Right;Posterior; Inferior Abdomen Bulb 10 2 Fr  9 days    Closed/Suction Drain Right;Superior; Anterior Abdomen Bulb 8 5 Fr  9 days    Closed/Suction Drain Right;Superior;Medial Abdomen Bulb 8 5 Fr  9 days    Chest Tube 1 Right 5 days                Labs studies:   I have personally reviewed pertinent labs  Results from last 7 days   Lab Units 02/11/19  0457 02/10/19  0456 02/09/19  0445   POTASSIUM mmol/L 4 0 4 2 4 4   CHLORIDE mmol/L 101 100 104   CO2 mmol/L 21 20* 19*   BUN mg/dL 12 11 11   CREATININE mg/dL 0 72 0 81 0 70   EGFR ml/min/1 73sq m 117 111 118   CALCIUM mg/dL 8 2* 8 2* 8 3   AST U/L 27 27 25   ALT U/L 27 29 32   ALK PHOS U/L 219* 233* 221*     Results from last 7 days   Lab Units 02/11/19 0457 02/10/19  0456 02/09/19  0445   WBC Thousand/uL 22 62* 25 41* 21 71*   HEMOGLOBIN g/dL 9 1* 8 9* 8 1*   PLATELETS Thousands/uL 889* 859* 720*     Results from last 7 days   Lab Units 02/05/19  1651   GRAM STAIN RESULT  Rare Polys  Rare Mononuclear Cells  No bacteria seen   BODY FLUID CULTURE, STERILE  No growth       Imaging Studies:   I have personally reviewed pertinent imaging study reports and images in PACS  CXR reviewed personally  Persistent loculated right pleural effusion  EKG, Pathology, and Other Studies:   I have personally reviewed pertinent reports

## 2019-02-11 NOTE — OCCUPATIONAL THERAPY NOTE
Occupational Therapy Treatment Note:       02/11/19 1322   Restrictions/Precautions   Other Precautions Cognitive; Fall Risk;Multiple lines   Pain Assessment   Pain Assessment 0-10   Pain Score 3   Pain Location Abdomen   ADL   Toileting Comments max asst to readjust and manage pants in stance  pt uses b ue's heavily on walker to maintain balance   Functional Standing Tolerance   Time pt demsontrates f balance with rw and activity tolerence is fair  Bed Mobility   Supine to Sit 3  Moderate assistance   Additional items Assist x 1   Sit to Supine 3  Moderate assistance   Additional items Assist x 1   Transfers   Sit to Stand 3  Moderate assistance   Stand to Sit 3  Moderate assistance   Stand pivot 3  Moderate assistance   Additional items   (a x 2:  drain lines, CT c port suction, iv & chair follow)   Additional Comments pt requries asst x 2 for multiple line management pt also requires moderate vc's for hand placement and demsontraets poor carryover of same  Functional Mobility   Additional items Rolling walker   Cognition   Overall Cognitive Status Impaired   Arousal/Participation Cooperative   Attention Within functional limits   Following Commands Follows one step commands without difficulty   Activity Tolerance   Activity Tolerance Patient tolerated treatment well   Assessment   Assessment pt participated in ot session and was seen focusing on safety and education during sit tos tnad trnasfers,  carryover assessemnt of hand placement rw use for functional trnasfers and functional mobility  pt requires portable suction for chest tube and continues to multiple lines and 5 kari drains  pt tolerated session well with low reports of pain  pt was taken for ct scan post session  pt was provided with tablet for leisure whil oob upon pts return from testing  pt reports visitors from family and son this weekend     Plan   Treatment Interventions ADL retraining;Functional transfer training; Activityengagement;Patient/family training;Equipment evaluation/education; Endurance training;Cognitive reorientation   Goal Expiration Date 02/18/19   Treatment Day 5   OT Frequency 3-5x/wk   Recommendation   OT Discharge Recommendation Short Term Rehab   [place] Index   Feeding 5   Bathing 0   Grooming Score 5   Dressing Score 0   Bladder Score 10   Bowels Score 10   Toilet Use Score 5   Transfers (Bed/Chair) Score 10   Stairs Score 0   Modified [place] Scale   Modified [place] Scale 4   April A FE Gonzalez

## 2019-02-11 NOTE — PROGRESS NOTES
Progress Note - General Surgery   Corazon Pel 35 y o  male MRN: [id number]  Unit/Bed#: [facility] 604-01 Encounter: [id number]    Assessment:  35 y o  M w/ multiple hepatic abscesses s/p IR drainage w/ step intermedius bacteremia    TPA of chest tube yesterday w/ good return of drainage    Plan:  Appreciate ID and GI recs  Cont to monitor CT output  Will repeat CTCAP to monitor for undrained vs loculated collections    Subjective/Objective   Chief Complaint:     Subjective:     Objective:     Blood pressure 125/73, pulse 92, temperature 98 4 °F (36 9 °C), temperature source Oral, resp  rate 18, height 6' 2" (1 88 m), weight 104 kg (228 lb 9 9 oz), SpO2 95 %  ,Body mass index is 29 35 kg/m²  Intake/Output Summary (Last 24 hours) at 2/11/2019 0836  Last data filed at 2/11/2019 0730  Gross per 24 hour   Intake 2650 ml   Output 3790 ml   Net -1140 ml       Invasive Devices     Peripheral Intravenous Line            Peripheral IV 02/07/19 Right Arm 3 days          Drain            Closed/Suction Drain Right Abdomen Bulb 10 2 Fr  9 days    Closed/Suction Drain Right; Inferior;Superior Abdomen Bulb 8 5 Fr  9 days    Closed/Suction Drain Right;Posterior; Inferior Abdomen Bulb 10 2 Fr  9 days    Closed/Suction Drain Right;Superior; Anterior Abdomen Bulb 8 5 Fr  9 days    Closed/Suction Drain Right;Superior;Medial Abdomen Bulb 8 5 Fr  9 days    Chest Tube 1 Right 5 days                Physical Exam:   No acute distress  Chest tube w/ 575 out    Lab, Imaging and other studies:  CBC:   Lab Results   Component Value Date    WBC 22 62 (H) 02/11/2019    HGB 9 1 (L) 02/11/2019    HCT 29 4 (L) 02/11/2019    MCV 95 02/11/2019     (H) 02/11/2019    MCH 29 3 02/11/2019    MCHC 31 0 (L) 02/11/2019    RDW 15 4 (H) 02/11/2019    MPV 9 0 02/11/2019   , CMP:   Lab Results   Component Value Date    SODIUM 131 (L) 02/11/2019    K 4 0 02/11/2019     02/11/2019    CO2 21 02/11/2019    BUN 12 02/11/2019    CREATININE 0 72 02/11/2019 CALCIUM 8 2 (L) 02/11/2019    EGFR 117 02/11/2019   , Coagulation: No results found for: PT, INR, APTT  VTE Pharmacologic Prophylaxis: Heparin  VTE Mechanical Prophylaxis: sequential compression device

## 2019-02-11 NOTE — PLAN OF CARE
Problem: PHYSICAL THERAPY ADULT  Goal: Performs mobility at highest level of function for planned discharge setting  See evaluation for individualized goals  Description  Treatment/Interventions: Functional transfer training, LE strengthening/ROM, Elevations, Therapeutic exercise, Endurance training, Patient/family training, Equipment eval/education, Bed mobility, Gait training, OT, Spoke to nursing  Equipment Recommended: Angelica Osborne (RW)       See flowsheet documentation for full assessment, interventions and recommendations  Outcome: Progressing  Note:   Prognosis: Fair  Problem List: Decreased strength, Decreased endurance, Impaired balance, Decreased mobility, Decreased coordination, Decreased cognition, Impaired judgement, Decreased safety awareness, Decreased skin integrity, Pain  Assessment: Pt demonstrated improved functional mobility this session, ambulating household distances with assist to ensure safety throughout  Pt performed all transfers with decreased assist, but performed them slowly due to increased pain  Pt required instructions & tactile cues throughout transfers to ensure safety  Pt with difficulty attending to cues, but was not impulsive about transfers  Pt ambualted with short, step to gait with forward flexed posture  Seated rest given due to fatigue  No complaints of increased pain or dizziness with positional changes  Upon return to room, pt performed supine to sit transfer onto stretcher to be [facility] to [facility] scan  Will continue to benefit from therapy services to improve functional mobiitly, safety awareness, strength, and activity tolerance to ensure safe return to OF  Barriers to Discharge: Inaccessible home environment     Recommendation: Short-term skilled PT     PT - OK to Discharge: Yes(to rehab when medically stable)    See flowsheet documentation for full assessment

## 2019-02-12 LAB
ALBUMIN SERPL BCP-MCNC: 1.9 G/DL (ref 3.5–5)
ALP SERPL-CCNC: 180 U/L (ref 46–116)
ALT SERPL W P-5'-P-CCNC: 23 U/L (ref 12–78)
ANION GAP SERPL CALCULATED.3IONS-SCNC: 10 MMOL/L (ref 4–13)
ANISOCYTOSIS BLD QL SMEAR: PRESENT
AST SERPL W P-5'-P-CCNC: 24 U/L (ref 5–45)
BASOPHILS # BLD MANUAL: 0.16 THOUSAND/UL (ref 0–0.1)
BASOPHILS NFR MAR MANUAL: 1 % (ref 0–1)
BILIRUB SERPL-MCNC: 1.54 MG/DL (ref 0.2–1)
BUN SERPL-MCNC: 11 MG/DL (ref 5–25)
CALCIUM SERPL-MCNC: 8.2 MG/DL (ref 8.3–10.1)
CHLORIDE SERPL-SCNC: 101 MMOL/L (ref 100–108)
CO2 SERPL-SCNC: 21 MMOL/L (ref 21–32)
CREAT SERPL-MCNC: 0.7 MG/DL (ref 0.6–1.3)
EOSINOPHIL # BLD MANUAL: 0.32 THOUSAND/UL (ref 0–0.4)
EOSINOPHIL NFR BLD MANUAL: 2 % (ref 0–6)
ERYTHROCYTE [DISTWIDTH] IN BLOOD BY AUTOMATED COUNT: 15.1 % (ref 11.6–15.1)
GFR SERPL CREATININE-BSD FRML MDRD: 118 ML/MIN/1.73SQ M
GIANT PLATELETS BLD QL SMEAR: PRESENT
GLUCOSE SERPL-MCNC: 106 MG/DL (ref 65–140)
GLUCOSE SERPL-MCNC: 108 MG/DL (ref 65–140)
GLUCOSE SERPL-MCNC: 112 MG/DL (ref 65–140)
GLUCOSE SERPL-MCNC: 140 MG/DL (ref 65–140)
GLUCOSE SERPL-MCNC: 99 MG/DL (ref 65–140)
HCT VFR BLD AUTO: 26.5 % (ref 36.5–49.3)
HGB BLD-MCNC: 8.4 G/DL (ref 12–17)
HYPERCHROMIA BLD QL SMEAR: PRESENT
LYMPHOCYTES # BLD AUTO: 0.95 THOUSAND/UL (ref 0.6–4.47)
LYMPHOCYTES # BLD AUTO: 6 % (ref 14–44)
MACROCYTES BLD QL AUTO: PRESENT
MCH RBC QN AUTO: 30 PG (ref 26.8–34.3)
MCHC RBC AUTO-ENTMCNC: 31.7 G/DL (ref 31.4–37.4)
MCV RBC AUTO: 95 FL (ref 82–98)
MONOCYTES # BLD AUTO: 0.48 THOUSAND/UL (ref 0–1.22)
MONOCYTES NFR BLD: 3 % (ref 4–12)
NEUTROPHILS # BLD MANUAL: 13.96 THOUSAND/UL (ref 1.85–7.62)
NEUTS SEG NFR BLD AUTO: 88 % (ref 43–75)
NRBC BLD AUTO-RTO: 0 /100 WBCS
PLATELET # BLD AUTO: 807 THOUSANDS/UL (ref 149–390)
PLATELET BLD QL SMEAR: ABNORMAL
PMV BLD AUTO: 9 FL (ref 8.9–12.7)
POLYCHROMASIA BLD QL SMEAR: PRESENT
POTASSIUM SERPL-SCNC: 3.4 MMOL/L (ref 3.5–5.3)
PROT SERPL-MCNC: 7.5 G/DL (ref 6.4–8.2)
RBC # BLD AUTO: 2.8 MILLION/UL (ref 3.88–5.62)
RBC MORPH BLD: PRESENT
SODIUM SERPL-SCNC: 132 MMOL/L (ref 136–145)
WBC # BLD AUTO: 15.86 THOUSAND/UL (ref 4.31–10.16)

## 2019-02-12 PROCEDURE — 82948 REAGENT STRIP/BLOOD GLUCOSE: CPT

## 2019-02-12 PROCEDURE — 80053 COMPREHEN METABOLIC PANEL: CPT | Performed by: NURSE PRACTITIONER

## 2019-02-12 PROCEDURE — 85027 COMPLETE CBC AUTOMATED: CPT | Performed by: STUDENT IN AN ORGANIZED HEALTH CARE EDUCATION/TRAINING PROGRAM

## 2019-02-12 PROCEDURE — 99232 SBSQ HOSP IP/OBS MODERATE 35: CPT | Performed by: SURGERY

## 2019-02-12 PROCEDURE — 85007 BL SMEAR W/DIFF WBC COUNT: CPT | Performed by: STUDENT IN AN ORGANIZED HEALTH CARE EDUCATION/TRAINING PROGRAM

## 2019-02-12 PROCEDURE — 99232 SBSQ HOSP IP/OBS MODERATE 35: CPT | Performed by: INTERNAL MEDICINE

## 2019-02-12 PROCEDURE — 97535 SELF CARE MNGMENT TRAINING: CPT

## 2019-02-12 RX ORDER — POTASSIUM CHLORIDE 14.9 MG/ML
20 INJECTION INTRAVENOUS
Status: COMPLETED | OUTPATIENT
Start: 2019-02-12 | End: 2019-02-12

## 2019-02-12 RX ORDER — POTASSIUM CHLORIDE 20 MEQ/1
40 TABLET, EXTENDED RELEASE ORAL ONCE
Status: COMPLETED | OUTPATIENT
Start: 2019-02-12 | End: 2019-02-12

## 2019-02-12 RX ADMIN — DOCUSATE SODIUM 100 MG: 100 CAPSULE, LIQUID FILLED ORAL at 09:05

## 2019-02-12 RX ADMIN — LORAZEPAM 1 MG: 2 INJECTION INTRAMUSCULAR; INTRAVENOUS at 15:35

## 2019-02-12 RX ADMIN — LORAZEPAM 1 MG: 2 INJECTION INTRAMUSCULAR; INTRAVENOUS at 20:13

## 2019-02-12 RX ADMIN — CEFTRIAXONE SODIUM 2000 MG: 10 INJECTION, POWDER, FOR SOLUTION INTRAVENOUS at 11:50

## 2019-02-12 RX ADMIN — METRONIDAZOLE 500 MG: 500 TABLET ORAL at 06:26

## 2019-02-12 RX ADMIN — STANDARDIZED SENNA CONCENTRATE 8.6 MG: 8.6 TABLET ORAL at 21:07

## 2019-02-12 RX ADMIN — DOCUSATE SODIUM 100 MG: 100 CAPSULE, LIQUID FILLED ORAL at 17:18

## 2019-02-12 RX ADMIN — CHLORHEXIDINE GLUCONATE 0.12% ORAL RINSE 15 ML: 1.2 LIQUID ORAL at 09:05

## 2019-02-12 RX ADMIN — ARIPIPRAZOLE 5 MG: 5 TABLET ORAL at 06:26

## 2019-02-12 RX ADMIN — POTASSIUM CHLORIDE 20 MEQ: 200 INJECTION, SOLUTION INTRAVENOUS at 14:42

## 2019-02-12 RX ADMIN — QUETIAPINE FUMARATE 200 MG: 100 TABLET ORAL at 21:07

## 2019-02-12 RX ADMIN — DIAZEPAM 10 MG: 5 TABLET ORAL at 09:05

## 2019-02-12 RX ADMIN — FAMOTIDINE 20 MG: 20 TABLET, FILM COATED ORAL at 20:19

## 2019-02-12 RX ADMIN — METRONIDAZOLE 500 MG: 500 TABLET ORAL at 14:42

## 2019-02-12 RX ADMIN — BUPROPION HYDROCHLORIDE 300 MG: 150 TABLET, FILM COATED, EXTENDED RELEASE ORAL at 09:05

## 2019-02-12 RX ADMIN — HEPARIN SODIUM 5000 UNITS: 5000 INJECTION INTRAVENOUS; SUBCUTANEOUS at 06:27

## 2019-02-12 RX ADMIN — HEPARIN SODIUM 5000 UNITS: 5000 INJECTION INTRAVENOUS; SUBCUTANEOUS at 21:07

## 2019-02-12 RX ADMIN — POTASSIUM CHLORIDE 20 MEQ: 200 INJECTION, SOLUTION INTRAVENOUS at 11:15

## 2019-02-12 RX ADMIN — FAMOTIDINE 20 MG: 20 TABLET, FILM COATED ORAL at 09:05

## 2019-02-12 RX ADMIN — CHLORHEXIDINE GLUCONATE 0.12% ORAL RINSE 15 ML: 1.2 LIQUID ORAL at 20:19

## 2019-02-12 RX ADMIN — METRONIDAZOLE 500 MG: 500 TABLET ORAL at 21:07

## 2019-02-12 RX ADMIN — DIAZEPAM 10 MG: 5 TABLET ORAL at 17:17

## 2019-02-12 RX ADMIN — POTASSIUM CHLORIDE 40 MEQ: 1500 TABLET, EXTENDED RELEASE ORAL at 11:15

## 2019-02-12 RX ADMIN — HEPARIN SODIUM 5000 UNITS: 5000 INJECTION INTRAVENOUS; SUBCUTANEOUS at 14:42

## 2019-02-12 RX ADMIN — ALTEPLASE 5 MG: 2.2 INJECTION, POWDER, LYOPHILIZED, FOR SOLUTION INTRAVENOUS at 12:35

## 2019-02-12 NOTE — PROGRESS NOTES
Progress Note - Infectious Disease   Stephen Anderson 35 y o  male MRN: [id number]  Unit/Bed#: [facility] 604-01 Encounter: [id number]      Impression/Recommendations:  1  Sepsis, POA, presented with fever and leukocytosis   Source of sepsis is most likely liver abscesses   Overall, patient is stable   Temperature still up and down   WBC stable   Admission blood cultures are now positive   Temperature is down but WBC remains elevated  Antibiotic plan as in below  Monitor temperature/WBC  Monitor hemodynamics      2  Streptococcus intermedius bacteremia   Source is most likely liver abscesses   However, we need to consider the possibility of endocarditis with septic emboli to liver   Bacteremia cleared with IV antibiotic regimen   2D echo without vegetation   Given bacteremia, patient will need 2 weeks of IV antibiotic  Continue high-dose IV ceftriaxone  Treat x 14 days total, through 2/15      3  Multiple liver abscesses   Patient has history of diarrhea   Liver abscess is may be all secondary to translocation from gut   Abdomen/pelvis CT without other obvious intra-abdominal pathologies   It may be worthwhile to look at colonoscopy to look for colonic lesions  Patient will most likely need prolonged antibiotic, until on drain abscess is resolved  After completion of IV antibiotic course for bacteremia above, he will be transitioned to p o  antibiotic  Temperature is down  WBC decreasing  Antibiotic plan as in above  Continue Flagyl for anaerobic coverage  Monitor temperature/WBC  Monitor abdominal pain  Plan for colonoscopy eventually  Transition to p o   Augmentin after completion of IV ceftriaxone/Flagyl above      3  Recurrent/persistent fever/leukocytosis   Temperature is down but WBC remains elevated   This is most likely secondary to incompletely drained abscesses/collection   Patient may need repeat imaging to assess for undrained liver abscess and loculated pleural effusion   In the meanwhile, continue drainage  Antibiotic plan as in above  Monitor temperature/WBC      4  Right-sided pleural effusion, status post chest tube placement   Culture is negative   This may be all sympathetic effusion from liver abscess   Patient did have increased drainage with tPA and DNAse  However, CXR still with significant loculated effusion     CT with improving effusion  Management per surgery service      5  HENRIQUE, POA  This may be secondary sepsis   Dehydration may contribute   Creatinine has normalized  Antibiotic at full dose  Monitor creatinine      6  Elevated LFTs   This is most likely secondary to hepatic abscesses   Will monitor LFTs with treatment and drainage of abscesses   LFTs continue to improve  Monitor LFTs      7  History of IVDU   I have some concern regarding possible active IVDU   For these reasons, patient is not a candidate for home IV antibiotic   Prior HIV screen negative      Discussed with patient in detail regarding the above plan      Antibiotics:  Ceftriaxone/Flagyl  Post drainage # 9      Subjective:  Patient feels well  Abdominal pain mild, well controlled  He is awake and alert  No diarrhea  Temperature stays down  No chills  Objective:  Vitals:  Temp:  [97 °F (36 1 °C)-99 9 °F (37 7 °C)] 97 °F (36 1 °C)  HR:  [] 94  Resp:  [16-28] 19  BP: (125-126)/(73-76) 125/73  SpO2:  [94 %-96 %] 96 %  Temp (24hrs), Av 2 °F (36 8 °C), Min:97 °F (36 1 °C), Max:99 9 °F (37 7 °C)  Current: Temperature: (!) 97 °F (36 1 °C)    Physical Exam:     General: Awake, alert, cooperative, no distress  Neck:  Supple  No mass  No lymphadenopathy  Lungs: Decreased breath sounds on right, sparse right basilar rales, no wheezing, respirations unlabored  Heart:  Regular rate and rhythm, S1 and S2 normal, no murmur  Abdomen: Soft, mildly distended, very mild RUQ tenderness, bowel sounds active all four quadrants,        no masses, no organomegaly  Drain serous  Extremities: No edema   No erythema/warmth  No ulcer  Nontender to palpation  Skin:  No rash  Neuro: Moves all extremities  Invasive Devices     Peripheral Intravenous Line            Peripheral IV 02/11/19 Left Forearm 1 day          Drain            Closed/Suction Drain Right Abdomen Bulb 10 2 Fr  10 days    Closed/Suction Drain Right; Inferior;Superior Abdomen Bulb 8 5 Fr  10 days    Closed/Suction Drain Right;Posterior; Inferior Abdomen Bulb 10 2 Fr  10 days    Closed/Suction Drain Right;Superior; Anterior Abdomen Bulb 8 5 Fr  10 days    Closed/Suction Drain Right;Superior;Medial Abdomen Bulb 8 5 Fr  10 days    Chest Tube 1 Right 6 days                Labs studies:   I have personally reviewed pertinent labs  Results from last 7 days   Lab Units 02/12/19  0446 02/11/19  0457 02/10/19  0456   POTASSIUM mmol/L 3 4* 4 0 4 2   CHLORIDE mmol/L 101 101 100   CO2 mmol/L 21 21 20*   BUN mg/dL 11 12 11   CREATININE mg/dL 0 70 0 72 0 81   EGFR ml/min/1 73sq m 118 117 111   CALCIUM mg/dL 8 2* 8 2* 8 2*   AST U/L 24 27 27   ALT U/L 23 27 29   ALK PHOS U/L 180* 219* 233*     Results from last 7 days   Lab Units 02/12/19  0446 02/11/19  0457 02/10/19  0456   WBC Thousand/uL 15 86* 22 62* 25 41*   HEMOGLOBIN g/dL 8 4* 9 1* 8 9*   PLATELETS Thousands/uL 807* 889* 859*     Results from last 7 days   Lab Units 02/05/19  1651   GRAM STAIN RESULT  Rare Polys  Rare Mononuclear Cells  No bacteria seen   BODY FLUID CULTURE, STERILE  No growth       Imaging Studies:   I have personally reviewed pertinent imaging study reports and images in PACS  Chest/abdomen/pelvis CT reviewed personally  Hepatic liver abscesses are stable to decreasing in size  Decreased right pleural effusion  EKG, Pathology, and Other Studies:   I have personally reviewed pertinent reports

## 2019-02-12 NOTE — OCCUPATIONAL THERAPY NOTE
Occupational Therapy Treatment Note:       02/12/19 1035   Restrictions/Precautions   Other Precautions Cognitive; Fall Risk   Pain Assessment   Pain Assessment No/denies pain   Transfers   Sit to Stand 4  Minimal assistance   Stand to Sit 4  Minimal assistance   Functional Mobility   Functional Mobility 4  Minimal assistance   Additional Comments mod vc's   Additional items Rolling walker   Cognition   Overall Cognitive Status Impaired   Arousal/Participation Cooperative   Memory Decreased recall of precautions;Decreased short term memory   Following Commands Follows one step commands without difficulty   Comments pt demosntrates poor carryover and safety s/p ongoing education with hand placement  Activity Tolerance   Activity Tolerance Patient tolerated treatment well   Assessment   Assessment pt participated in am ot session and was seen focusing on am care, grooming, and functional transfers  pt stood at sinkside for 15 min with fair balance  pt  required mod asst for thorough shaving with electric razor  pt was able to brush teeth with increased time  pt was able to wash ub with sba and min asst lb  pt requried max asst for sock management and to wash feet  pt doffed pants in stance with min asst for balance  pt with chest tube on suction, still with 5 kari drains which pt requires asst to manage  pt demosntrates limited insight nad poor safety awareness  will follow  pt is cooperative and motivated  needed encouragement to remin oob 1 hr today  Plan   Treatment Interventions ADL retraining;Functional transfer training; Activityengagement;Equipment evaluation/education;Patient/family training; Endurance training;Cognitive reorientation   Goal Expiration Date 02/18/19   Treatment Day 6   OT Frequency 3-5x/wk   Recommendation   OT Discharge Recommendation Short Term Rehab   Barthel Index   Feeding 5   Bathing 0   Grooming Score 5   Dressing Score 5   Bladder Score 10   Bowels Score 10   Toilet Use Score 5 Transfers (Bed/Chair) Score 10   Mobility (Level Surface) Score 0   Stairs Score 0   Barthel Index Score 50   Modified [place] Scale   Modified [place] Scale 4   April A FE Gonzalez

## 2019-02-12 NOTE — PROGRESS NOTES
Nurse / Provider rounds complete with staff nursesayra and patient  ID following AB's and repeat TPA for chest tube

## 2019-02-12 NOTE — RESTORATIVE TECHNICIAN NOTE
Restorative Specialist Mobility Note       Activity: Ambulate in room, [facility], Chair(Assisted OT, please refer to OT notes for all information )     Assistive Device: Front wheel walker     Ambulation Response: Tolerated fairly well  Repositioned: Sitting, Up in chair           Range of Motion: Active, All extremities      Patient left resting comfortably in bedside recliner, with call bell/table within reach and OT present in room

## 2019-02-12 NOTE — PROGRESS NOTES
Progress Note - General Surgery   Bishop Santoyo 35 y o  male MRN: [id number]  Unit/Bed#: [facility] 604-01 Encounter: [id number]    Assessment:  35 M with multiple hepatic abscesses, right chest tube placement    Plan:  Repeat tPA chest tubes  Continue drains, monitor output  Appreciate ID recs  Regular diet  Discharge planning  SQH    Subjective/Objective     Subjective: No acute events  Feels well  Objective:    Blood pressure 126/74, pulse 97, temperature 98 1 °F (36 7 °C), resp  rate 19, height 6' 2" (1 88 m), weight 101 kg (222 lb), SpO2 95 %  ,Body mass index is 28 5 kg/m²  Intake/Output Summary (Last 24 hours) at 2/12/2019 0634  Last data filed at 2/12/2019 3582  Gross per 24 hour   Intake 1415 ml   Output 2526 ml   Net -1111 ml       Invasive Devices     Peripheral Intravenous Line            Peripheral IV 02/11/19 Left Forearm less than 1 day          Drain            Closed/Suction Drain Right Abdomen Bulb 10 2 Fr  10 days    Closed/Suction Drain Right; Inferior;Superior Abdomen Bulb 8 5 Fr  10 days    Closed/Suction Drain Right;Posterior; Inferior Abdomen Bulb 10 2 Fr  10 days    Closed/Suction Drain Right;Superior; Anterior Abdomen Bulb 8 5 Fr  10 days    Closed/Suction Drain Right;Superior;Medial Abdomen Bulb 8 5 Fr   10 days    Chest Tube 1 Right 6 days                Physical Exam:   General: NAD, AAOx3  Eyes: PERRL  ENT: moist mucous membranes  Neck: supple  CV: RRR +S1/S2  Chest: breath sounds bilaterally  Right chest tube in place, serosang, no air leak  Abdomen: soft, NT ND, DANNY drains in place  Extremities: atraumatic, no edema      Results from last 7 days   Lab Units 02/12/19  0446 02/11/19  0457 02/10/19  0456   WBC Thousand/uL 15 86* 22 62* 25 41*   HEMOGLOBIN g/dL 8 4* 9 1* 8 9*   HEMATOCRIT % 26 5* 29 4* 28 0*   PLATELETS Thousands/uL 807* 889* 859*     Results from last 7 days   Lab Units 02/11/19  0457 02/10/19  0456 02/09/19  0445   POTASSIUM mmol/L 4 0 4 2 4 4   CHLORIDE mmol/L 101 100 104 CO2 mmol/L 21 20* 19*   BUN mg/dL 12 11 11   CREATININE mg/dL 0 72 0 81 0 70   CALCIUM mg/dL 8 2* 8 2* 8 3

## 2019-02-12 NOTE — SOCIAL WORK
[address] [facility] Jericho Weber came out to screen pt today  CM gave [employer] required paperwork  Per Frank Ward, she needs psych eval  CM [place] Text PA Madyson Brennan, to ask if she can put in consult for psych  CM will follow

## 2019-02-12 NOTE — PLAN OF CARE
Problem: OCCUPATIONAL THERAPY ADULT  Goal: Performs self-care activities at highest level of function for planned discharge setting  See evaluation for individualized goals  Description  Treatment Interventions: ADL retraining, Functional transfer training, Endurance training, Patient/family training, Equipment evaluation/education, Compensatory technique education, Energy conservation, Activityengagement          See flowsheet documentation for full assessment, interventions and recommendations      Outcome: Progressing   April A FE Gonzalez

## 2019-02-13 LAB
ANION GAP SERPL CALCULATED.3IONS-SCNC: 8 MMOL/L (ref 4–13)
ANISOCYTOSIS BLD QL SMEAR: PRESENT
BASOPHILS # BLD MANUAL: 0.13 THOUSAND/UL (ref 0–0.1)
BASOPHILS NFR MAR MANUAL: 1 % (ref 0–1)
BUN SERPL-MCNC: 9 MG/DL (ref 5–25)
CALCIUM SERPL-MCNC: 8.5 MG/DL (ref 8.3–10.1)
CHLORIDE SERPL-SCNC: 105 MMOL/L (ref 100–108)
CO2 SERPL-SCNC: 21 MMOL/L (ref 21–32)
CREAT SERPL-MCNC: 0.67 MG/DL (ref 0.6–1.3)
EOSINOPHIL # BLD MANUAL: 0.25 THOUSAND/UL (ref 0–0.4)
EOSINOPHIL NFR BLD MANUAL: 2 % (ref 0–6)
ERYTHROCYTE [DISTWIDTH] IN BLOOD BY AUTOMATED COUNT: 15.2 % (ref 11.6–15.1)
GFR SERPL CREATININE-BSD FRML MDRD: 120 ML/MIN/1.73SQ M
GLUCOSE SERPL-MCNC: 121 MG/DL (ref 65–140)
GLUCOSE SERPL-MCNC: 155 MG/DL (ref 65–140)
GLUCOSE SERPL-MCNC: 293 MG/DL (ref 65–140)
GLUCOSE SERPL-MCNC: 83 MG/DL (ref 65–140)
GLUCOSE SERPL-MCNC: 88 MG/DL (ref 65–140)
GLUCOSE SERPL-MCNC: >500 MG/DL (ref 65–140)
HCT VFR BLD AUTO: 26.2 % (ref 36.5–49.3)
HGB BLD-MCNC: 8.1 G/DL (ref 12–17)
LYMPHOCYTES # BLD AUTO: 1.26 THOUSAND/UL (ref 0.6–4.47)
LYMPHOCYTES # BLD AUTO: 10 % (ref 14–44)
MCH RBC QN AUTO: 29.3 PG (ref 26.8–34.3)
MCHC RBC AUTO-ENTMCNC: 30.9 G/DL (ref 31.4–37.4)
MCV RBC AUTO: 95 FL (ref 82–98)
MONOCYTES # BLD AUTO: 0.5 THOUSAND/UL (ref 0–1.22)
MONOCYTES NFR BLD: 4 % (ref 4–12)
MYELOCYTES NFR BLD MANUAL: 2 % (ref 0–1)
NEUTROPHILS # BLD MANUAL: 9.97 THOUSAND/UL (ref 1.85–7.62)
NEUTS SEG NFR BLD AUTO: 79 % (ref 43–75)
NRBC BLD AUTO-RTO: 0 /100 WBCS
PLATELET # BLD AUTO: 801 THOUSANDS/UL (ref 149–390)
PLATELET BLD QL SMEAR: ABNORMAL
PMV BLD AUTO: 8.7 FL (ref 8.9–12.7)
POLYCHROMASIA BLD QL SMEAR: PRESENT
POTASSIUM SERPL-SCNC: 3.9 MMOL/L (ref 3.5–5.3)
RBC # BLD AUTO: 2.76 MILLION/UL (ref 3.88–5.62)
RBC MORPH BLD: PRESENT
SODIUM SERPL-SCNC: 134 MMOL/L (ref 136–145)
VARIANT LYMPHS # BLD AUTO: 2 %
WBC # BLD AUTO: 12.62 THOUSAND/UL (ref 4.31–10.16)

## 2019-02-13 PROCEDURE — 82948 REAGENT STRIP/BLOOD GLUCOSE: CPT

## 2019-02-13 PROCEDURE — 80048 BASIC METABOLIC PNL TOTAL CA: CPT | Performed by: NURSE PRACTITIONER

## 2019-02-13 PROCEDURE — 99232 SBSQ HOSP IP/OBS MODERATE 35: CPT | Performed by: SURGERY

## 2019-02-13 PROCEDURE — 99232 SBSQ HOSP IP/OBS MODERATE 35: CPT | Performed by: INTERNAL MEDICINE

## 2019-02-13 PROCEDURE — 85027 COMPLETE CBC AUTOMATED: CPT | Performed by: NURSE PRACTITIONER

## 2019-02-13 PROCEDURE — 97116 GAIT TRAINING THERAPY: CPT

## 2019-02-13 PROCEDURE — 99222 1ST HOSP IP/OBS MODERATE 55: CPT | Performed by: PSYCHIATRY & NEUROLOGY

## 2019-02-13 PROCEDURE — 85007 BL SMEAR W/DIFF WBC COUNT: CPT | Performed by: NURSE PRACTITIONER

## 2019-02-13 RX ADMIN — QUETIAPINE FUMARATE 200 MG: 100 TABLET ORAL at 21:40

## 2019-02-13 RX ADMIN — CHLORHEXIDINE GLUCONATE 0.12% ORAL RINSE 15 ML: 1.2 LIQUID ORAL at 21:41

## 2019-02-13 RX ADMIN — METRONIDAZOLE 500 MG: 500 TABLET ORAL at 06:02

## 2019-02-13 RX ADMIN — HEPARIN SODIUM 5000 UNITS: 5000 INJECTION INTRAVENOUS; SUBCUTANEOUS at 21:41

## 2019-02-13 RX ADMIN — DOCUSATE SODIUM 100 MG: 100 CAPSULE, LIQUID FILLED ORAL at 17:51

## 2019-02-13 RX ADMIN — BUPROPION HYDROCHLORIDE 300 MG: 150 TABLET, FILM COATED, EXTENDED RELEASE ORAL at 08:12

## 2019-02-13 RX ADMIN — HEPARIN SODIUM 5000 UNITS: 5000 INJECTION INTRAVENOUS; SUBCUTANEOUS at 06:02

## 2019-02-13 RX ADMIN — OXYCODONE HYDROCHLORIDE 10 MG: 10 TABLET ORAL at 08:13

## 2019-02-13 RX ADMIN — CEFTRIAXONE SODIUM 2000 MG: 10 INJECTION, POWDER, FOR SOLUTION INTRAVENOUS at 11:40

## 2019-02-13 RX ADMIN — FAMOTIDINE 20 MG: 20 TABLET, FILM COATED ORAL at 21:41

## 2019-02-13 RX ADMIN — METRONIDAZOLE 500 MG: 500 TABLET ORAL at 13:31

## 2019-02-13 RX ADMIN — STANDARDIZED SENNA CONCENTRATE 8.6 MG: 8.6 TABLET ORAL at 21:41

## 2019-02-13 RX ADMIN — DIAZEPAM 10 MG: 5 TABLET ORAL at 08:12

## 2019-02-13 RX ADMIN — DOCUSATE SODIUM 100 MG: 100 CAPSULE, LIQUID FILLED ORAL at 08:12

## 2019-02-13 RX ADMIN — OXYCODONE HYDROCHLORIDE 10 MG: 10 TABLET ORAL at 19:32

## 2019-02-13 RX ADMIN — CHLORHEXIDINE GLUCONATE 0.12% ORAL RINSE 15 ML: 1.2 LIQUID ORAL at 08:12

## 2019-02-13 RX ADMIN — METRONIDAZOLE 500 MG: 500 TABLET ORAL at 21:41

## 2019-02-13 RX ADMIN — DIAZEPAM 10 MG: 5 TABLET ORAL at 17:51

## 2019-02-13 RX ADMIN — HEPARIN SODIUM 5000 UNITS: 5000 INJECTION INTRAVENOUS; SUBCUTANEOUS at 13:31

## 2019-02-13 RX ADMIN — FAMOTIDINE 20 MG: 20 TABLET, FILM COATED ORAL at 08:12

## 2019-02-13 RX ADMIN — ARIPIPRAZOLE 5 MG: 5 TABLET ORAL at 06:02

## 2019-02-13 NOTE — PLAN OF CARE
Problem: PHYSICAL THERAPY ADULT  Goal: Performs mobility at highest level of function for planned discharge setting  See evaluation for individualized goals  Description  Treatment/Interventions: Functional transfer training, LE strengthening/ROM, Elevations, Therapeutic exercise, Endurance training, Patient/family training, Equipment eval/education, Bed mobility, Gait training, OT, Spoke to nursing  Equipment Recommended: Halina Matthews (CLARA)       See flowsheet documentation for full assessment, interventions and recommendations  Outcome: Progressing  Note:   Prognosis: Fair  Problem List: Decreased strength, Decreased endurance, Impaired balance, Decreased mobility, Decreased coordination, Decreased cognition, Impaired judgement, Decreased safety awareness, Decreased skin integrity, Pain  Assessment: Pt continues to make progress with functional mobiilty this session  ambulated repeated community distances with brief standing rest in between to recover  No complaints of pain with mobility tasks  Pt required instructions for sequencing to perform all transfers safely, and was able to do so without LOB  Pt slightly impulsive with transfers, requiring commands to wait for assist to ensure safety  Will continue to benefit from therapy services to improve functional mobility, strength, activity tolerance and dynamic balance to maximize independence & safety  Barriers to Discharge: Inaccessible home environment     Recommendation: Short-term skilled PT     PT - OK to Discharge: Yes(to rehab when medically stable)    See flowsheet documentation for full assessment

## 2019-02-13 NOTE — PHYSICAL THERAPY NOTE
Physical Therapy Progress Note     02/13/19 1509   Pain Assessment   Pain Assessment No/denies pain   Restrictions/Precautions   Other Precautions Cognitive; Fall Risk;Multiple lines; Chair Alarm   Subjective   Subjective Pt encountered supine in bed, pleasant and agreeable to treatment  No complaints of pain during session, but did complain of slight SOB after ambulation  Bed Mobility   Supine to Sit 5  Supervision   Additional items Assist x 1; Increased time required;Verbal cues   Transfers   Sit to Stand 4  Minimal assistance   Additional items Assist x 1; Armrests; Increased time required   Stand to Sit 4  Minimal assistance   Additional items Assist x 1; Armrests; Increased time required   Stand pivot 4  Minimal assistance   Additional items Assist x 1; Increased time required   Ambulation/Elevation   Gait pattern Forward Flexion;Decreased foot clearance; Excessively slow; Short stride   Gait Assistance 4  Minimal assist   Additional items Assist x 1  (+ chair follow)   Assistive Device Rolling walker   Distance 160' x 2   Balance   Static Sitting Fair   Static Standing Fair -   Ambulatory Poor +   Endurance Deficit   Endurance Deficit Yes   Endurance Deficit Description fatigue   Activity Tolerance   Activity Tolerance Patient tolerated treatment well;Patient limited by fatigue   Nurse Cris Meade RN   Assessment   Prognosis Fair   Problem List Decreased strength;Decreased endurance; Impaired balance;Decreased mobility; Decreased coordination;Decreased cognition; Impaired judgement;Decreased safety awareness;Decreased skin integrity;Pain   Assessment Pt continues to make progress with functional mobiilty this session  ambulated repeated community distances with brief standing rest in between to recover  No complaints of pain with mobility tasks  Pt required instructions for sequencing to perform all transfers safely, and was able to do so without LOB    Pt slightly impulsive with transfers, requiring commands to wait for assist to ensure safety  Will continue to benefit from therapy services to improve functional mobility, strength, activity tolerance and dynamic balance to maximize independence & safety  Barriers to Discharge Inaccessible home environment   Goals   Patient Goals to drink a Coke instead of Pepsi   LTG Expiration Date 02/18/19   Long Term Goal #1 Per PT eval 2/4: 1) PERFORM BED MOBILITY MOD-I TO PARTICIPATE IN FREQUENT REPOSITIONING AND IMPROVE SKIN INTEGRITY; 2) PERFORM SIT<-->STAND TRANSFER MOD-I TO PROMOTE I WITH TOILETING AND OOB MOBILITY; 3) AMBULATE 200 FEET MOD-I W/ LEAST RESTRICTIVE DEVICE TO PARTICIPATE IN HOUSEHOLD AND COMMUNITY LEVEL AMBULATION; 4) IMPROVE B/L LE STRENGTH BY 1/2 GRADE TO IMPROVE EFFICIENCY OF TRANSFERS; 5) IMPROVE BALANCE BY 1 GRADE TO REDUCE RISK FOR FALLS; 6) NAVIGATE 12 STEPS S LEVEL IN ORDER TO SAFELY NAVIGATE MULTIPLE FLOORS AT HOME  Treatment Day 5   Plan   Treatment/Interventions Functional transfer training;LE strengthening/ROM; Elevations; Therapeutic exercise; Endurance training;Patient/family training;Equipment eval/education; Bed mobility;Gait training   Progress Progressing toward goals   PT Frequency   (3-5x/week)   Recommendation   Recommendation Short-term skilled PT   Equipment Recommended [place]a Lunch, PTA

## 2019-02-13 NOTE — PLAN OF CARE
Problem: Potential for Falls  Goal: Patient will remain free of falls  Description  INTERVENTIONS:  - Assess patient frequently for physical needs  -  Identify cognitive and physical deficits and behaviors that affect risk of falls    -  [place] fall precautions as indicated by assessment   - Educate patient/family on patient safety including physical limitations  - Instruct patient to call for assistance with activity based on assessment  - Modify environment to reduce risk of injury  - Consider OT/PT consult to assist with strengthening/mobility   Outcome: Progressing     Problem: GASTROINTESTINAL - ADULT  Goal: Minimal or absence of nausea and/or vomiting  Description  INTERVENTIONS:  - Administer IV fluids as ordered to ensure adequate hydration  - Maintain NPO status until nausea and vomiting are resolved  - Nasogastric tube as ordered  - Administer ordered antiemetic medications as needed  - Provide nonpharmacologic comfort measures as appropriate  - Advance diet as tolerated, if ordered  - Nutrition services referral to assist patient with adequate nutrition and appropriate food choices   Outcome: Progressing  Goal: Maintains or returns to baseline bowel function  Description  INTERVENTIONS:  - Assess bowel function  - Encourage oral fluids to ensure adequate hydration  - Administer IV fluids as ordered to ensure adequate hydration  - Administer ordered medications as needed  - Encourage mobilization and activity  - Nutrition services referral to assist patient with appropriate food choices   Outcome: Progressing  Goal: Maintains adequate nutritional intake  Description  INTERVENTIONS:  - Monitor percentage of each meal consumed  - Identify factors contributing to decreased intake, treat as appropriate  - Assist with meals as needed  - Monitor I&O, WT and lab values  - Obtain nutrition services referral as needed   Outcome: Progressing     Problem: METABOLIC, FLUID AND ELECTROLYTES - ADULT  Goal: Electrolytes maintained within normal limits  Description  INTERVENTIONS:  - Monitor labs and assess patient for signs and symptoms of electrolyte imbalances  - Administer electrolyte replacement as ordered  - Monitor response to electrolyte replacements, including repeat lab results as appropriate  - Instruct patient on fluid and nutrition as appropriate   Outcome: Progressing  Goal: Fluid balance maintained  Description  INTERVENTIONS:  - Monitor labs and assess for signs and symptoms of volume excess or deficit  - Monitor I/O and WT  - Instruct patient on fluid and nutrition as appropriate   Outcome: Progressing     Problem: SKIN/TISSUE INTEGRITY - ADULT  Goal: Incision(s), wounds(s) or drain site(s) healing without S/S of infection  Description  INTERVENTIONS  - Assess and document risk factors for skin impairment   - Assess and document dressing, incision, wound bed, drain sites and surrounding tissue  - Initiate Nutrition services consult and/or wound management as needed   Outcome: Progressing     Problem: PAIN - ADULT  Goal: Verbalizes/displays adequate comfort level or baseline comfort level  Description  Interventions:  - Encourage patient to monitor pain and request assistance  - Assess pain using appropriate pain scale  - Administer analgesics based on type and severity of pain and evaluate response  - Implement non-pharmacological measures as appropriate and evaluate response  - Consider cultural and social influences on pain and pain management  - Notify physician/advanced practitioner if interventions unsuccessful or patient reports new pain   Outcome: Progressing     Problem: INFECTION - ADULT  Goal: Absence or prevention of progression during hospitalization  Description  INTERVENTIONS:  - Assess and monitor for signs and symptoms of infection  - Monitor lab/diagnostic results  - Monitor all insertion sites, i e  indwelling lines, tubes, and drains  - Monitor endotracheal (as able) and nasal secretions for changes in amount and color  - [place] appropriate cooling/warming therapies per order  - Administer medications as ordered  - Instruct and encourage patient and family to use good hand hygiene technique  - Identify and instruct in appropriate isolation precautions for identified infection/condition   Outcome: Progressing  Goal: Absence of fever/infection during neutropenic period  Description  INTERVENTIONS:  - Monitor WBC  - Implement neutropenic guidelines   Outcome: Progressing     Problem: Nutrition/Hydration-ADULT  Goal: Nutrient/Hydration intake appropriate for improving, restoring or maintaining nutritional needs  Description  Monitor and assess patient's nutrition/hydration status for malnutrition (ex- brittle hair, bruises, dry skin, pale skin and conjunctiva, muscle wasting, smooth red tongue, and disorientation)  Collaborate with interdisciplinary team and initiate plan and interventions as ordered  Monitor patient's weight and dietary intake as ordered or per policy  Utilize nutrition screening tool and intervene per policy  Determine patient's food preferences and provide high-protein, high-caloric foods as appropriate       INTERVENTIONS:  - Monitor oral intake, urinary output, labs, and treatment plans  - Assess nutrition and hydration status and recommend course of action  - Evaluate amount of meals eaten  - Assist patient with eating if necessary   - Allow adequate time for meals  - Recommend/ encourage appropriate diets, oral nutritional supplements, and vitamin/mineral supplements  - Order, calculate, and assess calorie counts as needed  - Recommend, monitor, and adjust tube feedings and TPN/PPN based on assessed needs  - Assess need for intravenous fluids  - Provide specific nutrition/hydration education as appropriate  - Include patient/family/caregiver in decisions related to nutrition   Outcome: Progressing     Problem: Prexisting or High Potential for Compromised Skin Integrity  Goal: Skin integrity is maintained or improved  Description  INTERVENTIONS:  - Identify patients at risk for skin breakdown  - Assess and monitor skin integrity  - Assess and monitor nutrition and hydration status  - Monitor labs (i e  albumin)  - Assess for incontinence   - Turn and reposition patient  - Assist with mobility/ambulation  - Relieve pressure over bony prominences  - Avoid friction and shearing  - Provide appropriate hygiene as needed including keeping skin clean and dry  - Evaluate need for skin moisturizer/barrier cream  - Collaborate with interdisciplinary team (i e  Nutrition, Rehabilitation, etc )   - Patient/family teaching   Outcome: Progressing     Problem: CARDIOVASCULAR - ADULT  Goal: Maintains optimal cardiac output and hemodynamic stability  Description  INTERVENTIONS:  - Monitor I/O, vital signs and rhythm  - Monitor for S/S and trends of decreased cardiac output i e  bleeding, hypotension  - Administer and titrate ordered vasoactive medications to optimize hemodynamic stability  - Assess quality of pulses, skin color and temperature  - Assess for signs of decreased coronary artery perfusion - ex   Angina  - Instruct patient to report change in severity of symptoms   Outcome: Progressing  Goal: Absence of cardiac dysrhythmias or at baseline rhythm  Description  INTERVENTIONS:  - Continuous cardiac monitoring, monitor vital signs, obtain 12 lead EKG if indicated  - Administer antiarrhythmic and heart rate control medications as ordered  - Monitor electrolytes and administer replacement therapy as ordered   Outcome: Progressing     Problem: RESPIRATORY - ADULT  Goal: Achieves optimal ventilation and oxygenation  Description  INTERVENTIONS:  - Assess for changes in respiratory status  - Assess for changes in mentation and behavior  - Position to facilitate oxygenation and minimize respiratory effort  - Oxygen administration by appropriate delivery method based on oxygen saturation (per order) or ABGs  - Initiate smoking cessation education as indicated  - Encourage broncho-pulmonary hygiene including cough, deep breathe, Incentive Spirometry  - Assess the need for suctioning and aspirate as needed  - Assess and instruct to report SOB or any respiratory difficulty  - Respiratory Therapy support as indicated   Outcome: Progressing     Problem: MUSCULOSKELETAL - ADULT  Goal: Maintain or return mobility to safest level of function  Description  INTERVENTIONS:  - Assess patient's ability to carry out ADLs; assess patient's baseline for ADL function and identify physical deficits which impact ability to perform ADLs (bathing, care of mouth/teeth, toileting, grooming, dressing, etc )  - Assess/evaluate cause of self-care deficits   - Assess range of motion  - Assess patient's mobility; develop plan if impaired  - Assess patient's need for assistive devices and provide as appropriate  - Encourage maximum independence but intervene and supervise when necessary  - Involve family in performance of ADLs  - Assess for home care needs following discharge   - Request OT consult to assist with ADL evaluation and planning for discharge  - Provide patient education as appropriate   Outcome: Progressing     Problem: DISCHARGE PLANNING - CARE MANAGEMENT  Goal: Discharge to post-acute care or home with appropriate resources  Description  INTERVENTIONS:  - Conduct assessment to determine patient/family and health care team treatment goals, and need for post-acute services based on payer coverage, community resources, and patient preferences, and barriers to discharge  - Address psychosocial, clinical, and financial barriers to discharge as identified in assessment in conjunction with the patient/family and health care team  - Arrange appropriate level of post-acute services according to patient's   needs and preference and payer coverage in collaboration with the physician and health care team  - Communicate with and update the patient/family, physician, and health care team regarding progress on the discharge plan  - Arrange appropriate transportation to post-acute venues   Outcome: Progressing     Problem: SAFETY ADULT  Goal: Maintain or return to baseline ADL function  Description  INTERVENTIONS:  -  Assess patient's ability to carry out ADLs; assess patient's baseline for ADL function and identify physical deficits which impact ability to perform ADLs (bathing, care of mouth/teeth, toileting, grooming, dressing, etc )  - Assess/evaluate cause of self-care deficits   - Assess range of motion  - Assess patient's mobility; develop plan if impaired  - Assess patient's need for assistive devices and provide as appropriate  - Encourage maximum independence but intervene and supervise when necessary  ¯ Involve family in performance of ADLs  ¯ Assess for home care needs following discharge   ¯ Request OT consult to assist with ADL evaluation and planning for discharge  ¯ Provide patient education as appropriate  Outcome: Progressing  Goal: Maintain or return mobility status to optimal level  Description  INTERVENTIONS:  - Assess patient's baseline mobility status (ambulation, transfers, stairs, etc )    - Identify cognitive and physical deficits and behaviors that affect mobility  - Identify mobility aids required to assist with transfers and/or ambulation (gait belt, sit-to-stand, lift, walker, cane, etc )  - [place] fall precautions as indicated by assessment  - Record patient progress and toleration of activity level on Mobility SBAR; progress patient to next Phase/Stage  - Instruct patient to call for assistance with activity based on assessment  - Request Rehabilitation consult to assist with strengthening/weightbearing, etc   Outcome: Progressing     Problem: DISCHARGE PLANNING  Goal: Discharge to home or other facility with appropriate resources  Description  INTERVENTIONS:  - Identify barriers to discharge w/patient and caregiver  - Arrange for needed discharge resources and transportation as appropriate  - Identify discharge learning needs (meds, wound care, etc )  - Arrange for interpretive services to assist at discharge as needed  - Refer to Case Management Department for coordinating discharge planning if the patient needs post-hospital services based on physician/advanced practitioner order or complex needs related to functional status, cognitive ability, or social support system  Outcome: Progressing

## 2019-02-13 NOTE — PROGRESS NOTES
Nurse / Provider rounds completed with staff nurse, Altagracia Valderrama and patient  Will order Psych consult at request of case management

## 2019-02-13 NOTE — PROGRESS NOTES
Progress Note - Infectious Disease   Analy Jeronimo 35 y o  male MRN: [id number]  Unit/Bed#: [facility] 604-01 Encounter: [id number]      Impression/Recommendations:  1  Sepsis, POA, presented with fever and leukocytosis   Source of sepsis is most likely liver abscesses   Overall, patient is stable   Temperature still up and down   WBC stable   Admission blood cultures are now positive   Temperature is down  WBC trending down  Antibiotic plan as in below  Monitor temperature/WBC  Monitor hemodynamics      2  Streptococcus intermedius bacteremia   Source is most likely liver abscesses   However, we need to consider the possibility of endocarditis with septic emboli to liver   Bacteremia cleared with IV antibiotic regimen   2D echo without vegetation   Given bacteremia, patient will need 2 weeks of IV antibiotic  Continue high-dose IV ceftriaxone  Treat x 14 days total, through 2/15      3  Multiple liver abscesses   Patient has history of diarrhea   Liver abscess is may be all secondary to translocation from gut   Abdomen/pelvis CT without other obvious intra-abdominal pathologies   It may be worthwhile to look at colonoscopy to look for colonic lesions  Patient will most likely need prolonged antibiotic, until on drain abscess is resolved  After completion of IV antibiotic course for bacteremia above, he will be transitioned to p o  antibiotic  Temperature is down  WBC decreasing, near normalized  Antibiotic plan as in above  Continue Flagyl for anaerobic coverage  Monitor temperature/WBC  Monitor abdominal pain  Plan for colonoscopy eventually  Transition to p o  Augmentin after completion of IV ceftriaxone/Flagyl above      3  Recurrent/persistent fever/leukocytosis  Both in temperature and WBC are finally down with adequate drainage  Antibiotic plan as in above    Monitor temperature/WBC      4  Right-sided pleural effusion, status post chest tube placement   Culture is negative   This may be all sympathetic effusion from liver abscess   Patient did have increased drainage with tPA and DNAse   However, CXR still with significant loculated effusion     CT with improving effusion  Management per surgery service  Serial tPA per surgery service      5  HENRIQUE, POA  This may be secondary sepsis   Dehydration may contribute   Creatinine has normalized  Antibiotic at full dose  Monitor creatinine      6  Elevated LFTs   This is most likely secondary to hepatic abscesses   Will monitor LFTs with treatment and drainage of abscesses   LFTs continue to improve  Monitor LFTs      7  History of IVDU   I have some concern regarding possible active IVDU   For these reasons, patient is not a candidate for home IV antibiotic   Prior HIV screen negative      Discussed with patient in detail regarding the above plan      Antibiotics:  Ceftriaxone/Flagyl  Post drainage # 10     Subjective:  Patient feels well  Abdominal pain continue to improve  Minimal chest wall pain  Minimal dyspnea  He is awake and alert  Temperature stays down   No chills  He is tolerating antibiotics well  No nausea, vomiting or diarrhea  Objective:  Vitals:  Temp:  [96 1 °F (35 6 °C)-98 2 °F (36 8 °C)] 98 1 °F (36 7 °C)  HR:  [] 85  Resp:  [13-18] 18  BP: (110-126)/(62-76) 126/76  SpO2:  [94 %-97 %] 96 %  Temp (24hrs), Av 4 °F (36 3 °C), Min:96 1 °F (35 6 °C), Max:98 2 °F (36 8 °C)  Current: Temperature: 98 1 °F (36 7 °C)    Physical Exam:     General: Awake, alert, cooperative, no distress  Neck:  Supple  No mass  No lymphadenopathy  Lungs: Decreased breath sounds, sparse right basilar rales, no wheezing, respirations unlabored  Heart:  Regular rate and rhythm, S1 and S2 normal, no murmur  Abdomen: Soft, mildly distended, minimal RUQ tenderness, bowel sounds active all four quadrants,        no masses, no organomegaly  Drains serous  Extremities: Trace edema  No erythema/warmth  No ulcer   Nontender to palpation  Skin:  No rash  Neuro: Moves all extremities  Invasive Devices     Peripheral Intravenous Line            Peripheral IV 02/11/19 Left Forearm 2 days          Drain            Closed/Suction Drain Right Abdomen Bulb 10 2 Fr  11 days    Closed/Suction Drain Right; Inferior;Superior Abdomen Bulb 8 5 Fr  11 days    Closed/Suction Drain Right;Posterior; Inferior Abdomen Bulb 10 2 Fr  11 days    Closed/Suction Drain Right;Superior; Anterior Abdomen Bulb 8 5 Fr  11 days    Closed/Suction Drain Right;Superior;Medial Abdomen Bulb 8 5 Fr  11 days    Chest Tube 1 Right 7 days                Labs studies:   I have personally reviewed pertinent labs  Results from last 7 days   Lab Units 02/13/19 0449 02/12/19  0446 02/11/19 0457 02/10/19  0456   POTASSIUM mmol/L 3 9 3 4* 4 0 4 2   CHLORIDE mmol/L 105 101 101 100   CO2 mmol/L 21 21 21 20*   BUN mg/dL 9 11 12 11   CREATININE mg/dL 0 67 0 70 0 72 0 81   EGFR ml/min/1 73sq m 120 118 117 111   CALCIUM mg/dL 8 5 8 2* 8 2* 8 2*   AST U/L  --  24 27 27   ALT U/L  --  23 27 29   ALK PHOS U/L  --  180* 219* 233*     Results from last 7 days   Lab Units 02/13/19 0449 02/12/19 0446 02/11/19  0457   WBC Thousand/uL 12 62* 15 86* 22 62*   HEMOGLOBIN g/dL 8 1* 8 4* 9 1*   PLATELETS Thousands/uL 801* 807* 889*           Imaging Studies:   I have personally reviewed pertinent imaging study reports and images in PACS  EKG, Pathology, and Other Studies:   I have personally reviewed pertinent reports

## 2019-02-13 NOTE — UTILIZATION REVIEW
Continued Stay Review    Date: 2/13    Vital Signs: /76   Pulse 85   Temp 98 1 °F (36 7 °C)   Resp 18   Ht 6' 2" (1 88 m)   Wt 101 kg (222 lb)   SpO2 96%   BMI 28 50 kg/m²      Assessment/Plan:   36 y/o M w/ multiple liver abscesses in R lobe of liver, s/p IR drainage on 2/1, IR R CT placement on 2/5    Repeat tPA for R CT  R CT x1 to -20 suction  Monitor IR drain x5 outputs  Per ID, Ceftriaxone/Flagyl through 2/15, then transition to PO Abx    Medications:   Scheduled Meds:   Current Facility-Administered Medications:  ARIPiprazole 5 mg Oral Early Morning   bisacodyl 10 mg Rectal Daily   buPROPion 300 mg Oral QAM   cefTRIAXone 2,000 mg Intravenous Q24H   chlorhexidine 15 mL Swish & Spit Q12H [address]   diazepam 10 mg Oral BID   docusate sodium 100 mg Oral BID   dornase lisa (PULMOZYME) 5 mg in 30 mL SWFI chest tube instilation 5 mg Chest Tube Once   famotidine 20 mg Oral Q12H GAIL   heparin (porcine) 5,000 Units Subcutaneous Q8H [address]   metroNIDAZOLE 500 mg Oral Q8H [address]   QUEtiapine 200 mg Oral HS   senna 1 tablet Oral HS     Continuous Infusions:    PRN Meds:   albuterol    HYDROmorphone    LORazepam    oxyCODONE    Pertinent Labs/Diagnostic Results:   02/13/19 0449     WBC 4 31 - 10 16 Thousand/uL 12  62High     RBC 3 88 - 5 62 Million/uL 2 76Low     Hemoglobin 12 0 - 17 0 g/dL 8 1Low     Hematocrit 36 5 - 49 3 % 26 2Low       Age/Sex: 35 y o  male     Discharge Plan: Home when medically cleared

## 2019-02-13 NOTE — CONSULTS
Consultation - Yancy 35 y o  male MRN: [id number]  Unit/Bed#: [facility] 604-01 Encounter: [id number]      Chief Complaint:  I am  stable from the psychiatric point of view  History of Present Illness   Physician Requesting Consult: Mohini Smith MD  Reason for Consult / Principal Problem:  Cuauhtemoc Adkins mild episode of major depressive disorder without prior episode, patient is a target    Ariella Number is a 35 y o  male with a history of treated hepatitis-C, posttraumatic stress disorder and bipolar disorder presents with abdominal pain for 1 week  Patient had been admitted for the last 12 days, he needs to go to inpatient rehabilitation and needs psychiatric clearance  He does have a history of IV drug use but he claimed that he had been clean since 2017  He states that his mood is stable, he denies suicidal thoughts plans or intent, he denies any psychotic symptom  He states that he has good sleep and appetite, also have good family support  He also states that he is doing well he has a 1year-old boy that he care for and wants to do better for him           Psychiatric Review Of Systems:  sleep: no  appetite changes: no  weight changes: no  energy/anergy: no  interest/pleasure/anhedonia: no  somatic symptoms: no  anxiety/panic: no  elizabeth: no  guilty/hopeless: no  self injurious behavior/risky behavior: no    Historical Information   Past Psychiatric History:   He had multiple inpatient psych admissions including at [address] [place] [address], last 1 in [facility] on August 2018  Currently in treatment with follow-up with psychiatrist at [facility]  Past Suicide attempts:  Overdose in medication in the past  Past Violent behavior:  None  Past Psychiatric medication trial:  Wellbutrin, Adderall, Minipress, Valium, Seroquel, Abilify, trazodone, Lamictal    Substance Abuse History:  Red alcohol use, cannabis use according to him for medical reasons, he did use methamphetamines in the past, last time in August 2018  I have assessed this patient for substance use within the past 12 months     History of IP/OP rehabilitation program:  He was in rehab twice  Smoking history:  He smokes a pack a day but had been no smoking for over 2 weeks  Family Psychiatric History:   Denies any    Social History  Education: some college  Learning Disabilities: None  Marital history: single  Living arrangement, social support: He lives with his girlfriend  Occupational History: on permanent disability  Functioning Relationships: good support system  Other Pertinent History: incarcerated in the past , no actual legal issues       Traumatic History:   Abuse: None  Other Traumatic Events: He was exposed to trauma in residential    Past Medical History:   Diagnosis Date    ADHD     Anxiety     Bipolar [facility] disorder [facility] [address] )     [facility]     Hepatitis C     Infectious viral hepatitis     Hep C- No Symptoms( Took a course of Harvoni)    Psoriasis     PTSD (post-traumatic stress disorder)     assisted       Medical Review Of Systems:  Review of Systems - Negative except difficulty ambulating, all other systems reviewed were negative    Meds/Allergies   current meds:   Current Facility-Administered Medications   Medication Dose Route Frequency    albuterol (PROVENTIL HFA,VENTOLIN HFA) inhaler 2 puff  2 puff Inhalation Q4H PRN    ARIPiprazole (ABILIFY) tablet 5 mg  5 mg Oral Early Morning    bisacodyl (DULCOLAX) rectal suppository 10 mg  10 mg Rectal Daily    buPROPion (WELLBUTRIN XL) 24 hr tablet 300 mg  300 mg Oral QAM    cefTRIAXone (ROCEPHIN) 2,000 mg in dextrose 5 % 50 mL IVPB  2,000 mg Intravenous Q24H    chlorhexidine (PERIDEX) 0 12 % oral rinse 15 mL  15 mL Swish & Spit Q12H [address]    diazepam (VALIUM) tablet 10 mg  10 mg Oral BID    docusate sodium (COLACE) capsule 100 mg  100 mg Oral BID    dornase lisa (PULMOZYME) 5 mg in sterile water 30 mL chest tube instillation  5 mg Chest Tube Once    famotidine (PEPCID) tablet 20 mg  20 mg Oral Q12H [address]    heparin (porcine) subcutaneous injection 5,000 Units  5,000 Units Subcutaneous Q8H [address]    HYDROmorphone (DILAUDID) injection 1 mg  1 mg Intravenous Q3H PRN    LORazepam (ATIVAN) 2 mg/mL injection 1 mg  1 mg Intravenous Q4H PRN    metroNIDAZOLE (FLAGYL) tablet 500 mg  500 mg Oral Q8H [address]    oxyCODONE (ROXICODONE) immediate release tablet 10 mg  10 mg Oral Q4H PRN    oxyCODONE (ROXICODONE) IR tablet 5 mg  5 mg Oral Q4H PRN    polyethylene glycol (MIRALAX) packet 17 g  17 g Oral Daily PRN    prazosin (MINIPRESS) capsule 1 mg  1 mg Oral Daily PRN    QUEtiapine (SEROquel) tablet 200 mg  200 mg Oral HS    senna (SENOKOT) tablet 8 6 mg  1 tablet Oral HS     Allergies   Allergen Reactions    Sulfa Antibiotics Hives       Objective   Vital signs in last 24 hours:  Temp:  [96 1 °F (35 6 °C)-98 2 °F (36 8 °C)] 98 1 °F (36 7 °C)  HR:  [] 85  Resp:  [13-18] 18  BP: (110-126)/(62-76) 126/76      Intake/Output Summary (Last 24 hours) at 2/13/2019 1447  Last data filed at 2/13/2019 1322  Gross per 24 hour   Intake 1420 ml   Output 297 ml   Net 1123 ml       Mental Status Evaluation:  Appearance:  age appropriate and disheveled   Behavior:  normal   Speech:  normal pitch and normal volume   Mood:  euthymic   Affect:  mood-congruent   Language: naming objects and repeating phrases   Thought Process:  goal directed   Associations: intact associations   Thought Content:  normal   Perceptual Disturbances: None   Risk Potential: He denies any suicidal or homicidal ideation plan or intent   Sensorium:  person, place, time/date, situation and day of week   Memory:  recent and remote memory grossly intact   Cognition:  grossly intact   Consciousness:  alert and awake    Attention: attention span and concentration were age appropriate   Intellect: within normal limits   Fund of Knowledge: awareness of current events: Fair, past history: Fair and vocabulary: Fair   Insight:  good   Judgment: good   Muscle Strength and Tone: Within normal limits   Gait/Station: Difficulty ambulating   Motor Activity: no abnormal movements     Lab Results:    Lab Results   Component Value Date    WBC 12 62 (H) 02/13/2019    HGB 8 1 (L) 02/13/2019    HCT 26 2 (L) 02/13/2019    MCV 95 02/13/2019     (H) 02/13/2019     Lab Results   Component Value Date     03/21/2015    K 3 9 02/13/2019     02/13/2019    CO2 21 02/13/2019    ANIONGAP 9 03/21/2015    AGAP 8 02/13/2019    BUN 9 02/13/2019    CREATININE 0 67 02/13/2019    GLUC 83 02/13/2019    CALCIUM 8 5 02/13/2019    AST 24 02/12/2019    ALT 23 02/12/2019    ALKPHOS 180 (H) 02/12/2019    PROT 7 3 03/31/2015    TP 7 5 02/12/2019    BILITOT 0 5 03/31/2015    TBILI 1 54 (H) 02/12/2019    EGFR 120 02/13/2019         Code Status: )Level 1 - Full Code    Assessment/Plan     Assessment:  Horace Delgadillo is a 35 y o  male with bipolar disorder and posttraumatic stress disorders need psychiatric clearance to go to inpatient rehabilitation  Patient mood is euthymic, he denies suicidal thoughts plans or intent, he denies any psychotic symptoms  At this moment patient is a baseline  He is taking his medication as prescribed  Diagnosis:  Bipolar disorder depressed mild  Posttraumatic stress disorder  Plan:   Continue medical management  Continue current psychotropic medication  Patient is psychiatrically stable to go to inpatient rehabilitation  No other intervention at this time  I will sign off  Risks, benefits and possible side effects of Medications:   Risks, benefits, and possible side effects of medications explained to patient and patient verbalizes understanding             Pretty Rose MD

## 2019-02-13 NOTE — PROGRESS NOTES
Spoke with Juan Siu with red sx, plan for today is TPA chest tube  Also aware 3 DANNY drains are hard to flush with minimal drainage, continue to monitor pt

## 2019-02-13 NOTE — SOCIAL WORK
Cm reviewed patient during care coordination rounds  Patient not stable for discharge today  Patient to complete antibiotics on 2/15/19  Cm informed chest tube to be removed today or tomorrow  Cm awaiting clear if occasion on the number of drains patient to discharge home with  At this time therapies recommending short-term rehab for patient  Cm to obtain choices  Placement might be difficult due to IV drug use  Options assessment completed yesterday  Will need psych eval, options letter prior to patient's discharge if accepted to rehab  Cm contacted patient's mother to inform her of updated discharge plan  If patient to discharge home patient will be going to his own apartment, [address] [place] [address]  Per patient's mother there are 16 or more steps leading into the home  Per mother, patient's significant other would be at the home, but patient's mother described SO as "flighty" and not sure anyone will be home to assist  Per mother, if really needed, patient could d/c to her home for a short period of time  Cm sent completed psych evaluation to [facility] to complete option process  Seeing awaiting options letter  Cm provided patient with rehab options since reported last IV drug use was 2 years ago  Cm to see if there are any rehab facilities that are willing to accept patient  Cm met with patient to discuss discharge plan  Patient at this time not in agreement with going to rehab either acute or subacute rehab  Patient states he wants to discharge home and that his significant other, who does not work, can take care of him while at home    Cm to fall with medical team

## 2019-02-13 NOTE — PROGRESS NOTES
Progress Note - General Surgery   Jb Garcia 35 y o  male MRN: [id number]  Unit/Bed#: [facility] 604-01 Encounter: [id number]    Assessment:  34 y/o M w/ multiple liver abscesses in R lobe of liver, s/p IR drainage on 2/1, IR R CT placement on 2/5    Plan:  --Repeat tPA for R CT  --R CT x1 to -20 sxn  --Monitor IR drain x5 outputs  --Regular diet  --Per ID, Ceftriaxone/Flagyl through 2/15, then transition to PO Abx  --Dispo planning    Subjective/Objective     Subjective:     No acute events overnight  This morning, pt denies any complaints, feels well  Objective:     Blood pressure 115/68, pulse 103, temperature 98 2 °F (36 8 °C), resp  rate 17, height 6' 2" (1 88 m), weight 101 kg (222 lb), SpO2 95 %  ,Body mass index is 28 5 kg/m²  Intake/Output Summary (Last 24 hours) at 2/13/2019 0407  Last data filed at 2/13/2019 0104  Gross per 24 hour   Intake 1340 ml   Output 312 ml   Net 1028 ml       Invasive Devices     Peripheral Intravenous Line            Peripheral IV 02/11/19 Left Forearm 1 day          Drain            Closed/Suction Drain Right Abdomen Bulb 10 2 Fr  11 days    Closed/Suction Drain Right; Inferior;Superior Abdomen Bulb 8 5 Fr  11 days    Closed/Suction Drain Right;Posterior; Inferior Abdomen Bulb 10 2 Fr  11 days    Closed/Suction Drain Right;Superior; Anterior Abdomen Bulb 8 5 Fr  11 days    Closed/Suction Drain Right;Superior;Medial Abdomen Bulb 8 5 Fr  11 days    Chest Tube 1 Right 7 days                Physical Exam:     GEN: NAD  HEENT: MMM  CV: RRR  Lung: normal effort  Ab: Soft, NT/ND, R-sided DANNY drains x5 in place with serous and serosanguinous outputs, R CT x1 to -20 sxn with no output in 24h  Extrem: No CCE  Neuro:  A+Ox3, motor and sensation grossly intact      Lab, Imaging and other studies:  CBC:   Lab Results   Component Value Date    WBC 15 86 (H) 02/12/2019    HGB 8 4 (L) 02/12/2019    HCT 26 5 (L) 02/12/2019    MCV 95 02/12/2019     (H) 02/12/2019    MCH 30 0 02/12/2019 MCHC 31 7 02/12/2019    RDW 15 1 02/12/2019    MPV 9 0 02/12/2019    NRBC 0 02/12/2019   , CMP:   Lab Results   Component Value Date    SODIUM 132 (L) 02/12/2019    K 3 4 (L) 02/12/2019     02/12/2019    CO2 21 02/12/2019    BUN 11 02/12/2019    CREATININE 0 70 02/12/2019    CALCIUM 8 2 (L) 02/12/2019    AST 24 02/12/2019    ALT 23 02/12/2019    ALKPHOS 180 (H) 02/12/2019    EGFR 118 02/12/2019   , Coagulation: No results found for: PT, INR, APTT, Urinalysis: No results found for: COLORU, CLARITYU, SPECGRAV, PHUR, LEUKOCYTESUR, NITRITE, PROTEINUA, GLUCOSEU, KETONESU, BILIRUBINUR, BLOODU, Amylase: No results found for: AMYLASE, Lipase: No results found for: LIPASE  VTE Pharmacologic Prophylaxis: Heparin  VTE Mechanical Prophylaxis: sequential compression device

## 2019-02-14 ENCOUNTER — TRANSCRIBE ORDERS (OUTPATIENT)
Dept: ADMISSIONS | Facility: HOSPITAL | Age: 39
End: 2019-02-14

## 2019-02-14 LAB
ANION GAP SERPL CALCULATED.3IONS-SCNC: 10 MMOL/L (ref 4–13)
BASOPHILS # BLD MANUAL: 0.11 THOUSAND/UL (ref 0–0.1)
BASOPHILS NFR MAR MANUAL: 1 % (ref 0–1)
BUN SERPL-MCNC: 11 MG/DL (ref 5–25)
CALCIUM SERPL-MCNC: 8.7 MG/DL (ref 8.3–10.1)
CHLORIDE SERPL-SCNC: 101 MMOL/L (ref 100–108)
CO2 SERPL-SCNC: 21 MMOL/L (ref 21–32)
CREAT SERPL-MCNC: 0.8 MG/DL (ref 0.6–1.3)
EOSINOPHIL # BLD MANUAL: 0 THOUSAND/UL (ref 0–0.4)
EOSINOPHIL NFR BLD MANUAL: 0 % (ref 0–6)
ERYTHROCYTE [DISTWIDTH] IN BLOOD BY AUTOMATED COUNT: 15.4 % (ref 11.6–15.1)
GFR SERPL CREATININE-BSD FRML MDRD: 112 ML/MIN/1.73SQ M
GLUCOSE SERPL-MCNC: 108 MG/DL (ref 65–140)
GLUCOSE SERPL-MCNC: 113 MG/DL (ref 65–140)
GLUCOSE SERPL-MCNC: 119 MG/DL (ref 65–140)
GLUCOSE SERPL-MCNC: 120 MG/DL (ref 65–140)
GLUCOSE SERPL-MCNC: 137 MG/DL (ref 65–140)
HCT VFR BLD AUTO: 28.5 % (ref 36.5–49.3)
HGB BLD-MCNC: 8.7 G/DL (ref 12–17)
LYMPHOCYTES # BLD AUTO: 0.56 THOUSAND/UL (ref 0.6–4.47)
LYMPHOCYTES # BLD AUTO: 5 % (ref 14–44)
MCH RBC QN AUTO: 29.1 PG (ref 26.8–34.3)
MCHC RBC AUTO-ENTMCNC: 30.5 G/DL (ref 31.4–37.4)
MCV RBC AUTO: 95 FL (ref 82–98)
METAMYELOCYTES NFR BLD MANUAL: 2 % (ref 0–1)
MONOCYTES # BLD AUTO: 0.56 THOUSAND/UL (ref 0–1.22)
MONOCYTES NFR BLD: 5 % (ref 4–12)
NEUTROPHILS # BLD MANUAL: 8.69 THOUSAND/UL (ref 1.85–7.62)
NEUTS SEG NFR BLD AUTO: 77 % (ref 43–75)
NRBC BLD AUTO-RTO: 0 /100 WBCS
PLATELET # BLD AUTO: 725 THOUSANDS/UL (ref 149–390)
PLATELET BLD QL SMEAR: ABNORMAL
PMV BLD AUTO: 8.6 FL (ref 8.9–12.7)
POLYCHROMASIA BLD QL SMEAR: PRESENT
POTASSIUM SERPL-SCNC: 3.7 MMOL/L (ref 3.5–5.3)
RBC # BLD AUTO: 2.99 MILLION/UL (ref 3.88–5.62)
RBC MORPH BLD: PRESENT
SODIUM SERPL-SCNC: 132 MMOL/L (ref 136–145)
VARIANT LYMPHS # BLD AUTO: 10 %
WBC # BLD AUTO: 11.28 THOUSAND/UL (ref 4.31–10.16)

## 2019-02-14 PROCEDURE — 97530 THERAPEUTIC ACTIVITIES: CPT

## 2019-02-14 PROCEDURE — 97116 GAIT TRAINING THERAPY: CPT

## 2019-02-14 PROCEDURE — 82948 REAGENT STRIP/BLOOD GLUCOSE: CPT

## 2019-02-14 PROCEDURE — 85007 BL SMEAR W/DIFF WBC COUNT: CPT | Performed by: STUDENT IN AN ORGANIZED HEALTH CARE EDUCATION/TRAINING PROGRAM

## 2019-02-14 PROCEDURE — 80048 BASIC METABOLIC PNL TOTAL CA: CPT | Performed by: STUDENT IN AN ORGANIZED HEALTH CARE EDUCATION/TRAINING PROGRAM

## 2019-02-14 PROCEDURE — 99232 SBSQ HOSP IP/OBS MODERATE 35: CPT | Performed by: INTERNAL MEDICINE

## 2019-02-14 PROCEDURE — 85027 COMPLETE CBC AUTOMATED: CPT | Performed by: STUDENT IN AN ORGANIZED HEALTH CARE EDUCATION/TRAINING PROGRAM

## 2019-02-14 PROCEDURE — G0515 COGNITIVE SKILLS DEVELOPMENT: HCPCS

## 2019-02-14 PROCEDURE — 97535 SELF CARE MNGMENT TRAINING: CPT

## 2019-02-14 PROCEDURE — 99232 SBSQ HOSP IP/OBS MODERATE 35: CPT | Performed by: SURGERY

## 2019-02-14 RX ADMIN — HEPARIN SODIUM 5000 UNITS: 5000 INJECTION INTRAVENOUS; SUBCUTANEOUS at 21:45

## 2019-02-14 RX ADMIN — FAMOTIDINE 20 MG: 20 TABLET, FILM COATED ORAL at 20:20

## 2019-02-14 RX ADMIN — DIAZEPAM 10 MG: 5 TABLET ORAL at 17:36

## 2019-02-14 RX ADMIN — METRONIDAZOLE 500 MG: 500 TABLET ORAL at 21:45

## 2019-02-14 RX ADMIN — CHLORHEXIDINE GLUCONATE 0.12% ORAL RINSE 15 ML: 1.2 LIQUID ORAL at 20:21

## 2019-02-14 RX ADMIN — ARIPIPRAZOLE 5 MG: 5 TABLET ORAL at 06:50

## 2019-02-14 RX ADMIN — DOCUSATE SODIUM 100 MG: 100 CAPSULE, LIQUID FILLED ORAL at 17:36

## 2019-02-14 RX ADMIN — OXYCODONE HYDROCHLORIDE 10 MG: 10 TABLET ORAL at 12:43

## 2019-02-14 RX ADMIN — METRONIDAZOLE 500 MG: 500 TABLET ORAL at 06:51

## 2019-02-14 RX ADMIN — HEPARIN SODIUM 5000 UNITS: 5000 INJECTION INTRAVENOUS; SUBCUTANEOUS at 06:50

## 2019-02-14 RX ADMIN — CEFTRIAXONE SODIUM 2000 MG: 10 INJECTION, POWDER, FOR SOLUTION INTRAVENOUS at 13:06

## 2019-02-14 RX ADMIN — HEPARIN SODIUM 5000 UNITS: 5000 INJECTION INTRAVENOUS; SUBCUTANEOUS at 14:33

## 2019-02-14 RX ADMIN — OXYCODONE HYDROCHLORIDE 5 MG: 5 TABLET ORAL at 20:21

## 2019-02-14 RX ADMIN — DIAZEPAM 10 MG: 5 TABLET ORAL at 08:20

## 2019-02-14 RX ADMIN — BUPROPION HYDROCHLORIDE 300 MG: 150 TABLET, FILM COATED, EXTENDED RELEASE ORAL at 08:20

## 2019-02-14 RX ADMIN — FAMOTIDINE 20 MG: 20 TABLET, FILM COATED ORAL at 08:20

## 2019-02-14 RX ADMIN — CHLORHEXIDINE GLUCONATE 0.12% ORAL RINSE 15 ML: 1.2 LIQUID ORAL at 08:20

## 2019-02-14 RX ADMIN — QUETIAPINE FUMARATE 200 MG: 100 TABLET ORAL at 21:45

## 2019-02-14 RX ADMIN — STANDARDIZED SENNA CONCENTRATE 8.6 MG: 8.6 TABLET ORAL at 21:45

## 2019-02-14 RX ADMIN — LORAZEPAM 1 MG: 2 INJECTION INTRAMUSCULAR; INTRAVENOUS at 14:46

## 2019-02-14 RX ADMIN — METRONIDAZOLE 500 MG: 500 TABLET ORAL at 14:33

## 2019-02-14 NOTE — SOCIAL WORK
Cm reviewed patient during care coordination rounds  Cm informed by medical team anticipated discharge for tomorrow  Cm informed today that patient will discharge with 5 drains  Awaiting final antibiotic recommendations for discharge  No anticipation for IV antibiotics at time of discharge  Cm sent referral to [facility] as previously discussed yesterday with patient with update on discharging address, [address] [address]  Francisco Manifold Cm also requesting therapies work with patient again since he would have to complete steps to enter his 2nd floor apartment  Cm awaiting update on discharge recommendations  Cm confirmed that [facility] will be able to provide [place] [id number] services for home PT, and RN services  Cm ordered RW for patient at time of d/c  Cm awaiting scripts for flushes  Cm awaiting medical clearance  Cm confirmed with pharmacy that patient's flushes are not covered under insurance, which Cm informed that they are not normally, and that for script provided will cost patient $[phone]  Cm provided update to patient and patient's mother  Awaiting finale medical clearance

## 2019-02-14 NOTE — PLAN OF CARE
Problem: PHYSICAL THERAPY ADULT  Goal: Performs mobility at highest level of function for planned discharge setting  See evaluation for individualized goals  Description  Treatment/Interventions: Functional transfer training, LE strengthening/ROM, Elevations, Therapeutic exercise, Endurance training, Patient/family training, Equipment eval/education, Bed mobility, Gait training, OT, Spoke to nursing  Equipment Recommended: Mariza Socks (RW)       See flowsheet documentation for full assessment, interventions and recommendations  Note:   Prognosis: Fair  Problem List: Decreased strength, Decreased endurance, Decreased mobility, Impaired balance, Decreased coordination, Decreased safety awareness, Decreased skin integrity, Pain  Assessment: Pt participated in therapy session focusing on functional moblity tasks  Pt performed supine to sit transfer with supervision assist  Pt performed sit to stand transfer with supervision assist and verbal cues for hand placement  Pt ambulated 50' x 2 with RW and supervision assist and verbal cues for sequencing and safety  Pt displays narrow YULIANA, decreased step and stride length, decreased gait speed, improper weight shift  Pt ascnded/descended 14 stairs with B/L handrails and supervision assist and verbal cues for technique and safety  Provided pt education regarding safe functional mobility strategies, importance of using AD after discharge, possible smoking cessation strategies  Skilled physical therapy is indicated to address listed functional deficits focusing on strength, endurnace and functional mobility  Barriers to Discharge: Inaccessible home environment     Recommendation: Home with family support, Home PT     PT - OK to Discharge: Yes(when medically stable)    See flowsheet documentation for full assessment

## 2019-02-14 NOTE — PLAN OF CARE
Problem: Potential for Falls  Goal: Patient will remain free of falls  Description  INTERVENTIONS:  - Assess patient frequently for physical needs  -  Identify cognitive and physical deficits and behaviors that affect risk of falls    -  [place] fall precautions as indicated by assessment   - Educate patient/family on patient safety including physical limitations  - Instruct patient to call for assistance with activity based on assessment  - Modify environment to reduce risk of injury  - Consider OT/PT consult to assist with strengthening/mobility   Outcome: Progressing     Problem: GASTROINTESTINAL - ADULT  Goal: Minimal or absence of nausea and/or vomiting  Description  INTERVENTIONS:  - Administer IV fluids as ordered to ensure adequate hydration  - Maintain NPO status until nausea and vomiting are resolved  - Nasogastric tube as ordered  - Administer ordered antiemetic medications as needed  - Provide nonpharmacologic comfort measures as appropriate  - Advance diet as tolerated, if ordered  - Nutrition services referral to assist patient with adequate nutrition and appropriate food choices   Outcome: Progressing  Goal: Maintains or returns to baseline bowel function  Description  INTERVENTIONS:  - Assess bowel function  - Encourage oral fluids to ensure adequate hydration  - Administer IV fluids as ordered to ensure adequate hydration  - Administer ordered medications as needed  - Encourage mobilization and activity  - Nutrition services referral to assist patient with appropriate food choices   Outcome: Progressing  Goal: Maintains adequate nutritional intake  Description  INTERVENTIONS:  - Monitor percentage of each meal consumed  - Identify factors contributing to decreased intake, treat as appropriate  - Assist with meals as needed  - Monitor I&O, WT and lab values  - Obtain nutrition services referral as needed   Outcome: Progressing     Problem: METABOLIC, FLUID AND ELECTROLYTES - ADULT  Goal: Electrolytes maintained within normal limits  Description  INTERVENTIONS:  - Monitor labs and assess patient for signs and symptoms of electrolyte imbalances  - Administer electrolyte replacement as ordered  - Monitor response to electrolyte replacements, including repeat lab results as appropriate  - Instruct patient on fluid and nutrition as appropriate   Outcome: Progressing  Goal: Fluid balance maintained  Description  INTERVENTIONS:  - Monitor labs and assess for signs and symptoms of volume excess or deficit  - Monitor I/O and WT  - Instruct patient on fluid and nutrition as appropriate   Outcome: Progressing     Problem: SKIN/TISSUE INTEGRITY - ADULT  Goal: Incision(s), wounds(s) or drain site(s) healing without S/S of infection  Description  INTERVENTIONS  - Assess and document risk factors for skin impairment   - Assess and document dressing, incision, wound bed, drain sites and surrounding tissue  - Initiate Nutrition services consult and/or wound management as needed   Outcome: Progressing     Problem: PAIN - ADULT  Goal: Verbalizes/displays adequate comfort level or baseline comfort level  Description  Interventions:  - Encourage patient to monitor pain and request assistance  - Assess pain using appropriate pain scale  - Administer analgesics based on type and severity of pain and evaluate response  - Implement non-pharmacological measures as appropriate and evaluate response  - Consider cultural and social influences on pain and pain management  - Notify physician/advanced practitioner if interventions unsuccessful or patient reports new pain   Outcome: Progressing     Problem: INFECTION - ADULT  Goal: Absence or prevention of progression during hospitalization  Description  INTERVENTIONS:  - Assess and monitor for signs and symptoms of infection  - Monitor lab/diagnostic results  - Monitor all insertion sites, i e  indwelling lines, tubes, and drains  - Monitor endotracheal (as able) and nasal secretions for changes in amount and color  - [place] appropriate cooling/warming therapies per order  - Administer medications as ordered  - Instruct and encourage patient and family to use good hand hygiene technique  - Identify and instruct in appropriate isolation precautions for identified infection/condition   Outcome: Progressing  Goal: Absence of fever/infection during neutropenic period  Description  INTERVENTIONS:  - Monitor WBC  - Implement neutropenic guidelines   Outcome: Progressing     Problem: Nutrition/Hydration-ADULT  Goal: Nutrient/Hydration intake appropriate for improving, restoring or maintaining nutritional needs  Description  Monitor and assess patient's nutrition/hydration status for malnutrition (ex- brittle hair, bruises, dry skin, pale skin and conjunctiva, muscle wasting, smooth red tongue, and disorientation)  Collaborate with interdisciplinary team and initiate plan and interventions as ordered  Monitor patient's weight and dietary intake as ordered or per policy  Utilize nutrition screening tool and intervene per policy  Determine patient's food preferences and provide high-protein, high-caloric foods as appropriate       INTERVENTIONS:  - Monitor oral intake, urinary output, labs, and treatment plans  - Assess nutrition and hydration status and recommend course of action  - Evaluate amount of meals eaten  - Assist patient with eating if necessary   - Allow adequate time for meals  - Recommend/ encourage appropriate diets, oral nutritional supplements, and vitamin/mineral supplements  - Order, calculate, and assess calorie counts as needed  - Recommend, monitor, and adjust tube feedings and TPN/PPN based on assessed needs  - Assess need for intravenous fluids  - Provide specific nutrition/hydration education as appropriate  - Include patient/family/caregiver in decisions related to nutrition   Outcome: Progressing     Problem: Prexisting or High Potential for Compromised Skin Integrity  Goal: Skin integrity is maintained or improved  Description  INTERVENTIONS:  - Identify patients at risk for skin breakdown  - Assess and monitor skin integrity  - Assess and monitor nutrition and hydration status  - Monitor labs (i e  albumin)  - Assess for incontinence   - Turn and reposition patient  - Assist with mobility/ambulation  - Relieve pressure over bony prominences  - Avoid friction and shearing  - Provide appropriate hygiene as needed including keeping skin clean and dry  - Evaluate need for skin moisturizer/barrier cream  - Collaborate with interdisciplinary team (i e  Nutrition, Rehabilitation, etc )   - Patient/family teaching   Outcome: Progressing     Problem: CARDIOVASCULAR - ADULT  Goal: Maintains optimal cardiac output and hemodynamic stability  Description  INTERVENTIONS:  - Monitor I/O, vital signs and rhythm  - Monitor for S/S and trends of decreased cardiac output i e  bleeding, hypotension  - Administer and titrate ordered vasoactive medications to optimize hemodynamic stability  - Assess quality of pulses, skin color and temperature  - Assess for signs of decreased coronary artery perfusion - ex   Angina  - Instruct patient to report change in severity of symptoms   Outcome: Progressing  Goal: Absence of cardiac dysrhythmias or at baseline rhythm  Description  INTERVENTIONS:  - Continuous cardiac monitoring, monitor vital signs, obtain 12 lead EKG if indicated  - Administer antiarrhythmic and heart rate control medications as ordered  - Monitor electrolytes and administer replacement therapy as ordered   Outcome: Progressing     Problem: RESPIRATORY - ADULT  Goal: Achieves optimal ventilation and oxygenation  Description  INTERVENTIONS:  - Assess for changes in respiratory status  - Assess for changes in mentation and behavior  - Position to facilitate oxygenation and minimize respiratory effort  - Oxygen administration by appropriate delivery method based on oxygen saturation (per order) or ABGs  - Initiate smoking cessation education as indicated  - Encourage broncho-pulmonary hygiene including cough, deep breathe, Incentive Spirometry  - Assess the need for suctioning and aspirate as needed  - Assess and instruct to report SOB or any respiratory difficulty  - Respiratory Therapy support as indicated   Outcome: Progressing     Problem: MUSCULOSKELETAL - ADULT  Goal: Maintain or return mobility to safest level of function  Description  INTERVENTIONS:  - Assess patient's ability to carry out ADLs; assess patient's baseline for ADL function and identify physical deficits which impact ability to perform ADLs (bathing, care of mouth/teeth, toileting, grooming, dressing, etc )  - Assess/evaluate cause of self-care deficits   - Assess range of motion  - Assess patient's mobility; develop plan if impaired  - Assess patient's need for assistive devices and provide as appropriate  - Encourage maximum independence but intervene and supervise when necessary  - Involve family in performance of ADLs  - Assess for home care needs following discharge   - Request OT consult to assist with ADL evaluation and planning for discharge  - Provide patient education as appropriate   Outcome: Progressing     Problem: DISCHARGE PLANNING - CARE MANAGEMENT  Goal: Discharge to post-acute care or home with appropriate resources  Description  INTERVENTIONS:  - Conduct assessment to determine patient/family and health care team treatment goals, and need for post-acute services based on payer coverage, community resources, and patient preferences, and barriers to discharge  - Address psychosocial, clinical, and financial barriers to discharge as identified in assessment in conjunction with the patient/family and health care team  - Arrange appropriate level of post-acute services according to patient's   needs and preference and payer coverage in collaboration with the physician and health care team  - Communicate with and update the patient/family, physician, and health care team regarding progress on the discharge plan  - Arrange appropriate transportation to post-acute venues   Outcome: Progressing     Problem: SAFETY ADULT  Goal: Maintain or return to baseline ADL function  Description  INTERVENTIONS:  -  Assess patient's ability to carry out ADLs; assess patient's baseline for ADL function and identify physical deficits which impact ability to perform ADLs (bathing, care of mouth/teeth, toileting, grooming, dressing, etc )  - Assess/evaluate cause of self-care deficits   - Assess range of motion  - Assess patient's mobility; develop plan if impaired  - Assess patient's need for assistive devices and provide as appropriate  - Encourage maximum independence but intervene and supervise when necessary  ¯ Involve family in performance of ADLs  ¯ Assess for home care needs following discharge   ¯ Request OT consult to assist with ADL evaluation and planning for discharge  ¯ Provide patient education as appropriate  Outcome: Progressing  Goal: Maintain or return mobility status to optimal level  Description  INTERVENTIONS:  - Assess patient's baseline mobility status (ambulation, transfers, stairs, etc )    - Identify cognitive and physical deficits and behaviors that affect mobility  - Identify mobility aids required to assist with transfers and/or ambulation (gait belt, sit-to-stand, lift, walker, cane, etc )  - [place] fall precautions as indicated by assessment  - Record patient progress and toleration of activity level on Mobility SBAR; progress patient to next Phase/Stage  - Instruct patient to call for assistance with activity based on assessment  - Request Rehabilitation consult to assist with strengthening/weightbearing, etc   Outcome: Progressing     Problem: DISCHARGE PLANNING  Goal: Discharge to home or other facility with appropriate resources  Description  INTERVENTIONS:  - Identify barriers to discharge w/patient and caregiver  - Arrange for needed discharge resources and transportation as appropriate  - Identify discharge learning needs (meds, wound care, etc )  - Arrange for interpretive services to assist at discharge as needed  - Refer to Case Management Department for coordinating discharge planning if the patient needs post-hospital services based on physician/advanced practitioner order or complex needs related to functional status, cognitive ability, or social support system  Outcome: Progressing

## 2019-02-14 NOTE — OCCUPATIONAL THERAPY NOTE
Occupational Therapy Treatment Note:       02/14/19 1430   Restrictions/Precautions   Weight Bearing Precautions Per Order No   Other Precautions Cognitive;Multiple lines; Fall Risk;Pain   Pain Assessment   Pain Score 6   Pain Type Acute pain   Pain Location Abdomen   Pain Orientation Right   Hospital Pain Intervention(s) Repositioned   Response to Interventions tolerated   Bed Mobility   Supine to Sit 5  Supervision   Additional items Increased time required   Sit to Supine 5  Supervision   Additional items Increased time required   Additional Comments Increased time, close SBA   Transfers   Sit to Stand 5  Supervision   Additional items Increased time required   Stand to Sit 5  Supervision   Additional items Increased time required   Stand pivot 5  Supervision   Additional items Increased time required   Additional Comments close SBA for SPT no AD   Functional Mobility   Functional Mobility 5  Supervision   Additional Comments close SBA   Additional items Rolling walker   Cognition   Overall Cognitive Status Impaired   Arousal/Participation Alert; Cooperative   Attention Within functional limits   Orientation Level Oriented X4   Memory Decreased recall of precautions;Decreased short term memory   Following Commands Follows one step commands with increased time or repetition   Comments Participated in [facility], see below for details   Cognition Assessment Tools MOCA   Score 22   Assessment   Assessment Pt was seen this date for OT Tx session focusign on LB dressing tasks, trasnfers, standing toelrance and balance, fucnitonal mobility, and particiaption in [facility]  Pt seen for split session this date due to requestng rest this a m  and reported being agreeable to participate in [facility] after lunch  Pt presents supine, comepltes all bed mobility tasks supine to sit with SBA  Pt compeltes LB dressing tasks SBA with increased time   S for STS from EOB at RW and throguhout funciotnal mboility tasks RW level, cues for safety and technique throguhout  Pt reports living with s/o in one floor appartment however appartment is on second floor and has 12 steps to enter  Discussed same with PT and PT to trial stairs this p m  once CT is removed  Pt reports having no DME at home, would beenfit from RW to maximize safety and stability  Pt reports fatigue and requests return to supine and agreeable to particiate in [address] [place] [address] this p m  P m  session pt presents in chair following PT tx  pt agreeable to particiate in [address] [place] [address] in which he scored a 22/30 indicating a mild cog impairment  Please see below for details  Follwoing same pt reports discomfort in chair and requests return to supine, SBA for STS from chair and trhoguhout SPT from chair to bed no AD used for same  Complete sit to supine S  Pt goals have been addressed, pt plans to d/c home with s/o and daily checks from his mom  No immediate needs at thsi time, Spoke with OTR/L D/C OT    Plan   Treatment Interventions ADL retraining   Goal Expiration Date 02/18/19   Treatment Day 7   OT Frequency 3-5x/wk   Recommendation   OT Discharge Recommendation Home OT  (And increased family support)   OT - OK to Discharge Yes   Pt seen for administration of Mitchell Cognitive Assessment (MoCA) to further assess cognitive skills/deficits  Pt scored     22 /30 indicating mild cognitive impairment for age/education      Scores on MoCA as follows:    Visuospatial / Executive Function:    2/5    Naming:  3/3    Attention:   6/6    Language:  2/3    Abstraction: 2/2    Delayed Recall:   3/5    Orientation:    4/6   FE [place]

## 2019-02-14 NOTE — PHYSICAL THERAPY NOTE
PHYSICAL THERAPY NOTE          Patient Name: Mary FARRAR'S Date: 2/14/2019 02/14/19 1415   Pain Assessment   Pain Assessment 0-10   Pain Score 6   Pain Type Acute pain   Pain Location Abdomen   Pain Orientation Right   Hospital Pain Intervention(s) Repositioned; Ambulation/increased activity   Response to Interventions tolerated   Restrictions/Precautions   Weight Bearing Precautions Per Order No   Other Precautions Cognitive;Multiple lines; Fall Risk;Pain   General   Chart Reviewed Yes   Family/Caregiver Present No   Cognition   Overall Cognitive Status Impaired   Arousal/Participation Alert; Cooperative   Attention Within functional limits   Orientation Level Oriented X4   Following Commands Follows one step commands with increased time or repetition   Subjective   Subjective "I am feeling as good as I could I guess"   Bed Mobility   Supine to Sit 5  Supervision   Additional items Increased time required;Verbal cues   Additional Comments Pt left sitting in bedside chair at termination of session, OT present continuing session   Transfers   Sit to Stand 5  Supervision   Additional items Increased time required;Verbal cues   Stand to Sit 5  Supervision   Additional items Increased time required;Verbal cues   Stand pivot 5  Supervision   Additional items Increased time required;Verbal cues   Ambulation/Elevation   Gait pattern Forward Flexion;Decreased foot clearance;Narrow YULIANA; Short stride   Gait Assistance 5  Supervision   Additional items Verbal cues   Assistive Device Rolling walker   Distance 50' x 2   Stair Management Assistance 5  Supervision   Additional items Verbal cues; Increased time required   Stair Management Technique Two rails; Step to pattern; Foreward   Number of Stairs 14   Balance   Static Sitting Good   Dynamic Sitting Fair +   Static Standing Fair +   Dynamic Standing Fair   Ambulatory Fair   Endurance Deficit   Endurance Deficit No   Activity Tolerance   Activity Tolerance Patient tolerated treatment well   Medical Staff Made Aware [place]IR[place]   Nurse Made Aware yes   Assessment   Prognosis Fair   Problem List Decreased strength;Decreased endurance;Decreased mobility; Impaired balance;Decreased coordination;Decreased safety awareness;Decreased skin integrity;Pain   Assessment Pt participated in therapy session focusing on functional moblity tasks  Pt performed supine to sit transfer with supervision assist  Pt performed sit to stand transfer with supervision assist and verbal cues for hand placement  Pt ambulated 50' x 2 with RW and supervision assist and verbal cues for sequencing and safety  Pt displays narrow YULIANA, decreased step and stride length, decreased gait speed, improper weight shift  Pt ascnded/descended 14 stairs with B/L handrails and supervision assist and verbal cues for technique and safety  Provided pt education regarding safe functional mobility strategies, importance of using AD after discharge, possible smoking cessation strategies  Skilled physical therapy is indicated to address listed functional deficits focusing on strength, endurnace and functional mobility  Goals   Patient Goals go home   LTG Expiration Date 02/18/19   Treatment Day 6   Plan   Treatment/Interventions Functional transfer training;LE strengthening/ROM; Elevations; Endurance training;Cognitive reorientation;Equipment eval/education; Bed mobility;Gait training;Spoke to nursing;Spoke to case management;OT   Progress Progressing toward goals   PT Frequency   (3-5x/wk)   Recommendation   Recommendation Home with family support;Home PT   Equipment Recommended Walker  (RW)   PT - OK to Discharge Yes  (when medically stable)     Sheron Rai PT, DPT

## 2019-02-14 NOTE — PLAN OF CARE
Problem: OCCUPATIONAL THERAPY ADULT  Goal: Performs self-care activities at highest level of function for planned discharge setting  See evaluation for individualized goals  Description  Treatment Interventions: ADL retraining, Functional transfer training, Endurance training, Patient/family training, Equipment evaluation/education, Compensatory technique education, Energy conservation, Activityengagement          See flowsheet documentation for full assessment, interventions and recommendations  Outcome: Completed  Note:   Limitation: Decreased ADL status, Decreased Safe judgement during ADL, Decreased cognition, Decreased endurance, Decreased self-care trans, Decreased high-level ADLs  Prognosis: Good  Assessment: Pt was seen this date for OT Tx session focusign on LB dressing tasks, trasnfers, standing toelrance and balance, fucnitonal mobility, and particiaption in [facility]  Pt seen for split session this date due to requestng rest this a m  and reported being agreeable to participate in [facility] after lunch  Pt presents supine, comepltes all bed mobility tasks supine to sit with SBA  Pt compeltes LB dressing tasks SBA with increased time  S for STS from EOB at RW and throguhout funciotnal mboility tasks RW level, [facility]  Pt reports living with s/o in one floor appartment however appartment is on second floor and has 12 steps to enter  Discussed same with PT and PT to trial stairs this p m  once CT is removed  Pt reports having no DME at home, would beenfit from RW to maximize safety and stability  Pt reports fatigue and requests return to supine and agreeable to particiate in [facility] this p m  P m  session pt presents in chair following PT tx  pt agreeable to particiate in [facility] in which he scored a 22/30 indicating a mild cog impairment  Please see below for details   Follwoing same pt reports discomfort in chair and requests return to supine, SBA for STS from chair and trhoguhout SPT from chair to bed no AD used for same  Complete sit to supine S  Pt goals have been addressed, pt plans to d/c home with s/o and daily checks from his mom  No immediate needs at si time, Spoke with OTR/L D/C OT      OT Discharge Recommendation: Home OT(And increased family support)  OT - OK to Discharge: Yes  Pt seen for administration of Mitchell Cognitive Assessment (MoCA) to further assess cognitive skills/deficits  Pt scored     22 /30 indicating mild cognitive impairment for age/education      Scores on MoCA as follows:    Visuospatial / Executive Function:    2/5    Naming:  3/3    Attention:   6/6    Language:  2/3    Abstraction: 2/2    Delayed Recall:   3/5    Orientation:    4/6      FE [place]

## 2019-02-14 NOTE — PROGRESS NOTES
Nurse / Provider rounds completed with staff nurse, Xander Rodas and patient  Reviewed plan of care, Chest tube to be removed today  Tentative discharge home tomorrow with IR drains

## 2019-02-14 NOTE — PROGRESS NOTES
Progress Note - General Surgery   Yumiko Cost 35 y o  male MRN: [id number]  Unit/Bed#: [facility] 604-01 Encounter: [id number]    Assessment:  36 y/o M w/ multiple liver abscesses in R lobe of liver, s/p IR drainage on 2/1, IR R CT placement on 2/5      Plan:  Regular diet  Abx per ID through 2/15  Possibly D/C CT today  Continue DANNY drains  Prn pain control  dispo planning    Subjective/Objective   Chief Complaint:     Subjective: SHRAVAN  Tmax 100 2  No N/V  Tolerating PO  Objective:     Blood pressure 136/88, pulse 102, temperature 97 7 °F (36 5 °C), resp  rate 18, height 6' 2" (1 88 m), weight 101 kg (222 lb), SpO2 95 %  ,Body mass index is 28 5 kg/m²  Intake/Output Summary (Last 24 hours) at 2/14/2019 0452  Last data filed at 2/13/2019 2157  Gross per 24 hour   Intake 1520 ml   Output 140 ml   Net 1380 ml       Invasive Devices     Peripheral Intravenous Line            Peripheral IV 02/11/19 Left Forearm 2 days          Drain            Closed/Suction Drain Right Abdomen Bulb 10 2 Fr  12 days    Closed/Suction Drain Right; Inferior;Superior Abdomen Bulb 8 5 Fr  12 days    Closed/Suction Drain Right;Posterior; Inferior Abdomen Bulb 10 2 Fr  12 days    Closed/Suction Drain Right;Superior; Anterior Abdomen Bulb 8 5 Fr  12 days    Closed/Suction Drain Right;Superior;Medial Abdomen Bulb 8 5 Fr  12 days    Chest Tube 1 Right 8 days                Physical Exam: NAD  AAOX3  Normal respiratory effort  Soft, NT, ND  JPx4 serous to serosang  R CT (sxn, -AL)  No c/c/e      Lab, Imaging and other studies:I have personally reviewed pertinent lab results    , CBC: No results found for: WBC, HGB, HCT, MCV, PLT, ADJUSTEDWBC, MCH, MCHC, RDW, MPV, NRBC, CMP: No results found for: SODIUM, K, CL, CO2, ANIONGAP, BUN, CREATININE, GLUCOSE, CALCIUM, AST, ALT, ALKPHOS, PROT, BILITOT, EGFR  VTE Pharmacologic Prophylaxis: Heparin  VTE Mechanical Prophylaxis: sequential compression device

## 2019-02-14 NOTE — PROGRESS NOTES
Cleared with IR to pull left chest tube  Sutures removed prior to tube removal   Tolerated procedure well  Dressing applied

## 2019-02-15 VITALS
HEIGHT: 74 IN | RESPIRATION RATE: 19 BRPM | SYSTOLIC BLOOD PRESSURE: 112 MMHG | WEIGHT: 224.8 LBS | TEMPERATURE: 97.7 F | BODY MASS INDEX: 28.85 KG/M2 | OXYGEN SATURATION: 96 % | DIASTOLIC BLOOD PRESSURE: 68 MMHG | HEART RATE: 94 BPM

## 2019-02-15 LAB
GLUCOSE SERPL-MCNC: 118 MG/DL (ref 65–140)
GLUCOSE SERPL-MCNC: 126 MG/DL (ref 65–140)

## 2019-02-15 PROCEDURE — 99232 SBSQ HOSP IP/OBS MODERATE 35: CPT | Performed by: SURGERY

## 2019-02-15 PROCEDURE — 99232 SBSQ HOSP IP/OBS MODERATE 35: CPT | Performed by: INTERNAL MEDICINE

## 2019-02-15 PROCEDURE — 82948 REAGENT STRIP/BLOOD GLUCOSE: CPT

## 2019-02-15 RX ORDER — OXYCODONE HYDROCHLORIDE 5 MG/1
5 TABLET ORAL EVERY 4 HOURS PRN
Qty: 15 TABLET | Refills: 0 | Status: SHIPPED | OUTPATIENT
Start: 2019-02-15 | End: 2019-02-25

## 2019-02-15 RX ORDER — DOCUSATE SODIUM 100 MG/1
100 CAPSULE, LIQUID FILLED ORAL 2 TIMES DAILY
Qty: 10 CAPSULE | Refills: 0 | Status: SHIPPED | OUTPATIENT
Start: 2019-02-15

## 2019-02-15 RX ORDER — FAMOTIDINE 20 MG/1
20 TABLET, FILM COATED ORAL EVERY 12 HOURS SCHEDULED
Qty: 30 TABLET | Refills: 0 | Status: SHIPPED | OUTPATIENT
Start: 2019-02-15

## 2019-02-15 RX ORDER — AMOXICILLIN AND CLAVULANATE POTASSIUM 875; 125 MG/1; MG/1
1 TABLET, FILM COATED ORAL EVERY 12 HOURS SCHEDULED
Qty: 60 TABLET | Refills: 0 | Status: SHIPPED | OUTPATIENT
Start: 2019-02-15 | End: 2019-03-17

## 2019-02-15 RX ADMIN — HEPARIN SODIUM 5000 UNITS: 5000 INJECTION INTRAVENOUS; SUBCUTANEOUS at 13:25

## 2019-02-15 RX ADMIN — CEFTRIAXONE SODIUM 2000 MG: 10 INJECTION, POWDER, FOR SOLUTION INTRAVENOUS at 11:30

## 2019-02-15 RX ADMIN — BUPROPION HYDROCHLORIDE 300 MG: 150 TABLET, FILM COATED, EXTENDED RELEASE ORAL at 11:27

## 2019-02-15 RX ADMIN — METRONIDAZOLE 500 MG: 500 TABLET ORAL at 13:25

## 2019-02-15 RX ADMIN — OXYCODONE HYDROCHLORIDE 5 MG: 5 TABLET ORAL at 06:24

## 2019-02-15 RX ADMIN — HEPARIN SODIUM 5000 UNITS: 5000 INJECTION INTRAVENOUS; SUBCUTANEOUS at 06:24

## 2019-02-15 RX ADMIN — CHLORHEXIDINE GLUCONATE 0.12% ORAL RINSE 15 ML: 1.2 LIQUID ORAL at 11:27

## 2019-02-15 RX ADMIN — OXYCODONE HYDROCHLORIDE 10 MG: 10 TABLET ORAL at 13:25

## 2019-02-15 RX ADMIN — DIAZEPAM 10 MG: 5 TABLET ORAL at 11:27

## 2019-02-15 RX ADMIN — METRONIDAZOLE 500 MG: 500 TABLET ORAL at 06:24

## 2019-02-15 RX ADMIN — ARIPIPRAZOLE 5 MG: 5 TABLET ORAL at 06:24

## 2019-02-15 RX ADMIN — DOCUSATE SODIUM 100 MG: 100 CAPSULE, LIQUID FILLED ORAL at 11:27

## 2019-02-15 RX ADMIN — FAMOTIDINE 20 MG: 20 TABLET, FILM COATED ORAL at 11:27

## 2019-02-15 NOTE — PROGRESS NOTES
Progress Note - Infectious Disease   Stephen Anderson 35 y o  male MRN: [id number]  Unit/Bed#: [facility] 604-01 Encounter: [id number]      Impression/Recommendations:  1  Sepsis, POA, presented with fever and leukocytosis   Source of sepsis is most likely liver abscesses   Overall, patient is stable   Temperature is down   WBC stable   Be  Antibiotic plan as in below  Monitor temperature/WBC  Monitor hemodynamics      2  Streptococcus intermedius bacteremia   Source is most likely liver abscesses   Bacteremia cleared with IV antibiotic regimen   2D echo without vegetation     Continue high-dose IV ceftriaxone  Treat x 14 days total   Complete treatment today      3  Multiple liver abscesses   Patient has history of diarrhea   Liver abscess is may be all secondary to translocation from gut   Abdomen/pelvis CT without other obvious intra-abdominal pathologies   It may be worthwhile to look at colonoscopy to look for colonic lesions   Patient will most likely need prolonged antibiotic, until on drain abscess is resolved   After completion of IV antibiotic course for bacteremia above, he will be transitioned to p o  antibiotic   Temperature is down   WBC decreasing, near normalized  Complete ceftriaxone/Flagyl today  Monitor temperature/WBC  Monitor abdominal pain  Transition to p o  Augmentin (875 mg bid) in a m  Repeat abdomen/pelvis CT in 1 month  Patient should stay on p o  Augmentin until liver abscesses resolve  Recommend  colonoscopy  This can be done non urgently, as an outpatient      3  Recurrent/persistent fever/leukocytosis   Both in temperature and WBC are finally down with adequate drainage  Antibiotic plan as in above  Monitor temperature/WBC      4  Right-sided pleural effusion, status post chest tube placement   Culture is negative   This may be all sympathetic effusion from liver abscess   Patient did have increased drainage with tPA and DNAse   CXR and CT with improving effusion    Chest tube has been removed  Management per surgery service      5  HENRIQUE, POA  This may be secondary sepsis   Dehydration may contribute   Creatinine has normalized  Antibiotic at full dose  Monitor creatinine      6  Elevated LFTs   This is most likely secondary to hepatic abscesses   Will monitor LFTs with treatment and drainage of abscesses   LFTs continue to improve  Monitor LFTs      7  History of IVDU   I have some concern regarding possible active IVDU   For these reasons, patient is not a candidate for home IV antibiotic   Prior HIV screen negative      Discussed with patient in detail regarding the above plan  Reviewed findings and follow-up plan with patient  Discussed with surgery service  Follow-up with me in 1 month, after repeat abdomen/pelvis CT      Antibiotics:  Ceftriaxone/Flagyl  Post drainage # 14     Subjective:  Patient feels well  Abdominal pain minimal   No further chest wall pain  No further dyspnea  He is awake and alert  Temperature stays down   No chills  He is tolerating antibiotics well   No nausea, vomiting or diarrhea  Objective:  Vitals:  Temp:  [97 7 °F (36 5 °C)-99 °F (37 2 °C)] 97 7 °F (36 5 °C)  HR:  [92-94] 94  Resp:  [18-20] 19  BP: ()/(64-68) 112/68  SpO2:  [96 %] 96 %  Temp (24hrs), Av 2 °F (36 8 °C), Min:97 7 °F (36 5 °C), Max:99 °F (37 2 °C)  Current: Temperature: 97 7 °F (36 5 °C)    Physical Exam:     General: Awake, alert, cooperative, no distress  Neck:  Supple  No mass  No lymphadenopathy  Lungs: Decreased breath sounds on right, no rales, no wheezing, respirations unlabored  Heart:  Regular rate and rhythm, S1 and S2 normal, no murmur  Abdomen: Soft, nondistended, minimal RUQ tender, bowel sounds active all four quadrants,        no masses, no organomegaly  Drain serous  Extremities: No edema  No erythema/warmth  No ulcer  Nontender to palpation  Skin:  No rash  Neuro: Moves all extremities       Invasive Devices     Peripheral Intravenous Line Peripheral IV 02/11/19 Left Forearm 4 days          Drain            Closed/Suction Drain Right Abdomen Bulb 10 2 Fr  13 days    Closed/Suction Drain Right; Inferior;Superior Abdomen Bulb 8 5 Fr  13 days    Closed/Suction Drain Right;Posterior; Inferior Abdomen Bulb 10 2 Fr  13 days    Closed/Suction Drain Right;Superior; Anterior Abdomen Bulb 8 5 Fr  13 days    Closed/Suction Drain Right;Superior;Medial Abdomen Bulb 8 5 Fr  13 days                Labs studies:   I have personally reviewed pertinent labs  Results from last 7 days   Lab Units 02/14/19 0447 02/13/19 0449 02/12/19 0446 02/11/19 0457 02/10/19  0456   POTASSIUM mmol/L 3 7 3 9 3 4* 4 0 4 2   CHLORIDE mmol/L 101 105 101 101 100   CO2 mmol/L 21 21 21 21 20*   BUN mg/dL 11 9 11 12 11   CREATININE mg/dL 0 80 0 67 0 70 0 72 0 81   EGFR ml/min/1 73sq m 112 120 118 117 111   CALCIUM mg/dL 8 7 8 5 8 2* 8 2* 8 2*   AST U/L  --   --  24 27 27   ALT U/L  --   --  23 27 29   ALK PHOS U/L  --   --  180* 219* 233*     Results from last 7 days   Lab Units 02/14/19 0447 02/13/19 0449 02/12/19 0446   WBC Thousand/uL 11 28* 12 62* 15 86*   HEMOGLOBIN g/dL 8 7* 8 1* 8 4*   PLATELETS Thousands/uL 725* 801* 807*           Imaging Studies:   I have personally reviewed pertinent imaging study reports and images in PACS  EKG, Pathology, and Other Studies:   I have personally reviewed pertinent reports

## 2019-02-15 NOTE — SOCIAL WORK
Cm reviewed patient during care coordination rounds  Patient remained stable for discharge today  Cm confirmed [facility] to provide [place] [address] services  RW delivered at bedside  Patient's mother to transport patient home at 4:30 pm  Cm spoke with patient's sister and patient at bedside to review d/c  Cm confirmed that flushes are going to be out of pocket since prescriptions are not covered under insurance  All parties in agreement with discharge

## 2019-02-15 NOTE — INCIDENTAL FINDINGS
The following findings require follow up:  Radiographic finding   Finding: Liver metastasis vs Liver Abscesses noted on CT scan   Follow up required: Yes   Follow up should be done within 2 week(s)    Please notify the following clinician to assist with the follow up:   Primary Care Provider and at General Surgery Office Visit

## 2019-02-15 NOTE — PROGRESS NOTES
Progress Note - General Surgery   Bishop Santoyo 35 y o  male MRN: [id number]  [facility] Encounter: [id number]    Assessment:  36 y/o M w/ multiple liver abscesses in R lobe of liver, s/p IR drainage on 2/1, IR R CT placement on 2/5    Plan:  Regular diet  Last dose of IV abx today, transition to Augmentin per ID  Continue DANNY drains  Dispo: likely today with home PT/OT and final ID plan    Subjective/Objective   Chief Complaint:     Subjective: SHRAVAN  Afebrile  Tolerating PO  No N/V  Wants to go home  Objective:     Blood pressure 99/64, pulse 92, temperature 98 °F (36 7 °C), temperature source Oral, resp  rate 20, height 6' 2" (1 88 m), weight 101 kg (222 lb), SpO2 96 %  ,Body mass index is 28 5 kg/m²  Intake/Output Summary (Last 24 hours) at 2/15/2019 0548  Last data filed at 2/15/2019 0030  Gross per 24 hour   Intake 1760 ml   Output 2212 5 ml   Net -452 5 ml       Invasive Devices     Peripheral Intravenous Line            Peripheral IV 02/11/19 Left Forearm 3 days          Drain            Closed/Suction Drain Right Abdomen Bulb 10 2 Fr  13 days    Closed/Suction Drain Right; Inferior;Superior Abdomen Bulb 8 5 Fr  13 days    Closed/Suction Drain Right;Posterior; Inferior Abdomen Bulb 10 2 Fr  13 days    Closed/Suction Drain Right;Superior; Anterior Abdomen Bulb 8 5 Fr  13 days    Closed/Suction Drain Right;Superior;Medial Abdomen Bulb 8 5 Fr  13 days                Physical Exam: NAD  AAOX3  Normal respiratory effort  Soft, NT, ND  DANNY x5- #4 serosang, # 1-2 slightly purulent to serous, # 3&5 serous  No c/c/e      Lab, Imaging and other studies:I have personally reviewed pertinent lab results    , CBC: No results found for: WBC, HGB, HCT, MCV, PLT, ADJUSTEDWBC, MCH, MCHC, RDW, MPV, NRBC, CMP: No results found for: SODIUM, K, CL, CO2, ANIONGAP, BUN, CREATININE, GLUCOSE, CALCIUM, AST, ALT, ALKPHOS, PROT, BILITOT, EGFR  VTE Pharmacologic Prophylaxis: Heparin  VTE Mechanical Prophylaxis: sequential compression device

## 2019-02-15 NOTE — DISCHARGE SUMMARY
Discharge Summary - Ashli Montes 35 y o  male MRN: [id number]    Unit/Bed#: [facility] 604-01 Encounter: [id number]    Admission Date:   Admission Orders (From admission, onward)    Ordered        02/01/19 1726  Inpatient Admission  Once     Order ID Start Status   [id number] 02/01/19 1723 Completed                Admitting Diagnosis: Pyogenic hepatic abscess [K75 0]  Weakness [R53 1]  Acute hepatic failure [K72 00]  Fever [R50 9]  Sepsis ([address] ) [A41 9]    HPI: Per Aline Bird, " Ashli Montes is a 35 y o  male with history of treated Hep C ([place], 2017- secondary to IVDU)  The patient denies any IV drug use since his treatment  He presents with abdominal pain, mostly RUQ, that has been present and worsening over the past week  He denies history of any similar pain  This pain is intermittent but quite severe at the moment, does not radiate  Associated with nausea, vomiting, diarrhea  He does not think he has had fevers at home, however temp in the ED is 103  1  A few days ago someone pointed out to him that he appeared more yellow than usual  T bili today is 9 5  WBC 29 37  CTAP was performed and shows multiple (~9) multiloculated liver masses suggestive of abscesses  There is no obvious source, as he has some mild GB thickening likely reactive to the surrounding inflammation  "     Procedures Performed:   Orders Placed This Encounter   Procedures    ED ECG Documentation Only       Summary of Hospital Course: The patient is a 68-year-old male who came in with a history of hepatitis-C and IV drug abuse  Patient had multiple hepatic abscesses and required extensive antibiotic care through the infectious disease team   Patient had extensive hospital course required multiple interventions and procedures  His discharge was quite complex and extensive discharge instructions were written for the patient  Require multiple follow ups    Please refer to hospital chart for full documentation and summary of hospital course  Significant Findings, Care, Treatment and Services Provided: Xr Chest Portable    Result Date: 2/6/2019  Impression: 1  There are 5 drains projecting over the right base  Patient is known to have 5 drains in his liver abscesses  It is unclear if the superior most pigtail is pleural   Consider repeat chest radiograph with better inclusion of all of the drains on the patient's right side  2   Decrease moderate right effusion  3   Diffuse left lung opacity is unchanged  Workstation performed: [id number]     Xr Chest Portable    Result Date: 2/4/2019  Impression: Diffuse bilateral pulmonary opacities again seen  Moderate right pleural effusion  Workstation performed: [id number]     Xr Chest Portable    Result Date: 2/4/2019  Impression: Vascular congestion with new moderate right pleural effusion  Workstation performed: [id number]     Xr Chest 2 Views    Result Date: 2/1/2019  Impression: Elevation of the right hemidiaphragm with bibasilar subsegmental atelectasis and suspected trace right effusion  Workstation performed: [id number]     Ct Guided Perc Drainage Catheter Placeme    Result Date: 2/1/2019  Impression: Impression: Successful placement of a total of 5  all-purpose drainage catheters into the 5 largest liver abscess collections  A total of 300 cc of purulent material was aspirated and a specimen sent to the laboratory as described  The 5 drains were labeled as anterior superior, middle superior, posterior superior, central, and posterior inferior  The two 10-Danish drains were the central drain and the posterior-inferior drain  The 3 superior drains were 8 5-Danish  Plan: Tubes to DANNY bulb suction  Flush each tube with 20 mL normal saline solution  The patient will likely need more drains placed since there are several other abscesses    We will follow patient and see on follow-up CAT scan if 2 further drains are needed or if the existing drains require repositioning and or wire disruption of the loculations to [facility]  Workstation performed: [id number]     Ct Chest Abdomen Pelvis W Contrast    Result Date: 2/5/2019  Impression: 1  New patchy opacity in the left upper lobe likely representing pneumonia  2   Large right pleural effusion, increased from the prior study, with new trace left pleural effusion  Compressive atelectasis  3   Decreased size of numerous liver abscesses status post drainage of the largest abscesses  New gas within the collections is likely iatrogenic  4   Moderate free fluid in the pelvis  The study was marked in [facility] for immediate notification  Workstation performed: [id number]     Xr Chest Portable Icu    Result Date: 2/5/2019  Impression: Increased right pleural effusion  Stable diffuse airspace disease  Workstation performed: [id number]     Ct Abdomen Pelvis With Contrast    Result Date: 2/1/2019  Impression: 1  In the provided clinical setting of fever and severe leukocytosis, the innumerable hepatic multilocular/multiseptated liver masses are most likely pyogenic abscesses versus less likely anemic abscesses  Other causes of cystic liver masses are less likely in this clinical setting  The patient's reported history of hepatitis C is noted  After patient's acute issues have resolved and the above liver lesions have been treated, surveillance liver imaging could be performed  2   Nondistended gallbladder with mild wall thickening and mucosal hyperemia is likely reactive  I personally discussed this study with Tori Earl on 2/1/2019 at 3:21PM  Workstation performed: [id number]     Ir Chest Tube    Result Date: 2/6/2019  Impression: Successful placement of a right chest pigtail drain and connected to Pleur-evac  No kee purulence obtained  Sample sent for laboratory analysis  _______________________________________________________________ PROCEDURE DETAILS: Operators: Dr Candie Marcum, [address] [place] [address] attending, performed the procedure   Anesthesia: Conscious sedation was provided throughout a total intra-service time of 44 minutes during which the patient's hemodynamic parameters were continuously monitored by an independent trained radiology nurse  1% lidocaine was injected in the skin and subcutaneous tissues overlying the access site  Medications: 1% lidocaine, fentanyl, Versed Contrast: 0 mL of Omnipaque 300 Fluoroscopy time: 0 minutes COMMENTS: Following the discussion of the risks, benefits and alternatives to the procedure, written informed consent was obtained from the patient  The patient was placed left lateral decubitus on the patient's bed  A preprocedure timeout was performed per [facility] protocol  Ultrasound evaluation of right chest demonstrated a pleural effusion  The right lateral chest was prepped and draped in the usual sterile fashion  All elements of maximal sterile barrier technique were followed (cap, mask, sterile gown, sterile gloves, large sterile sheet, hand hygiene, and 2% chlorhexidine for cutaneous antisepsis)  Lidocaine was administered to the skin, and a small skin incision  was made  Under direct ultrasound guidance, an 18 gauge needle was advanced into the pleural fluid  Guidewire was advanced through the needle  Needle removed, access was serially dilated, and 10-Portuguese pigtail drain advanced  Pigtail formed in collection  Drain connected to Pleur-evac and sutured to the skin with 2-0 Prolene   Workstation performed: [id number]       Complications: no complications    Discharge Diagnosis:   Patient Active Problem List   Diagnosis    History of hepatitis C    Vasectomy evaluation    Hepatic abscess    Hyponatremia    Depression    Anxiety    IV drug abuse ([address] )    Transaminitis    Anemia    Leukocytosis    Gram-positive bacteremia    Tobacco abuse    Hypophosphatemia    Urinary retention         Resolved Problems  Date Reviewed: 2/5/2019    None          Condition at Discharge: fair         Discharge instructions/Information to patient and family:   See after visit summary for information provided to patient and family  Provisions for Follow-Up Care:  See after visit summary for information related to follow-up care and any pertinent home health orders  PCP: No primary care provider on file  Disposition: Home    Planned Readmission: Yes pending outpatient management of IR drains and development of abscesses  Discharge Statement   I spent 35 minutes discharging the patient  This time was spent on the day of discharge  I had direct contact with the patient on the day of discharge  Additional documentation is required if more than 30 minutes were spent on discharge  Discharge Medications:  See after visit summary for reconciled discharge medications provided to patient and family

## 2019-02-15 NOTE — DISCHARGE INSTRUCTIONS
Gastroenterology instruction:  Please call Gastroenterology providers in schedule a colonoscopy  The numbers provided in chart  Infectious Disease Instruction:   Please continue to take antibiotics as prescribed  Repeat CT scan that can be ordered by the infectious disease doctor  Please call the office to schedule appointment in 4 weeks with a repeat CT scan  Primary Care Provider Instruction:  Please evaluate the dosage of the adderall moving forward  Narcotic pain instruction  Take narcotic pain medications only as prescribed  Consult with your doctor prior to starting a new medication while on narcotic pain medications  Do not drink alcohol while taking narcotic pain medications  No working, driving, or hazardous activity while taking narcotics  If you need to request a refill, please contact the office 48 hours prior to running out of your medications  Acute Care Surgery Discharge Instructions    Please follow-up as instructed  If you do not already have a follow-up appointment, please call the office when you leave to schedule an appointment to be seen in 2-3 weeks for post-operative re-evaluation  Activity:  - No lifting greater than 20 pounds or strenuous physical activity or exercise for at least 4 weeks  - Walking and normal light activities are encouraged  - Normal daily activities including climbing steps are okay  - No driving until no longer using pain medications  Return to work:    - You may return to work in 2 weeks or sooner if you are feeling well enough  Diet:    - You may resume your normal diet  Medications:    - You may resume all of your regular medications after going home unless otherwise instructed  Please refer to your discharge medication list for further details  - Please take the pain medications as directed  - You are encouraged to use non-narcotic pain medications first and whenever possible   Reserve the use of narcotic pain medication for moderate to severe pain not controlled by non-narcotic medications   - No driving while taking narcotic pain medications  - You may become constipated, especially if taking pain medications  You may take any over the counter stool softeners or laxatives as needed  Examples: Milk of Magnesia, Colace, Senna  Additional Instructions:  - If you have any questions or concerns after discharge please call the office   - Call office or return to ER if fever greater than 101, chills, persistent nausea/vomiting, worsening/uncontrollable pain, and/or increasing redness or purulent/foul smelling drainage from incision(s)  TUBE CARE INSTRUCTIONS    Care after your procedure:    Resume your normal diet  Small sips of flat soda will help with nausea  1  The properly functioning catheter should be forward flushed once (1x) daily with 10ml of normal saline using clean technique  You will be given a prescription for flushes  To flush the tube, clean both connections with alcohol swab  Twist off the drainage bag/ bulb  tubing and twist the saline syringe into the drainage tube and flush  Remove the syringe and twist the drainage bag / bulb tubing tubing back on     2  The drainage bag/bulb may be emptied as necessary  Keep a record of the amount of fluid you drain from your tube  This should be done with clean technique as well  3  A fresh dressing should be applied daily over the tube insertion site  4  As the tube is secured to the skin with only a suture,try not to pull on your tube  Tub baths are not permitted  Showers are permitted if the patient's skin entry site is prevented from getting wet  Similarly, washcloth "baths" are acceptable  Contact Interventional Radiology at [phone] [place] PATIENTS: Contact Interventional Radiology at [phone]) Catina Fields PATIENTS: Contact Interventional Radiology at [phone]) if:    1  Leakage or large amounts of liquid around the catheter      2  Fever of 101 degrees lasting several hours without other obvious cause (such as sore throat, flu, etc)  3  Persistent nausea or vomiting  4  Diminished drainage, which may be associated with pressure or pain  Or when the     drainage from your tube is less than 10mls for 48 hours  5  Catheter pulled back or falls out  The following pharmacies carry the flush syringes  [facility]                     [facility]  [address]  Phone [phone]            Phone [phone]             [place] [address]                                [phone] [address] [facility] Natasha ELY                      [address] [facility]  Phone [phone]            Phone [phone]                      [facility]                                                                                                          [phone]  [facility]  [facility] [address]    [address]  Phone [phone]

## 2019-02-15 NOTE — PROGRESS NOTES
Progress Note - Infectious Disease   Corazon Grady 35 y o  male MRN: [id number]  Unit/Bed#: [facility] 604-01 Encounter: [id number]      Impression/Recommendations:  1  Sepsis, POA, presented with fever and leukocytosis   Source of sepsis is most likely liver abscesses   Overall, patient is stable   Temperature is down   WBC stable   Be  Antibiotic plan as in below  Monitor temperature/WBC  Monitor hemodynamics      2  Streptococcus intermedius bacteremia   Source is most likely liver abscesses   Bacteremia cleared with IV antibiotic regimen   2D echo without vegetation     Continue high-dose IV ceftriaxone  Treat x 14 days total, through 2/15      3  Multiple liver abscesses   Patient has history of diarrhea   Liver abscess is may be all secondary to translocation from gut   Abdomen/pelvis CT without other obvious intra-abdominal pathologies   It may be worthwhile to look at colonoscopy to look for colonic lesions   Patient will most likely need prolonged antibiotic, until on drain abscess is resolved   After completion of IV antibiotic course for bacteremia above, he will be transitioned to p o  antibiotic   Temperature is down   WBC decreasing, near normalized  Antibiotic plan as in above  Continue Flagyl for anaerobic coverage  Monitor temperature/WBC  Monitor abdominal pain  Plan for colonoscopy non urgently  Transition to p o  Augmentin after completion of IV ceftriaxone/Flagyl above      3  Recurrent/persistent fever/leukocytosis  Both in temperature and WBC are finally down with adequate drainage  Antibiotic plan as in above  Monitor temperature/WBC      4  Right-sided pleural effusion, status post chest tube placement   Culture is negative   This may be all sympathetic effusion from liver abscess   Patient did have increased drainage with tPA and DNAse   CXR and CT with improving effusion  Management per surgery service      5  HENRIQUE, POA  This may be secondary sepsis   Dehydration may contribute   Creatinine has normalized  Antibiotic at full dose  Monitor creatinine      6  Elevated LFTs   This is most likely secondary to hepatic abscesses   Will monitor LFTs with treatment and drainage of abscesses   LFTs continue to improve  Monitor LFTs      7  History of IVDU   I have some concern regarding possible active IVDU   For these reasons, patient is not a candidate for home IV antibiotic   Prior HIV screen negative      Discussed with patient in detail regarding the above plan      Antibiotics:  Ceftriaxone/Flagyl  Post drainage # 13     Subjective:  Patient feels well  Abdominal pain minimal   Minimal chest wall pain  Minimal dyspnea  He is awake and alert  Temperature stays down   No chills  He is tolerating antibiotics well  No nausea, vomiting or diarrhea  Objective:  Vitals:  Temp:  [98 °F (36 7 °C)] 98 °F (36 7 °C)  HR:  [92-93] 92  Resp:  [18-20] 20  BP: ()/(64-76) 99/64  SpO2:  [95 %-97 %] 97 %  Temp (24hrs), Av °F (36 7 °C), Min:98 °F (36 7 °C), Max:98 °F (36 7 °C)  Current: Temperature: 98 °F (36 7 °C)    Physical Exam:     General: Awake, alert, cooperative, no distress  Neck:  Supple  No mass  No lymphadenopathy  Lungs: Decreased breath sounds, sparse right basilar rales, no wheezing, respirations unlabored  Heart:  Regular rate and rhythm, S1 and S2 normal, no murmur  Abdomen: Soft, nondistended, minimal RUQ tenderness, bowel sounds active all four quadrants,        no masses, no organomegaly  Drain serous  Extremities: No edema  No erythema/warmth  No ulcer  Nontender to palpation  Skin:  No rash  Neuro: Moves all extremities  Invasive Devices     Peripheral Intravenous Line            Peripheral IV 19 Left Forearm 3 days          Drain            Closed/Suction Drain Right Abdomen Bulb 10 2 Fr  13 days    Closed/Suction Drain Right; Inferior;Superior Abdomen Bulb 8 5 Fr  13 days    Closed/Suction Drain Right;Posterior; Inferior Abdomen Bulb 10 2 Fr  13 days    Closed/Suction Drain Right;Superior; Anterior Abdomen Bulb 8 5 Fr  13 days    Closed/Suction Drain Right;Superior;Medial Abdomen Bulb 8 5 Fr  13 days    Chest Tube 1 Right 9 days                Labs studies:   I have personally reviewed pertinent labs  Results from last 7 days   Lab Units 02/14/19 0447 02/13/19 0449 02/12/19 0446 02/11/19 0457 02/10/19  0456   POTASSIUM mmol/L 3 7 3 9 3 4* 4 0 4 2   CHLORIDE mmol/L 101 105 101 101 100   CO2 mmol/L 21 21 21 21 20*   BUN mg/dL 11 9 11 12 11   CREATININE mg/dL 0 80 0 67 0 70 0 72 0 81   EGFR ml/min/1 73sq m 112 120 118 117 111   CALCIUM mg/dL 8 7 8 5 8 2* 8 2* 8 2*   AST U/L  --   --  24 27 27   ALT U/L  --   --  23 27 29   ALK PHOS U/L  --   --  180* 219* 233*     Results from last 7 days   Lab Units 02/14/19 0447 02/13/19 0449 02/12/19 0446   WBC Thousand/uL 11 28* 12 62* 15 86*   HEMOGLOBIN g/dL 8 7* 8 1* 8 4*   PLATELETS Thousands/uL 725* 801* 807*           Imaging Studies:   I have personally reviewed pertinent imaging study reports and images in PACS  EKG, Pathology, and Other Studies:   I have personally reviewed pertinent reports

## 2019-02-18 DIAGNOSIS — K75.0 HEPATIC ABSCESS: Primary | ICD-10-CM

## 2019-02-21 ENCOUNTER — HOSPITAL ENCOUNTER (OUTPATIENT)
Dept: RADIOLOGY | Facility: HOSPITAL | Age: 39
Discharge: HOME | End: 2019-02-21
Admitting: RADIOLOGY
Payer: MEDICARE

## 2019-02-21 ENCOUNTER — TELEPHONE (OUTPATIENT)
Dept: SURGERY | Facility: CLINIC | Age: 39
End: 2019-02-21

## 2019-02-21 DIAGNOSIS — L02.91 ABSCESS: ICD-10-CM

## 2019-02-21 PROCEDURE — 49423 EXCHANGE DRAINAGE CATHETER: CPT

## 2019-02-21 PROCEDURE — 49423 EXCHANGE DRAINAGE CATHETER: CPT | Performed by: RADIOLOGY

## 2019-02-21 PROCEDURE — 49424 ASSESS CYST CONTRAST INJECT: CPT | Performed by: RADIOLOGY

## 2019-02-21 PROCEDURE — C1769 GUIDE WIRE: HCPCS

## 2019-02-21 PROCEDURE — 75984 XRAY CONTROL CATHETER CHANGE: CPT | Performed by: RADIOLOGY

## 2019-02-21 PROCEDURE — 75984 XRAY CONTROL CATHETER CHANGE: CPT

## 2019-02-21 PROCEDURE — C1729 CATH, DRAINAGE: HCPCS

## 2019-02-21 PROCEDURE — 76080 X-RAY EXAM OF FISTULA: CPT | Performed by: RADIOLOGY

## 2019-02-21 RX ADMIN — IOHEXOL 30 ML: 300 INJECTION, SOLUTION INTRAVENOUS at 16:43

## 2019-02-21 NOTE — SEDATION DOCUMENTATION
#4 and #5 abdominal drains exchanged by Dr Daniela Barnett without complications  Patient scheduled for CT abdomen and pelvis with IV contrast on Tuesday 2/26/19 at 1315 as ordered by Dr Daniela Barnett discussed with Dr Iwona White  Based on CT scan results will arrange for repeat tube check and or possible new drain tube placement

## 2019-02-21 NOTE — BRIEF OP NOTE (RAD/CATH)
IR TUBE CHECK  Procedure Note    PATIENT NAME: Junito Schmitz  : 1980  MRN: [id number]     Pre-op Diagnosis:   1  Abscess      Post-op Diagnosis:   1  Abscess        Surgeon:   Taryn Mathew MD    Estimated Blood Loss: None    Findings:   1  There was no residual abscess cavity in the #1, 2, and 3 drains (ant sup, mid sup, and post sup)  2  There remained fluid cavity around drains #4 and 5 (central and inferior)  3  Drains #1, 2, and 3 were removed  4  Drains #4 and 5 were exchanged using new 10 2 Fr catheters  Specimens: None    Complications:  None    Anesthesia: Local     Plan:   1  CT abd/pelvis w/ IV contrast on Tues (2019)  2  Based on results of the CT scan, patient will f/u with Dr Selam Barbour on Thurs (2019) for possible additional 2 hepatic abscess drains      Taryn Mathew MD     Date: 2019  Time: 4:42 PM

## 2019-02-21 NOTE — SEDATION DOCUMENTATION
Here for right abdominal drain tube check x 5  Complains of leaking at skin insertion site from all drains upon flushing

## 2019-02-21 NOTE — H&P
IR H&P    HPI:  35year old male with multiple liver abscesses s/p 5 drainage catheter placement on 2/1/2019 returns for drain evaluation as he has had minimal outputs  PMH:  Hepatic abscess  Bipolar 1  ADHD  Hep C  PTSD    PSH:  Vasectomy    Physical exam:  Gen: NAD  Abd: 5 liver abscess drains in place    A/P:  35year old male with multiple liver abscesses s/p 5 drainage catheter placement on 2/1/2019 returns for drain evaluation as he has had minimal outputs      - Drain check

## 2019-02-25 ENCOUNTER — OFFICE VISIT (OUTPATIENT)
Dept: SURGERY | Facility: CLINIC | Age: 39
End: 2019-02-25
Payer: MEDICARE

## 2019-02-25 VITALS
BODY MASS INDEX: 28.31 KG/M2 | WEIGHT: 220.6 LBS | TEMPERATURE: 96.9 F | HEIGHT: 74 IN | DIASTOLIC BLOOD PRESSURE: 78 MMHG | SYSTOLIC BLOOD PRESSURE: 118 MMHG

## 2019-02-25 DIAGNOSIS — K75.0 HEPATIC ABSCESS: Primary | ICD-10-CM

## 2019-02-25 PROCEDURE — 99213 OFFICE O/P EST LOW 20 MIN: CPT | Performed by: STUDENT IN AN ORGANIZED HEALTH CARE EDUCATION/TRAINING PROGRAM

## 2019-02-25 RX ORDER — DEXTROAMPHETAMINE SACCHARATE, AMPHETAMINE ASPARTATE, DEXTROAMPHETAMINE SULFATE AND AMPHETAMINE SULFATE 5; 5; 5; 5 MG/1; MG/1; MG/1; MG/1
10 TABLET ORAL 3 TIMES DAILY
COMMUNITY

## 2019-02-25 RX ORDER — SILDENAFIL 50 MG/1
50 TABLET, FILM COATED ORAL DAILY PRN
COMMUNITY

## 2019-02-25 NOTE — PROGRESS NOTES
Office Visit - General Surgery  Faisal Willard MRN: [id number]  Encounter: [id number]    Assessment and Plan    Problem List Items Addressed This Visit        Digestive    Hepatic abscess - Primary        Status post removal of 3 out of 5 drains  Continue augmentin  Follow-up CT scheduled for tomorrow with IV contrast  Will see IR pending results of CT for possible removal of last two drains  Has scheduled follow-up with GI and ID scheduled next month  Will see in one month after his other scheduled follow-up    Chief Complaint:  Faisal Willard is a 35 y o  male who presents for Abscess (Liver abscess, Patient denies pain  Appetite coming back )    Subjective  Doing well since hospitalization  Had a drain check with IR where drains 1 - 3 were removed and 4 - 5 where exchanged  Tolerating diet and feels great  Denies fevers and chills  Drains with minimal output  His mom is helping him with drain care  Still on antibiotics    Past Medical History  Past Medical History:   Diagnosis Date    ADHD     Anxiety     Bipolar 1 disorder ([address] )     Delusion (HCC)     Hepatitis C     Infectious viral hepatitis     Hep C- No Symptoms( Took a course of Harvoni)    Psoriasis     PTSD (post-traumatic stress disorder)     correction       Past Surgical History  Past Surgical History:   Procedure Laterality Date    CT GUIDED PERC DRAINAGE CATHETER PLACEMENT  2/1/2019    IR CHEST TUBE  2/5/2019    ME REMOVAL OF SPERM DUCT(S) Bilateral 9/25/2018    Procedure: VASECTOMY;  Surgeon: Bijal Murillo MD;  [facility]: [facility];  Service: Urology    WISDOM TOOTH EXTRACTION         Family History  History reviewed  No pertinent family history      Social History  Social History     Socioeconomic History    Marital status: Single     Spouse name: None    Number of children: None    Years of education: None    Highest education level: None   Occupational History    None   Social Needs    Financial resource strain: None   HOMEOSTASIS [employer]S-Kahlil insecurity:     Worry: None     Inability: None    Transportation needs:     Medical: None     Non-medical: None   Tobacco Use    Smoking status: Current Every Day Smoker     Packs/day: 1 00    Smokeless tobacco: Never Used   Substance and Sexual Activity    Alcohol use: Yes     Comment: socially    Drug use: Yes     Types: Marijuana, Methaqualone     Comment: medical- vaping 4x/day    Sexual activity: None   Lifestyle    Physical activity:     Days per week: None     Minutes per session: None    Stress: None   Relationships    Social connections:     Talks on phone: None     Gets together: None     Attends [employer] service: None     Active member of club or organization: None     Attends meetings of clubs or organizations: None     Relationship status: None    Intimate partner violence:     Fear of current or ex partner: None     Emotionally abused: None     Physically abused: None     Forced sexual activity: None   Other Topics Concern    None   Social History Narrative    None        Medications  Current Outpatient Medications on File Prior to Visit   Medication Sig Dispense Refill    amoxicillin-clavulanate (AUGMENTIN) 875-125 mg per tablet Take 1 tablet by mouth every 12 (twelve) hours for 30 days 60 tablet 0    amphetamine-dextroamphetamine (ADDERALL) 20 mg tablet Take 10 mg by mouth 3 (three) times a day      ARIPiprazole (ABILIFY) 5 mg tablet Take 5 mg by mouth daily in the early morning        buPROPion (WELLBUTRIN XL) 300 mg 24 hr tablet Take 300 mg by mouth every morning  0    diazepam (VALIUM) 10 mg tablet Take 10 mg by mouth 2 (two) times a day  0    docusate sodium (COLACE) 100 mg capsule Take 1 capsule (100 mg total) by mouth 2 (two) times a day 10 capsule 0    famotidine (PEPCID) 20 mg tablet Take 1 tablet (20 mg total) by mouth every 12 (twelve) hours 30 tablet 0    prazosin (MINIPRESS) 1 mg capsule Take 1 mg by mouth as needed Anxiety   0    QUEtiapine (SEROquel) 200 mg tablet Take 200 mg by mouth daily at bedtime  0    sildenafil (VIAGRA) 50 MG tablet Take 50 mg by mouth daily as needed for erectile dysfunction      oxyCODONE (ROXICODONE) 5 mg immediate release tablet Take 1 tablet (5 mg total) by mouth every 4 (four) hours as needed for moderate pain for up to 10 daysMax Daily Amount: 30 mg (Patient not taking: Reported on 2/25/2019) 15 tablet 0     No current facility-administered medications on file prior to visit  Allergies  Allergies   Allergen Reactions    Sulfa Antibiotics Hives       Review of Systems   Constitutional: Negative  HENT: Negative  Eyes: Negative  Respiratory: Negative  Cardiovascular: Negative  Gastrointestinal: Negative  Endocrine: Negative  Genitourinary: Negative  Musculoskeletal: Negative  Skin: Negative  Allergic/Immunologic: Negative  Neurological: Negative  Hematological: Negative  Psychiatric/Behavioral: Negative  Objective  Vitals:    02/25/19 0911   BP: 118/78   Temp: (!) 96 9 °F (36 1 °C)       Physical Exam   Constitutional: He is oriented to person, place, and time  He appears well-developed and well-nourished  HENT:   Head: Normocephalic  Eyes: Pupils are equal, round, and reactive to light  Neck: Normal range of motion  Cardiovascular: Normal rate  Pulmonary/Chest: Effort normal  No respiratory distress  Abdominal: Soft  He exhibits no distension  There is no tenderness  Two drains in place with serosanguinous output   Musculoskeletal: Normal range of motion  He exhibits no tenderness  Neurological: He is alert and oriented to person, place, and time  Skin: Skin is warm and dry  Capillary refill takes less than 2 seconds

## 2019-02-26 ENCOUNTER — HOSPITAL ENCOUNTER (OUTPATIENT)
Dept: RADIOLOGY | Facility: HOSPITAL | Age: 39
Discharge: HOME | End: 2019-02-26
Attending: RADIOLOGY
Payer: MEDICARE

## 2019-02-26 DIAGNOSIS — L02.91 ABSCESS: ICD-10-CM

## 2019-02-26 PROCEDURE — 74177 CT ABD & PELVIS W/CONTRAST: CPT

## 2019-02-26 RX ADMIN — IOHEXOL 100 ML: 350 INJECTION, SOLUTION INTRAVENOUS at 14:08

## 2019-03-01 NOTE — PROGRESS NOTES
CT scan from 2/26/18 reviewed and all collections have decreased in size  There are 2 remaining drains and no further abscess is seen around the superior central drain  There is still some abscess around the inferior posterior drain but markedly decreased  The patient will be scheduled to return in 2 weeks for tube checks and possible pulling as long as the collections are small and no longer communicate with the biliary tree

## 2019-03-11 ENCOUNTER — HOSPITAL ENCOUNTER (OUTPATIENT)
Dept: RADIOLOGY | Facility: HOSPITAL | Age: 39
Discharge: HOME | End: 2019-03-11
Payer: MEDICARE

## 2019-03-11 ENCOUNTER — HOSPITAL ENCOUNTER (OUTPATIENT)
Dept: RADIOLOGY | Facility: HOSPITAL | Age: 39
Discharge: HOME | End: 2019-03-11

## 2019-03-11 DIAGNOSIS — K75.0 HEPATIC ABSCESS: ICD-10-CM

## 2019-03-11 PROCEDURE — 76080 X-RAY EXAM OF FISTULA: CPT

## 2019-03-11 PROCEDURE — 49424 ASSESS CYST CONTRAST INJECT: CPT

## 2019-03-11 PROCEDURE — 49424 ASSESS CYST CONTRAST INJECT: CPT | Performed by: RADIOLOGY

## 2019-03-11 PROCEDURE — 76080 X-RAY EXAM OF FISTULA: CPT | Performed by: RADIOLOGY

## 2019-03-11 RX ADMIN — IOHEXOL 2 ML: 300 INJECTION, SOLUTION INTRAVENOUS at 13:13

## 2019-03-11 NOTE — DISCHARGE INSTRUCTIONS
Drainage Tube Removal    Your drainage tube was removed today  What you need know at home:   Keep a clean dry dressing at the tube site until the small opening closes  It will take twenty four to forty eight hours  Keep the site dry until it heals  A small amount of drainage on your dressing is normal  Resume your normal diet  Small sips of flat soda will help with any nausea  Contact Interventional Radiology for any of the following: You have pain, fever greater than 101, shaking chills  If you have increased redness or swelling at the site  Drainage Tube Removal    Your drainage tube was removed today  What you need know at home:   Keep a clean dry dressing at the tube site until the small opening closes  It will take twenty four to forty eight hours  Keep the site dry until it heals  A small amount of drainage on your dressing is normal  Resume your normal diet  Small sips of flat soda will help with any nausea  Contact Interventional Radiology for any of the following: You have pain, fever greater than 101, shaking chills  If you have increased redness or swelling at the site  I the drainage from your site does not stop  If the site drains pus or has a bad odor  Contact Interventional Radiology at [phone]   [facility] PATIENTS: Contact Interventional Radiology at [phone]) [facility] PATIENTS: Contact Interventional Radiology at [phone]) if:      I the drainage from your site does not stop  If the site drains pus or has a bad odor       Contact Interventional Radiology at [phone]   [facility] PATIENTS: Contact Interventional Radiology at [phone]) [facility] PATIENTS: Contact Interventional Radiology at [phone]) if:

## 2019-03-11 NOTE — SEDATION DOCUMENTATION
Dry dressing applied to both drainage sites, c/d/i  Patient instructed to call IR if any fevers, chills, shaking, n/v, or purulent discharge  Patient discharged home with instructions

## 2019-03-12 ENCOUNTER — OFFICE VISIT (OUTPATIENT)
Dept: GASTROENTEROLOGY | Facility: AMBULARY SURGERY CENTER | Age: 39
End: 2019-03-12
Payer: MEDICARE

## 2019-03-12 VITALS
DIASTOLIC BLOOD PRESSURE: 80 MMHG | HEART RATE: 102 BPM | RESPIRATION RATE: 18 BRPM | SYSTOLIC BLOOD PRESSURE: 140 MMHG | TEMPERATURE: 97.6 F | BODY MASS INDEX: 30.83 KG/M2 | HEIGHT: 74 IN | WEIGHT: 240.2 LBS

## 2019-03-12 DIAGNOSIS — K75.0 HEPATIC ABSCESS: Primary | ICD-10-CM

## 2019-03-12 PROCEDURE — 99214 OFFICE O/P EST MOD 30 MIN: CPT | Performed by: PHYSICIAN ASSISTANT

## 2019-03-12 NOTE — PATIENT INSTRUCTIONS
Patient is scheduled for a colonoscopy on 4/9/19 at [facility] with dr Jackson Antis  Patient provided suprep instructions check out  Lab requisition at check out

## 2019-03-12 NOTE — ASSESSMENT & PLAN NOTE
Abscesses appear decreasing in size, patient remains on antibiotic therapy and just had the last of his percutaneous drains removed  Question for translocation of bacteria from the gut, rule out colonic inflammatory regions or malignancy  He denies any IV drug use over the last 2 years, given his history of hepatitis C, it would be reasonable to rule out reinfection or reactivation of infection given his elevated liver enzymes (although Most likely the etiology of his elevated liver enzymes was the liver abscesses)    - Plan for colonoscopy    - Patient was explained about  the risks and benefits of the procedure  Risks including but not limited to bleeding, infection, perforation were explained in detail  Also explained about less than 100% sensitivity with the exam and other alternatives      - Prescription and instructions were provided for Suprep    - Advised patient to continue abstinence from IV drug abuse    - Will check hepatitis C viral load and genotyping    - Check LFTs and CBC

## 2019-03-12 NOTE — PROGRESS NOTES
Follow-up Note -  [facility]  Mandy Alonzo 1980 35 y o  male         Reason:  Follow up; liver abscesses, question for colonic source    HPI:  24-year-old male with history of IV drug abuse, hepatitis C genotype 1b, status post treatment with Harvoni x 12 weeks with confirmed SVR Presents for follow-up; he was seen in the hospital one month ago After he had presented on the very first with flulike symptoms for one week, progressively worsening right upper quadrant pain over a period of 3 days  He was found with liver abscesses on imaging, underwent IR drainage with cultures revealing Streptococcus intermedius  Colonoscopy was recommended for outpatient follow-up to exclude underlying colonic source  Patient said he just recently had his last 2 percutaneous drains removed recently  He says he is feeling quite well, denies any abdominal pain, fevers or chills  Denies any acid reflux, heartburn or difficulty swallowing  No disturbances to his bowel habits  No known family history of colon cancer  He denies any IV drug use whatsoever for the last 2 years, as has his most recent tattoo was one year ago, both of these events to place before he had completed antiviral therapy for hepatitis C    REVIEW OF SYSTEMS:      CONSTITUTIONAL: Denies any fever, chills, or rigors  Good appetite, and no recent weight loss  HEENT: No earache or tinnitus  Denies hearing loss or visual disturbances  CARDIOVASCULAR: No chest pain or palpitations  RESPIRATORY: Denies any cough, hemoptysis, shortness of breath or dyspnea on exertion  GASTROINTESTINAL: As noted in the History of Present Illness  GENITOURINARY: No problems with urination  Denies any hematuria or dysuria  NEUROLOGIC: No dizziness or vertigo, denies headaches  MUSCULOSKELETAL: Denies any muscle or joint pain  SKIN: Denies skin rashes or itching     ENDOCRINE: Denies excessive thirst  Denies intolerance to heat or cold   PSYCHOSOCIAL: Denies depression or anxiety  Denies any recent memory loss       Past Medical History:   Diagnosis Date    ADHD     Anxiety     Bipolar 1 disorder ([facility] [address] )     Delusion (HCC)     Hepatitis C     Infectious viral hepatitis     Hep C- No Symptoms( Took a course of Harvoni)    Psoriasis     PTSD (post-traumatic stress disorder)     [facility]      Past Surgical History:   Procedure Laterality Date    CT GUIDED PERC DRAINAGE CATHETER PLACEMENT  2/1/2019    IR CHEST TUBE  2/5/2019    PA REMOVAL OF SPERM DUCT(S) Bilateral 9/25/2018    Procedure: VASECTOMY;  Surgeon: Ana Rizzo MD;  [facility];  Service: Urology    WISDOM TOOTH EXTRACTION       Social History     Socioeconomic History    Marital status: Single     Spouse name: Not on file    Number of children: Not on file    Years of education: Not on file    Highest education level: Not on file   Occupational History    Not on file   Social Needs    Financial resource strain: Not on file    Food insecurity:     Worry: Not on file     Inability: Not on file    Transportation needs:     Medical: Not on file     Non-medical: Not on file   Tobacco Use    Smoking status: Current Every Day Smoker     Packs/day: 1 00    Smokeless tobacco: Never Used   Substance and Sexual Activity    Alcohol use: Yes     Comment: socially    Drug use: Yes     Types: Marijuana, Methaqualone     Comment: medical- vaping 4x/day    Sexual activity: Not on file   Lifestyle    Physical activity:     Days per week: Not on file     Minutes per session: Not on file    Stress: Not on file   Relationships    Social connections:     Talks on phone: Not on file     Gets together: Not on file     Attends [employer] service: Not on file     Active member of club or organization: Not on file     Attends meetings of clubs or organizations: Not on file     Relationship status: Not on file    Intimate partner violence:     Fear of current or ex partner: Not on file     Emotionally abused: Not on file     Physically abused: Not on file     Forced sexual activity: Not on file   Other Topics Concern    Not on file   Social History Narrative    Not on file     Family History   Problem Relation Age of Onset    No Known Problems Mother     No Known Problems Father      Sulfa antibiotics  Current Outpatient Medications   Medication Sig Dispense Refill    amoxicillin-clavulanate (AUGMENTIN) 875-125 mg per tablet Take 1 tablet by mouth every 12 (twelve) hours for 30 days 60 tablet 0    amphetamine-dextroamphetamine (ADDERALL) 20 mg tablet Take 10 mg by mouth 3 (three) times a day      buPROPion (WELLBUTRIN XL) 300 mg 24 hr tablet Take 300 mg by mouth every morning  0    diazepam (VALIUM) 10 mg tablet Take 10 mg by mouth 2 (two) times a day  0    prazosin (MINIPRESS) 1 mg capsule Take 1 mg by mouth as needed Anxiety   0    QUEtiapine (SEROquel) 200 mg tablet Take 200 mg by mouth daily at bedtime  0    sildenafil (VIAGRA) 50 MG tablet Take 50 mg by mouth daily as needed for erectile dysfunction      ARIPiprazole (ABILIFY) 5 mg tablet Take 5 mg by mouth daily in the early morning        docusate sodium (COLACE) 100 mg capsule Take 1 capsule (100 mg total) by mouth 2 (two) times a day (Patient not taking: Reported on 3/12/2019) 10 capsule 0    famotidine (PEPCID) 20 mg tablet Take 1 tablet (20 mg total) by mouth every 12 (twelve) hours (Patient not taking: Reported on 3/12/2019) 30 tablet 0    Na Sulfate-K Sulfate-Mg Sulf (SUPREP BOWEL PREP KIT) 17 5-3 13-1 6 GM/177ML SOLN Take 2 Bottles by mouth see administration instructions Suprep bowel prep  Please follow the instructions from the office 2 Bottle 0     No current facility-administered medications for this visit  Blood pressure 140/80, pulse 102, temperature 97 6 °F (36 4 °C), temperature source Tympanic, resp  rate 18, height 6' 2" (1 88 m), weight 109 kg (240 lb 3 2 oz)      PHYSICAL EXAM: General Appearance:   Alert, cooperative, no distress, appears stated age    HEENT:   Normocephalic, atraumatic, anicteric      Neck:  Supple, symmetrical, trachea midline, no adenopathy;    thyroid: no enlargement/tenderness/nodules; no carotid  bruit or JVD    Lungs:   Clear to auscultation bilaterally; no rales, rhonchi or wheezing; respirations unlabored    Heart[de-identified]   S1 and S2 normal; regular rate and rhythm; no murmur, rub, or gallop  Abdomen:   Soft, non-tender, non-distended; normal bowel sounds; no masses, no organomegaly    Extremities: No edema, erythema, wounds, rashes  Some tattoos noted on upper extremities   Rectal:   Deferred                      Lab Results   Component Value Date    WBC 11 28 (H) 02/14/2019    HGB 8 7 (L) 02/14/2019    HCT 28 5 (L) 02/14/2019    MCV 95 02/14/2019     (H) 02/14/2019     Lab Results   Component Value Date    GLUCOSE 94 02/01/2019    CALCIUM 8 7 02/14/2019     03/21/2015    K 3 7 02/14/2019    CO2 21 02/14/2019     02/14/2019    BUN 11 02/14/2019    CREATININE 0 80 02/14/2019     Lab Results   Component Value Date    ALT 23 02/12/2019    AST 24 02/12/2019     (H) 08/24/2017    ALKPHOS 180 (H) 02/12/2019    BILITOT 0 5 03/31/2015     Lab Results   Component Value Date    INR 1 53 (H) 02/02/2019    INR 1 40 (H) 02/01/2019    INR 1 40 (H) 02/01/2019    PROTIME 18 5 (H) 02/02/2019    PROTIME 17 3 (H) 02/01/2019    PROTIME 17 3 (H) 02/01/2019       No results found  ASSESSMENT & PLAN:    Hepatic abscess  Abscesses appear decreasing in size, patient remains on antibiotic therapy and just had the last of his percutaneous drains removed  Question for translocation of bacteria from the gut, rule out colonic inflammatory regions or malignancy    He denies any IV drug use over the last 2 years, given his history of hepatitis C, it would be reasonable to rule out reinfection or reactivation of infection given his elevated liver enzymes (although Most likely the etiology of his elevated liver enzymes was the liver abscesses)    - Plan for colonoscopy    - Patient was explained about  the risks and benefits of the procedure  Risks including but not limited to bleeding, infection, perforation were explained in detail  Also explained about less than 100% sensitivity with the exam and other alternatives      - Prescription and instructions were provided for Suprep    - Advised patient to continue abstinence from IV drug abuse    - Will check hepatitis C viral load and genotyping    - Check LFTs and CBC

## 2019-03-14 ENCOUNTER — TELEPHONE (OUTPATIENT)
Dept: GASTROENTEROLOGY | Facility: AMBULARY SURGERY CENTER | Age: 39
End: 2019-03-14

## 2019-03-14 ENCOUNTER — TELEPHONE (OUTPATIENT)
Dept: INFECTIOUS DISEASES | Facility: CLINIC | Age: 39
End: 2019-03-14

## 2019-03-14 NOTE — TELEPHONE ENCOUNTER
Attempted to contact pt to discuss repeat CT  Patient had been scheduled for 3/15 and cancelled the appointment  He needs to have a repeat CT done to determine whether his abscesses are still present and if he needs to continue on his abx  Left message for patient to call back as soon as he gets the message

## 2019-03-14 NOTE — TELEPHONE ENCOUNTER
DR Sushant Alvarez PATIENT      He received a call from sneha at [facility] stating that he is symptomatic with elevated blood pressure---would like a call back    [phone]

## 2019-03-14 NOTE — TELEPHONE ENCOUNTER
I spoke to Graciela from [facility]  She is reporting /90,  today, patient asymptomatic  She said he is on minipress but does not know who prescribed it  I noted he does not have a PCP but was last seen by [facility] in February  I advised we will not be managing his BP and he needs to establish family care  She will call  Patient's mother and assist with arranging

## 2019-03-17 ENCOUNTER — APPOINTMENT (EMERGENCY)
Dept: CT IMAGING | Facility: HOSPITAL | Age: 39
End: 2019-03-17
Payer: MEDICARE

## 2019-03-17 ENCOUNTER — HOSPITAL ENCOUNTER (OUTPATIENT)
Facility: HOSPITAL | Age: 39
Setting detail: OBSERVATION
Discharge: HOME | End: 2019-03-17
Attending: EMERGENCY MEDICINE | Admitting: INTERNAL MEDICINE
Payer: MEDICARE

## 2019-03-17 ENCOUNTER — APPOINTMENT (EMERGENCY)
Dept: RADIOLOGY | Facility: HOSPITAL | Age: 39
End: 2019-03-17
Payer: MEDICARE

## 2019-03-17 VITALS
SYSTOLIC BLOOD PRESSURE: 102 MMHG | OXYGEN SATURATION: 96 % | TEMPERATURE: 97.8 F | RESPIRATION RATE: 24 BRPM | WEIGHT: 241.4 LBS | BODY MASS INDEX: 30.99 KG/M2 | DIASTOLIC BLOOD PRESSURE: 55 MMHG | HEART RATE: 97 BPM

## 2019-03-17 DIAGNOSIS — R77.8 ELEVATED TROPONIN: ICD-10-CM

## 2019-03-17 DIAGNOSIS — N17.9 AKI (ACUTE KIDNEY INJURY) (HCC): ICD-10-CM

## 2019-03-17 DIAGNOSIS — R55 NEAR SYNCOPE: Primary | ICD-10-CM

## 2019-03-17 LAB
ALBUMIN SERPL BCP-MCNC: 3.3 G/DL (ref 3.5–5)
ALP SERPL-CCNC: 208 U/L (ref 46–116)
ALT SERPL W P-5'-P-CCNC: 54 U/L (ref 12–78)
AMPHETAMINES SERPL QL SCN: POSITIVE
ANION GAP SERPL CALCULATED.3IONS-SCNC: 14 MMOL/L (ref 4–13)
APTT PPP: 31 SECONDS (ref 26–38)
AST SERPL W P-5'-P-CCNC: 36 U/L (ref 5–45)
ATRIAL RATE: 102 BPM
BARBITURATES UR QL: NEGATIVE
BASOPHILS # BLD AUTO: 0.03 THOUSANDS/ΜL (ref 0–0.1)
BASOPHILS NFR BLD AUTO: 0 % (ref 0–1)
BENZODIAZ UR QL: POSITIVE
BILIRUB SERPL-MCNC: 0.6 MG/DL (ref 0.2–1)
BUN SERPL-MCNC: 18 MG/DL (ref 5–25)
CALCIUM SERPL-MCNC: 8.9 MG/DL (ref 8.3–10.1)
CHLORIDE SERPL-SCNC: 104 MMOL/L (ref 100–108)
CO2 SERPL-SCNC: 23 MMOL/L (ref 21–32)
COCAINE UR QL: NEGATIVE
CREAT SERPL-MCNC: 1.32 MG/DL (ref 0.6–1.3)
DEPRECATED D DIMER PPP: 505 NG/ML (FEU)
EOSINOPHIL # BLD AUTO: 0.08 THOUSAND/ΜL (ref 0–0.61)
EOSINOPHIL NFR BLD AUTO: 1 % (ref 0–6)
ERYTHROCYTE [DISTWIDTH] IN BLOOD BY AUTOMATED COUNT: 15 % (ref 11.6–15.1)
GFR SERPL CREATININE-BSD FRML MDRD: 67 ML/MIN/1.73SQ M
GLUCOSE SERPL-MCNC: 111 MG/DL (ref 65–140)
HCT VFR BLD AUTO: 32.8 % (ref 36.5–49.3)
HGB BLD-MCNC: 10.4 G/DL (ref 12–17)
IMM GRANULOCYTES # BLD AUTO: 0.04 THOUSAND/UL (ref 0–0.2)
IMM GRANULOCYTES NFR BLD AUTO: 0 % (ref 0–2)
INR PPP: 1.07 (ref 0.86–1.17)
LYMPHOCYTES # BLD AUTO: 2.02 THOUSANDS/ΜL (ref 0.6–4.47)
LYMPHOCYTES NFR BLD AUTO: 20 % (ref 14–44)
MCH RBC QN AUTO: 29.5 PG (ref 26.8–34.3)
MCHC RBC AUTO-ENTMCNC: 31.7 G/DL (ref 31.4–37.4)
MCV RBC AUTO: 93 FL (ref 82–98)
METHADONE UR QL: NEGATIVE
MONOCYTES # BLD AUTO: 1.1 THOUSAND/ΜL (ref 0.17–1.22)
MONOCYTES NFR BLD AUTO: 11 % (ref 4–12)
NEUTROPHILS # BLD AUTO: 6.82 THOUSANDS/ΜL (ref 1.85–7.62)
NEUTS SEG NFR BLD AUTO: 68 % (ref 43–75)
NRBC BLD AUTO-RTO: 0 /100 WBCS
OPIATES UR QL SCN: NEGATIVE
P AXIS: 79 DEGREES
PCP UR QL: NEGATIVE
PLATELET # BLD AUTO: 329 THOUSANDS/UL (ref 149–390)
PMV BLD AUTO: 8.6 FL (ref 8.9–12.7)
POTASSIUM SERPL-SCNC: 3.5 MMOL/L (ref 3.5–5.3)
PR INTERVAL: 146 MS
PROT SERPL-MCNC: 7.5 G/DL (ref 6.4–8.2)
PROTHROMBIN TIME: 13.6 SECONDS (ref 11.8–14.2)
QRS AXIS: 53 DEGREES
QRSD INTERVAL: 76 MS
QT INTERVAL: 398 MS
QTC INTERVAL: 518 MS
RBC # BLD AUTO: 3.52 MILLION/UL (ref 3.88–5.62)
SODIUM SERPL-SCNC: 141 MMOL/L (ref 136–145)
T WAVE AXIS: 80 DEGREES
THC UR QL: POSITIVE
TROPONIN I SERPL-MCNC: 0.11 NG/ML
TROPONIN I SERPL-MCNC: 0.4 NG/ML
VENTRICULAR RATE: 102 BPM
WBC # BLD AUTO: 10.09 THOUSAND/UL (ref 4.31–10.16)

## 2019-03-17 PROCEDURE — 93010 ELECTROCARDIOGRAM REPORT: CPT | Performed by: INTERNAL MEDICINE

## 2019-03-17 PROCEDURE — C9113 INJ PANTOPRAZOLE SODIUM, VIA: HCPCS | Performed by: EMERGENCY MEDICINE

## 2019-03-17 PROCEDURE — 36415 COLL VENOUS BLD VENIPUNCTURE: CPT | Performed by: EMERGENCY MEDICINE

## 2019-03-17 PROCEDURE — 80307 DRUG TEST PRSMV CHEM ANLYZR: CPT | Performed by: INTERNAL MEDICINE

## 2019-03-17 PROCEDURE — 71275 CT ANGIOGRAPHY CHEST: CPT

## 2019-03-17 PROCEDURE — 96361 HYDRATE IV INFUSION ADD-ON: CPT

## 2019-03-17 PROCEDURE — 84484 ASSAY OF TROPONIN QUANT: CPT | Performed by: EMERGENCY MEDICINE

## 2019-03-17 PROCEDURE — 71045 X-RAY EXAM CHEST 1 VIEW: CPT

## 2019-03-17 PROCEDURE — 85025 COMPLETE CBC W/AUTO DIFF WBC: CPT | Performed by: EMERGENCY MEDICINE

## 2019-03-17 PROCEDURE — 84484 ASSAY OF TROPONIN QUANT: CPT | Performed by: INTERNAL MEDICINE

## 2019-03-17 PROCEDURE — 96360 HYDRATION IV INFUSION INIT: CPT

## 2019-03-17 PROCEDURE — 85730 THROMBOPLASTIN TIME PARTIAL: CPT | Performed by: EMERGENCY MEDICINE

## 2019-03-17 PROCEDURE — 80053 COMPREHEN METABOLIC PANEL: CPT | Performed by: EMERGENCY MEDICINE

## 2019-03-17 PROCEDURE — 85610 PROTHROMBIN TIME: CPT | Performed by: EMERGENCY MEDICINE

## 2019-03-17 PROCEDURE — 99285 EMERGENCY DEPT VISIT HI MDM: CPT

## 2019-03-17 PROCEDURE — 99220 PR INITIAL OBSERVATION CARE/DAY 70 MINUTES: CPT | Performed by: INTERNAL MEDICINE

## 2019-03-17 PROCEDURE — 85379 FIBRIN DEGRADATION QUANT: CPT | Performed by: EMERGENCY MEDICINE

## 2019-03-17 PROCEDURE — 93005 ELECTROCARDIOGRAM TRACING: CPT

## 2019-03-17 RX ORDER — ONDANSETRON 2 MG/ML
4 INJECTION INTRAMUSCULAR; INTRAVENOUS EVERY 6 HOURS PRN
Status: DISCONTINUED | OUTPATIENT
Start: 2019-03-17 | End: 2019-03-17 | Stop reason: HOSPADM

## 2019-03-17 RX ORDER — PANTOPRAZOLE SODIUM 40 MG/1
40 INJECTION, POWDER, FOR SOLUTION INTRAVENOUS ONCE
Status: COMPLETED | OUTPATIENT
Start: 2019-03-17 | End: 2019-03-17

## 2019-03-17 RX ORDER — ACETAMINOPHEN 325 MG/1
650 TABLET ORAL EVERY 6 HOURS PRN
Status: DISCONTINUED | OUTPATIENT
Start: 2019-03-17 | End: 2019-03-17 | Stop reason: HOSPADM

## 2019-03-17 RX ORDER — AMOXICILLIN AND CLAVULANATE POTASSIUM 875; 125 MG/1; MG/1
1 TABLET, FILM COATED ORAL EVERY 12 HOURS SCHEDULED
Status: DISCONTINUED | OUTPATIENT
Start: 2019-03-17 | End: 2019-03-17 | Stop reason: HOSPADM

## 2019-03-17 RX ORDER — PRAZOSIN HYDROCHLORIDE 1 MG/1
1 CAPSULE ORAL 2 TIMES DAILY PRN
Status: DISCONTINUED | OUTPATIENT
Start: 2019-03-17 | End: 2019-03-17 | Stop reason: HOSPADM

## 2019-03-17 RX ORDER — DEXTROAMPHETAMINE SACCHARATE, AMPHETAMINE ASPARTATE, DEXTROAMPHETAMINE SULFATE AND AMPHETAMINE SULFATE 2.5; 2.5; 2.5; 2.5 MG/1; MG/1; MG/1; MG/1
10 TABLET ORAL 3 TIMES DAILY
Status: DISCONTINUED | OUTPATIENT
Start: 2019-03-17 | End: 2019-03-17 | Stop reason: HOSPADM

## 2019-03-17 RX ORDER — NICOTINE 21 MG/24HR
1 PATCH, TRANSDERMAL 24 HOURS TRANSDERMAL DAILY
Status: DISCONTINUED | OUTPATIENT
Start: 2019-03-17 | End: 2019-03-17 | Stop reason: HOSPADM

## 2019-03-17 RX ORDER — ARIPIPRAZOLE 5 MG/1
5 TABLET ORAL
Status: DISCONTINUED | OUTPATIENT
Start: 2019-03-17 | End: 2019-03-17 | Stop reason: HOSPADM

## 2019-03-17 RX ORDER — DOCUSATE SODIUM 100 MG/1
100 CAPSULE, LIQUID FILLED ORAL 2 TIMES DAILY
Status: DISCONTINUED | OUTPATIENT
Start: 2019-03-17 | End: 2019-03-17 | Stop reason: HOSPADM

## 2019-03-17 RX ORDER — SODIUM CHLORIDE 9 MG/ML
125 INJECTION, SOLUTION INTRAVENOUS CONTINUOUS
Status: DISCONTINUED | OUTPATIENT
Start: 2019-03-17 | End: 2019-03-17 | Stop reason: HOSPADM

## 2019-03-17 RX ORDER — QUETIAPINE FUMARATE 200 MG/1
200 TABLET, FILM COATED ORAL
Status: DISCONTINUED | OUTPATIENT
Start: 2019-03-17 | End: 2019-03-17 | Stop reason: HOSPADM

## 2019-03-17 RX ORDER — ASPIRIN 81 MG/1
81 TABLET ORAL DAILY
Status: DISCONTINUED | OUTPATIENT
Start: 2019-03-18 | End: 2019-03-17 | Stop reason: HOSPADM

## 2019-03-17 RX ORDER — DIAZEPAM 5 MG/1
10 TABLET ORAL 2 TIMES DAILY
Status: DISCONTINUED | OUTPATIENT
Start: 2019-03-17 | End: 2019-03-17 | Stop reason: HOSPADM

## 2019-03-17 RX ORDER — FAMOTIDINE 20 MG/1
20 TABLET, FILM COATED ORAL EVERY 12 HOURS SCHEDULED
Status: DISCONTINUED | OUTPATIENT
Start: 2019-03-17 | End: 2019-03-17 | Stop reason: HOSPADM

## 2019-03-17 RX ORDER — BUPROPION HYDROCHLORIDE 150 MG/1
300 TABLET ORAL EVERY MORNING
Status: DISCONTINUED | OUTPATIENT
Start: 2019-03-17 | End: 2019-03-17 | Stop reason: HOSPADM

## 2019-03-17 RX ORDER — ASPIRIN 81 MG/1
162 TABLET, CHEWABLE ORAL ONCE
Status: COMPLETED | OUTPATIENT
Start: 2019-03-17 | End: 2019-03-17

## 2019-03-17 RX ADMIN — PANTOPRAZOLE SODIUM 40 MG: 40 INJECTION, POWDER, FOR SOLUTION INTRAVENOUS at 07:34

## 2019-03-17 RX ADMIN — NICOTINE 1 PATCH: 14 PATCH TRANSDERMAL at 12:03

## 2019-03-17 RX ADMIN — IOHEXOL 85 ML: 350 INJECTION, SOLUTION INTRAVENOUS at 06:52

## 2019-03-17 RX ADMIN — FAMOTIDINE 20 MG: 20 TABLET ORAL at 13:00

## 2019-03-17 RX ADMIN — DEXTROAMPHETAMINE SACCHARATE, AMPHETAMINE ASPARTATE, DEXTROAMPHETAMINE SULFATE AND AMPHETAMINE SULFATE 10 MG: 2.5; 2.5; 2.5; 2.5 TABLET ORAL at 13:00

## 2019-03-17 RX ADMIN — ARIPIPRAZOLE 5 MG: 5 TABLET ORAL at 13:00

## 2019-03-17 RX ADMIN — DOCUSATE SODIUM 100 MG: 100 CAPSULE, LIQUID FILLED ORAL at 13:00

## 2019-03-17 RX ADMIN — ASPIRIN 81 MG 162 MG: 81 TABLET ORAL at 08:01

## 2019-03-17 RX ADMIN — BUPROPION HYDROCHLORIDE 300 MG: 150 TABLET, FILM COATED, EXTENDED RELEASE ORAL at 13:00

## 2019-03-17 RX ADMIN — DIAZEPAM 10 MG: 5 TABLET ORAL at 13:00

## 2019-03-17 RX ADMIN — SODIUM CHLORIDE 125 ML/HR: 0.9 INJECTION, SOLUTION INTRAVENOUS at 07:33

## 2019-03-17 RX ADMIN — SODIUM CHLORIDE 1000 ML: 0.9 INJECTION, SOLUTION INTRAVENOUS at 05:24

## 2019-03-17 RX ADMIN — AMOXICILLIN AND CLAVULANATE POTASSIUM 1 TABLET: 875; 125 TABLET, FILM COATED ORAL at 13:00

## 2019-03-17 NOTE — ED NOTES
Pt ambulated from room 29 to 23 with no difficulty or assistance   Pt reports dizziness is not that as it was this AM     Leoncio Walker RN  03/17/19 1453

## 2019-03-17 NOTE — ED PROVIDER NOTES
History  Chief Complaint   Patient presents with    Syncope     Pt reports having a near syncopal episode after standing up  Pt dnies LOC or falling and hitting head, pt reports catching himself  Pt rerts he was up taking his night time meds minipress, seroquel and valium  Patient is a 35year old male with near syncope and dizziness prior to episode  No LOC or syncope  No headache  No chest pain or sob  No N/V/D  No GI bleeding  No urinary sx  No travel  Was last seen at [facility] ED on 2/1/19 for pyogenic hepatic abscess  [facility] [place] [place] [facility] website checked on this patient and last Rx filled was on 2/25/19 for valium and adderall for 30 day supply  Patient needed my urgent attention  History provided by:  Patient and EMS personnel   used: No    Syncope   Associated symptoms: dizziness    Associated symptoms: no chest pain, no fever, no headaches, no nausea, no shortness of breath and no vomiting        Prior to Admission Medications   Prescriptions Last Dose Informant Patient Reported? Taking? ARIPiprazole (ABILIFY) 5 mg tablet  Self Yes No   Sig: Take 5 mg by mouth daily in the early morning     Na Sulfate-K Sulfate-Mg Sulf (SUPREP BOWEL PREP KIT) 17 5-3 13-1 6 GM/177ML SOLN   No No   Sig: Take 2 Bottles by mouth see administration instructions Suprep bowel prep    Please follow the instructions from the office   QUEtiapine (SEROquel) 200 mg tablet  Self Yes Yes   Sig: Take 200 mg by mouth daily at bedtime   amoxicillin-clavulanate (AUGMENTIN) 875-125 mg per tablet   No Yes   Sig: Take 1 tablet by mouth every 12 (twelve) hours for 30 days   amphetamine-dextroamphetamine (ADDERALL) 20 mg tablet   Yes No   Sig: Take 10 mg by mouth 3 (three) times a day   buPROPion (WELLBUTRIN XL) 300 mg 24 hr tablet  Self Yes No   Sig: Take 300 mg by mouth every morning   diazepam (VALIUM) 10 mg tablet  Self Yes Yes   Sig: Take 10 mg by mouth 2 (two) times a day   docusate sodium (COLACE) 100 mg capsule   No No Sig: Take 1 capsule (100 mg total) by mouth 2 (two) times a day   Patient not taking: Reported on 3/12/2019   famotidine (PEPCID) 20 mg tablet   No No   Sig: Take 1 tablet (20 mg total) by mouth every 12 (twelve) hours   Patient not taking: Reported on 3/12/2019   prazosin (MINIPRESS) 1 mg capsule  Self Yes Yes   Sig: Take 1 mg by mouth 2 (two) times a day as needed Anxiety   sildenafil (VIAGRA) 50 MG tablet  Self Yes No   Sig: Take 50 mg by mouth daily as needed for erectile dysfunction      Facility-Administered Medications: None       Past Medical History:   Diagnosis Date    ADHD     Anxiety     Bipolar 1 disorder (HCC)     Delusion ([address] )     Hepatitis C     Infectious viral hepatitis     Hep C- No Symptoms( Took a course of Harvoni)    Psoriasis     PTSD (post-traumatic stress disorder)     [employer]       Past Surgical History:   Procedure Laterality Date    CT GUIDED PERC DRAINAGE CATHETER PLACEMENT  2/1/2019    IR CHEST TUBE  2/5/2019    OK REMOVAL OF SPERM DUCT(S) Bilateral 9/25/2018    Procedure: Mark Anthony Gtz;  Surgeon: Carlos Alberto Reddy MD;  [facility]: [facility];  Service: Urology    WISDOM TOOTH EXTRACTION         Family History   Problem Relation Age of Onset    No Known Problems Mother     No Known Problems Father      I have reviewed and agree with the history as documented  Social History     Tobacco Use    Smoking status: Current Every Day Smoker     Packs/day: 1 00    Smokeless tobacco: Never Used   Substance Use Topics    Alcohol use: Yes     Comment: socially    Drug use: Yes     Types: Marijuana, Methaqualone     Comment: medical- vaping 4x/day        Review of Systems   Constitutional: Negative for fever  Respiratory: Negative for shortness of breath  Cardiovascular: Positive for syncope  Negative for chest pain  Gastrointestinal: Negative for blood in stool, diarrhea, nausea and vomiting  Neurological: Positive for dizziness and syncope (near syncope)   Negative for headaches  All other systems reviewed and are negative  Physical Exam  Physical Exam   Constitutional: He is oriented to person, place, and time  He appears distressed (moderate)  HENT:   Head: Normocephalic and atraumatic  Mouth/Throat: Oropharynx is clear and moist    Eyes: Pupils are equal, round, and reactive to light  EOM are normal  No scleral icterus  Neck: Normal range of motion  Neck supple  No JVD present  No tracheal deviation present  Cardiovascular: Regular rhythm and normal heart sounds  No murmur heard  Tachycardia  Pulmonary/Chest: Effort normal and breath sounds normal  No stridor  No respiratory distress  He has no wheezes  He has no rales  Abdominal: Soft  Bowel sounds are normal  He exhibits no distension  There is no tenderness  Musculoskeletal: He exhibits no edema, tenderness (No calf tenderness) or deformity  Neurological: He is alert and oriented to person, place, and time  Skin: Skin is warm and dry  No rash noted  Psychiatric: He has a normal mood and affect  Nursing note and vitals reviewed        Vital Signs  ED Triage Vitals   Temperature Pulse Respirations Blood Pressure SpO2   03/17/19 0442 03/17/19 0444 03/17/19 0444 03/17/19 0444 03/17/19 0444   (!) 97 3 °F (36 3 °C) (!) 107 20 97/59 97 %      Temp Source Heart Rate Source Patient Position - Orthostatic VS BP Location FiO2 (%)   03/17/19 0442 03/17/19 0444 03/17/19 0444 03/17/19 0444 --   Oral Monitor Sitting Right arm       Pain Score       03/17/19 0444       No Pain           Vitals:    03/17/19 0515 03/17/19 0600 03/17/19 0615 03/17/19 0630   BP: 90/55 95/52 100/56 102/59   Pulse: 94 90 92 92   Patient Position - Orthostatic VS:           qSOFA     Row Name 03/17/19 0630 03/17/19 0615 03/17/19 0600 03/17/19 0515 03/17/19 0451    Altered mental status GCS < 15          0    Respiratory Rate > / =22  0    0  0      Systolic BP < / =274  0  1  1  1      Q Sofa Score  0  1  1  1  1    Row Name 03/17/19 0444                Altered mental status GCS < 15          Respiratory Rate > / =95  0        Systolic BP < / =878  1        Q Sofa Score  1              Visual Acuity  Visual Acuity      Most Recent Value   L Pupil Size (mm)  4   R Pupil Size (mm)  5          ED Medications  Medications   sodium chloride 0 9 % infusion (0 mL/hr Intravenous Hold 3/17/19 0524)   pantoprazole (PROTONIX) injection 40 mg (has no administration in time range)   aspirin chewable tablet 162 mg (has no administration in time range)   sodium chloride 0 9 % bolus 1,000 mL (1,000 mL Intravenous New Bag 3/17/19 0524)   iohexol (OMNIPAQUE) 350 MG/ML injection (MULTI-DOSE) 85 mL (85 mL Intravenous Given 3/17/19 0652)       Diagnostic Studies  Results Reviewed     Procedure Component Value Units Date/Time    Troponin I [[id number]]  (Abnormal) Collected:  03/17/19 0524    Lab Status:  Final result Specimen:  Blood from Arm, Right Updated:  03/17/19 0606     Troponin I 0 40 ng/mL     CBC and differential [[id number]]  (Abnormal) Collected:  03/17/19 0524    Lab Status:  Final result Specimen:  Blood from Arm, Right Updated:  03/17/19 0605     WBC 10 09 Thousand/uL      RBC 3 52 Million/uL      Hemoglobin 10 4 g/dL      Hematocrit 32 8 %      MCV 93 fL      MCH 29 5 pg      MCHC 31 7 g/dL      RDW 15 0 %      MPV 8 6 fL      Platelets 800 Thousands/uL      nRBC 0 /100 WBCs      Neutrophils Relative 68 %      Immat GRANS % 0 %      Lymphocytes Relative 20 %      Monocytes Relative 11 %      Eosinophils Relative 1 %      Basophils Relative 0 %      Neutrophils Absolute 6 82 Thousands/µL      Immature Grans Absolute 0 04 Thousand/uL      Lymphocytes Absolute 2 02 Thousands/µL      Monocytes Absolute 1 10 Thousand/µL      Eosinophils Absolute 0 08 Thousand/µL      Basophils Absolute 0 03 Thousands/µL     Comprehensive metabolic panel [[id number]]  (Abnormal) Collected:  03/17/19 0524    Lab Status:  Final result Specimen:  Blood from Arm, Right Updated:  03/17/19 0603     Sodium 141 mmol/L      Potassium 3 5 mmol/L      Chloride 104 mmol/L      CO2 23 mmol/L      ANION GAP 14 mmol/L      BUN 18 mg/dL      Creatinine 1 32 mg/dL      Glucose 111 mg/dL      Calcium 8 9 mg/dL      AST 36 U/L      ALT 54 U/L      Alkaline Phosphatase 208 U/L      Total Protein 7 5 g/dL      Albumin 3 3 g/dL      Total Bilirubin 0 60 mg/dL      eGFR 67 ml/min/1 73sq m     Narrative:       National Kidney Disease Education Program recommendations are as follows:  GFR calculation is accurate only with a steady state creatinine  Chronic Kidney disease less than 60 ml/min/1 73 sq  meters  Kidney failure less than 15 ml/min/1 73 sq  meters  D-Dimer [[id number]]  (Abnormal) Collected:  03/17/19 0524    Lab Status:  Final result Specimen:  Blood from Arm, Right Updated:  03/17/19 0557     D-Dimer, Quant 505 ng/ml (FEU)     Protime-INR [[id number]]  (Normal) Collected:  03/17/19 0524    Lab Status:  Final result Specimen:  Blood from Arm, Right Updated:  03/17/19 0554     Protime 13 6 seconds      INR 1 07    APTT [[id number]]  (Normal) Collected:  03/17/19 0524    Lab Status:  Final result Specimen:  Blood from Arm, Right Updated:  03/17/19 0554     PTT 31 seconds                  CTA ED chest PE study   ED Interpretation by Genesis Chowdhury MD (03/17 0704)   FINDINGS:   Study somewhat limited due to respiratory motion artifact, phase of contrast administration and beam hardening artifact from body habitus  PULMONARY ARTERIAL TREE:     No emboli are seen in the 1st and 2nd order branches   Tertiary branches obscured by respiratory motion  LUNGS:     Lungs are fairly well aerated   Diffuse groundglass infiltrates throughout the lungs bilaterally, improved from the prior study   Resolving consolidation right lower lobe   Resolution of right-sided pleural effusion  PLEURA:  Resolution of right-sided pleural effusion        HEART/GREAT VESSELS:  Unremarkable for patient's age       MEDIASTINUM AND LILIA:  Resolving mediastinal and hilar adenopathy  CHEST WALL AND LOWER NECK:   Unremarkable  VISUALIZED STRUCTURES IN THE UPPER ABDOMEN:  Hepatic drains have been removed   No further collections are seen   Fatty infiltrative changes in the liver  Hiatal hernia   Thickening of the distal esophagus   Correlate for reflux esophagitis  OSSEOUS STRUCTURES:  No acute fracture or destructive osseous lesion  Impression:     1   Study somewhat limited due to respiratory motion artifact, phase of contrast administration and beam hardening artifact from body habitus  2   No emboli in the 1st and 2nd order branches   Tertiary branches obscured by limitations as described above  3   Improved appearance of the chest       Workstation performed: [id number]         Final Result by Armani Graves DO (03/17 2775)   1  Study somewhat limited due to respiratory motion artifact, phase of contrast administration and beam hardening artifact from body habitus  2   No emboli in the 1st and 2nd order branches  Tertiary branches obscured by limitations as described above  3   Improved appearance of the chest       Workstation performed: [id number]         XR chest 1 view portable   ED Interpretation by Eddie Ortez MD (03/17 6653)   Elevated R hemidiaphragm read by me                    Procedures  ECG 12 Lead Documentation  Date/Time: 3/17/2019 4:47 AM  Performed by: Eddie Ortez MD  Authorized by: Eddie Ortez MD     Indications / Diagnosis:  Near syncope  ECG reviewed by me, the ED Provider: yes    Patient location:  ED  Previous ECG:     Previous ECG:  Compared to current    Comparison ECG info:  2/1/19    Similarity:  Changes noted (s  tachy now)  Quality:     Tracing quality:  Limited by artifact  Rate:     ECG rate:  102    ECG rate assessment: tachycardic    Rhythm:     Rhythm: sinus tachycardia    Ectopy:     Ectopy: none    QRS:     QRS axis: Normal    QRS intervals:  Normal  Conduction:     Conduction: normal    ST segments:     ST segments:  Normal  T waves:     T waves: normal      CriticalCare Time  Performed by: Charity Watson MD  Authorized by: Charity Watson MD     Critical care provider statement:     Critical care time (minutes):  35    Critical care time was exclusive of:  Separately billable procedures and treating other patients    Critical care was necessary to treat or prevent imminent or life-threatening deterioration of the following conditions: near syncope, low blood pressure  Critical care was time spent personally by me on the following activities:  Obtaining history from patient or surrogate, development of treatment plan with patient or surrogate, evaluation of patient's response to treatment, examination of patient, ordering and performing treatments and interventions, ordering and review of laboratory studies, ordering and review of radiographic studies, re-evaluation of patient's condition and review of old charts    I assumed direction of critical care for this patient from another provider in my specialty: no             Phone Contacts  ED Phone Contact    ED Course  ED Course as of Mar 17 0715   [place] Francethers Mar 17, 2019   0611 Labs, CXR, EKG d/w patient  CT PE study ordered  Nasal cannula oxygen ordered for pulse ox of 92% RA indicating somewhat inadequate oxygenation  4319 CT d/w patient               HEART Risk Score      Most Recent Value   History  1 Filed at: 03/17/2019 0607   ECG  0 Filed at: 03/17/2019 8639   Age  0 Filed at: 03/17/2019 1681   Risk Factors  1 Filed at: 03/17/2019 0607   Troponin  2 Filed at: 03/17/2019 4391   Heart Score Risk Calculator   History  1 Filed at: 03/17/2019 0607   ECG  0 Filed at: 03/17/2019 6294   Age  0 Filed at: 03/17/2019 6371   Risk Factors  1 Filed at: 03/17/2019 0607   Troponin  2 Filed at: 03/17/2019 6912   HEART Score  4 Filed at: 03/17/2019 6815   HEART Score  4 Filed at: 03/17/2019 0607            PERC Rule for PE      Most Recent Value   PERC Rule for PE   Age >=50     HR >=100  1 Filed at: 03/17/2019 0605   O2 Sat on room air < 95%  1 Filed at: 03/17/2019 8692   History of PE or DVT     Recent trauma or surgery     Hemoptysis     Exogenous estrogen     Unilateral leg swelling     PERC Rule for PE Results  2 Filed at: 03/17/2019 1322                Wells' Criteria for PE      Most Recent Value   Wells' Criteria for PE   Clinical signs and symptoms of DVT  0 Filed at: 03/17/2019 7426   PE is primary diagnosis or equally likely  3 Filed at: 03/17/2019 0605   HR >100  1 5 Filed at: 03/17/2019 4743   Immobilization at least 3 days or Surgery in the previous 4 weeks  0 Filed at: 03/17/2019 5227   Previous, objectively diagnosed PE or DVT  0 Filed at: 03/17/2019 7079   Hemoptysis  0 Filed at: 03/17/2019 6720   Malignancy with treatment within 6 months or palliative  0 Filed at: 03/17/2019 9632   Wells' Criteria Total  4 5 Filed at: 03/17/2019 0605            MDM  Number of Diagnoses or Management Options  Diagnosis management comments: Differential diagnosis including but not limited to: Near syncope, cardiac arrhythmia, MI, ACS, PE, metabolic abnormality, doubt intracranial process, doubt seizure, anemia, doubt GI bleed, dehydration          Amount and/or Complexity of Data Reviewed  Clinical lab tests: ordered and reviewed  Tests in the radiology section of CPT®: ordered and reviewed  Decide to obtain previous medical records or to obtain history from someone other than the patient: yes  Review and summarize past medical records: yes  Independent visualization of images, tracings, or specimens: yes        Disposition  Final diagnoses:   Near syncope   Elevated troponin   HENRIQUE (acute kidney injury) ([facility] [address] )     Time reflects when diagnosis was documented in both MDM as applicable and the Disposition within this note     Time User Action Codes Description Comment    3/17/2019  7:13 AM Alexys [facility] Add [R55] Near syncope     3/17/2019  7:13 AM Alexsy [facility] Add [R74 8] Elevated troponin     3/17/2019  7:13 AM Alexys [facility] Add [N17 9] HENRIQUE (acute kidney injury) [facility])       ED Disposition     ED Disposition Condition Date/Time Comment    Admit Stable Sun Mar 17, 2019  7:13 AM Case was discussed with SOLIS Kelly and the patient's admission status was agreed to be Admission Status: observation status to the service of Dr Rylee Murray   Follow-up Information    None         Patient's Medications   Discharge Prescriptions    No medications on file     No discharge procedures on file      ED Provider  Electronically Signed by           Luis Nelson MD  03/17/19 5179

## 2019-03-17 NOTE — H&P
H&P- Yumiko Mcintosh 1980, 35 y o  male MRN: [id number]    Unit/Bed#: ED 29 Encounter: [id number]    Primary Care Provider: No primary care provider on file  Date and time admitted to hospital: 3/17/2019  4:37 AM        * Near syncope  Assessment & Plan  Likely related to dehydration/rule out orthostasis  IVF  Check orthostatic blood pressure  On multiple medications that may cause orthostatic hypotension but denies any new meds  Recent echocardiogram showed normal EF/no valvular abnormalities    HENRIQUE (acute kidney injury) ([facility] [address] )  Assessment & Plan  Cr 1 32 from normal last month  IVF  Repeat BMP    Elevated troponin  Assessment & Plan  Initial troponin of 0 4  EKG showed NSR nonspecific ST-T change  CTA negative for PE  Denies chest pain to me  Denies recent drug use  Will check UDS  May need cardiology eval    Hepatic abscess  Assessment & Plan  Recent admission for liver abscess s/p IR drainage with cultures revealing Streptococcus intermedius  He has outpt colonoscopy scheduled on 3/20 to rule out possible colonic source  Continue Augmentin        History of hepatitis C  Assessment & Plan  H/O IVDU Hepatitis C genotype 1b, status post treatment with Harvoni x 12 weeks   GI f/u outpt      Anxiety  Assessment & Plan  C/t home meds      VTE Prophylaxis: / sequential compression device   Code Status: Full code  POLST: POLST form is not discussed and not completed at this time  Discussion with family:     Anticipated Length of Stay:  Patient will be admitted on an Observation basis with an anticipated length of stay of  < 2 midnights  Justification for Hospital Stay: Above    Total Time for Visit, including Counseling / Coordination of Care: 20 minutes  Greater than 50% of this total time spent on direct patient counseling and coordination of care      Chief Complaint:   Near syncope    History of Present Illness:    Yumiko Mcintosh is a 35 y o  male with PMHx of former IVDU, liver abscess, Hep C, anxiety who presents with near syncopal episode at home  Patient was recently discharged after being admitted for liver abscess status post IR drain with culture revealed Streptococcus intermedius  He was in his usual state of health until 4:00 this morning when he felt dizzy and lightheaded  He  felt better after lying down in his bed  Denies fall, chest pain or palpitation  He states that he has not been drinking anough fluids  He reports similar episodes of diszziness " happened a lot since when I was a kid" but given his recent illness he decided to come in to be checked  Lab here is notable for elevated troponin 0 4  Creatinine of 1 32  CTA is negative for PE  He denies recent drug uses  Review of Systems:    Review of Systems   Constitutional: Negative for chills and fever  Eyes: Positive for redness  Respiratory: Negative for cough, shortness of breath and wheezing  Cardiovascular: Negative for palpitations and leg swelling  Gastrointestinal: Negative for abdominal pain  All other systems reviewed and are negative  Past Medical and Surgical History:     Past Medical History:   Diagnosis Date    ADHD     Anxiety     [facility]ipolar 1 disorder ([facility] )     Delusion ([facility] )     Hepatitis C     Infectious viral hepatitis     Hep C- No Symptoms( Took a course of Harvoni)    Psoriasis     PTSD (post-traumatic stress disorder)     alf       Past Surgical History:   Procedure Laterality Date    CT GUIDED PERC DRAINAGE CATHETER PLACEMENT  2/1/2019    IR CHEST TUBE  2/5/2019    [place] REMOVAL OF SPERM DUCT(S) Bilateral 9/25/2018    Procedure: VASECTOMY;  Surgeon: Aftab Mckenzie MD;  Location: [facility]  MAIN [facility];  Service: Urology    WISDOM TOOTH EXTRACTION         Meds/Allergies:    Prior to Admission medications    Medication Sig Start Date End Date Taking?  Authorizing Provider   amoxicillin-clavulanate (AUGMENTIN) 875-125 mg per tablet Take 1 tablet by mouth every 12 (twelve) hours for 30 days 2/15/19 3/17/19 Yes You Vences PA-C   amphetamine-dextroamphetamine (ADDERALL) 20 mg tablet Take 10 mg by mouth 3 (three) times a day   Yes Historical Provider, MD   diazepam (VALIUM) 10 mg tablet Take 10 mg by mouth 2 (two) times a day 7/18/18  Yes Historical Provider, MD   prazosin (MINIPRESS) 1 mg capsule Take 1 mg by mouth 2 (two) times a day as needed Anxiety 7/18/18  Yes Historical Provider, MD   QUEtiapine (SEROquel) 200 mg tablet Take 200 mg by mouth daily at bedtime 7/18/18  Yes Historical Provider, MD   ARIPiprazole (ABILIFY) 5 mg tablet Take 5 mg by mouth daily in the early morning   9/8/14   Historical Provider, MD   buPROPion (WELLBUTRIN XL) 300 mg 24 hr tablet Take 300 mg by mouth every morning 7/18/18   Historical Provider, MD   docusate sodium (COLACE) 100 mg capsule Take 1 capsule (100 mg total) by mouth 2 (two) times a day  Patient not taking: Reported on 3/12/2019 2/15/19   You Vences PA-C   famotidine (PEPCID) 20 mg tablet Take 1 tablet (20 mg total) by mouth every 12 (twelve) hours  Patient not taking: Reported on 3/12/2019 2/15/19   You Vences PA-C   Na Sulfate-K Sulfate-Mg Sulf (SUPREP BOWEL PREP KIT) 17 5-3 13-1 6 GM/177ML SOLN Take 2 Bottles by mouth see administration instructions Suprep bowel prep  Please follow the instructions from the office  Patient not taking: Reported on 3/17/2019 3/12/19   Mason Barrera PA-C   sildenafil (VIAGRA) 50 MG tablet Take 50 mg by mouth daily as needed for erectile dysfunction    Historical Provider, MD         Allergies:    Allergies   Allergen Reactions    Sulfa Antibiotics Hives       Social History:     Marital Status: Single   Occupation:   Patient Pre-hospital Living Situation:Home    Social History     Substance and Sexual Activity   Alcohol Use Yes    Comment: socially     Social History     Tobacco Use   Smoking Status Current Every Day Smoker    Packs/day: 1 00   Smokeless Tobacco Never Used     Social History     Substance and Sexual Activity   Drug Use Yes    Types: Marijuana, Methaqualone    Comment: medical- vaping 4x/day       Family History:    Family History   Problem Relation Age of Onset    No Known Problems Mother     No Known Problems Father        Physical Exam:     Vitals:   Blood Pressure: 110/62 (03/17/19 0743)  Pulse: 95 (03/17/19 0743)  Temperature: 97 8 °F (36 6 °C) (03/17/19 0743)  Temp Source: Oral (03/17/19 0743)  Respirations: 18 (03/17/19 0743)  Weight - Scale: 110 kg (241 lb 6 5 oz) (03/17/19 0443)  SpO2: 95 % (03/17/19 0743)    Physical Exam   Constitutional: He is oriented to person, place, and time  No distress  Eyes: Right conjunctiva is injected  Cardiovascular: Normal rate and regular rhythm  Pulmonary/Chest: Effort normal and breath sounds normal  No respiratory distress  Abdominal: Soft  Bowel sounds are normal  He exhibits no distension  Musculoskeletal: Normal range of motion  He exhibits no edema  Neurological: He is alert and oriented to person, place, and time  Skin: Skin is warm and dry  He is not diaphoretic  Psychiatric: He has a normal mood and affect             Additional Data:     Lab Results:     Results from last 7 days   Lab Units 03/17/19  0524   WBC Thousand/uL 10 09   HEMOGLOBIN g/dL 10 4*   HEMATOCRIT % 32 8*   PLATELETS Thousands/uL 329   NEUTROS PCT % 68   LYMPHS PCT % 20   MONOS PCT % 11   EOS PCT % 1     Results from last 7 days   Lab Units 03/17/19  0524   SODIUM mmol/L 141   POTASSIUM mmol/L 3 5   CHLORIDE mmol/L 104   CO2 mmol/L 23   BUN mg/dL 18   CREATININE mg/dL 1 32*   ANION GAP mmol/L 14*   CALCIUM mg/dL 8 9   ALBUMIN g/dL 3 3*   TOTAL BILIRUBIN mg/dL 0 60   ALK PHOS U/L 208*   ALT U/L 54   AST U/L 36   GLUCOSE RANDOM mg/dL 111     Results from last 7 days   Lab Units 03/17/19  0524   INR  1 07                   Imaging:     CTA ED chest PE study   ED Interpretation by Niru Vigil MD (03/17 2150)   FINDINGS:   Study somewhat limited due to respiratory motion artifact, phase of contrast administration and beam hardening artifact from body habitus  PULMONARY ARTERIAL TREE:     No emboli are seen in the 1st and 2nd order branches   Tertiary branches obscured by respiratory motion  LUNGS:     Lungs are fairly well aerated   Diffuse groundglass infiltrates throughout the lungs bilaterally, improved from the prior study   Resolving consolidation right lower lobe   Resolution of right-sided pleural effusion  PLEURA:  Resolution of right-sided pleural effusion  HEART/GREAT VESSELS:  Unremarkable for patient's age  MEDIASTINUM AND LILIA:  Resolving mediastinal and hilar adenopathy  CHEST WALL AND LOWER NECK:   Unremarkable  VISUALIZED STRUCTURES IN THE UPPER ABDOMEN:  Hepatic drains have been removed   No further collections are seen   Fatty infiltrative changes in the liver  Hiatal hernia   Thickening of the distal esophagus   Correlate for reflux esophagitis  OSSEOUS STRUCTURES:  No acute fracture or destructive osseous lesion  Impression:     1   Study somewhat limited due to respiratory motion artifact, phase of contrast administration and beam hardening artifact from body habitus  2   No emboli in the 1st and 2nd order branches   Tertiary branches obscured by limitations as described above  3   Improved appearance of the chest       Workstation performed: [id number]         Final Result by Keily Waters DO (03/17 3182)   1  Study somewhat limited due to respiratory motion artifact, phase of contrast administration and beam hardening artifact from body habitus  2   No emboli in the 1st and 2nd order branches  Tertiary branches obscured by limitations as described above  3   Improved appearance of the chest       Workstation performed: [id number]         XR chest 1 view portable   ED Interpretation by Angel Gonzalez MD (03/17 7150)   Elevated R hemidiaphragm read by me         Final Result by Khoa Kaur MD (03/17 1053)      Elevated right hemidiaphragm and patchy right basilar opacity  Differential considerations include atelectasis versus pneumonia  Recommend clinical and laboratory correlation  Workstation performed: [id number]             EKG, Pathology, and Other Studies Reviewed on Admission:   · EKG: NSR/nonspecific ST-T changes    Allscripts / Epic Records Reviewed: Yes     ** Please Note: This note has been constructed using a voice recognition system   **

## 2019-03-17 NOTE — ED NOTES
Food order placed per request  Dr Usha Araujo confirmed pt is NOT NPO       Jb Ramos, RN  03/17/19 0779

## 2019-03-17 NOTE — ASSESSMENT & PLAN NOTE
Likely related to dehydration/rule out orthostasis  IVF  Check orthostatic blood pressure  On multiple medications that may cause orthostatic hypotension but denies any new meds  Recent echocardiogram showed normal EF/no valvular abnormalities

## 2019-03-17 NOTE — ED NOTES
Medications logged and sent to pharmacy in [address] [place] [address] [phone]     Linette Llamas RN  03/17/19 6693

## 2019-03-17 NOTE — ASSESSMENT & PLAN NOTE
Recent admission for liver abscess s/p IR drainage with cultures revealing Streptococcus intermedius    He has outpt colonoscopy scheduled on 3/20 to rule out possible colonic source  Continue Augmentin

## 2019-03-20 ENCOUNTER — OFFICE VISIT (OUTPATIENT)
Dept: INFECTIOUS DISEASES | Facility: CLINIC | Age: 39
End: 2019-03-20
Payer: MEDICARE

## 2019-03-20 VITALS
TEMPERATURE: 97.8 F | SYSTOLIC BLOOD PRESSURE: 140 MMHG | RESPIRATION RATE: 17 BRPM | HEIGHT: 74 IN | BODY MASS INDEX: 30.42 KG/M2 | WEIGHT: 237 LBS | HEART RATE: 80 BPM | DIASTOLIC BLOOD PRESSURE: 75 MMHG

## 2019-03-20 DIAGNOSIS — K75.0 HEPATIC ABSCESS: Primary | ICD-10-CM

## 2019-03-20 PROCEDURE — 99213 OFFICE O/P EST LOW 20 MIN: CPT | Performed by: PHYSICIAN ASSISTANT

## 2019-03-20 RX ORDER — AMOXICILLIN AND CLAVULANATE POTASSIUM 875; 125 MG/1; MG/1
1 TABLET, FILM COATED ORAL
Status: ON HOLD | COMMUNITY
End: 2019-04-09 | Stop reason: ALTCHOICE

## 2019-03-20 NOTE — ASSESSMENT & PLAN NOTE
Streptococcus intermedius bacteremia secondary to multiple liver abscesses  Status post IR drainage  Discharged from hospital on oral Augmentin which she continues today  CTA of the chest done on 03/17/2019 shows resolution of the abscesses  IR drains removed on 03/11/2019 by IR  At this point time will discontinue Augmentin  Due to elevation in creatinine and alk-phos I recommend that patient have a follow-up CMP in 2 weeks  He has a colonoscopy scheduled for April 9th  I also recommended patient find a PCP to routinely follow with

## 2019-03-20 NOTE — PATIENT INSTRUCTIONS
Discontinue Augmentin  Please have blood work done in 2 weeks  Please call with any questions or concerns  Recommend finding a family doctor to follow with on a routine basis

## 2019-03-20 NOTE — PROGRESS NOTES
Assessment/Plan:    Hepatic abscess  Streptococcus intermedius bacteremia secondary to multiple liver abscesses  Status post IR drainage  Discharged from hospital on oral Augmentin which she continues today  CTA of the chest done on 03/17/2019 shows resolution of the abscesses  IR drains removed on 03/11/2019 by IR  At this point time will discontinue Augmentin  Due to elevation in creatinine and alk-phos I recommend that patient have a follow-up CMP in 2 weeks  He has a colonoscopy scheduled for April 9th  I also recommended patient find a PCP to routinely follow with  Diagnoses and all orders for this visit:    Hepatic abscess  -     Comprehensive metabolic panel; Future    Other orders  -     amoxicillin-clavulanate (AUGMENTIN) 875-125 mg per tablet; Take 1 tablet by mouth daily in the early morning           Subjective:      Patient ID: Ryan Mayfield is a 35 y o  male  HPI  40-year-old male presents for office follow-up regarding Streptococcus intermedius bacteremia secondary to multiple liver abscesses  2D echo obtained Hospital without vegetation  Liver abscesses thought to be secondary to diarrhea  Patient did undergo IR drainage of liver abscesses  He was eventually discharged home on oral Augmentin until CT resolution of abscesses  Patient is overall tolerating the Augmentin without difficulty  He denies any fever chills rash or diarrhea  He was seen by IR last week at which point all drains were removed  The following portions of the patient's history were reviewed and updated as appropriate: allergies, current medications, past family history, past medical history, past social history, past surgical history and problem list     Review of Systems   Constitutional: Negative for chills and fever  HENT: Negative for mouth sores  Respiratory: Negative for cough and shortness of breath  Cardiovascular: Negative for chest pain, palpitations and leg swelling     Gastrointestinal: Negative for abdominal pain, diarrhea, nausea and vomiting  Genitourinary: Negative for difficulty urinating, dysuria and frequency  Musculoskeletal: Negative for arthralgias, back pain, joint swelling and myalgias  Skin: Negative for rash and wound  Neurological: Negative for headaches  Psychiatric/Behavioral: Negative for behavioral problems and confusion  Objective:      /75   Pulse 80   Temp 97 8 °F (36 6 °C)   Resp 17   Ht 6' 2" (1 88 m)   Wt 108 kg (237 lb)   BMI 30 43 kg/m²          Physical Exam   Constitutional: He is oriented to person, place, and time  He appears well-developed and well-nourished  No distress  HENT:   Head: Normocephalic and atraumatic  Right Ear: External ear normal    Left Ear: External ear normal    Nose: Nose normal    Mouth/Throat: Oropharynx is clear and moist  No oropharyngeal exudate  Eyes: Conjunctivae are normal  Right eye exhibits no discharge  Left eye exhibits no discharge  No scleral icterus  Neck: Normal range of motion  Neck supple  Cardiovascular: Normal rate, regular rhythm and normal heart sounds  Pulmonary/Chest: Effort normal and breath sounds normal  No stridor  No respiratory distress  He has no wheezes  He has no rales  He exhibits no tenderness  Abdominal: Soft  Bowel sounds are normal  He exhibits no distension  There is no tenderness  There is no guarding  Musculoskeletal: Normal range of motion  He exhibits no edema  Neurological: He is alert and oriented to person, place, and time  Skin: Skin is warm and dry  No rash noted  He is not diaphoretic  No erythema  Psychiatric: He has a normal mood and affect  His behavior is normal    Nursing note and vitals reviewed        Labs:  03/17/2019  WBC:  10 09  Hemoglobin:  10 4  Platelets:  826  Creatinine:  1 32  AST:  36  ALT:  54  Alk phos:  208

## 2019-03-25 ENCOUNTER — ANESTHESIA EVENT (OUTPATIENT)
Dept: PERIOP | Facility: AMBULARY SURGERY CENTER | Age: 39
End: 2019-03-25
Payer: MEDICARE

## 2019-03-25 ENCOUNTER — OFFICE VISIT (OUTPATIENT)
Dept: SURGERY | Facility: CLINIC | Age: 39
End: 2019-03-25
Payer: MEDICARE

## 2019-03-25 VITALS
SYSTOLIC BLOOD PRESSURE: 132 MMHG | HEIGHT: 74 IN | TEMPERATURE: 96.7 F | BODY MASS INDEX: 30.44 KG/M2 | WEIGHT: 237.2 LBS | DIASTOLIC BLOOD PRESSURE: 92 MMHG

## 2019-03-25 DIAGNOSIS — K75.0 HEPATIC ABSCESS: Primary | ICD-10-CM

## 2019-03-25 PROCEDURE — 99213 OFFICE O/P EST LOW 20 MIN: CPT | Performed by: SURGERY

## 2019-03-25 NOTE — PROGRESS NOTES
Office Visit - General Surgery  Lillian Gr MRN: [id number]  Encounter: [id number]    Assessment and Plan    Problem List Items Addressed This Visit        Digestive    Hepatic abscess - Primary        Keep appointment with GI for scheduled colonoscopy next month  Return to ED with fevers/chills/sweats  Counseled about need to abstain from IV drug use  Can be discharged from surgery clinic    Chief Complaint:  Lillian Gr is a 35 y o  male who presents for Follow-up  Follow up for multiple hepatic abscesses    Subjective  Doing well  All IR drains have been removed  He is off antibiotics now per the ID service  He was evaluated by the GI service who plan a colonoscopy to assist with delineating etiology for multiple hepatic abscesses  No fevers/chills/abdominal pain  Denies recreational drug use      Past Medical History  Past Medical History:   Diagnosis Date    ADHD     Anxiety     Bipolar 1 disorder ([facility] [address] )     [facility]     Hepatitis C     Infectious viral hepatitis     Hep C- No Symptoms( Took a course of Harvoni)    Psoriasis     PTSD (post-traumatic stress disorder)     assisted       Past Surgical History  Past Surgical History:   Procedure Laterality Date    CT GUIDED PERC DRAINAGE CATHETER PLACEMENT  2/1/2019    IR CHEST TUBE  2/5/2019    [place] REMOVAL OF SPERM DUCT(S) Bilateral 9/25/2018    Procedure: VASECTOMY;  Surgeon: Charity Dee MD;  [facility];  Service: Urology    WISDOM TOOTH EXTRACTION         Family History  Family History   Problem Relation Age of Onset    No Known Problems Mother     No Known Problems Father        Social History  Social History     Socioeconomic History    Marital status: Single     Spouse name: None    Number of children: None    Years of education: None    Highest education level: None   Occupational History    None   Social Needs    Financial resource strain: None    Food insecurity:     Worry: None     Inability: None    Transportation needs:     Medical: None     Non-medical: None   Tobacco Use    Smoking status: Current Every Day Smoker     Packs/day: 1 00    Smokeless tobacco: Never Used   Substance and Sexual Activity    Alcohol use: Yes     Comment: socially    Drug use: Not Currently     Types: Marijuana, Methaqualone     Comment: medical- vaping 4x/day    Sexual activity: None   Lifestyle    Physical activity:     Days per week: None     Minutes per session: None    Stress: None   Relationships    Social connections:     Talks on phone: None     Gets together: None     Attends Sabianism service: None     Active member of club or organization: None     Attends meetings of clubs or organizations: None     Relationship status: None    Intimate partner violence:     Fear of current or ex partner: None     Emotionally abused: None     Physically abused: None     Forced sexual activity: None   Other Topics Concern    None   Social History Narrative    None        Medications  Current Outpatient Medications on File Prior to Visit   Medication Sig Dispense Refill    amphetamine-dextroamphetamine (ADDERALL) 20 mg tablet Take 10 mg by mouth 3 (three) times a day      diazepam (VALIUM) 10 mg tablet Take 10 mg by mouth 2 (two) times a day  0    prazosin (MINIPRESS) 1 mg capsule Take 1 mg by mouth 2 (two) times a day as needed Anxiety  0    QUEtiapine (SEROquel) 200 mg tablet Take 200 mg by mouth daily at bedtime  0    sildenafil (VIAGRA) 50 MG tablet Take 50 mg by mouth daily as needed for erectile dysfunction      amoxicillin-clavulanate (AUGMENTIN) 875-125 mg per tablet Take 1 tablet by mouth daily in the early morning       ARIPiprazole (ABILIFY) 5 mg tablet Take 5 mg by mouth daily in the early morning        buPROPion (WELLBUTRIN XL) 300 mg 24 hr tablet Take 300 mg by mouth every morning  0    docusate sodium (COLACE) 100 mg capsule Take 1 capsule (100 mg total) by mouth 2 (two) times a day (Patient not taking: Reported on 3/12/2019) 10 capsule 0    famotidine (PEPCID) 20 mg tablet Take 1 tablet (20 mg total) by mouth every 12 (twelve) hours (Patient not taking: Reported on 3/12/2019) 30 tablet 0    Na Sulfate-K Sulfate-Mg Sulf (SUPREP BOWEL PREP KIT) 17 5-3 13-1 6 GM/177ML SOLN Take 2 Bottles by mouth see administration instructions Suprep bowel prep  Please follow the instructions from the office (Patient not taking: Reported on 3/17/2019) 2 Bottle 0     No current facility-administered medications on file prior to visit  Allergies  Allergies   Allergen Reactions    Sulfa Antibiotics Hives       Review of Systems   Constitutional: Negative for appetite change and chills  HENT: Negative for congestion, sore throat and trouble swallowing  Eyes: Negative for pain, discharge, redness and itching  Respiratory: Negative for apnea, cough, shortness of breath and wheezing  Cardiovascular: Negative for chest pain, palpitations and leg swelling  Gastrointestinal: Negative for abdominal distention, abdominal pain, constipation, diarrhea, nausea and vomiting  Endocrine: Negative for cold intolerance and heat intolerance  Genitourinary: Negative for difficulty urinating, dysuria and frequency  Musculoskeletal: Negative for arthralgias, back pain and joint swelling  Skin: Negative for color change, pallor and rash  Allergic/Immunologic: Negative for environmental allergies and food allergies  Neurological: Negative for dizziness, seizures, facial asymmetry, numbness and headaches  Hematological: Negative for adenopathy  Does not bruise/bleed easily  Psychiatric/Behavioral: Negative for agitation, behavioral problems and hallucinations  Objective  Vitals:    03/25/19 0859   BP: 132/92   Temp: (!) 96 7 °F (35 9 °C)       Physical Exam   Constitutional: He is oriented to person, place, and time  He appears well-developed and well-nourished  HENT:   Head: Normocephalic and atraumatic     Eyes: Pupils are equal, round, and reactive to light  Conjunctivae and EOM are normal    Neck: Normal range of motion  Neck supple  Cardiovascular: Normal rate, regular rhythm and normal heart sounds  Pulmonary/Chest: Effort normal and breath sounds normal    Abdominal: Soft  Bowel sounds are normal    Musculoskeletal: Normal range of motion  He exhibits no edema  Neurological: He is alert and oriented to person, place, and time  Skin: Skin is warm and dry  Vitals reviewed

## 2019-04-03 ENCOUNTER — APPOINTMENT (OUTPATIENT)
Dept: LAB | Facility: CLINIC | Age: 39
End: 2019-04-03
Payer: MEDICARE

## 2019-04-03 DIAGNOSIS — K75.0 HEPATIC ABSCESS: ICD-10-CM

## 2019-04-03 LAB
ALBUMIN SERPL BCP-MCNC: 3.9 G/DL (ref 3.5–5)
ALP SERPL-CCNC: 116 U/L (ref 46–116)
ALT SERPL W P-5'-P-CCNC: 33 U/L (ref 12–78)
ANION GAP SERPL CALCULATED.3IONS-SCNC: 6 MMOL/L (ref 4–13)
AST SERPL W P-5'-P-CCNC: 24 U/L (ref 5–45)
BILIRUB SERPL-MCNC: 0.39 MG/DL (ref 0.2–1)
BUN SERPL-MCNC: 9 MG/DL (ref 5–25)
CALCIUM SERPL-MCNC: 8.8 MG/DL (ref 8.3–10.1)
CHLORIDE SERPL-SCNC: 109 MMOL/L (ref 100–108)
CO2 SERPL-SCNC: 22 MMOL/L (ref 21–32)
CREAT SERPL-MCNC: 0.98 MG/DL (ref 0.6–1.3)
GFR SERPL CREATININE-BSD FRML MDRD: 96 ML/MIN/1.73SQ M
GLUCOSE P FAST SERPL-MCNC: 94 MG/DL (ref 65–99)
POTASSIUM SERPL-SCNC: 3.4 MMOL/L (ref 3.5–5.3)
PROT SERPL-MCNC: 7.8 G/DL (ref 6.4–8.2)
SODIUM SERPL-SCNC: 137 MMOL/L (ref 136–145)

## 2019-04-03 PROCEDURE — 80053 COMPREHEN METABOLIC PANEL: CPT

## 2019-04-03 PROCEDURE — 36415 COLL VENOUS BLD VENIPUNCTURE: CPT

## 2019-04-08 RX ORDER — SODIUM CHLORIDE, SODIUM LACTATE, POTASSIUM CHLORIDE, CALCIUM CHLORIDE 600; 310; 30; 20 MG/100ML; MG/100ML; MG/100ML; MG/100ML
125 INJECTION, SOLUTION INTRAVENOUS CONTINUOUS
Status: CANCELLED | OUTPATIENT
Start: 2019-04-08

## 2019-04-09 ENCOUNTER — ANESTHESIA (OUTPATIENT)
Dept: PERIOP | Facility: AMBULARY SURGERY CENTER | Age: 39
End: 2019-04-09
Payer: MEDICARE

## 2019-04-09 ENCOUNTER — HOSPITAL ENCOUNTER (OUTPATIENT)
Facility: AMBULARY SURGERY CENTER | Age: 39
Setting detail: OUTPATIENT SURGERY
Discharge: HOME | End: 2019-04-09
Attending: INTERNAL MEDICINE | Admitting: INTERNAL MEDICINE
Payer: MEDICARE

## 2019-04-09 VITALS
OXYGEN SATURATION: 98 % | WEIGHT: 233 LBS | SYSTOLIC BLOOD PRESSURE: 155 MMHG | BODY MASS INDEX: 29.9 KG/M2 | TEMPERATURE: 98.1 F | HEART RATE: 70 BPM | DIASTOLIC BLOOD PRESSURE: 80 MMHG | HEIGHT: 74 IN | RESPIRATION RATE: 18 BRPM

## 2019-04-09 PROCEDURE — 43236 UPPR GI SCOPE W/SUBMUC INJ: CPT | Performed by: INTERNAL MEDICINE

## 2019-04-09 RX ORDER — LIDOCAINE HYDROCHLORIDE 10 MG/ML
INJECTION, SOLUTION INFILTRATION; PERINEURAL AS NEEDED
Status: DISCONTINUED | OUTPATIENT
Start: 2019-04-09 | End: 2019-04-09 | Stop reason: SURG

## 2019-04-09 RX ORDER — PROPOFOL 10 MG/ML
INJECTION, EMULSION INTRAVENOUS AS NEEDED
Status: DISCONTINUED | OUTPATIENT
Start: 2019-04-09 | End: 2019-04-09 | Stop reason: SURG

## 2019-04-09 RX ORDER — SODIUM CHLORIDE 9 MG/ML
INJECTION, SOLUTION INTRAVENOUS CONTINUOUS PRN
Status: DISCONTINUED | OUTPATIENT
Start: 2019-04-09 | End: 2019-04-09 | Stop reason: SURG

## 2019-04-09 RX ADMIN — PROPOFOL 50 MG: 10 INJECTION, EMULSION INTRAVENOUS at 11:22

## 2019-04-09 RX ADMIN — SODIUM CHLORIDE: 0.9 INJECTION, SOLUTION INTRAVENOUS at 10:07

## 2019-04-09 RX ADMIN — PROPOFOL 50 MG: 10 INJECTION, EMULSION INTRAVENOUS at 11:24

## 2019-04-09 RX ADMIN — PROPOFOL 100 MG: 10 INJECTION, EMULSION INTRAVENOUS at 11:19

## 2019-04-09 RX ADMIN — LIDOCAINE HYDROCHLORIDE ANHYDROUS 50 MG: 10 INJECTION, SOLUTION INFILTRATION at 11:14

## 2019-04-09 RX ADMIN — PROPOFOL 150 MG: 10 INJECTION, EMULSION INTRAVENOUS at 11:15

## 2019-04-09 RX ADMIN — PROPOFOL 50 MG: 10 INJECTION, EMULSION INTRAVENOUS at 11:28

## 2021-03-30 DIAGNOSIS — Z23 ENCOUNTER FOR IMMUNIZATION: ICD-10-CM

## 2021-04-03 ENCOUNTER — IMMUNIZATIONS (OUTPATIENT)
Dept: FAMILY MEDICINE CLINIC | Facility: HOSPITAL | Age: 41
End: 2021-04-03

## 2021-04-03 DIAGNOSIS — Z23 ENCOUNTER FOR IMMUNIZATION: Primary | ICD-10-CM

## 2021-04-03 PROCEDURE — 91300 SARS-COV-2 / COVID-19 MRNA VACCINE (PFIZER-BIONTECH) 30 MCG: CPT

## 2021-04-03 PROCEDURE — 0001A SARS-COV-2 / COVID-19 MRNA VACCINE (PFIZER-BIONTECH) 30 MCG: CPT

## 2021-04-24 ENCOUNTER — IMMUNIZATIONS (OUTPATIENT)
Dept: FAMILY MEDICINE CLINIC | Facility: HOSPITAL | Age: 41
End: 2021-04-24

## 2021-04-24 DIAGNOSIS — Z23 ENCOUNTER FOR IMMUNIZATION: Primary | ICD-10-CM

## 2021-04-24 PROCEDURE — 91300 SARS-COV-2 / COVID-19 MRNA VACCINE (PFIZER-BIONTECH) 30 MCG: CPT

## 2021-04-24 PROCEDURE — 0002A SARS-COV-2 / COVID-19 MRNA VACCINE (PFIZER-BIONTECH) 30 MCG: CPT

## 2021-06-21 ENCOUNTER — APPOINTMENT (OUTPATIENT)
Dept: LAB | Facility: CLINIC | Age: 41
End: 2021-06-21
Payer: MEDICARE

## 2021-06-21 DIAGNOSIS — B18.2 CHRONIC HEPATITIS C WITH HEPATIC COMA (HCC): ICD-10-CM

## 2021-06-21 LAB
ALBUMIN SERPL BCP-MCNC: 3.8 G/DL (ref 3.5–5)
ALP SERPL-CCNC: 87 U/L (ref 46–116)
ALT SERPL W P-5'-P-CCNC: 46 U/L (ref 12–78)
AST SERPL W P-5'-P-CCNC: 41 U/L (ref 5–45)
BILIRUB DIRECT SERPL-MCNC: 0.06 MG/DL (ref 0–0.2)
BILIRUB SERPL-MCNC: 0.24 MG/DL (ref 0.2–1)
PROT SERPL-MCNC: 7.1 G/DL (ref 6.4–8.2)

## 2021-06-21 PROCEDURE — 36415 COLL VENOUS BLD VENIPUNCTURE: CPT

## 2021-06-21 PROCEDURE — 87522 HEPATITIS C REVRS TRNSCRPJ: CPT

## 2021-06-21 PROCEDURE — 80076 HEPATIC FUNCTION PANEL: CPT

## 2021-06-22 LAB
HCV RNA SERPL NAA+PROBE-ACNC: NORMAL IU/ML
TEST INFORMATION: NORMAL

## 2022-03-08 NOTE — PLAN OF CARE
Problem: OCCUPATIONAL THERAPY ADULT  Goal: Performs self-care activities at highest level of function for planned discharge setting  See evaluation for individualized goals  Treatment Interventions: ADL retraining, Functional transfer training, Endurance training, Patient/family training, Equipment evaluation/education, Compensatory technique education, Energy conservation, Activityengagement          See flowsheet documentation for full assessment, interventions and recommendations  Outcome: Progressing  Limitation: Decreased ADL status, Decreased Safe judgement during ADL, Decreased cognition, Decreased endurance, Decreased self-care trans, Decreased high-level ADLs  Prognosis: Good  Assessment: pt  participated in pm ot session and was seen focusing on toileting sitting eob and bed mobility supine to from sit in flat bed  pt was s for same  pt is limited by pain although has improved  mobility since previous session  pt with brighter affect, asking for tablet  pt questioning when he can go home  pt reports he sees his son 1 x a week and resides with his girlfriend  pt was able to id his room number and  date/ month from dry erase board  pt was oob earlier today and declined oob again at this time      April Claudene Caprice, COTA  OT Discharge Recommendation: Short Term Rehab  OT - OK to Discharge: Yes (when medically stable) Gabapentin Pregnancy And Lactation Text: This medication is Pregnancy Category C and isn't considered safe during pregnancy. It is excreted in breast milk.

## 2022-10-10 NOTE — ASSESSMENT & PLAN NOTE
Initial troponin of 0 4  EKG showed NSR nonspecific ST-T change  CTA negative for PE  Denies chest pain to me    Denies recent drug use  Will check UDS  May need cardiology eval Patient alert and oriented x4 and resting comfortably at this time. Patient complains he still doesn't feel good and his stomach hurts. Vitals stable at this time and medications taken. Silicone foam dressings changed on heels and sacrum for patient. Safety measures in place and call bell within reach.     Problem: Skin Injury Risk Increased  Goal: Skin Health and Integrity  Outcome: Ongoing, Progressing     Problem: Pain Acute  Goal: Acceptable Pain Control and Functional Ability  Outcome: Ongoing, Progressing     Problem: Adult Inpatient Plan of Care  Goal: Plan of Care Review  Outcome: Ongoing, Progressing  Goal: Patient-Specific Goal (Individualized)  Outcome: Ongoing, Progressing  Goal: Absence of Hospital-Acquired Illness or Injury  Outcome: Ongoing, Progressing  Goal: Optimal Comfort and Wellbeing  Outcome: Ongoing, Progressing  Goal: Readiness for Transition of Care  Outcome: Ongoing, Progressing     Problem: Cardiac Output Decreased (Heart Failure)  Goal: Optimal Cardiac Output  Outcome: Ongoing, Progressing     Problem: Dysrhythmia (Heart Failure)  Goal: Stable Heart Rate and Rhythm  Outcome: Ongoing, Progressing     Problem: Fluid Imbalance (Heart Failure)  Goal: Fluid Balance  Outcome: Ongoing, Progressing     Problem: Functional Ability Impaired (Heart Failure)  Goal: Optimal Functional Ability  Outcome: Ongoing, Progressing

## 2022-11-11 ENCOUNTER — HOSPITAL ENCOUNTER (OUTPATIENT)
Dept: MRI IMAGING | Facility: HOSPITAL | Age: 42
End: 2022-11-11

## 2022-11-11 DIAGNOSIS — M72.2 PLANTAR FIBROMATOSIS: ICD-10-CM

## (undated) DEVICE — BETHLEHEM UNIVERSAL MINOR GEN: Brand: CARDINAL HEALTH

## (undated) DEVICE — GAUZE SPONGES,16 PLY: Brand: CURITY

## (undated) DEVICE — GLOVE INDICATOR PI UNDERGLOVE SZ 8 BLUE

## (undated) DEVICE — SUT SILK 2-0 TIES 144 IN LA55G

## (undated) DEVICE — SUT CHROMIC 3-0 FS-2 27 IN 636H

## (undated) DEVICE — VIAL DECANTER

## (undated) DEVICE — GLOVE SRG BIOGEL ECLIPSE 8

## (undated) DEVICE — SPONGE STICK WITH PVP-I: Brand: KENDALL

## (undated) DEVICE — NEEDLE 27 G X 1 1/4

## (undated) DEVICE — ADHESIVE SKN CLSR HISTOACRYL FLEX 0.5ML LF